# Patient Record
Sex: MALE | Race: WHITE | Employment: OTHER | URBAN - METROPOLITAN AREA
[De-identification: names, ages, dates, MRNs, and addresses within clinical notes are randomized per-mention and may not be internally consistent; named-entity substitution may affect disease eponyms.]

---

## 2017-08-10 ENCOUNTER — OFFICE VISIT (OUTPATIENT)
Dept: INTERNAL MEDICINE CLINIC | Age: 67
End: 2017-08-10

## 2017-08-10 VITALS
OXYGEN SATURATION: 97 % | BODY MASS INDEX: 28.05 KG/M2 | TEMPERATURE: 97.8 F | SYSTOLIC BLOOD PRESSURE: 130 MMHG | WEIGHT: 225.6 LBS | DIASTOLIC BLOOD PRESSURE: 72 MMHG | HEIGHT: 75 IN | RESPIRATION RATE: 12 BRPM | HEART RATE: 66 BPM

## 2017-08-10 DIAGNOSIS — Z87.891 HX OF SMOKING: ICD-10-CM

## 2017-08-10 DIAGNOSIS — E78.00 PURE HYPERCHOLESTEROLEMIA: ICD-10-CM

## 2017-08-10 DIAGNOSIS — E11.9 TYPE 2 DIABETES MELLITUS WITHOUT COMPLICATION, WITHOUT LONG-TERM CURRENT USE OF INSULIN (HCC): Primary | ICD-10-CM

## 2017-08-10 DIAGNOSIS — I10 ESSENTIAL HYPERTENSION: ICD-10-CM

## 2017-08-10 DIAGNOSIS — Z13.6 SCREENING FOR AAA (ABDOMINAL AORTIC ANEURYSM): ICD-10-CM

## 2017-08-10 DIAGNOSIS — Z12.5 PROSTATE CANCER SCREENING: ICD-10-CM

## 2017-08-10 PROCEDURE — 99214 OFFICE O/P EST MOD 30 MIN: CPT | Performed by: INTERNAL MEDICINE

## 2017-08-10 PROCEDURE — G8427 DOCREV CUR MEDS BY ELIG CLIN: HCPCS | Performed by: INTERNAL MEDICINE

## 2017-08-10 PROCEDURE — 3046F HEMOGLOBIN A1C LEVEL >9.0%: CPT | Performed by: INTERNAL MEDICINE

## 2017-08-10 PROCEDURE — 3017F COLORECTAL CA SCREEN DOC REV: CPT | Performed by: INTERNAL MEDICINE

## 2017-08-10 PROCEDURE — G8598 ASA/ANTIPLAT THER USED: HCPCS | Performed by: INTERNAL MEDICINE

## 2017-08-10 PROCEDURE — 1123F ACP DISCUSS/DSCN MKR DOCD: CPT | Performed by: INTERNAL MEDICINE

## 2017-08-10 PROCEDURE — 1036F TOBACCO NON-USER: CPT | Performed by: INTERNAL MEDICINE

## 2017-08-10 PROCEDURE — G8419 CALC BMI OUT NRM PARAM NOF/U: HCPCS | Performed by: INTERNAL MEDICINE

## 2017-08-10 PROCEDURE — 4040F PNEUMOC VAC/ADMIN/RCVD: CPT | Performed by: INTERNAL MEDICINE

## 2017-08-10 RX ORDER — HYDROCHLOROTHIAZIDE 12.5 MG/1
12.5 CAPSULE, GELATIN COATED ORAL EVERY MORNING
Qty: 90 CAPSULE | Refills: 1 | Status: SHIPPED | OUTPATIENT
Start: 2017-08-10 | End: 2018-02-06 | Stop reason: SDUPTHER

## 2017-08-10 RX ORDER — PIOGLITAZONEHYDROCHLORIDE 30 MG/1
30 TABLET ORAL DAILY
Qty: 90 TABLET | Refills: 1 | Status: SHIPPED | OUTPATIENT
Start: 2017-08-10 | End: 2018-02-12 | Stop reason: SDUPTHER

## 2017-08-10 RX ORDER — LANCETS
1 EACH MISCELLANEOUS DAILY
Qty: 300 EACH | Refills: 0 | Status: SHIPPED | OUTPATIENT
Start: 2017-08-10 | End: 2019-07-08 | Stop reason: SDUPTHER

## 2017-08-10 RX ORDER — ROSUVASTATIN CALCIUM 40 MG/1
40 TABLET, COATED ORAL DAILY
Qty: 90 TABLET | Refills: 1 | Status: SHIPPED | OUTPATIENT
Start: 2017-08-10 | End: 2018-02-12 | Stop reason: SDUPTHER

## 2017-08-10 RX ORDER — CLOPIDOGREL BISULFATE 75 MG/1
TABLET ORAL
Qty: 90 TABLET | Refills: 1 | Status: SHIPPED | OUTPATIENT
Start: 2017-08-10 | End: 2018-02-12 | Stop reason: SDUPTHER

## 2017-08-10 RX ORDER — AMLODIPINE BESYLATE AND BENAZEPRIL HYDROCHLORIDE 10; 20 MG/1; MG/1
1 CAPSULE ORAL DAILY
Qty: 90 CAPSULE | Refills: 1 | Status: SHIPPED | OUTPATIENT
Start: 2017-08-10 | End: 2018-02-12 | Stop reason: SDUPTHER

## 2017-08-10 RX ORDER — TAMSULOSIN HYDROCHLORIDE 0.4 MG/1
0.4 CAPSULE ORAL DAILY
Qty: 90 CAPSULE | Refills: 1 | Status: SHIPPED | OUTPATIENT
Start: 2017-08-10 | End: 2018-02-06 | Stop reason: SDUPTHER

## 2017-08-10 RX ORDER — EZETIMIBE 10 MG/1
10 TABLET ORAL DAILY
Qty: 90 TABLET | Refills: 1 | Status: SHIPPED | OUTPATIENT
Start: 2017-08-10 | End: 2018-02-12 | Stop reason: SDUPTHER

## 2017-08-10 RX ORDER — GLIMEPIRIDE 4 MG/1
TABLET ORAL
Qty: 135 TABLET | Refills: 1 | Status: SHIPPED | OUTPATIENT
Start: 2017-08-10 | End: 2018-02-06 | Stop reason: SDUPTHER

## 2017-08-10 ASSESSMENT — ENCOUNTER SYMPTOMS
SHORTNESS OF BREATH: 0
ABDOMINAL PAIN: 0
DIARRHEA: 0
COUGH: 0
CONSTIPATION: 0

## 2017-08-19 LAB
ALBUMIN SERPL-MCNC: 4.3 G/DL (ref 3.5–5.7)
ALP BLD-CCNC: 51 U/L (ref 36–125)
ALT SERPL-CCNC: 16 U/L (ref 7–52)
ANION GAP SERPL CALCULATED.3IONS-SCNC: 8 MMOL/L (ref 3–16)
AST SERPL-CCNC: 15 U/L (ref 13–39)
BILIRUB SERPL-MCNC: 0.5 MG/DL (ref 0–1.5)
BUN BLDV-MCNC: 15 MG/DL (ref 7–25)
CALCIUM SERPL-MCNC: 9.7 MG/DL (ref 8.6–10.3)
CHLORIDE BLD-SCNC: 104 MMOL/L (ref 98–110)
CHOLESTEROL, TOTAL: 150 MG/DL (ref 0–200)
CO2: 26 MMOL/L (ref 21–33)
CREAT SERPL-MCNC: 0.79 MG/DL (ref 0.6–1.3)
CREATININE URINE: 117.3 MG/DL
GFR, ESTIMATED: >90 SEE NOTE.
GFR, ESTIMATED: >90 SEE NOTE.
GLUCOSE BLD-MCNC: 159 MG/DL (ref 70–100)
HBA1C MFR BLD: 8 % (ref 4.8–6.4)
HDLC SERPL-MCNC: 41 MG/DL (ref 60–92)
LDL CHOLESTEROL CALCULATED: 84 MG/DL
MICROALBUMIN UR-MCNC: 16.1 MG/L (ref 0–17)
MICROALBUMIN/CREAT UR-RTO: 13.7 MG/G (ref 0–30)
OSMOLALITY CALCULATION: 290 MOSM/KG (ref 278–305)
POTASSIUM SERPL-SCNC: 4.1 MMOL/L (ref 3.5–5.3)
PROSTATE SPECIFIC ANTIGEN: 0.44 NG/ML (ref 0–4)
SODIUM BLD-SCNC: 138 MMOL/L (ref 133–146)
TOTAL CK: 64 U/L (ref 30–223)
TOTAL PROTEIN: 6.7 G/DL (ref 6.4–8.9)
TRIGL SERPL-MCNC: 123 MG/DL (ref 10–149)

## 2018-02-07 RX ORDER — TAMSULOSIN HYDROCHLORIDE 0.4 MG/1
CAPSULE ORAL
Qty: 90 CAPSULE | Refills: 0 | Status: SHIPPED | OUTPATIENT
Start: 2018-02-07 | End: 2018-05-13 | Stop reason: SDUPTHER

## 2018-02-07 RX ORDER — GLIMEPIRIDE 4 MG/1
TABLET ORAL
Qty: 135 TABLET | Refills: 0 | Status: SHIPPED | OUTPATIENT
Start: 2018-02-07 | End: 2019-01-23 | Stop reason: SDUPTHER

## 2018-02-07 RX ORDER — HYDROCHLOROTHIAZIDE 12.5 MG/1
12.5 CAPSULE, GELATIN COATED ORAL EVERY MORNING
Qty: 90 CAPSULE | Refills: 0 | Status: SHIPPED | OUTPATIENT
Start: 2018-02-07 | End: 2019-01-23 | Stop reason: SDUPTHER

## 2018-02-12 ENCOUNTER — OFFICE VISIT (OUTPATIENT)
Dept: INTERNAL MEDICINE CLINIC | Age: 68
End: 2018-02-12

## 2018-02-12 VITALS
DIASTOLIC BLOOD PRESSURE: 64 MMHG | HEART RATE: 57 BPM | OXYGEN SATURATION: 98 % | HEIGHT: 75 IN | WEIGHT: 233 LBS | BODY MASS INDEX: 28.97 KG/M2 | SYSTOLIC BLOOD PRESSURE: 120 MMHG

## 2018-02-12 DIAGNOSIS — E11.9 TYPE 2 DIABETES MELLITUS WITHOUT COMPLICATION, WITHOUT LONG-TERM CURRENT USE OF INSULIN (HCC): Primary | ICD-10-CM

## 2018-02-12 DIAGNOSIS — E78.00 PURE HYPERCHOLESTEROLEMIA: ICD-10-CM

## 2018-02-12 DIAGNOSIS — Z87.891 HX OF SMOKING: ICD-10-CM

## 2018-02-12 DIAGNOSIS — Z23 FLU VACCINE NEED: ICD-10-CM

## 2018-02-12 DIAGNOSIS — I10 ESSENTIAL HYPERTENSION: ICD-10-CM

## 2018-02-12 DIAGNOSIS — Z13.6 SCREENING FOR AAA (ABDOMINAL AORTIC ANEURYSM): ICD-10-CM

## 2018-02-12 PROCEDURE — 99214 OFFICE O/P EST MOD 30 MIN: CPT | Performed by: INTERNAL MEDICINE

## 2018-02-12 PROCEDURE — G8428 CUR MEDS NOT DOCUMENT: HCPCS | Performed by: INTERNAL MEDICINE

## 2018-02-12 PROCEDURE — G8598 ASA/ANTIPLAT THER USED: HCPCS | Performed by: INTERNAL MEDICINE

## 2018-02-12 PROCEDURE — 1123F ACP DISCUSS/DSCN MKR DOCD: CPT | Performed by: INTERNAL MEDICINE

## 2018-02-12 PROCEDURE — G8419 CALC BMI OUT NRM PARAM NOF/U: HCPCS | Performed by: INTERNAL MEDICINE

## 2018-02-12 PROCEDURE — 4040F PNEUMOC VAC/ADMIN/RCVD: CPT | Performed by: INTERNAL MEDICINE

## 2018-02-12 PROCEDURE — 3046F HEMOGLOBIN A1C LEVEL >9.0%: CPT | Performed by: INTERNAL MEDICINE

## 2018-02-12 PROCEDURE — 90662 IIV NO PRSV INCREASED AG IM: CPT | Performed by: INTERNAL MEDICINE

## 2018-02-12 PROCEDURE — G0008 ADMIN INFLUENZA VIRUS VAC: HCPCS | Performed by: INTERNAL MEDICINE

## 2018-02-12 PROCEDURE — 1036F TOBACCO NON-USER: CPT | Performed by: INTERNAL MEDICINE

## 2018-02-12 PROCEDURE — 3017F COLORECTAL CA SCREEN DOC REV: CPT | Performed by: INTERNAL MEDICINE

## 2018-02-12 PROCEDURE — G8484 FLU IMMUNIZE NO ADMIN: HCPCS | Performed by: INTERNAL MEDICINE

## 2018-02-12 RX ORDER — AMLODIPINE BESYLATE AND BENAZEPRIL HYDROCHLORIDE 10; 20 MG/1; MG/1
1 CAPSULE ORAL DAILY
Qty: 90 CAPSULE | Refills: 1 | Status: SHIPPED | OUTPATIENT
Start: 2018-02-12 | End: 2018-08-07 | Stop reason: SDUPTHER

## 2018-02-12 RX ORDER — EZETIMIBE 10 MG/1
10 TABLET ORAL DAILY
Qty: 90 TABLET | Refills: 1 | Status: SHIPPED | OUTPATIENT
Start: 2018-02-12 | End: 2018-08-07 | Stop reason: SDUPTHER

## 2018-02-12 RX ORDER — CLOPIDOGREL BISULFATE 75 MG/1
TABLET ORAL
Qty: 90 TABLET | Refills: 1 | Status: SHIPPED | OUTPATIENT
Start: 2018-02-12 | End: 2018-08-07 | Stop reason: SDUPTHER

## 2018-02-12 RX ORDER — ROSUVASTATIN CALCIUM 40 MG/1
40 TABLET, COATED ORAL DAILY
Qty: 90 TABLET | Refills: 1 | Status: SHIPPED | OUTPATIENT
Start: 2018-02-12 | End: 2018-08-18 | Stop reason: SDUPTHER

## 2018-02-12 RX ORDER — PIOGLITAZONEHYDROCHLORIDE 30 MG/1
30 TABLET ORAL DAILY
Qty: 90 TABLET | Refills: 1 | Status: SHIPPED | OUTPATIENT
Start: 2018-02-12 | End: 2018-08-07 | Stop reason: SDUPTHER

## 2018-02-12 ASSESSMENT — ENCOUNTER SYMPTOMS
COUGH: 0
SHORTNESS OF BREATH: 0
CONSTIPATION: 0
ABDOMINAL PAIN: 0
WHEEZING: 0
DIARRHEA: 0

## 2018-02-12 ASSESSMENT — PATIENT HEALTH QUESTIONNAIRE - PHQ9
2. FEELING DOWN, DEPRESSED OR HOPELESS: 0
SUM OF ALL RESPONSES TO PHQ9 QUESTIONS 1 & 2: 0
1. LITTLE INTEREST OR PLEASURE IN DOING THINGS: 0
SUM OF ALL RESPONSES TO PHQ QUESTIONS 1-9: 0

## 2018-02-12 NOTE — PROGRESS NOTES
Subjective:      Patient ID: Izaiah Castro is a 79 y.o. male. HPI     Chief Complaint   Patient presents with    Diabetes    Hypertension    Hyperlipidemia      Not fasting right now    Checks glucose only in am , [address] he recalls  Didn't bring records/meter    Weight is up some   Discussed    No chest pain  No edema  No headache    Not smoking    No stomach trouble recently  Had some diarrhea at times  Not gotten the AAA    Review of Systems   Constitutional: Negative for chills, fever and unexpected weight change. Respiratory: Negative for cough, shortness of breath and wheezing. Cardiovascular: Negative for chest pain, palpitations and leg swelling. Gastrointestinal: Negative for abdominal pain, constipation and diarrhea. Genitourinary: Negative for difficulty urinating, frequency and hematuria. Musculoskeletal: Positive for arthralgias. Negative for myalgias. Neurological: Negative for weakness, numbness and headaches. Hematological: Negative for adenopathy. Does not bruise/bleed easily. Objective:   Physical Exam   Constitutional: He is oriented to person, place, and time. He appears well-developed and well-nourished. Eyes: Pupils are equal, round, and reactive to light. No scleral icterus. Neck: Normal range of motion. No JVD present. Cardiovascular: Normal rate, regular rhythm, normal heart sounds and intact distal pulses. Pulmonary/Chest: Effort normal and breath sounds normal.   Abdominal: Soft. Bowel sounds are normal.   Musculoskeletal: He exhibits no edema. Neurological: He is alert and oriented to person, place, and time. Psychiatric: He has a normal mood and affect. His behavior is normal. Judgment and thought content normal.       Assessment:        ICD-10-CM ICD-9-CM    1. Type 2 diabetes mellitus without complication, without long-term current use of insulin ([facility]) E11.9 250.00 Hemoglobin A1C      Comprehensive Metabolic Panel   2.  Flu vaccine need Z23

## 2018-02-13 ENCOUNTER — TELEPHONE (OUTPATIENT)
Dept: INTERNAL MEDICINE CLINIC | Age: 68
End: 2018-02-13

## 2018-02-14 ENCOUNTER — TELEPHONE (OUTPATIENT)
Dept: INTERNAL MEDICINE CLINIC | Age: 68
End: 2018-02-14

## 2018-02-20 ENCOUNTER — TELEPHONE (OUTPATIENT)
Dept: INTERNAL MEDICINE CLINIC | Age: 68
End: 2018-02-20

## 2018-03-03 LAB
ALBUMIN SERPL-MCNC: 4.5 G/DL (ref 3.5–5.7)
ALP BLD-CCNC: 55 U/L (ref 36–125)
ALT SERPL-CCNC: 21 U/L (ref 7–52)
ANION GAP SERPL CALCULATED.3IONS-SCNC: 10 MMOL/L (ref 3–16)
AST SERPL-CCNC: 15 U/L (ref 13–39)
BILIRUB SERPL-MCNC: 0.6 MG/DL (ref 0–1.5)
BUN BLDV-MCNC: 18 MG/DL (ref 7–25)
CALCIUM SERPL-MCNC: 9.9 MG/DL (ref 8.6–10.3)
CHLORIDE BLD-SCNC: 102 MMOL/L (ref 98–110)
CHOLESTEROL, TOTAL: 136 MG/DL (ref 0–200)
CO2: 29 MMOL/L (ref 21–33)
CREAT SERPL-MCNC: 0.88 MG/DL (ref 0.6–1.3)
GFR, ESTIMATED: 88 SEE NOTE.
GFR, ESTIMATED: >90 SEE NOTE.
GLUCOSE BLD-MCNC: 177 MG/DL (ref 70–100)
HBA1C MFR BLD: 9.6 % (ref 4.8–6.4)
HDLC SERPL-MCNC: 38 MG/DL (ref 60–92)
LDL CHOLESTEROL CALCULATED: 69 MG/DL
OSMOLALITY CALCULATION: 298 MOSM/KG (ref 278–305)
POTASSIUM SERPL-SCNC: 4.3 MMOL/L (ref 3.5–5.3)
SODIUM BLD-SCNC: 141 MMOL/L (ref 133–146)
TOTAL PROTEIN: 7 G/DL (ref 6.4–8.9)
TRIGL SERPL-MCNC: 145 MG/DL (ref 10–149)

## 2018-05-14 ENCOUNTER — TELEPHONE (OUTPATIENT)
Dept: INTERNAL MEDICINE CLINIC | Age: 68
End: 2018-05-14

## 2018-05-14 RX ORDER — TAMSULOSIN HYDROCHLORIDE 0.4 MG/1
CAPSULE ORAL
Qty: 90 CAPSULE | Refills: 0 | Status: SHIPPED | OUTPATIENT
Start: 2018-05-14 | End: 2018-08-08 | Stop reason: SDUPTHER

## 2018-08-08 RX ORDER — TAMSULOSIN HYDROCHLORIDE 0.4 MG/1
CAPSULE ORAL
Qty: 90 CAPSULE | Refills: 0 | Status: SHIPPED | OUTPATIENT
Start: 2018-08-08 | End: 2018-11-04 | Stop reason: SDUPTHER

## 2018-08-20 RX ORDER — ROSUVASTATIN CALCIUM 40 MG/1
40 TABLET, COATED ORAL DAILY
Qty: 90 TABLET | Refills: 0 | Status: SHIPPED | OUTPATIENT
Start: 2018-08-20 | End: 2019-01-23 | Stop reason: SDUPTHER

## 2018-08-20 NOTE — TELEPHONE ENCOUNTER
Medication:   Requested Prescriptions     Pending Prescriptions Disp Refills    rosuvastatin (CRESTOR) 40 MG tablet [Pharmacy Med Name: ROSUVASTATIN 40MG TABLETS] 90 tablet 0     Sig: TAKE 1 TABLET BY MOUTH DAILY       Last Filled:      Patient Phone Number: [phone] (home) [phone] (work)    Last appt: Visit date not found 02-  Next appt: Visit date not found    Last Lipid:   Lab Results   Component Value Date    CHOL 136 03/03/2018    TRIG 145 03/03/2018    HDL 38 03/03/2018    HDL 38 05/22/2012    [address] 69 03/03/2018       Last OARRS: No flowsheet data found.     Preferred Pharmacy:   [facility] [address], [address] [place] [address] [phone] - [address] [address]  Phone: [phone] Fax: [phone]

## 2018-09-17 ENCOUNTER — OFFICE VISIT (OUTPATIENT)
Dept: INTERNAL MEDICINE CLINIC | Age: 68
End: 2018-09-17

## 2018-09-17 VITALS
BODY MASS INDEX: 27.85 KG/M2 | WEIGHT: 224 LBS | TEMPERATURE: 97.1 F | HEART RATE: 65 BPM | DIASTOLIC BLOOD PRESSURE: 78 MMHG | OXYGEN SATURATION: 98 % | HEIGHT: 75 IN | SYSTOLIC BLOOD PRESSURE: 134 MMHG

## 2018-09-17 DIAGNOSIS — R26.89 BALANCE PROBLEM: ICD-10-CM

## 2018-09-17 DIAGNOSIS — E11.9 TYPE 2 DIABETES MELLITUS WITHOUT COMPLICATION, WITHOUT LONG-TERM CURRENT USE OF INSULIN (HCC): Primary | ICD-10-CM

## 2018-09-17 DIAGNOSIS — E11.9 TYPE 2 DIABETES MELLITUS WITHOUT COMPLICATION, WITHOUT LONG-TERM CURRENT USE OF INSULIN (HCC): ICD-10-CM

## 2018-09-17 DIAGNOSIS — I10 ESSENTIAL HYPERTENSION: ICD-10-CM

## 2018-09-17 DIAGNOSIS — R29.898 LEFT LEG WEAKNESS: ICD-10-CM

## 2018-09-17 DIAGNOSIS — E78.00 PURE HYPERCHOLESTEROLEMIA: ICD-10-CM

## 2018-09-17 LAB
A/G RATIO: 1.7 (ref 1.1–2.2)
ALBUMIN SERPL-MCNC: 4.7 G/DL (ref 3.4–5)
ALP BLD-CCNC: 73 U/L (ref 40–129)
ALT SERPL-CCNC: 20 U/L (ref 10–40)
ANION GAP SERPL CALCULATED.3IONS-SCNC: 14 MMOL/L (ref 3–16)
AST SERPL-CCNC: 13 U/L (ref 15–37)
BILIRUB SERPL-MCNC: 0.4 MG/DL (ref 0–1)
BUN BLDV-MCNC: 16 MG/DL (ref 7–20)
CALCIUM SERPL-MCNC: 10 MG/DL (ref 8.3–10.6)
CHLORIDE BLD-SCNC: 97 MMOL/L (ref 99–110)
CHOLESTEROL, TOTAL: 282 MG/DL (ref 0–199)
CO2: 27 MMOL/L (ref 21–32)
CREAT SERPL-MCNC: 0.8 MG/DL (ref 0.8–1.3)
CREATININE URINE: 114.4 MG/DL (ref 39–259)
GFR AFRICAN AMERICAN: >60
GFR NON-AFRICAN AMERICAN: >60
GLOBULIN: 2.8 G/DL
GLUCOSE BLD-MCNC: 196 MG/DL (ref 70–99)
HDLC SERPL-MCNC: 42 MG/DL (ref 40–60)
LDL CHOLESTEROL CALCULATED: 198 MG/DL
MICROALBUMIN UR-MCNC: <1.2 MG/DL
MICROALBUMIN/CREAT UR-RTO: NORMAL MG/G (ref 0–30)
POTASSIUM SERPL-SCNC: 4.2 MMOL/L (ref 3.5–5.1)
SODIUM BLD-SCNC: 138 MMOL/L (ref 136–145)
TOTAL PROTEIN: 7.5 G/DL (ref 6.4–8.2)
TRIGL SERPL-MCNC: 210 MG/DL (ref 0–150)
VLDLC SERPL CALC-MCNC: 42 MG/DL

## 2018-09-17 PROCEDURE — 99214 OFFICE O/P EST MOD 30 MIN: CPT | Performed by: INTERNAL MEDICINE

## 2018-09-17 PROCEDURE — 1036F TOBACCO NON-USER: CPT | Performed by: INTERNAL MEDICINE

## 2018-09-17 PROCEDURE — G8419 CALC BMI OUT NRM PARAM NOF/U: HCPCS | Performed by: INTERNAL MEDICINE

## 2018-09-17 PROCEDURE — G8598 ASA/ANTIPLAT THER USED: HCPCS | Performed by: INTERNAL MEDICINE

## 2018-09-17 PROCEDURE — 3017F COLORECTAL CA SCREEN DOC REV: CPT | Performed by: INTERNAL MEDICINE

## 2018-09-17 PROCEDURE — G8427 DOCREV CUR MEDS BY ELIG CLIN: HCPCS | Performed by: INTERNAL MEDICINE

## 2018-09-17 PROCEDURE — 3046F HEMOGLOBIN A1C LEVEL >9.0%: CPT | Performed by: INTERNAL MEDICINE

## 2018-09-17 PROCEDURE — 2022F DILAT RTA XM EVC RTNOPTHY: CPT | Performed by: INTERNAL MEDICINE

## 2018-09-17 PROCEDURE — 1101F PT FALLS ASSESS-DOCD LE1/YR: CPT | Performed by: INTERNAL MEDICINE

## 2018-09-17 PROCEDURE — 4040F PNEUMOC VAC/ADMIN/RCVD: CPT | Performed by: INTERNAL MEDICINE

## 2018-09-17 PROCEDURE — 1123F ACP DISCUSS/DSCN MKR DOCD: CPT | Performed by: INTERNAL MEDICINE

## 2018-09-17 ASSESSMENT — ENCOUNTER SYMPTOMS
WHEEZING: 0
CONSTIPATION: 0
DIARRHEA: 0
ABDOMINAL PAIN: 0
COUGH: 0
SHORTNESS OF BREATH: 0
BACK PAIN: 1

## 2018-09-17 NOTE — PROGRESS NOTES
2018     Ambar Cam (:  1950) is a 76 y.o. male, here for evaluation of the following medical concerns:    Chief Complaint   Patient presents with    Other     unsteady on feet for 1 week, can hear heartbeat in left ear    Diabetes    Hyperlipidemia    Hypertension          HPI    Not fasting   Didn't bring in glucose meter or records    One week left leg feels heavy, walking \"like I am half drunk\"  Hard to even lift foot upward while sitting in chair left leg, has to concentrate, easy with right  This is new  Off balance for a couple weeks  No trouble with speech    No chest pains  No edema  No trouble with arms or face  No numbness    No ear pain  Hears pulsing in ears        Review of Systems   Constitutional: Negative for appetite change, chills and fever. Respiratory: Negative for cough, shortness of breath and wheezing. Cardiovascular: Negative for chest pain, palpitations and leg swelling. Gastrointestinal: Negative for abdominal pain, constipation and diarrhea. Genitourinary: Negative for difficulty urinating (good on the flomax) and hematuria. Musculoskeletal: Positive for back pain (dull , left side of back some). Negative for arthralgias. Skin: Negative for rash and wound. Neurological: Positive for weakness and light-headedness (occ). Negative for dizziness, syncope, speech difficulty, numbness and headaches. See hpi   Hematological: Negative for adenopathy. Does not bruise/bleed easily. Prior to Visit Medications    Medication Sig Taking?  Authorizing Provider   rosuvastatin (CRESTOR) 40 MG tablet TAKE 1 TABLET BY MOUTH DAILY Yes Timothy Wolff MD   amLODIPine-benazepril (LOTREL) 10-20 MG per capsule TAKE 1 CAPSULE BY MOUTH DAILY Yes Timothy Wolff MD   pioglitazone (ACTOS) 30 MG tablet TAKE 1 TABLET BY MOUTH DAILY Yes Timothy Wolff MD   clopidogrel (PLAVIX) 75 MG tablet TAKE 1 TABLET BY MOUTH EVERY DAY Yes Timothy Wolff MD   ezetimibe (ZETIA) 10 MG tablet TAKE 1 TABLET BY MOUTH DAILY Yes Angeles Cherry MD   tamsulosin (FLOMAX) 0.4 MG capsule TAKE 1 CAPSULE BY MOUTH EVERY DAY Yes Angeles Cherry MD   dapagliflozin (FARXIGA) 10 MG tablet Take 1 tablet by mouth every morning Yes Angeles Cherry MD   metFORMIN (GLUCOPHAGE) 1000 MG tablet TAKE 1 TABLET BY MOUTH TWICE DAILY WITH MEALS Yes Angeles Cherry MD   hydrochlorothiazide (MICROZIDE) 12.5 MG capsule TAKE 1 CAPSULE BY MOUTH EVERY MORNING Yes Angeles Cherry MD   glimepiride (AMARYL) 4 MG tablet TAKE 1 TABLET BY MOUTH EVERY MORNING WITH BREAKFAST AND 1/2 TABLET BY MOUTH WITH DINNER DAILY Yes Angeles Cherry MD   glucose blood VI test strips (ONE TOUCH ULTRA TEST) strip Apply 1 each topically daily Test Daily for Diabetes  DX E11.9 Yes Angeles Cherry MD   ONE TOUCH ULTRASOFT LANCETS MISC 1 each by In Vitro route daily Test Daily for Diabetes E11.9 Yes Angeles Cherry MD   Blood Glucose Monitoring Suppl MOISE Dx E11.9 Check daily and prn One Touch meter Yes Angeles Cherry MD   aspirin 81 MG EC tablet Take 81 mg by mouth daily. Yes Historical Provider, MD   clotrimazole-betamethasone (LOTRISONE) cream Apply topically 2 times daily as needed. Angeles Cherry MD   EPINEPHrine [facility]) 0.3 MG/0.3ML MOISE injection Inject 0.3 mLs into the skin once as needed for 1 dose. Angeles Cherry MD   Multiple Vitamins-Minerals (CENTRUM SILVER PO) Take  by mouth daily.   Historical Provider, MD        Social History   Substance Use Topics    Smoking status: Former Smoker     Packs/day: 1.00     Years: 48.00     Types: Cigarettes     Start date: 9/17/1959     Quit date: 11/28/2007    Smokeless tobacco: Never Used    Alcohol use Yes        Vitals:    09/17/18 1528   BP: 120/70   Site: Right Upper Arm   Position: Sitting   Cuff Size: Medium Adult   Pulse: 65   Temp: 97.1 °F (36.2 °C)   TempSrc: Oral   SpO2: 98%   Weight: 224 lb (101.6 kg)   Height: 6' 3\" (1.905 m)     Estimated body mass index is 28 kg/m² as calculated from the following:    Height as of this encounter: 6' 3\" (1.905 m). Weight as of this encounter: 224 lb (101.6 kg). Physical Exam   Constitutional: He is oriented to person, place, and time. He appears well-developed and well-nourished. HENT:   Right Ear: External ear normal.   Left Ear: External ear normal.   Eyes: Pupils are equal, round, and reactive to light. Conjunctivae and EOM are normal. No scleral icterus. Cardiovascular: Normal rate, regular rhythm, normal heart sounds and intact distal pulses. Pulmonary/Chest: Effort normal and breath sounds normal.   Abdominal: Soft. Bowel sounds are normal.   Musculoskeletal: He exhibits no edema. Feet -- no sores seen  Normal pulses  Filament test normal bilaterally     Neurological: He is alert and oriented to person, place, and time. He has normal reflexes. He displays normal reflexes. No cranial nerve deficit. He exhibits normal muscle tone. Coordination normal.   dtrs are ok  babinskis are downgoing bilaterally  rhomberg almost fall to left  Cranial nerves 2-12 intact  Motor 4+/5 left quad, 5/5 right   Ant tib 5/5 bilaterally  Tip toe for 30 seconds with hand on table for balance   Skin: Skin is warm and dry. Psychiatric: He has a normal mood and affect. His behavior is normal. Judgment and thought content normal.       ASSESSMENT/PLAN:  1. Balance problem  And left leg weakness - new  Discussed  Need mri with and without to rule out cva or cerebellum lesion  If negative, emg back    2. Type 2 diabetes mellitus without complication, without long-term current use of insulin ([address]  Check lab    3. Pure hypercholesterolemia  Check lab    4. Essential hypertension  Fair control  Need blood work before can do mri head      No Follow-up on file. An electronic signature was used to authenticate this note.     --Cristina Hernandez MD on 9/17/2018 at 4:04 PM

## 2018-09-18 ENCOUNTER — HOSPITAL ENCOUNTER (OUTPATIENT)
Dept: MRI IMAGING | Age: 68
Discharge: HOME | End: 2018-09-18
Attending: INTERNAL MEDICINE | Admitting: INTERNAL MEDICINE

## 2018-09-18 DIAGNOSIS — I10 ESSENTIAL HYPERTENSION: ICD-10-CM

## 2018-09-18 DIAGNOSIS — R29.898 LEFT LEG WEAKNESS: ICD-10-CM

## 2018-09-18 DIAGNOSIS — I63.9 ISCHEMIC STROKE OF FRONTAL LOBE (HCC): Primary | ICD-10-CM

## 2018-09-18 DIAGNOSIS — R26.89 BALANCE PROBLEM: ICD-10-CM

## 2018-09-18 DIAGNOSIS — E78.00 PURE HYPERCHOLESTEROLEMIA: ICD-10-CM

## 2018-09-18 DIAGNOSIS — E11.9 TYPE 2 DIABETES MELLITUS WITHOUT COMPLICATIONS (HCC): ICD-10-CM

## 2018-09-18 DIAGNOSIS — E11.9 TYPE 2 DIABETES MELLITUS WITHOUT COMPLICATION, WITHOUT LONG-TERM CURRENT USE OF INSULIN (HCC): ICD-10-CM

## 2018-09-18 DIAGNOSIS — I65.21 RIGHT INTERNAL CAROTID OCCLUSION: ICD-10-CM

## 2018-09-18 DIAGNOSIS — I25.10 ATHEROSCLEROSIS OF NATIVE CORONARY ARTERY OF NATIVE HEART WITHOUT ANGINA PECTORIS: ICD-10-CM

## 2018-09-18 LAB
ESTIMATED AVERAGE GLUCOSE: 243.2 MG/DL
HBA1C MFR BLD: 10.1 %

## 2018-09-18 RX ORDER — SODIUM CHLORIDE 0.9 % (FLUSH) 0.9 %
10 SYRINGE (ML) INJECTION ONCE
Status: DISCONTINUED | OUTPATIENT
Start: 2018-09-18 | End: 2018-09-19 | Stop reason: HOSPADM

## 2018-09-21 ENCOUNTER — TELEPHONE (OUTPATIENT)
Dept: INTERNAL MEDICINE CLINIC | Age: 68
End: 2018-09-21

## 2018-09-21 NOTE — TELEPHONE ENCOUNTER
PT called states MRI done on Tuesday showed some mini strokes , Pt wanting to call and see if he can get ECHO and DUP done sooner Pt given number [facility]

## 2018-10-02 ENCOUNTER — HOSPITAL ENCOUNTER (OUTPATIENT)
Dept: NON INVASIVE DIAGNOSTICS | Age: 68
Discharge: HOME | End: 2018-10-02
Payer: MEDICARE

## 2018-10-02 DIAGNOSIS — I10 ESSENTIAL HYPERTENSION: ICD-10-CM

## 2018-10-02 DIAGNOSIS — I25.10 ATHEROSCLEROSIS OF NATIVE CORONARY ARTERY OF NATIVE HEART WITHOUT ANGINA PECTORIS: ICD-10-CM

## 2018-10-02 DIAGNOSIS — I63.9 ISCHEMIC STROKE OF FRONTAL LOBE (HCC): ICD-10-CM

## 2018-10-02 LAB
LV EF: 58 %
LVEF MODALITY: NORMAL

## 2018-10-02 PROCEDURE — 93306 TTE W/DOPPLER COMPLETE: CPT

## 2018-10-03 ENCOUNTER — TELEPHONE (OUTPATIENT)
Dept: INTERNAL MEDICINE CLINIC | Age: 68
End: 2018-10-03

## 2018-10-09 ENCOUNTER — OFFICE VISIT (OUTPATIENT)
Dept: INTERNAL MEDICINE CLINIC | Age: 68
End: 2018-10-09
Payer: MEDICARE

## 2018-10-09 VITALS
BODY MASS INDEX: 27.95 KG/M2 | RESPIRATION RATE: 16 BRPM | TEMPERATURE: 98.6 F | HEART RATE: 58 BPM | OXYGEN SATURATION: 95 % | WEIGHT: 224.8 LBS | DIASTOLIC BLOOD PRESSURE: 74 MMHG | HEIGHT: 75 IN | SYSTOLIC BLOOD PRESSURE: 136 MMHG

## 2018-10-09 DIAGNOSIS — I77.9 RIGHT-SIDED CAROTID ARTERY DISEASE, UNSPECIFIED TYPE (HCC): Primary | ICD-10-CM

## 2018-10-09 DIAGNOSIS — E11.9 TYPE 2 DIABETES MELLITUS WITHOUT COMPLICATION, WITHOUT LONG-TERM CURRENT USE OF INSULIN (HCC): ICD-10-CM

## 2018-10-09 PROCEDURE — G8598 ASA/ANTIPLAT THER USED: HCPCS | Performed by: INTERNAL MEDICINE

## 2018-10-09 PROCEDURE — 3017F COLORECTAL CA SCREEN DOC REV: CPT | Performed by: INTERNAL MEDICINE

## 2018-10-09 PROCEDURE — 1036F TOBACCO NON-USER: CPT | Performed by: INTERNAL MEDICINE

## 2018-10-09 PROCEDURE — 2022F DILAT RTA XM EVC RTNOPTHY: CPT | Performed by: INTERNAL MEDICINE

## 2018-10-09 PROCEDURE — G8484 FLU IMMUNIZE NO ADMIN: HCPCS | Performed by: INTERNAL MEDICINE

## 2018-10-09 PROCEDURE — 1123F ACP DISCUSS/DSCN MKR DOCD: CPT | Performed by: INTERNAL MEDICINE

## 2018-10-09 PROCEDURE — G8419 CALC BMI OUT NRM PARAM NOF/U: HCPCS | Performed by: INTERNAL MEDICINE

## 2018-10-09 PROCEDURE — 3046F HEMOGLOBIN A1C LEVEL >9.0%: CPT | Performed by: INTERNAL MEDICINE

## 2018-10-09 PROCEDURE — 4040F PNEUMOC VAC/ADMIN/RCVD: CPT | Performed by: INTERNAL MEDICINE

## 2018-10-09 PROCEDURE — G8427 DOCREV CUR MEDS BY ELIG CLIN: HCPCS | Performed by: INTERNAL MEDICINE

## 2018-10-09 PROCEDURE — 99214 OFFICE O/P EST MOD 30 MIN: CPT | Performed by: INTERNAL MEDICINE

## 2018-10-09 PROCEDURE — 1101F PT FALLS ASSESS-DOCD LE1/YR: CPT | Performed by: INTERNAL MEDICINE

## 2018-10-09 ASSESSMENT — ENCOUNTER SYMPTOMS
ABDOMINAL PAIN: 0
CONSTIPATION: 0
SHORTNESS OF BREATH: 0
COUGH: 0
DIARRHEA: 0

## 2018-10-09 NOTE — PROGRESS NOTES
Deepak Alfaro MD   glimepiride (AMARYL) 4 MG tablet TAKE 1 TABLET BY MOUTH EVERY MORNING WITH BREAKFAST AND 1/2 TABLET BY MOUTH WITH DINNER DAILY Yes Deepak Alfaro MD   glucose blood VI test strips (ONE TOUCH ULTRA TEST) strip Apply 1 each topically daily Test Daily for Diabetes  DX E11.9 Yes Deepak Alfaro MD   ONE TOUCH ULTRASOFT LANCETS MISC 1 each by In Vitro route daily Test Daily for Diabetes E11.9 Yes Deepak Alfaro MD   Blood Glucose Monitoring Suppl MOISE Dx E11.9 Check daily and prn One Touch meter Yes Deepak Alfaro MD   clotrimazole-betamethasone (LOTRISONE) cream Apply topically 2 times daily as needed. Deepak Alfaro MD   EPINEPHrine [facility]) 0.3 MG/0.3ML MOISE injection Inject 0.3 mLs into the skin once as needed for 1 dose. Deepak Alfaro MD   aspirin 81 MG EC tablet Take 81 mg by mouth daily. Historical Provider, MD   Multiple Vitamins-Minerals (CENTRUM SILVER PO) Take  by mouth daily. Historical Provider, MD        Social History   Substance Use Topics    Smoking status: Former Smoker     Packs/day: 1.00     Years: 48.00     Types: Cigarettes     Start date: 9/17/1959     Quit date: 11/28/2007    Smokeless tobacco: Never Used    Alcohol use Yes        Vitals:    10/09/18 1629   BP: 136/74   Site: Left Upper Arm   Position: Sitting   Cuff Size: Large Adult   Pulse: 58   Resp: 16   Temp: 98.6 °F (37 °C)   TempSrc: Oral   SpO2: 95%   Weight: 224 lb 12.8 oz (102 kg)   Height: 6' 3\" (1.905 m)     Estimated body mass index is 28.1 kg/m² as calculated from the following:    Height as of this encounter: 6' 3\" (1.905 m). Weight as of this encounter: 224 lb 12.8 oz (102 kg). Physical Exam   Constitutional: He is oriented to person, place, and time. He appears well-developed and well-nourished. HENT:   Right Ear: External ear normal.   Left Ear: External ear normal.   Eyes: Pupils are equal, round, and reactive to light. No scleral icterus. Neck: Normal range of motion. No JVD present. Cardiovascular: Normal rate, regular rhythm, normal heart sounds and intact distal pulses. Left carotid bruit, soft   Pulmonary/Chest: Effort normal and breath sounds normal.   Abdominal: Soft. Bowel sounds are normal.   Musculoskeletal: He exhibits no edema. Neurological: He is alert and oriented to person, place, and time. He has normal reflexes. Minor hand tremor  Normal facies and motion   Psychiatric: He has a normal mood and affect. His behavior is normal. Judgment and thought content normal.       ASSESSMENT/PLAN:  1. Right-sided carotid artery disease, unspecified type [facility])  Discussed at length  Refer Dr Elza Mclaughlin and his group  Xiao Mancia emphasized  - Ambulatory Referral To Cardiothoracic Surgery    2. Type 2 diabetes mellitus without complication, without long-term current use of insulin ([facility]  Correcting how he takes med and diet  Offered referral dietician again  Patient thinking      No Follow-up on file. An electronic signature was used to authenticate this note.     --Willy Benitez MD on 10/9/2018 at 5:21 PM

## 2018-10-09 NOTE — PATIENT INSTRUCTIONS
The goal is to start the day with a fasting glucose between 80 and 130 and to have a glucose at least under 180 two hours after supper, ideally under 140. If your glucoses run higher than this frequently, please let me know. Don't wait until your next appointment. I recommend getting a 7 day am and pm pill organizer and setting this up once a week.

## 2018-11-05 RX ORDER — CLOPIDOGREL BISULFATE 75 MG/1
TABLET ORAL
Qty: 90 TABLET | Refills: 0 | Status: SHIPPED | OUTPATIENT
Start: 2018-11-05 | End: 2019-01-23 | Stop reason: SDUPTHER

## 2018-11-05 RX ORDER — TAMSULOSIN HYDROCHLORIDE 0.4 MG/1
CAPSULE ORAL
Qty: 90 CAPSULE | Refills: 0 | Status: SHIPPED | OUTPATIENT
Start: 2018-11-05 | End: 2019-01-23 | Stop reason: SDUPTHER

## 2018-11-05 RX ORDER — AMLODIPINE BESYLATE AND BENAZEPRIL HYDROCHLORIDE 10; 20 MG/1; MG/1
1 CAPSULE ORAL DAILY
Qty: 90 CAPSULE | Refills: 0 | Status: SHIPPED | OUTPATIENT
Start: 2018-11-05 | End: 2019-01-23 | Stop reason: SDUPTHER

## 2018-11-05 RX ORDER — PIOGLITAZONEHYDROCHLORIDE 30 MG/1
30 TABLET ORAL DAILY
Qty: 90 TABLET | Refills: 0 | Status: SHIPPED | OUTPATIENT
Start: 2018-11-05 | End: 2019-01-23 | Stop reason: SDUPTHER

## 2018-11-05 RX ORDER — EZETIMIBE 10 MG/1
10 TABLET ORAL DAILY
Qty: 90 TABLET | Refills: 0 | Status: SHIPPED | OUTPATIENT
Start: 2018-11-05 | End: 2019-01-23 | Stop reason: SDUPTHER

## 2018-11-07 ENCOUNTER — TELEPHONE (OUTPATIENT)
Dept: VASCULAR SURGERY | Age: 68
End: 2018-11-07

## 2018-12-06 ENCOUNTER — OFFICE VISIT (OUTPATIENT)
Dept: VASCULAR SURGERY | Age: 68
End: 2018-12-06
Payer: MEDICARE

## 2018-12-06 VITALS
HEIGHT: 75 IN | BODY MASS INDEX: 27.6 KG/M2 | SYSTOLIC BLOOD PRESSURE: 124 MMHG | DIASTOLIC BLOOD PRESSURE: 70 MMHG | WEIGHT: 222 LBS

## 2018-12-06 DIAGNOSIS — I65.21 STENOSIS OF RIGHT CAROTID ARTERY: Primary | ICD-10-CM

## 2018-12-06 PROCEDURE — G8598 ASA/ANTIPLAT THER USED: HCPCS | Performed by: SURGERY

## 2018-12-06 PROCEDURE — 99205 OFFICE O/P NEW HI 60 MIN: CPT | Performed by: SURGERY

## 2018-12-06 PROCEDURE — G8484 FLU IMMUNIZE NO ADMIN: HCPCS | Performed by: SURGERY

## 2018-12-06 PROCEDURE — 1036F TOBACCO NON-USER: CPT | Performed by: SURGERY

## 2018-12-06 PROCEDURE — 4040F PNEUMOC VAC/ADMIN/RCVD: CPT | Performed by: SURGERY

## 2018-12-06 PROCEDURE — 1123F ACP DISCUSS/DSCN MKR DOCD: CPT | Performed by: SURGERY

## 2018-12-06 PROCEDURE — G8427 DOCREV CUR MEDS BY ELIG CLIN: HCPCS | Performed by: SURGERY

## 2018-12-06 PROCEDURE — G8419 CALC BMI OUT NRM PARAM NOF/U: HCPCS | Performed by: SURGERY

## 2018-12-06 PROCEDURE — 3017F COLORECTAL CA SCREEN DOC REV: CPT | Performed by: SURGERY

## 2018-12-06 PROCEDURE — 1101F PT FALLS ASSESS-DOCD LE1/YR: CPT | Performed by: SURGERY

## 2018-12-06 ASSESSMENT — ENCOUNTER SYMPTOMS: SHORTNESS OF BREATH: 1

## 2018-12-06 NOTE — PROGRESS NOTES
noted in the   [address] is indicative of a distal obstruction.    - The left internal carotid artery appears to have a <50% diameter reducing    stenosis based on velocity criteria.        Recommendations        -Recommend follow up study in 6 months. Consider carotid CTA if further    imaging is warranted.        Signature        ------------------------------------------------------------------    Electronically signed by Cindy Street MD, [address] (Interpreting    physician) on 10/02/2018 at 07:09 PM    ------------------------------------------------------------------       Blood Pressure:Right arm 112/ mmHg. Left arm 118/ mmHg. Patient Status:Routine. [address] [place] [address] - Vascular Lab. Technical Quality:Adequate visualization.       Plaque     - A plaque was found in the Right ICA.       Irregular. The plaque characteristics are: heterogeneous texture. There is evidence of calcified plaque.         - A plaque was found in the Right CCA.       DIffuse smooth <50%. The plaque characteristics are: heterogeneous texture.         - A plaque was found in the Left ICA.       Irregular. The plaque characteristics are: heterogeneous texture.       Velocities are measured in cm/s ; Diameters are measured in mm       Carotid Right Measurements   +---------------+----+----+-----+----+   ! Location       !PSV ! EDV ! Angle! RI  !   +---------------+----+----+-----+----+   ! Prox CCA       !94. 4!0   !60   !1   !   +---------------+----+----+-----+----+   ! Mid CCA        !42. 7!0   !60   !1   !   +---------------+----+----+-----+----+   ! Dist CCA       !9.59!    !60   !    !   +---------------+----+----+-----+----+   ! Prox ICA       !86. 2!11. 5! 60   !0.87!   +---------------+----+----+-----+----+   ! Mid ICA        !25. 2!10. 1! 60   !0.6 ! +---------------+----+----+-----+----+   ! Dist ICA       !27. 2!10. 8! 60   !0.6 ! +---------------+----+----+-----+----+   ! Prox ECA       !64. 1!    !60   !    ! +---------------+----+----+-----+----+   ! Vertebral      !56. 8!    !60   !    !   +---------------+----+----+-----+----+   ! Prox Subclavian! 58.5!    !0    !    !   +---------------+----+----+-----+----+         - There is antegrade vertebral flow noted on the right side.         - Additional Measurements:ICAPSV/CCAPSV 2.02.       Carotid Left Measurements   +---------------+----+----+-----+----+   ! Location       !PSV ! EDV ! Angle! RI  !   +---------------+----+----+-----+----+   ! Prox CCA       !108 !16. 2! 60   !0.85!   +---------------+----+----+-----+----+   ! Mid CCA       [id number]. 9!18  !60   !0.8 ! +---------------+----+----+-----+----+   ! Dist CCA       !96. 9!25. 5! 60   !0.74!   +---------------+----+----+-----+----+   ! Prox ICA       !84. 5!12. 5! 60   !0.66!   +---------------+----+----+-----+----+   ! Mid ICA        !71. 3!20. 6! 60   !0.71! +---------------+----+----+-----+----+   ! Dist ICA       !74. 3!57. 5! 60   !0.62!   +---------------+----+----+-----+----+   ! Prox ECA       !87. 8!    !60   !    !   +---------------+----+----+-----+----+   ! Vertebral     [id number]. 6!    !60   !    !   +---------------+----+----+-----+----+   ! Prox Subclavian! 221 !    !60   !    !   +---------------+----+----+-----+----+         - There is antegrade vertebral flow noted on the left side.         - Additional Measurements:ICAPSV/CCAPSV 0.92. ICAEDV/CCAEDV 1.76.         Assessment:      S/P TIA/small CVA  HELEN stenosis of unclear severity due to severe calcification and shadowing - abnormal flow patterns seen on CDS in CCA and on MRI may be due to unseen critical ICA stenosis. Plan:      Continue maximal medical therapy - ASA + statin. Will obtain CTA neck to better delineate birfurcation disease and possibility of siphon stenosis as it may affect treatment. Will calll with results and plan.

## 2018-12-07 DIAGNOSIS — Z01.812 PRE-PROCEDURE LAB EXAM: Primary | ICD-10-CM

## 2018-12-14 ENCOUNTER — HOSPITAL ENCOUNTER (OUTPATIENT)
Dept: CT IMAGING | Age: 68
Discharge: HOME | End: 2018-12-14
Payer: MEDICARE

## 2018-12-14 DIAGNOSIS — I65.21 STENOSIS OF RIGHT CAROTID ARTERY: ICD-10-CM

## 2018-12-14 DIAGNOSIS — Z01.812 PRE-PROCEDURE LAB EXAM: ICD-10-CM

## 2018-12-14 LAB
ANION GAP SERPL CALCULATED.3IONS-SCNC: 10 MMOL/L (ref 3–16)
BUN BLDV-MCNC: 17 MG/DL (ref 7–20)
CALCIUM SERPL-MCNC: 10.2 MG/DL (ref 8.3–10.6)
CHLORIDE BLD-SCNC: 98 MMOL/L (ref 99–110)
CO2: 29 MMOL/L (ref 21–32)
CREAT SERPL-MCNC: 0.9 MG/DL (ref 0.8–1.3)
GFR AFRICAN AMERICAN: >60
GFR NON-AFRICAN AMERICAN: >60
GLUCOSE BLD-MCNC: 177 MG/DL (ref 70–99)
POTASSIUM SERPL-SCNC: 4.3 MMOL/L (ref 3.5–5.1)
SODIUM BLD-SCNC: 137 MMOL/L (ref 136–145)

## 2018-12-14 PROCEDURE — 70498 CT ANGIOGRAPHY NECK: CPT

## 2018-12-14 PROCEDURE — 36415 COLL VENOUS BLD VENIPUNCTURE: CPT

## 2018-12-14 PROCEDURE — 6360000004 HC RX CONTRAST MEDICATION: Performed by: SURGERY

## 2018-12-14 PROCEDURE — 80048 BASIC METABOLIC PNL TOTAL CA: CPT

## 2018-12-14 RX ADMIN — IOPAMIDOL 100 ML: 755 INJECTION, SOLUTION INTRAVENOUS at 16:34

## 2018-12-20 ENCOUNTER — SURG/PROC ORDERS (OUTPATIENT)
Dept: VASCULAR SURGERY | Age: 68
End: 2018-12-20

## 2018-12-20 ENCOUNTER — OFFICE VISIT (OUTPATIENT)
Dept: VASCULAR SURGERY | Age: 68
End: 2018-12-20
Payer: MEDICARE

## 2018-12-20 VITALS
SYSTOLIC BLOOD PRESSURE: 128 MMHG | WEIGHT: 221 LBS | BODY MASS INDEX: 27.48 KG/M2 | DIASTOLIC BLOOD PRESSURE: 68 MMHG | HEIGHT: 75 IN

## 2018-12-20 DIAGNOSIS — Z01.818 PRE-OP TESTING: Primary | ICD-10-CM

## 2018-12-20 DIAGNOSIS — I65.23 BILATERAL CAROTID ARTERY STENOSIS: ICD-10-CM

## 2018-12-20 DIAGNOSIS — I65.21 SYMPTOMATIC STENOSIS OF RIGHT CAROTID ARTERY WITHOUT INFARCTION: Primary | ICD-10-CM

## 2018-12-20 PROCEDURE — 99213 OFFICE O/P EST LOW 20 MIN: CPT | Performed by: SURGERY

## 2018-12-20 PROCEDURE — G8419 CALC BMI OUT NRM PARAM NOF/U: HCPCS | Performed by: SURGERY

## 2018-12-20 PROCEDURE — G8598 ASA/ANTIPLAT THER USED: HCPCS | Performed by: SURGERY

## 2018-12-20 PROCEDURE — 3017F COLORECTAL CA SCREEN DOC REV: CPT | Performed by: SURGERY

## 2018-12-20 PROCEDURE — G8427 DOCREV CUR MEDS BY ELIG CLIN: HCPCS | Performed by: SURGERY

## 2018-12-20 PROCEDURE — G8484 FLU IMMUNIZE NO ADMIN: HCPCS | Performed by: SURGERY

## 2018-12-20 PROCEDURE — 1101F PT FALLS ASSESS-DOCD LE1/YR: CPT | Performed by: SURGERY

## 2018-12-20 PROCEDURE — 4040F PNEUMOC VAC/ADMIN/RCVD: CPT | Performed by: SURGERY

## 2018-12-20 PROCEDURE — 1036F TOBACCO NON-USER: CPT | Performed by: SURGERY

## 2018-12-20 PROCEDURE — 1123F ACP DISCUSS/DSCN MKR DOCD: CPT | Performed by: SURGERY

## 2018-12-20 RX ORDER — SODIUM CHLORIDE 0.9 % (FLUSH) 0.9 %
10 SYRINGE (ML) INJECTION EVERY 12 HOURS SCHEDULED
Status: CANCELLED | OUTPATIENT
Start: 2018-12-20

## 2018-12-20 RX ORDER — SODIUM CHLORIDE 0.9 % (FLUSH) 0.9 %
10 SYRINGE (ML) INJECTION PRN
Status: CANCELLED | OUTPATIENT
Start: 2018-12-20

## 2018-12-20 NOTE — PROGRESS NOTES
Subjective:      Patient ID: Jeanne Deleon is a 76 y.o. male. HPI Referral from Willy Benitez MD for evaluation of HELEN stenosis found during wourkup for transient L leg clumsiness in September of this year. Symptoms lasted a week and have completely resolved and not recurred. No amaurosis or CVA. MRI at the time suggested N subacute frontal infarcts.     Past Medical History:   Diagnosis Date    CAD (coronary artery disease)     Chronic back pain     lumbar disk disease    Colorectal polyps     Erectile dysfunction     Hyperlipidemia     Hypertension     Hypertrophy of prostate without urinary obstruction and other lower urinary tract symptoms (LUTS)     Retrograde ejaculation     Sciatica     Type II or unspecified type diabetes mellitus without mention of complication, not stated as uncontrolled      Allergies   Allergen Reactions    Other      Bee stings       Current Outpatient Prescriptions   Medication Sig Dispense Refill    metFORMIN (GLUCOPHAGE) 1000 MG tablet TAKE 1 TABLET BY MOUTH TWICE DAILY WITH MEALS 180 tablet 0    amLODIPine-benazepril (LOTREL) 10-20 MG per capsule TAKE 1 CAPSULE BY MOUTH DAILY 90 capsule 0    clopidogrel (PLAVIX) 75 MG tablet TAKE 1 TABLET BY MOUTH EVERY DAY 90 tablet 0    pioglitazone (ACTOS) 30 MG tablet TAKE 1 TABLET BY MOUTH DAILY 90 tablet 0    tamsulosin (FLOMAX) 0.4 MG capsule TAKE 1 CAPSULE BY MOUTH EVERY DAY 90 capsule 0    ezetimibe (ZETIA) 10 MG tablet TAKE 1 TABLET BY MOUTH DAILY 90 tablet 0    rosuvastatin (CRESTOR) 40 MG tablet TAKE 1 TABLET BY MOUTH DAILY 90 tablet 0    dapagliflozin (FARXIGA) 10 MG tablet Take 1 tablet by mouth every morning 90 tablet 1    hydrochlorothiazide (MICROZIDE) 12.5 MG capsule TAKE 1 CAPSULE BY MOUTH EVERY MORNING 90 capsule 0    glimepiride (AMARYL) 4 MG tablet TAKE 1 TABLET BY MOUTH EVERY MORNING WITH BREAKFAST AND 1/2 TABLET BY MOUTH WITH DINNER DAILY 135 tablet 0    glucose blood VI test strips (ONE TOUCH artery origin. 5. Centrilobular and paraseptal emphysema. 6. Air-fluid level in the right maxillary sinus suspicious for acute   sinusitis. Assessment:     Symptomatic severe HELEN stenosis - critical affecting outflow and inflow      Plan:      Continue maximal medical therapy - ASA + statin. Recommended R CEA for stroke prevention in this case. All benefits and risk reduction explained including risk of CVA without surgery (up to 40% at 2 years) and with surgery (1-2%). Risk of CN injury also outlined. Pt and wife understand and wish to proceed in near future. [facility].

## 2018-12-26 ENCOUNTER — HOSPITAL ENCOUNTER (OUTPATIENT)
Dept: GENERAL RADIOLOGY | Age: 68
Discharge: HOME | End: 2018-12-26
Payer: MEDICARE

## 2018-12-26 ENCOUNTER — HOSPITAL ENCOUNTER (OUTPATIENT)
Dept: PREADMISSION TESTING | Age: 68
Discharge: HOME | End: 2018-12-30
Payer: MEDICARE

## 2018-12-26 ENCOUNTER — ANESTHESIA EVENT (OUTPATIENT)
Dept: OPERATING ROOM | Age: 68
DRG: 039 | End: 2018-12-26
Payer: MEDICARE

## 2018-12-26 VITALS — WEIGHT: 225 LBS | HEIGHT: 75 IN | BODY MASS INDEX: 27.98 KG/M2

## 2018-12-26 DIAGNOSIS — Z01.811 PRE-OP CHEST EXAM: ICD-10-CM

## 2018-12-26 LAB
ABO/RH: NORMAL
ANION GAP SERPL CALCULATED.3IONS-SCNC: 9 MMOL/L (ref 3–16)
ANTIBODY SCREEN: NORMAL
APTT: 34.8 SEC (ref 26–36)
BILIRUBIN URINE: NEGATIVE
BLOOD, URINE: NEGATIVE
BUN BLDV-MCNC: 16 MG/DL (ref 7–20)
CALCIUM SERPL-MCNC: 10.2 MG/DL (ref 8.3–10.6)
CHLORIDE BLD-SCNC: 101 MMOL/L (ref 99–110)
CLARITY: CLEAR
CO2: 29 MMOL/L (ref 21–32)
COLOR: YELLOW
CREAT SERPL-MCNC: 0.8 MG/DL (ref 0.8–1.3)
GFR AFRICAN AMERICAN: >60
GFR NON-AFRICAN AMERICAN: >60
GLUCOSE BLD-MCNC: 182 MG/DL (ref 70–99)
GLUCOSE URINE: >=1000 MG/DL
HCT VFR BLD CALC: 49.9 % (ref 40.5–52.5)
HEMOGLOBIN: 16.6 G/DL (ref 13.5–17.5)
INR BLD: 0.96 (ref 0.86–1.14)
KETONES, URINE: NEGATIVE MG/DL
LEUKOCYTE ESTERASE, URINE: NEGATIVE
MCH RBC QN AUTO: 30.6 PG (ref 26–34)
MCHC RBC AUTO-ENTMCNC: 33.3 G/DL (ref 31–36)
MCV RBC AUTO: 92 FL (ref 80–100)
MICROSCOPIC EXAMINATION: ABNORMAL
NITRITE, URINE: NEGATIVE
PDW BLD-RTO: 14.1 % (ref 12.4–15.4)
PH UA: 5.5
PLATELET # BLD: 190 K/UL (ref 135–450)
PMV BLD AUTO: 7.5 FL (ref 5–10.5)
POTASSIUM SERPL-SCNC: 4.5 MMOL/L (ref 3.5–5.1)
PROTEIN UA: NEGATIVE MG/DL
PROTHROMBIN TIME: 11 SEC (ref 9.8–13)
RBC # BLD: 5.42 M/UL (ref 4.2–5.9)
SODIUM BLD-SCNC: 139 MMOL/L (ref 136–145)
SPECIFIC GRAVITY UA: 1.03
URINE TYPE: ABNORMAL
UROBILINOGEN, URINE: 0.2 E.U./DL
WBC # BLD: 6.6 K/UL (ref 4–11)

## 2018-12-26 PROCEDURE — 93005 ELECTROCARDIOGRAM TRACING: CPT

## 2018-12-26 PROCEDURE — 81003 URINALYSIS AUTO W/O SCOPE: CPT

## 2018-12-26 PROCEDURE — 86900 BLOOD TYPING SEROLOGIC ABO: CPT

## 2018-12-26 PROCEDURE — 85027 COMPLETE CBC AUTOMATED: CPT

## 2018-12-26 PROCEDURE — 85610 PROTHROMBIN TIME: CPT

## 2018-12-26 PROCEDURE — 71046 X-RAY EXAM CHEST 2 VIEWS: CPT

## 2018-12-26 PROCEDURE — 80048 BASIC METABOLIC PNL TOTAL CA: CPT

## 2018-12-26 PROCEDURE — 86850 RBC ANTIBODY SCREEN: CPT

## 2018-12-26 PROCEDURE — 85730 THROMBOPLASTIN TIME PARTIAL: CPT

## 2018-12-26 PROCEDURE — 86901 BLOOD TYPING SEROLOGIC RH(D): CPT

## 2018-12-26 NOTE — ANESTHESIA PRE PROCEDURE
(EPIPEN) 0.3 MG/0.3ML MOISE injection Inject 0.3 mLs into the skin once as needed for 1 dose. 4/29/11   Yenifer Palomo MD   aspirin 81 MG EC tablet Take 81 mg by mouth daily. Historical Provider, MD   Multiple Vitamins-Minerals (CENTRUM SILVER PO) Take  by mouth daily. 11/28/10   Historical Provider, MD       Current medications:    Current Outpatient Prescriptions   Medication Sig Dispense Refill    metFORMIN (GLUCOPHAGE) 1000 MG tablet TAKE 1 TABLET BY MOUTH TWICE DAILY WITH MEALS 180 tablet 0    amLODIPine-benazepril (LOTREL) 10-20 MG per capsule TAKE 1 CAPSULE BY MOUTH DAILY 90 capsule 0    clopidogrel (PLAVIX) 75 MG tablet TAKE 1 TABLET BY MOUTH EVERY DAY 90 tablet 0    pioglitazone (ACTOS) 30 MG tablet TAKE 1 TABLET BY MOUTH DAILY 90 tablet 0    tamsulosin (FLOMAX) 0.4 MG capsule TAKE 1 CAPSULE BY MOUTH EVERY DAY 90 capsule 0    ezetimibe (ZETIA) 10 MG tablet TAKE 1 TABLET BY MOUTH DAILY 90 tablet 0    rosuvastatin (CRESTOR) 40 MG tablet TAKE 1 TABLET BY MOUTH DAILY 90 tablet 0    dapagliflozin (FARXIGA) 10 MG tablet Take 1 tablet by mouth every morning 90 tablet 1    hydrochlorothiazide (MICROZIDE) 12.5 MG capsule TAKE 1 CAPSULE BY MOUTH EVERY MORNING 90 capsule 0    glimepiride (AMARYL) 4 MG tablet TAKE 1 TABLET BY MOUTH EVERY MORNING WITH BREAKFAST AND 1/2 TABLET BY MOUTH WITH DINNER DAILY 135 tablet 0    glucose blood VI test strips (ONE TOUCH ULTRA TEST) strip Apply 1 each topically daily Test Daily for Diabetes  DX E11.9 100 each 3    ONE TOUCH ULTRASOFT LANCETS MISC 1 each by In Vitro route daily Test Daily for Diabetes E11.9 300 each 0    Blood Glucose Monitoring Suppl MOISE Dx E11.9 Check daily and prn One Touch meter 1 Device 0    EPINEPHrine (EPIPEN) 0.3 MG/0.3ML MOISE injection Inject 0.3 mLs into the skin once as needed for 1 dose. 1 Device 1    aspirin 81 MG EC tablet Take 81 mg by mouth daily.  Multiple Vitamins-Minerals (CENTRUM SILVER PO) Take  by mouth daily.        No current facility-administered medications for this encounter. Allergies:     Allergies   Allergen Reactions    Other      Bee stings         Problem List:    Patient Active Problem List   Diagnosis Code    Type 2 diabetes mellitus without complication, without long-term current use of insulin ([facility]) E11.9    Pure hypercholesterolemia E78.00    Essential hypertension I10    Coronary atherosclerosis of native coronary artery I25.10    Ear fullness H93.8X9    Paronychia [id number]    Kidney stone N20.0    Fungal dermatitis B36.9    Neck pain on left side M54.2    Hx of smoking Z87.891    Colon polyps K63.5    Left hip pain M25.552    Ischemic stroke of frontal lobe ([facility]) I63.9    Right-sided carotid artery disease ([facility]) I77.9       Past Medical History:        Diagnosis Date    CAD (coronary artery disease)     Chronic back pain     lumbar disk disease    Colorectal polyps     Erectile dysfunction     Hyperlipidemia     Hypertension     Hypertrophy of prostate without urinary obstruction and other lower urinary tract symptoms (LUTS)     Retrograde ejaculation     Sciatica     Type II or unspecified type diabetes mellitus without mention of complication, not stated as uncontrolled        Past Surgical History:        Procedure Laterality Date    CORONARY ANGIOPLASTY      x2    CYST REMOVAL      from back and finger x2    TONSILLECTOMY AND ADENOIDECTOMY         Social History:    Social History   Substance Use Topics    Smoking status: Former Smoker     Packs/day: 1.00     Years: 48.00     Types: Cigarettes     Start date: 9/17/1959     Quit date: 11/28/2007    Smokeless tobacco: Never Used    Alcohol use Yes                                Counseling given: Not Answered      Vital Signs (Current):   Vitals:    12/26/18 1253   Weight: 225 lb (102.1 kg)   Height: 6' 3\" (1.905 m)                                              BP Readings from Last 3 Encounters:   12/20/18 128/68   12/06/18

## 2018-12-27 ENCOUNTER — OFFICE VISIT (OUTPATIENT)
Dept: CARDIOLOGY CLINIC | Age: 68
End: 2018-12-27
Payer: MEDICARE

## 2018-12-27 VITALS
HEIGHT: 75 IN | SYSTOLIC BLOOD PRESSURE: 130 MMHG | BODY MASS INDEX: 26.98 KG/M2 | WEIGHT: 217 LBS | HEART RATE: 60 BPM | DIASTOLIC BLOOD PRESSURE: 62 MMHG | OXYGEN SATURATION: 96 %

## 2018-12-27 DIAGNOSIS — I10 ESSENTIAL HYPERTENSION: ICD-10-CM

## 2018-12-27 DIAGNOSIS — Z01.810 PREOP CARDIOVASCULAR EXAM: ICD-10-CM

## 2018-12-27 DIAGNOSIS — E78.00 PURE HYPERCHOLESTEROLEMIA: ICD-10-CM

## 2018-12-27 DIAGNOSIS — I25.10 ATHEROSCLEROSIS OF NATIVE CORONARY ARTERY OF NATIVE HEART WITHOUT ANGINA PECTORIS: Primary | ICD-10-CM

## 2018-12-27 LAB
EKG ATRIAL RATE: 74 BPM
EKG DIAGNOSIS: NORMAL
EKG P AXIS: 73 DEGREES
EKG P-R INTERVAL: 182 MS
EKG Q-T INTERVAL: 436 MS
EKG QRS DURATION: 154 MS
EKG QTC CALCULATION (BAZETT): 483 MS
EKG R AXIS: -40 DEGREES
EKG T AXIS: 47 DEGREES
EKG VENTRICULAR RATE: 74 BPM

## 2018-12-27 PROCEDURE — 4040F PNEUMOC VAC/ADMIN/RCVD: CPT | Performed by: INTERNAL MEDICINE

## 2018-12-27 PROCEDURE — G8598 ASA/ANTIPLAT THER USED: HCPCS | Performed by: INTERNAL MEDICINE

## 2018-12-27 PROCEDURE — 1123F ACP DISCUSS/DSCN MKR DOCD: CPT | Performed by: INTERNAL MEDICINE

## 2018-12-27 PROCEDURE — 1101F PT FALLS ASSESS-DOCD LE1/YR: CPT | Performed by: INTERNAL MEDICINE

## 2018-12-27 PROCEDURE — G8484 FLU IMMUNIZE NO ADMIN: HCPCS | Performed by: INTERNAL MEDICINE

## 2018-12-27 PROCEDURE — 3017F COLORECTAL CA SCREEN DOC REV: CPT | Performed by: INTERNAL MEDICINE

## 2018-12-27 PROCEDURE — G8419 CALC BMI OUT NRM PARAM NOF/U: HCPCS | Performed by: INTERNAL MEDICINE

## 2018-12-27 PROCEDURE — G8427 DOCREV CUR MEDS BY ELIG CLIN: HCPCS | Performed by: INTERNAL MEDICINE

## 2018-12-27 PROCEDURE — 99204 OFFICE O/P NEW MOD 45 MIN: CPT | Performed by: INTERNAL MEDICINE

## 2018-12-27 PROCEDURE — 1036F TOBACCO NON-USER: CPT | Performed by: INTERNAL MEDICINE

## 2018-12-27 NOTE — LETTER
[facility] CARDIOLOGY[address]  Dept: [phone]  Dept Fax: [phone]      2018    Patient:Hernandez Cam  : 1950  DOS: 2018    To Whom it May Concern:    Oren Morales has been evaluated for cardiac clearance. Oren Morales is considered at a mild to moderate cardiac risk for surgery due to known, stable CAD. There is no further cardiac testing that could be done to lower the risk. Please let my office know if you have any questions or concerns.           Shaun Fagan MD                           [facility] [place] and [place]

## 2018-12-31 ENCOUNTER — HOSPITAL ENCOUNTER (INPATIENT)
Age: 68
LOS: 1 days | Discharge: HOME | DRG: 039 | End: 2019-01-01
Attending: SURGERY | Admitting: SURGERY
Payer: MEDICARE

## 2018-12-31 ENCOUNTER — ANESTHESIA (OUTPATIENT)
Dept: OPERATING ROOM | Age: 68
DRG: 039 | End: 2018-12-31
Payer: MEDICARE

## 2018-12-31 VITALS
RESPIRATION RATE: 2 BRPM | SYSTOLIC BLOOD PRESSURE: 127 MMHG | DIASTOLIC BLOOD PRESSURE: 59 MMHG | TEMPERATURE: 99 F | OXYGEN SATURATION: 97 %

## 2018-12-31 PROBLEM — I65.21 CAROTID STENOSIS, SYMPTOMATIC W/O INFARCT, RIGHT: Status: ACTIVE | Noted: 2018-12-31

## 2018-12-31 LAB
ABO/RH: NORMAL
ANION GAP SERPL CALCULATED.3IONS-SCNC: 11 MMOL/L (ref 3–16)
ANTIBODY SCREEN: NORMAL
BUN BLDV-MCNC: 16 MG/DL (ref 7–20)
CALCIUM SERPL-MCNC: 9.7 MG/DL (ref 8.3–10.6)
CHLORIDE BLD-SCNC: 106 MMOL/L (ref 99–110)
CO2: 24 MMOL/L (ref 21–32)
CREAT SERPL-MCNC: 0.7 MG/DL (ref 0.8–1.3)
GFR AFRICAN AMERICAN: >60
GFR NON-AFRICAN AMERICAN: >60
GLUCOSE BLD-MCNC: 163 MG/DL (ref 70–99)
GLUCOSE BLD-MCNC: 192 MG/DL (ref 70–99)
GLUCOSE BLD-MCNC: 210 MG/DL (ref 70–99)
GLUCOSE BLD-MCNC: 272 MG/DL (ref 70–99)
PERFORMED ON: ABNORMAL
POTASSIUM SERPL-SCNC: 5.1 MMOL/L (ref 3.5–5.1)
SODIUM BLD-SCNC: 141 MMOL/L (ref 136–145)

## 2018-12-31 PROCEDURE — 7100000001 HC PACU RECOVERY - ADDTL 15 MIN: Performed by: SURGERY

## 2018-12-31 PROCEDURE — 2580000003 HC RX 258: Performed by: SURGERY

## 2018-12-31 PROCEDURE — 80048 BASIC METABOLIC PNL TOTAL CA: CPT

## 2018-12-31 PROCEDURE — 86850 RBC ANTIBODY SCREEN: CPT

## 2018-12-31 PROCEDURE — 6360000002 HC RX W HCPCS: Performed by: NURSE ANESTHETIST, CERTIFIED REGISTERED

## 2018-12-31 PROCEDURE — 2100000000 HC CCU R&B

## 2018-12-31 PROCEDURE — 88304 TISSUE EXAM BY PATHOLOGIST: CPT

## 2018-12-31 PROCEDURE — 6360000002 HC RX W HCPCS: Performed by: SURGERY

## 2018-12-31 PROCEDURE — 3600000004 HC SURGERY LEVEL 4 BASE: Performed by: SURGERY

## 2018-12-31 PROCEDURE — 2709999900 HC NON-CHARGEABLE SUPPLY: Performed by: SURGERY

## 2018-12-31 PROCEDURE — 6370000000 HC RX 637 (ALT 250 FOR IP): Performed by: SURGERY

## 2018-12-31 PROCEDURE — 86900 BLOOD TYPING SEROLOGIC ABO: CPT

## 2018-12-31 PROCEDURE — 2500000003 HC RX 250 WO HCPCS: Performed by: NURSE ANESTHETIST, CERTIFIED REGISTERED

## 2018-12-31 PROCEDURE — 2580000003 HC RX 258: Performed by: NURSE ANESTHETIST, CERTIFIED REGISTERED

## 2018-12-31 PROCEDURE — 86901 BLOOD TYPING SEROLOGIC RH(D): CPT

## 2018-12-31 PROCEDURE — 3700000000 HC ANESTHESIA ATTENDED CARE: Performed by: SURGERY

## 2018-12-31 PROCEDURE — 35301 RECHANNELING OF ARTERY: CPT | Performed by: SURGERY

## 2018-12-31 PROCEDURE — 3600000014 HC SURGERY LEVEL 4 ADDTL 15MIN: Performed by: SURGERY

## 2018-12-31 PROCEDURE — C1768 GRAFT, VASCULAR: HCPCS | Performed by: SURGERY

## 2018-12-31 PROCEDURE — 6360000002 HC RX W HCPCS: Performed by: ANESTHESIOLOGY

## 2018-12-31 PROCEDURE — 3700000001 HC ADD 15 MINUTES (ANESTHESIA): Performed by: SURGERY

## 2018-12-31 PROCEDURE — 7100000000 HC PACU RECOVERY - FIRST 15 MIN: Performed by: SURGERY

## 2018-12-31 PROCEDURE — 6370000000 HC RX 637 (ALT 250 FOR IP)

## 2018-12-31 DEVICE — PATCH VASC W0.8XL8CM PERIPH BOV PERICARD N PVC N DEHP CRSS: Type: IMPLANTABLE DEVICE | Site: CAROTID | Status: FUNCTIONAL

## 2018-12-31 RX ORDER — EPHEDRINE SULFATE 50 MG/ML
INJECTION INTRAVENOUS PRN
Status: DISCONTINUED | OUTPATIENT
Start: 2018-12-31 | End: 2018-12-31 | Stop reason: SDUPTHER

## 2018-12-31 RX ORDER — NITROGLYCERIN 5 MG/ML
INJECTION, SOLUTION INTRAVENOUS PRN
Status: DISCONTINUED | OUTPATIENT
Start: 2018-12-31 | End: 2018-12-31 | Stop reason: SDUPTHER

## 2018-12-31 RX ORDER — SODIUM CHLORIDE 0.9 % (FLUSH) 0.9 %
10 SYRINGE (ML) INJECTION EVERY 12 HOURS SCHEDULED
Status: DISCONTINUED | OUTPATIENT
Start: 2018-12-31 | End: 2018-12-31 | Stop reason: HOSPADM

## 2018-12-31 RX ORDER — HEPARIN SODIUM 1000 [USP'U]/ML
INJECTION, SOLUTION INTRAVENOUS; SUBCUTANEOUS PRN
Status: DISCONTINUED | OUTPATIENT
Start: 2018-12-31 | End: 2018-12-31 | Stop reason: SDUPTHER

## 2018-12-31 RX ORDER — HYDRALAZINE HYDROCHLORIDE 20 MG/ML
5 INJECTION INTRAMUSCULAR; INTRAVENOUS EVERY 10 MIN PRN
Status: DISCONTINUED | OUTPATIENT
Start: 2018-12-31 | End: 2018-12-31 | Stop reason: HOSPADM

## 2018-12-31 RX ORDER — SODIUM CHLORIDE 9 MG/ML
INJECTION, SOLUTION INTRAVENOUS
Status: COMPLETED
Start: 2018-12-31 | End: 2018-12-31

## 2018-12-31 RX ORDER — OXYCODONE HYDROCHLORIDE AND ACETAMINOPHEN 5; 325 MG/1; MG/1
2 TABLET ORAL EVERY 4 HOURS PRN
Status: DISCONTINUED | OUTPATIENT
Start: 2018-12-31 | End: 2019-01-01 | Stop reason: HOSPADM

## 2018-12-31 RX ORDER — FENTANYL CITRATE 50 UG/ML
INJECTION, SOLUTION INTRAMUSCULAR; INTRAVENOUS PRN
Status: DISCONTINUED | OUTPATIENT
Start: 2018-12-31 | End: 2018-12-31 | Stop reason: SDUPTHER

## 2018-12-31 RX ORDER — SODIUM CHLORIDE 0.9 % (FLUSH) 0.9 %
10 SYRINGE (ML) INJECTION PRN
Status: DISCONTINUED | OUTPATIENT
Start: 2018-12-31 | End: 2018-12-31 | Stop reason: HOSPADM

## 2018-12-31 RX ORDER — OXYCODONE HYDROCHLORIDE AND ACETAMINOPHEN 5; 325 MG/1; MG/1
1 TABLET ORAL
Status: DISCONTINUED | OUTPATIENT
Start: 2018-12-31 | End: 2018-12-31 | Stop reason: HOSPADM

## 2018-12-31 RX ORDER — SODIUM CHLORIDE 9 MG/ML
INJECTION, SOLUTION INTRAVENOUS CONTINUOUS PRN
Status: DISCONTINUED | OUTPATIENT
Start: 2018-12-31 | End: 2018-12-31 | Stop reason: SDUPTHER

## 2018-12-31 RX ORDER — SODIUM CHLORIDE 9 MG/ML
INJECTION, SOLUTION INTRAVENOUS CONTINUOUS
Status: DISCONTINUED | OUTPATIENT
Start: 2018-12-31 | End: 2019-01-01 | Stop reason: HOSPADM

## 2018-12-31 RX ORDER — ROCURONIUM BROMIDE 10 MG/ML
INJECTION, SOLUTION INTRAVENOUS PRN
Status: DISCONTINUED | OUTPATIENT
Start: 2018-12-31 | End: 2018-12-31 | Stop reason: SDUPTHER

## 2018-12-31 RX ORDER — LIDOCAINE HYDROCHLORIDE 20 MG/ML
INJECTION, SOLUTION INFILTRATION; PERINEURAL PRN
Status: DISCONTINUED | OUTPATIENT
Start: 2018-12-31 | End: 2018-12-31 | Stop reason: SDUPTHER

## 2018-12-31 RX ORDER — LABETALOL HYDROCHLORIDE 5 MG/ML
5 INJECTION, SOLUTION INTRAVENOUS EVERY 10 MIN PRN
Status: DISCONTINUED | OUTPATIENT
Start: 2018-12-31 | End: 2018-12-31 | Stop reason: HOSPADM

## 2018-12-31 RX ORDER — TAMSULOSIN HYDROCHLORIDE 0.4 MG/1
0.4 CAPSULE ORAL NIGHTLY
Status: DISCONTINUED | OUTPATIENT
Start: 2018-12-31 | End: 2019-01-01 | Stop reason: HOSPADM

## 2018-12-31 RX ORDER — CEFAZOLIN SODIUM 2 G/100ML
2 INJECTION, SOLUTION INTRAVENOUS
Status: COMPLETED | OUTPATIENT
Start: 2018-12-31 | End: 2018-12-31

## 2018-12-31 RX ORDER — MORPHINE SULFATE 2 MG/ML
4 INJECTION, SOLUTION INTRAMUSCULAR; INTRAVENOUS
Status: DISCONTINUED | OUTPATIENT
Start: 2018-12-31 | End: 2019-01-01 | Stop reason: HOSPADM

## 2018-12-31 RX ORDER — ROSUVASTATIN CALCIUM 10 MG/1
40 TABLET, COATED ORAL NIGHTLY
Status: DISCONTINUED | OUTPATIENT
Start: 2018-12-31 | End: 2019-01-01 | Stop reason: HOSPADM

## 2018-12-31 RX ORDER — AMLODIPINE BESYLATE AND BENAZEPRIL HYDROCHLORIDE 10; 20 MG/1; MG/1
1 CAPSULE ORAL DAILY
Status: DISCONTINUED | OUTPATIENT
Start: 2018-12-31 | End: 2018-12-31

## 2018-12-31 RX ORDER — NITROGLYCERIN 20 MG/100ML
INJECTION INTRAVENOUS CONTINUOUS PRN
Status: DISCONTINUED | OUTPATIENT
Start: 2018-12-31 | End: 2018-12-31 | Stop reason: SDUPTHER

## 2018-12-31 RX ORDER — MEPERIDINE HYDROCHLORIDE 25 MG/ML
12.5 INJECTION INTRAMUSCULAR; INTRAVENOUS; SUBCUTANEOUS EVERY 5 MIN PRN
Status: DISCONTINUED | OUTPATIENT
Start: 2018-12-31 | End: 2018-12-31 | Stop reason: HOSPADM

## 2018-12-31 RX ORDER — HYDROMORPHONE HCL 110MG/55ML
0.5 PATIENT CONTROLLED ANALGESIA SYRINGE INTRAVENOUS EVERY 5 MIN PRN
Status: DISCONTINUED | OUTPATIENT
Start: 2018-12-31 | End: 2018-12-31 | Stop reason: HOSPADM

## 2018-12-31 RX ORDER — NICOTINE POLACRILEX 4 MG
15 LOZENGE BUCCAL PRN
Status: DISCONTINUED | OUTPATIENT
Start: 2018-12-31 | End: 2019-01-01 | Stop reason: HOSPADM

## 2018-12-31 RX ORDER — DEXTROSE MONOHYDRATE 50 MG/ML
100 INJECTION, SOLUTION INTRAVENOUS PRN
Status: DISCONTINUED | OUTPATIENT
Start: 2018-12-31 | End: 2019-01-01 | Stop reason: HOSPADM

## 2018-12-31 RX ORDER — DEXTROSE MONOHYDRATE 25 G/50ML
12.5 INJECTION, SOLUTION INTRAVENOUS PRN
Status: DISCONTINUED | OUTPATIENT
Start: 2018-12-31 | End: 2019-01-01 | Stop reason: HOSPADM

## 2018-12-31 RX ORDER — AMLODIPINE BESYLATE 5 MG/1
10 TABLET ORAL DAILY
Status: DISCONTINUED | OUTPATIENT
Start: 2018-12-31 | End: 2019-01-01 | Stop reason: HOSPADM

## 2018-12-31 RX ORDER — LIDOCAINE HYDROCHLORIDE 10 MG/ML
INJECTION, SOLUTION EPIDURAL; INFILTRATION; INTRACAUDAL; PERINEURAL
Status: DISCONTINUED
Start: 2018-12-31 | End: 2018-12-31

## 2018-12-31 RX ORDER — SUCCINYLCHOLINE CHLORIDE 20 MG/ML
INJECTION INTRAMUSCULAR; INTRAVENOUS PRN
Status: DISCONTINUED | OUTPATIENT
Start: 2018-12-31 | End: 2018-12-31 | Stop reason: SDUPTHER

## 2018-12-31 RX ORDER — DEXAMETHASONE SODIUM PHOSPHATE 4 MG/ML
INJECTION, SOLUTION INTRA-ARTICULAR; INTRALESIONAL; INTRAMUSCULAR; INTRAVENOUS; SOFT TISSUE PRN
Status: DISCONTINUED | OUTPATIENT
Start: 2018-12-31 | End: 2018-12-31 | Stop reason: SDUPTHER

## 2018-12-31 RX ORDER — SODIUM CHLORIDE 0.9 % (FLUSH) 0.9 %
10 SYRINGE (ML) INJECTION PRN
Status: DISCONTINUED | OUTPATIENT
Start: 2018-12-31 | End: 2019-01-01 | Stop reason: HOSPADM

## 2018-12-31 RX ORDER — ACETAMINOPHEN 325 MG/1
650 TABLET ORAL EVERY 4 HOURS PRN
Status: DISCONTINUED | OUTPATIENT
Start: 2018-12-31 | End: 2019-01-01 | Stop reason: HOSPADM

## 2018-12-31 RX ORDER — MORPHINE SULFATE 2 MG/ML
2 INJECTION, SOLUTION INTRAMUSCULAR; INTRAVENOUS
Status: DISCONTINUED | OUTPATIENT
Start: 2018-12-31 | End: 2019-01-01 | Stop reason: HOSPADM

## 2018-12-31 RX ORDER — GLIMEPIRIDE 2 MG/1
4 TABLET ORAL
Status: DISCONTINUED | OUTPATIENT
Start: 2019-01-01 | End: 2019-01-01 | Stop reason: HOSPADM

## 2018-12-31 RX ORDER — SODIUM CHLORIDE 0.9 % (FLUSH) 0.9 %
10 SYRINGE (ML) INJECTION EVERY 12 HOURS SCHEDULED
Status: DISCONTINUED | OUTPATIENT
Start: 2018-12-31 | End: 2019-01-01 | Stop reason: HOSPADM

## 2018-12-31 RX ORDER — ONDANSETRON 2 MG/ML
INJECTION INTRAMUSCULAR; INTRAVENOUS PRN
Status: DISCONTINUED | OUTPATIENT
Start: 2018-12-31 | End: 2018-12-31 | Stop reason: SDUPTHER

## 2018-12-31 RX ORDER — PROPOFOL 10 MG/ML
INJECTION, EMULSION INTRAVENOUS PRN
Status: DISCONTINUED | OUTPATIENT
Start: 2018-12-31 | End: 2018-12-31 | Stop reason: SDUPTHER

## 2018-12-31 RX ORDER — LISINOPRIL 20 MG/1
20 TABLET ORAL DAILY
Status: DISCONTINUED | OUTPATIENT
Start: 2018-12-31 | End: 2019-01-01 | Stop reason: HOSPADM

## 2018-12-31 RX ORDER — CEFAZOLIN SODIUM 2 G/100ML
2 INJECTION, SOLUTION INTRAVENOUS EVERY 8 HOURS
Status: COMPLETED | OUTPATIENT
Start: 2018-12-31 | End: 2019-01-01

## 2018-12-31 RX ORDER — OXYCODONE HYDROCHLORIDE AND ACETAMINOPHEN 5; 325 MG/1; MG/1
1 TABLET ORAL EVERY 4 HOURS PRN
Status: DISCONTINUED | OUTPATIENT
Start: 2018-12-31 | End: 2019-01-01 | Stop reason: HOSPADM

## 2018-12-31 RX ORDER — GLYCOPYRROLATE 0.2 MG/ML
INJECTION INTRAMUSCULAR; INTRAVENOUS PRN
Status: DISCONTINUED | OUTPATIENT
Start: 2018-12-31 | End: 2018-12-31 | Stop reason: SDUPTHER

## 2018-12-31 RX ORDER — PIOGLITAZONEHYDROCHLORIDE 15 MG/1
30 TABLET ORAL DAILY
Status: DISCONTINUED | OUTPATIENT
Start: 2018-12-31 | End: 2019-01-01 | Stop reason: HOSPADM

## 2018-12-31 RX ORDER — NEOSTIGMINE METHYLSULFATE 5 MG/5 ML
SYRINGE (ML) INTRAVENOUS PRN
Status: DISCONTINUED | OUTPATIENT
Start: 2018-12-31 | End: 2018-12-31 | Stop reason: SDUPTHER

## 2018-12-31 RX ORDER — HYDROCHLOROTHIAZIDE 25 MG/1
12.5 TABLET ORAL EVERY MORNING
Status: DISCONTINUED | OUTPATIENT
Start: 2019-01-01 | End: 2019-01-01 | Stop reason: HOSPADM

## 2018-12-31 RX ORDER — ONDANSETRON 2 MG/ML
4 INJECTION INTRAMUSCULAR; INTRAVENOUS
Status: DISCONTINUED | OUTPATIENT
Start: 2018-12-31 | End: 2018-12-31 | Stop reason: HOSPADM

## 2018-12-31 RX ORDER — ONDANSETRON 2 MG/ML
4 INJECTION INTRAMUSCULAR; INTRAVENOUS EVERY 6 HOURS PRN
Status: DISCONTINUED | OUTPATIENT
Start: 2018-12-31 | End: 2019-01-01 | Stop reason: HOSPADM

## 2018-12-31 RX ORDER — ASPIRIN 81 MG/1
81 TABLET ORAL DAILY
Status: DISCONTINUED | OUTPATIENT
Start: 2018-12-31 | End: 2019-01-01 | Stop reason: HOSPADM

## 2018-12-31 RX ADMIN — PHENYLEPHRINE HYDROCHLORIDE 20 MCG/MIN: 10 INJECTION, SOLUTION INTRAMUSCULAR; INTRAVENOUS; SUBCUTANEOUS at 14:05

## 2018-12-31 RX ADMIN — NITROGLYCERIN 10 MCG: 5 INJECTION, SOLUTION INTRAVENOUS at 16:26

## 2018-12-31 RX ADMIN — PROPOFOL 140 MG: 10 INJECTION, EMULSION INTRAVENOUS at 13:56

## 2018-12-31 RX ADMIN — EPHEDRINE SULFATE 10 MG: 50 INJECTION INTRAVENOUS at 14:31

## 2018-12-31 RX ADMIN — EPHEDRINE SULFATE 10 MG: 50 INJECTION INTRAVENOUS at 14:29

## 2018-12-31 RX ADMIN — NITROGLYCERIN 20 MCG: 5 INJECTION, SOLUTION INTRAVENOUS at 14:56

## 2018-12-31 RX ADMIN — GLYCOPYRROLATE 0.2 MG: 0.2 INJECTION, SOLUTION INTRAMUSCULAR; INTRAVENOUS at 14:33

## 2018-12-31 RX ADMIN — GLYCOPYRROLATE 0.2 MG: 0.2 INJECTION, SOLUTION INTRAMUSCULAR; INTRAVENOUS at 14:34

## 2018-12-31 RX ADMIN — GLYCOPYRROLATE 0.2 MG: 0.2 INJECTION, SOLUTION INTRAMUSCULAR; INTRAVENOUS at 14:31

## 2018-12-31 RX ADMIN — METFORMIN HYDROCHLORIDE 1000 MG: 500 TABLET ORAL at 19:26

## 2018-12-31 RX ADMIN — OXYCODONE HYDROCHLORIDE AND ACETAMINOPHEN 1 TABLET: 5; 325 TABLET ORAL at 19:26

## 2018-12-31 RX ADMIN — HYDROMORPHONE HYDROCHLORIDE 0.5 MG: 2 INJECTION INTRAMUSCULAR; INTRAVENOUS; SUBCUTANEOUS at 16:30

## 2018-12-31 RX ADMIN — GLYCOPYRROLATE 0.6 MG: 0.2 INJECTION, SOLUTION INTRAMUSCULAR; INTRAVENOUS at 15:58

## 2018-12-31 RX ADMIN — Medication 3 MG: at 15:58

## 2018-12-31 RX ADMIN — HEPARIN SODIUM 3000 UNITS: 1000 INJECTION INTRAVENOUS; SUBCUTANEOUS at 14:35

## 2018-12-31 RX ADMIN — ROCURONIUM BROMIDE 5 MG: 10 INJECTION, SOLUTION INTRAVENOUS at 13:56

## 2018-12-31 RX ADMIN — NITROGLYCERIN 20 MCG: 5 INJECTION, SOLUTION INTRAVENOUS at 14:50

## 2018-12-31 RX ADMIN — ASPIRIN 81 MG: 81 TABLET, COATED ORAL at 20:13

## 2018-12-31 RX ADMIN — SODIUM CHLORIDE: 9 INJECTION, SOLUTION INTRAVENOUS at 15:15

## 2018-12-31 RX ADMIN — NITROGLYCERIN 5 MCG/MIN: 20 INJECTION INTRAVENOUS at 14:49

## 2018-12-31 RX ADMIN — NITROGLYCERIN 20 MCG: 5 INJECTION, SOLUTION INTRAVENOUS at 14:49

## 2018-12-31 RX ADMIN — LIDOCAINE HYDROCHLORIDE 60 MG: 20 INJECTION, SOLUTION INFILTRATION; PERINEURAL at 13:56

## 2018-12-31 RX ADMIN — FENTANYL CITRATE 50 MCG: 50 INJECTION, SOLUTION INTRAMUSCULAR; INTRAVENOUS at 16:00

## 2018-12-31 RX ADMIN — TAMSULOSIN HYDROCHLORIDE 0.4 MG: 0.4 CAPSULE ORAL at 20:13

## 2018-12-31 RX ADMIN — ROSUVASTATIN CALCIUM 40 MG: 10 TABLET, FILM COATED ORAL at 20:13

## 2018-12-31 RX ADMIN — NITROGLYCERIN 50 MCG: 5 INJECTION, SOLUTION INTRAVENOUS at 14:38

## 2018-12-31 RX ADMIN — EPHEDRINE SULFATE 10 MG: 50 INJECTION INTRAVENOUS at 14:33

## 2018-12-31 RX ADMIN — ONDANSETRON 4 MG: 2 INJECTION INTRAMUSCULAR; INTRAVENOUS at 15:58

## 2018-12-31 RX ADMIN — SODIUM CHLORIDE: 9 INJECTION, SOLUTION INTRAVENOUS at 21:44

## 2018-12-31 RX ADMIN — FENTANYL CITRATE 50 MCG: 50 INJECTION, SOLUTION INTRAMUSCULAR; INTRAVENOUS at 13:56

## 2018-12-31 RX ADMIN — INSULIN LISPRO 2 UNITS: 100 INJECTION, SOLUTION INTRAVENOUS; SUBCUTANEOUS at 21:38

## 2018-12-31 RX ADMIN — SUCCINYLCHOLINE CHLORIDE 120 MG: 20 INJECTION, SOLUTION INTRAMUSCULAR; INTRAVENOUS at 13:56

## 2018-12-31 RX ADMIN — DEXAMETHASONE SODIUM PHOSPHATE 4 MG: 4 INJECTION, SOLUTION INTRAMUSCULAR; INTRAVENOUS at 14:13

## 2018-12-31 RX ADMIN — SUCCINYLCHOLINE CHLORIDE 45 MG: 20 INJECTION, SOLUTION INTRAMUSCULAR; INTRAVENOUS at 14:07

## 2018-12-31 RX ADMIN — ROCURONIUM BROMIDE 10 MG: 10 INJECTION, SOLUTION INTRAVENOUS at 15:06

## 2018-12-31 RX ADMIN — NITROGLYCERIN 50 MCG: 5 INJECTION, SOLUTION INTRAVENOUS at 14:35

## 2018-12-31 RX ADMIN — GLYCOPYRROLATE 0.2 MG: 0.2 INJECTION, SOLUTION INTRAMUSCULAR; INTRAVENOUS at 14:32

## 2018-12-31 RX ADMIN — SODIUM CHLORIDE: 9 INJECTION, SOLUTION INTRAVENOUS at 13:45

## 2018-12-31 RX ADMIN — CEFAZOLIN SODIUM 2 G: 2 INJECTION, SOLUTION INTRAVENOUS at 23:02

## 2018-12-31 RX ADMIN — CEFAZOLIN SODIUM 2 G: 2 INJECTION, SOLUTION INTRAVENOUS at 13:38

## 2018-12-31 ASSESSMENT — PULMONARY FUNCTION TESTS
PIF_VALUE: 15
PIF_VALUE: 16
PIF_VALUE: 17
PIF_VALUE: 16
PIF_VALUE: 17
PIF_VALUE: 17
PIF_VALUE: 16
PIF_VALUE: 17
PIF_VALUE: 2
PIF_VALUE: 15
PIF_VALUE: 17
PIF_VALUE: 17
PIF_VALUE: 16
PIF_VALUE: 17
PIF_VALUE: 16
PIF_VALUE: 17
PIF_VALUE: 16
PIF_VALUE: 17
PIF_VALUE: 1
PIF_VALUE: 16
PIF_VALUE: 17
PIF_VALUE: 16
PIF_VALUE: 16
PIF_VALUE: 14
PIF_VALUE: 16
PIF_VALUE: 17
PIF_VALUE: 15
PIF_VALUE: 16
PIF_VALUE: 16
PIF_VALUE: 17
PIF_VALUE: 2
PIF_VALUE: 17
PIF_VALUE: 17
PIF_VALUE: 16
PIF_VALUE: 17
PIF_VALUE: 12
PIF_VALUE: 17
PIF_VALUE: 15
PIF_VALUE: 17
PIF_VALUE: 16
PIF_VALUE: 17
PIF_VALUE: 15
PIF_VALUE: 16
PIF_VALUE: 16
PIF_VALUE: 20
PIF_VALUE: 16
PIF_VALUE: 17
PIF_VALUE: 16
PIF_VALUE: 1
PIF_VALUE: 16
PIF_VALUE: 16
PIF_VALUE: 17
PIF_VALUE: 16
PIF_VALUE: 16
PIF_VALUE: 8
PIF_VALUE: 16
PIF_VALUE: 1
PIF_VALUE: 16
PIF_VALUE: 17
PIF_VALUE: 16
PIF_VALUE: 14
PIF_VALUE: 16
PIF_VALUE: 14
PIF_VALUE: 2
PIF_VALUE: 16
PIF_VALUE: 15
PIF_VALUE: 1
PIF_VALUE: 16
PIF_VALUE: 16
PIF_VALUE: 17
PIF_VALUE: 16
PIF_VALUE: 1
PIF_VALUE: 14
PIF_VALUE: 15
PIF_VALUE: 17
PIF_VALUE: 15
PIF_VALUE: 16
PIF_VALUE: 11
PIF_VALUE: 1
PIF_VALUE: 16
PIF_VALUE: 17
PIF_VALUE: 16
PIF_VALUE: 17
PIF_VALUE: 16
PIF_VALUE: 16
PIF_VALUE: 17
PIF_VALUE: 16
PIF_VALUE: 15
PIF_VALUE: 13
PIF_VALUE: 17
PIF_VALUE: 14
PIF_VALUE: 17
PIF_VALUE: 16
PIF_VALUE: 2
PIF_VALUE: 16
PIF_VALUE: 6
PIF_VALUE: 16
PIF_VALUE: 17
PIF_VALUE: 17
PIF_VALUE: 2
PIF_VALUE: 16
PIF_VALUE: 14
PIF_VALUE: 16
PIF_VALUE: 1
PIF_VALUE: 17
PIF_VALUE: 16
PIF_VALUE: 15
PIF_VALUE: 16
PIF_VALUE: 16
PIF_VALUE: 14
PIF_VALUE: 14
PIF_VALUE: 16
PIF_VALUE: 2
PIF_VALUE: 17
PIF_VALUE: 17
PIF_VALUE: 16
PIF_VALUE: 17
PIF_VALUE: 14
PIF_VALUE: 16
PIF_VALUE: 16
PIF_VALUE: 17
PIF_VALUE: 1

## 2018-12-31 ASSESSMENT — PAIN - FUNCTIONAL ASSESSMENT: PAIN_FUNCTIONAL_ASSESSMENT: 0-10

## 2018-12-31 ASSESSMENT — PAIN SCALES - GENERAL
PAINLEVEL_OUTOF10: 0
PAINLEVEL_OUTOF10: 5
PAINLEVEL_OUTOF10: 4
PAINLEVEL_OUTOF10: 8
PAINLEVEL_OUTOF10: 2

## 2018-12-31 ASSESSMENT — PAIN DESCRIPTION - PAIN TYPE
TYPE: SURGICAL PAIN
TYPE: SURGICAL PAIN

## 2018-12-31 NOTE — ANESTHESIA PRE PROCEDURE
complication, not stated as uncontrolled        Past Surgical History:        Procedure Laterality Date    CORONARY ANGIOPLASTY      x2    CYST REMOVAL      from back and finger x2    TONSILLECTOMY AND ADENOIDECTOMY         Social History:    Social History   Substance Use Topics    Smoking status: Former Smoker     Packs/day: 1.00     Years: 48.00     Types: Cigarettes     Start date: 9/17/1959     Quit date: 11/28/2007    Smokeless tobacco: Never Used    Alcohol use Yes                                Counseling given: Not Answered      Vital Signs (Current): There were no vitals filed for this visit. BP Readings from Last 3 Encounters:   12/27/18 130/62   12/20/18 128/68   12/06/18 124/70       NPO Status:                                                                                 BMI:   Wt Readings from Last 3 Encounters:   12/26/18 225 lb (102.1 kg)   12/27/18 217 lb (98.4 kg)   12/20/18 221 lb (100.2 kg)     There is no height or weight on file to calculate BMI.    CBC:   Lab Results   Component Value Date    WBC 6.6 12/26/2018    RBC 5.42 12/26/2018    HGB 16.6 12/26/2018    HCT 49.9 12/26/2018    MCV 92.0 12/26/2018    RDW 14.1 12/26/2018     12/26/2018       CMP:   Lab Results   Component Value Date     12/26/2018    K 4.5 12/26/2018     12/26/2018    CO2 29 12/26/2018    BUN 16 12/26/2018    CREATININE 0.8 12/26/2018    GFRAA >60 12/26/2018    GFRAA >60 12/18/2012    AGRATIO 1.7 09/17/2018    LABGLOM >60 12/26/2018    LABGLOM 90 01/19/2015    GLUCOSE 182 12/26/2018    GLUCOSE 141 05/21/2011    PROT 7.5 09/17/2018    PROT 7.7 12/17/2012    CALCIUM 10.2 12/26/2018    BILITOT 0.4 09/17/2018    ALKPHOS 73 09/17/2018    AST 13 09/17/2018    ALT 20 09/17/2018       POC Tests: No results for input(s): POCGLU, POCNA, POCK, POCCL, POCBUN, POCHEMO, POCHCT in the last 72 hours.     Coags:   Lab Results   Component Value Date    PROTIME 11.0

## 2019-01-01 VITALS
DIASTOLIC BLOOD PRESSURE: 60 MMHG | WEIGHT: 216.49 LBS | HEART RATE: 68 BPM | TEMPERATURE: 98.2 F | OXYGEN SATURATION: 94 % | SYSTOLIC BLOOD PRESSURE: 123 MMHG | HEIGHT: 75 IN | BODY MASS INDEX: 26.92 KG/M2 | RESPIRATION RATE: 20 BRPM

## 2019-01-01 LAB
GLUCOSE BLD-MCNC: 191 MG/DL (ref 70–99)
GLUCOSE BLD-MCNC: 224 MG/DL (ref 70–99)
PERFORMED ON: ABNORMAL
PERFORMED ON: ABNORMAL

## 2019-01-01 PROCEDURE — 03UK0KZ SUPPLEMENT RIGHT INTERNAL CAROTID ARTERY WITH NONAUTOLOGOUS TISSUE SUBSTITUTE, OPEN APPROACH: ICD-10-PCS | Performed by: SURGERY

## 2019-01-01 PROCEDURE — 6370000000 HC RX 637 (ALT 250 FOR IP): Performed by: SURGERY

## 2019-01-01 PROCEDURE — 6360000002 HC RX W HCPCS: Performed by: SURGERY

## 2019-01-01 PROCEDURE — 99024 POSTOP FOLLOW-UP VISIT: CPT | Performed by: SURGERY

## 2019-01-01 PROCEDURE — 2580000003 HC RX 258: Performed by: SURGERY

## 2019-01-01 PROCEDURE — 03CK0ZZ EXTIRPATION OF MATTER FROM RIGHT INTERNAL CAROTID ARTERY, OPEN APPROACH: ICD-10-PCS | Performed by: SURGERY

## 2019-01-01 RX ORDER — ACETAMINOPHEN 80 MG
TABLET,CHEWABLE ORAL
Status: DISCONTINUED
Start: 2019-01-01 | End: 2019-01-01 | Stop reason: HOSPADM

## 2019-01-01 RX ADMIN — OXYCODONE HYDROCHLORIDE AND ACETAMINOPHEN 2 TABLET: 5; 325 TABLET ORAL at 04:12

## 2019-01-01 RX ADMIN — GLIMEPIRIDE 4 MG: 2 TABLET ORAL at 09:01

## 2019-01-01 RX ADMIN — AMLODIPINE BESYLATE 10 MG: 5 TABLET ORAL at 09:00

## 2019-01-01 RX ADMIN — PIOGLITAZONE 30 MG: 15 TABLET ORAL at 09:00

## 2019-01-01 RX ADMIN — METFORMIN HYDROCHLORIDE 1000 MG: 500 TABLET ORAL at 09:00

## 2019-01-01 RX ADMIN — LISINOPRIL 20 MG: 20 TABLET ORAL at 09:01

## 2019-01-01 RX ADMIN — HYDROCHLOROTHIAZIDE 12.5 MG: 25 TABLET ORAL at 09:00

## 2019-01-01 RX ADMIN — Medication 10 ML: at 09:01

## 2019-01-01 RX ADMIN — INSULIN LISPRO 2 UNITS: 100 INJECTION, SOLUTION INTRAVENOUS; SUBCUTANEOUS at 14:16

## 2019-01-01 RX ADMIN — CEFAZOLIN SODIUM 2 G: 2 INJECTION, SOLUTION INTRAVENOUS at 08:59

## 2019-01-01 ASSESSMENT — PAIN DESCRIPTION - LOCATION: LOCATION: INCISION

## 2019-01-01 ASSESSMENT — PAIN SCALES - GENERAL
PAINLEVEL_OUTOF10: 0
PAINLEVEL_OUTOF10: 7

## 2019-01-01 ASSESSMENT — PAIN DESCRIPTION - PAIN TYPE: TYPE: SURGICAL PAIN

## 2019-01-17 ENCOUNTER — OFFICE VISIT (OUTPATIENT)
Dept: VASCULAR SURGERY | Age: 69
End: 2019-01-17

## 2019-01-17 VITALS
BODY MASS INDEX: 27.48 KG/M2 | DIASTOLIC BLOOD PRESSURE: 58 MMHG | SYSTOLIC BLOOD PRESSURE: 130 MMHG | HEIGHT: 75 IN | WEIGHT: 221 LBS

## 2019-01-17 DIAGNOSIS — I65.21 SYMPTOMATIC STENOSIS OF RIGHT CAROTID ARTERY WITHOUT INFARCTION: Primary | ICD-10-CM

## 2019-01-17 PROCEDURE — 99024 POSTOP FOLLOW-UP VISIT: CPT | Performed by: SURGERY

## 2019-01-23 ENCOUNTER — OFFICE VISIT (OUTPATIENT)
Dept: INTERNAL MEDICINE CLINIC | Age: 69
End: 2019-01-23
Payer: MEDICARE

## 2019-01-23 VITALS
DIASTOLIC BLOOD PRESSURE: 62 MMHG | WEIGHT: 219.2 LBS | BODY MASS INDEX: 27.4 KG/M2 | RESPIRATION RATE: 16 BRPM | HEART RATE: 63 BPM | TEMPERATURE: 98.1 F | OXYGEN SATURATION: 97 % | SYSTOLIC BLOOD PRESSURE: 132 MMHG

## 2019-01-23 DIAGNOSIS — E78.00 PURE HYPERCHOLESTEROLEMIA: ICD-10-CM

## 2019-01-23 DIAGNOSIS — E11.9 TYPE 2 DIABETES MELLITUS WITHOUT COMPLICATION, WITHOUT LONG-TERM CURRENT USE OF INSULIN (HCC): ICD-10-CM

## 2019-01-23 DIAGNOSIS — Z12.5 PROSTATE CANCER SCREENING: ICD-10-CM

## 2019-01-23 DIAGNOSIS — I10 ESSENTIAL HYPERTENSION: ICD-10-CM

## 2019-01-23 DIAGNOSIS — I65.21 CAROTID STENOSIS, SYMPTOMATIC W/O INFARCT, RIGHT: Primary | ICD-10-CM

## 2019-01-23 PROCEDURE — 1111F DSCHRG MED/CURRENT MED MERGE: CPT | Performed by: INTERNAL MEDICINE

## 2019-01-23 PROCEDURE — 3017F COLORECTAL CA SCREEN DOC REV: CPT | Performed by: INTERNAL MEDICINE

## 2019-01-23 PROCEDURE — 3046F HEMOGLOBIN A1C LEVEL >9.0%: CPT | Performed by: INTERNAL MEDICINE

## 2019-01-23 PROCEDURE — G8484 FLU IMMUNIZE NO ADMIN: HCPCS | Performed by: INTERNAL MEDICINE

## 2019-01-23 PROCEDURE — G8598 ASA/ANTIPLAT THER USED: HCPCS | Performed by: INTERNAL MEDICINE

## 2019-01-23 PROCEDURE — 2022F DILAT RTA XM EVC RTNOPTHY: CPT | Performed by: INTERNAL MEDICINE

## 2019-01-23 PROCEDURE — 1123F ACP DISCUSS/DSCN MKR DOCD: CPT | Performed by: INTERNAL MEDICINE

## 2019-01-23 PROCEDURE — 4040F PNEUMOC VAC/ADMIN/RCVD: CPT | Performed by: INTERNAL MEDICINE

## 2019-01-23 PROCEDURE — G8419 CALC BMI OUT NRM PARAM NOF/U: HCPCS | Performed by: INTERNAL MEDICINE

## 2019-01-23 PROCEDURE — 1101F PT FALLS ASSESS-DOCD LE1/YR: CPT | Performed by: INTERNAL MEDICINE

## 2019-01-23 PROCEDURE — 99214 OFFICE O/P EST MOD 30 MIN: CPT | Performed by: INTERNAL MEDICINE

## 2019-01-23 PROCEDURE — 1036F TOBACCO NON-USER: CPT | Performed by: INTERNAL MEDICINE

## 2019-01-23 PROCEDURE — G8427 DOCREV CUR MEDS BY ELIG CLIN: HCPCS | Performed by: INTERNAL MEDICINE

## 2019-01-23 RX ORDER — HYDROCHLOROTHIAZIDE 12.5 MG/1
12.5 CAPSULE, GELATIN COATED ORAL EVERY MORNING
Qty: 90 CAPSULE | Refills: 1 | Status: SHIPPED | OUTPATIENT
Start: 2019-01-23 | End: 2019-10-24 | Stop reason: SDUPTHER

## 2019-01-23 RX ORDER — CLOPIDOGREL BISULFATE 75 MG/1
TABLET ORAL
Qty: 90 TABLET | Refills: 1 | Status: SHIPPED | OUTPATIENT
Start: 2019-01-23 | End: 2020-01-30

## 2019-01-23 RX ORDER — TAMSULOSIN HYDROCHLORIDE 0.4 MG/1
CAPSULE ORAL
Qty: 90 CAPSULE | Refills: 1 | Status: SHIPPED | OUTPATIENT
Start: 2019-01-23 | End: 2019-08-20 | Stop reason: SDUPTHER

## 2019-01-23 RX ORDER — EZETIMIBE 10 MG/1
10 TABLET ORAL DAILY
Qty: 90 TABLET | Refills: 1 | Status: SHIPPED | OUTPATIENT
Start: 2019-01-23 | End: 2020-01-30 | Stop reason: SDUPTHER

## 2019-01-23 RX ORDER — AMLODIPINE BESYLATE AND BENAZEPRIL HYDROCHLORIDE 10; 20 MG/1; MG/1
1 CAPSULE ORAL DAILY
Qty: 90 CAPSULE | Refills: 0 | Status: SHIPPED | OUTPATIENT
Start: 2019-01-23 | End: 2019-10-24 | Stop reason: SDUPTHER

## 2019-01-23 RX ORDER — ROSUVASTATIN CALCIUM 40 MG/1
40 TABLET, COATED ORAL DAILY
Qty: 90 TABLET | Refills: 1 | Status: SHIPPED | OUTPATIENT
Start: 2019-01-23 | End: 2020-03-16 | Stop reason: SDUPTHER

## 2019-01-23 RX ORDER — PIOGLITAZONEHYDROCHLORIDE 30 MG/1
30 TABLET ORAL DAILY
Qty: 90 TABLET | Refills: 0 | Status: SHIPPED | OUTPATIENT
Start: 2019-01-23 | End: 2019-03-27

## 2019-01-23 RX ORDER — GLIMEPIRIDE 4 MG/1
TABLET ORAL
Qty: 135 TABLET | Refills: 1 | Status: SHIPPED | OUTPATIENT
Start: 2019-01-23 | End: 2019-03-27

## 2019-01-23 ASSESSMENT — ENCOUNTER SYMPTOMS
ROS SKIN COMMENTS: WOUND HEALING WELL
DIARRHEA: 0
COUGH: 0
CONSTIPATION: 0
SHORTNESS OF BREATH: 0
WHEEZING: 0
ABDOMINAL PAIN: 0

## 2019-01-25 DIAGNOSIS — E78.00 PURE HYPERCHOLESTEROLEMIA: ICD-10-CM

## 2019-01-25 DIAGNOSIS — Z12.5 PROSTATE CANCER SCREENING: ICD-10-CM

## 2019-01-25 DIAGNOSIS — E11.9 TYPE 2 DIABETES MELLITUS WITHOUT COMPLICATION, WITHOUT LONG-TERM CURRENT USE OF INSULIN (HCC): ICD-10-CM

## 2019-01-25 DIAGNOSIS — I10 ESSENTIAL HYPERTENSION: ICD-10-CM

## 2019-01-25 DIAGNOSIS — I65.21 CAROTID STENOSIS, SYMPTOMATIC W/O INFARCT, RIGHT: ICD-10-CM

## 2019-01-25 LAB
A/G RATIO: 1.8 (ref 1.1–2.2)
ALBUMIN SERPL-MCNC: 4.5 G/DL (ref 3.4–5)
ALP BLD-CCNC: 68 U/L (ref 40–129)
ALT SERPL-CCNC: 18 U/L (ref 10–40)
ANION GAP SERPL CALCULATED.3IONS-SCNC: 17 MMOL/L (ref 3–16)
AST SERPL-CCNC: 11 U/L (ref 15–37)
BILIRUB SERPL-MCNC: 0.3 MG/DL (ref 0–1)
BUN BLDV-MCNC: 14 MG/DL (ref 7–20)
CALCIUM SERPL-MCNC: 9.7 MG/DL (ref 8.3–10.6)
CHLORIDE BLD-SCNC: 95 MMOL/L (ref 99–110)
CHOLESTEROL, TOTAL: 136 MG/DL (ref 0–199)
CO2: 28 MMOL/L (ref 21–32)
CREAT SERPL-MCNC: 0.8 MG/DL (ref 0.8–1.3)
GFR AFRICAN AMERICAN: >60
GFR NON-AFRICAN AMERICAN: >60
GLOBULIN: 2.5 G/DL
GLUCOSE BLD-MCNC: 226 MG/DL (ref 70–99)
HDLC SERPL-MCNC: 45 MG/DL (ref 40–60)
LDL CHOLESTEROL CALCULATED: 62 MG/DL
POTASSIUM SERPL-SCNC: 4.6 MMOL/L (ref 3.5–5.1)
PROSTATE SPECIFIC ANTIGEN: 0.49 NG/ML (ref 0–4)
SODIUM BLD-SCNC: 140 MMOL/L (ref 136–145)
TOTAL PROTEIN: 7 G/DL (ref 6.4–8.2)
TRIGL SERPL-MCNC: 144 MG/DL (ref 0–150)
VLDLC SERPL CALC-MCNC: 29 MG/DL

## 2019-01-26 LAB
ESTIMATED AVERAGE GLUCOSE: 220.2 MG/DL
HBA1C MFR BLD: 9.3 %

## 2019-01-28 ENCOUNTER — TELEPHONE (OUTPATIENT)
Dept: INTERNAL MEDICINE CLINIC | Age: 69
End: 2019-01-28

## 2019-01-29 ENCOUNTER — TELEPHONE (OUTPATIENT)
Dept: INTERNAL MEDICINE CLINIC | Age: 69
End: 2019-01-29

## 2019-02-14 ENCOUNTER — CLINICAL DOCUMENTATION (OUTPATIENT)
Dept: INTERNAL MEDICINE CLINIC | Age: 69
End: 2019-02-14

## 2019-03-07 ENCOUNTER — OFFICE VISIT (OUTPATIENT)
Dept: VASCULAR SURGERY | Age: 69
End: 2019-03-07

## 2019-03-07 ENCOUNTER — HOSPITAL ENCOUNTER (OUTPATIENT)
Age: 69
Discharge: HOME | End: 2019-03-07
Payer: MEDICARE

## 2019-03-07 VITALS
WEIGHT: 225 LBS | DIASTOLIC BLOOD PRESSURE: 68 MMHG | SYSTOLIC BLOOD PRESSURE: 124 MMHG | BODY MASS INDEX: 27.98 KG/M2 | HEIGHT: 75 IN

## 2019-03-07 DIAGNOSIS — I65.21 SYMPTOMATIC STENOSIS OF RIGHT CAROTID ARTERY WITHOUT INFARCTION: Primary | ICD-10-CM

## 2019-03-07 LAB
CHOLESTEROL, TOTAL: 128 MG/DL (ref 0–199)
HDLC SERPL-MCNC: 43 MG/DL (ref 40–60)
LDL CHOLESTEROL CALCULATED: 63 MG/DL
TRIGL SERPL-MCNC: 109 MG/DL (ref 0–150)
VLDLC SERPL CALC-MCNC: 22 MG/DL

## 2019-03-07 PROCEDURE — 36415 COLL VENOUS BLD VENIPUNCTURE: CPT

## 2019-03-07 PROCEDURE — 99024 POSTOP FOLLOW-UP VISIT: CPT | Performed by: SURGERY

## 2019-03-07 PROCEDURE — 80061 LIPID PANEL: CPT

## 2019-03-12 ENCOUNTER — TELEPHONE (OUTPATIENT)
Dept: CARDIOLOGY CLINIC | Age: 69
End: 2019-03-12

## 2019-03-14 ENCOUNTER — OFFICE VISIT (OUTPATIENT)
Dept: CARDIOLOGY CLINIC | Age: 69
End: 2019-03-14
Payer: MEDICARE

## 2019-03-14 VITALS
HEART RATE: 81 BPM | HEIGHT: 75 IN | WEIGHT: 220 LBS | DIASTOLIC BLOOD PRESSURE: 64 MMHG | BODY MASS INDEX: 27.35 KG/M2 | SYSTOLIC BLOOD PRESSURE: 128 MMHG

## 2019-03-14 DIAGNOSIS — E78.00 PURE HYPERCHOLESTEROLEMIA: Primary | ICD-10-CM

## 2019-03-14 DIAGNOSIS — I25.10 ATHEROSCLEROSIS OF NATIVE CORONARY ARTERY OF NATIVE HEART WITHOUT ANGINA PECTORIS: ICD-10-CM

## 2019-03-14 DIAGNOSIS — I10 ESSENTIAL HYPERTENSION: ICD-10-CM

## 2019-03-14 PROBLEM — I77.9 RIGHT-SIDED CAROTID ARTERY DISEASE (HCC): Status: RESOLVED | Noted: 2018-10-09 | Resolved: 2019-03-14

## 2019-03-14 PROCEDURE — 1036F TOBACCO NON-USER: CPT | Performed by: INTERNAL MEDICINE

## 2019-03-14 PROCEDURE — 4040F PNEUMOC VAC/ADMIN/RCVD: CPT | Performed by: INTERNAL MEDICINE

## 2019-03-14 PROCEDURE — G8598 ASA/ANTIPLAT THER USED: HCPCS | Performed by: INTERNAL MEDICINE

## 2019-03-14 PROCEDURE — 99213 OFFICE O/P EST LOW 20 MIN: CPT | Performed by: INTERNAL MEDICINE

## 2019-03-14 PROCEDURE — 1101F PT FALLS ASSESS-DOCD LE1/YR: CPT | Performed by: INTERNAL MEDICINE

## 2019-03-14 PROCEDURE — 1123F ACP DISCUSS/DSCN MKR DOCD: CPT | Performed by: INTERNAL MEDICINE

## 2019-03-14 PROCEDURE — G8419 CALC BMI OUT NRM PARAM NOF/U: HCPCS | Performed by: INTERNAL MEDICINE

## 2019-03-14 PROCEDURE — G8427 DOCREV CUR MEDS BY ELIG CLIN: HCPCS | Performed by: INTERNAL MEDICINE

## 2019-03-14 PROCEDURE — G8484 FLU IMMUNIZE NO ADMIN: HCPCS | Performed by: INTERNAL MEDICINE

## 2019-03-14 PROCEDURE — 3017F COLORECTAL CA SCREEN DOC REV: CPT | Performed by: INTERNAL MEDICINE

## 2019-03-27 ENCOUNTER — OFFICE VISIT (OUTPATIENT)
Dept: ENDOCRINOLOGY | Age: 69
End: 2019-03-27
Payer: MEDICARE

## 2019-03-27 VITALS
HEART RATE: 80 BPM | DIASTOLIC BLOOD PRESSURE: 58 MMHG | SYSTOLIC BLOOD PRESSURE: 120 MMHG | WEIGHT: 221 LBS | BODY MASS INDEX: 27.48 KG/M2 | OXYGEN SATURATION: 98 % | HEIGHT: 75 IN

## 2019-03-27 DIAGNOSIS — I10 ESSENTIAL HYPERTENSION: ICD-10-CM

## 2019-03-27 DIAGNOSIS — E78.2 MIXED HYPERLIPIDEMIA: ICD-10-CM

## 2019-03-27 DIAGNOSIS — I25.10 CORONARY ARTERY DISEASE INVOLVING NATIVE CORONARY ARTERY OF NATIVE HEART WITHOUT ANGINA PECTORIS: ICD-10-CM

## 2019-03-27 PROCEDURE — G8484 FLU IMMUNIZE NO ADMIN: HCPCS | Performed by: INTERNAL MEDICINE

## 2019-03-27 PROCEDURE — G8419 CALC BMI OUT NRM PARAM NOF/U: HCPCS | Performed by: INTERNAL MEDICINE

## 2019-03-27 PROCEDURE — G8427 DOCREV CUR MEDS BY ELIG CLIN: HCPCS | Performed by: INTERNAL MEDICINE

## 2019-03-27 PROCEDURE — 3046F HEMOGLOBIN A1C LEVEL >9.0%: CPT | Performed by: INTERNAL MEDICINE

## 2019-03-27 PROCEDURE — G8598 ASA/ANTIPLAT THER USED: HCPCS | Performed by: INTERNAL MEDICINE

## 2019-03-27 PROCEDURE — 1036F TOBACCO NON-USER: CPT | Performed by: INTERNAL MEDICINE

## 2019-03-27 PROCEDURE — 4040F PNEUMOC VAC/ADMIN/RCVD: CPT | Performed by: INTERNAL MEDICINE

## 2019-03-27 PROCEDURE — 99204 OFFICE O/P NEW MOD 45 MIN: CPT | Performed by: INTERNAL MEDICINE

## 2019-03-27 PROCEDURE — 3017F COLORECTAL CA SCREEN DOC REV: CPT | Performed by: INTERNAL MEDICINE

## 2019-03-27 PROCEDURE — 2022F DILAT RTA XM EVC RTNOPTHY: CPT | Performed by: INTERNAL MEDICINE

## 2019-03-27 PROCEDURE — 1123F ACP DISCUSS/DSCN MKR DOCD: CPT | Performed by: INTERNAL MEDICINE

## 2019-03-27 RX ORDER — BLOOD-GLUCOSE METER
EACH MISCELLANEOUS
Qty: 1 KIT | Refills: 0 | COMMUNITY
Start: 2019-03-27

## 2019-03-27 RX ORDER — GLIPIZIDE 10 MG/1
10 TABLET, FILM COATED, EXTENDED RELEASE ORAL DAILY
Qty: 30 TABLET | Refills: 3 | Status: SHIPPED | OUTPATIENT
Start: 2019-03-27 | End: 2019-07-17 | Stop reason: SDUPTHER

## 2019-03-28 RX ORDER — BLOOD SUGAR DIAGNOSTIC
STRIP MISCELLANEOUS
Qty: 100 EACH | Refills: 5 | COMMUNITY
Start: 2019-03-28 | End: 2021-01-25 | Stop reason: SDUPTHER

## 2019-03-29 ENCOUNTER — TELEPHONE (OUTPATIENT)
Dept: ENDOCRINOLOGY | Age: 69
End: 2019-03-29

## 2019-04-04 NOTE — TELEPHONE ENCOUNTER
Fax from [employer]. They cannot perform this PA request because the requested product is a plan exclusion of the member, so there is no coverage criteria to review and apply.  Re: Gadiel Mantilla Inj

## 2019-04-08 ENCOUNTER — OFFICE VISIT (OUTPATIENT)
Dept: INTERNAL MEDICINE CLINIC | Age: 69
End: 2019-04-08
Payer: MEDICARE

## 2019-04-08 VITALS
HEART RATE: 74 BPM | SYSTOLIC BLOOD PRESSURE: 134 MMHG | OXYGEN SATURATION: 98 % | TEMPERATURE: 97.6 F | WEIGHT: 216 LBS | BODY MASS INDEX: 26.86 KG/M2 | DIASTOLIC BLOOD PRESSURE: 72 MMHG | HEIGHT: 75 IN

## 2019-04-08 DIAGNOSIS — I25.10 ATHEROSCLEROSIS OF NATIVE CORONARY ARTERY OF NATIVE HEART WITHOUT ANGINA PECTORIS: ICD-10-CM

## 2019-04-08 DIAGNOSIS — I10 ESSENTIAL HYPERTENSION: ICD-10-CM

## 2019-04-08 DIAGNOSIS — I65.21 CAROTID STENOSIS, SYMPTOMATIC W/O INFARCT, RIGHT: ICD-10-CM

## 2019-04-08 DIAGNOSIS — E11.9 TYPE 2 DIABETES MELLITUS WITHOUT COMPLICATION, WITHOUT LONG-TERM CURRENT USE OF INSULIN (HCC): ICD-10-CM

## 2019-04-08 DIAGNOSIS — E78.00 PURE HYPERCHOLESTEROLEMIA: ICD-10-CM

## 2019-04-08 PROCEDURE — 99214 OFFICE O/P EST MOD 30 MIN: CPT | Performed by: INTERNAL MEDICINE

## 2019-04-08 PROCEDURE — 1036F TOBACCO NON-USER: CPT | Performed by: INTERNAL MEDICINE

## 2019-04-08 PROCEDURE — 3046F HEMOGLOBIN A1C LEVEL >9.0%: CPT | Performed by: INTERNAL MEDICINE

## 2019-04-08 PROCEDURE — G8598 ASA/ANTIPLAT THER USED: HCPCS | Performed by: INTERNAL MEDICINE

## 2019-04-08 PROCEDURE — G8419 CALC BMI OUT NRM PARAM NOF/U: HCPCS | Performed by: INTERNAL MEDICINE

## 2019-04-08 PROCEDURE — 1123F ACP DISCUSS/DSCN MKR DOCD: CPT | Performed by: INTERNAL MEDICINE

## 2019-04-08 PROCEDURE — 3017F COLORECTAL CA SCREEN DOC REV: CPT | Performed by: INTERNAL MEDICINE

## 2019-04-08 PROCEDURE — G8427 DOCREV CUR MEDS BY ELIG CLIN: HCPCS | Performed by: INTERNAL MEDICINE

## 2019-04-08 PROCEDURE — 4040F PNEUMOC VAC/ADMIN/RCVD: CPT | Performed by: INTERNAL MEDICINE

## 2019-04-08 PROCEDURE — 2022F DILAT RTA XM EVC RTNOPTHY: CPT | Performed by: INTERNAL MEDICINE

## 2019-04-08 RX ORDER — AMOXICILLIN 500 MG/1
500 CAPSULE ORAL 3 TIMES DAILY
Qty: 30 CAPSULE | Refills: 0 | Status: SHIPPED | OUTPATIENT
Start: 2019-04-08 | End: 2019-04-18

## 2019-04-08 ASSESSMENT — PATIENT HEALTH QUESTIONNAIRE - PHQ9
SUM OF ALL RESPONSES TO PHQ QUESTIONS 1-9: 0
SUM OF ALL RESPONSES TO PHQ9 QUESTIONS 1 & 2: 0
2. FEELING DOWN, DEPRESSED OR HOPELESS: 0
1. LITTLE INTEREST OR PLEASURE IN DOING THINGS: 0
SUM OF ALL RESPONSES TO PHQ QUESTIONS 1-9: 0

## 2019-04-08 NOTE — PROGRESS NOTES
Subjective:      Patient ID: Job Vicente is a 71 y.o. male. HPI  Established patient here for a visit to manage chronic medical conditions as detailed below. Type 2 diabetes mellitus without complication, without long-term current use of insulin (HCC)  Diabetes Mellitus Type II:  Home blood sugar records reviewed: fasting range: 120-150. No significant episodes of hypoglycemia. Compliant with medications. No polyuria, polydipsia, visual changes, foot problems, GI upset. Diabetic diet compliance:  compliant most of the time. Current exercise: none. Will check labs, and refill medications as appropriate. Hypertension:  Denies CP, SOB, visual changes, dizziness, palpitations or HA. He is adherent to a low sodium diet. Blood pressure typically runs ok outside of the office. Continue current medications. Hyperlipidemia:  No new myalgias or GI upset on current medications. Lab Results   Component Value Date    LABA1C 9.3 01/25/2019    LABA1C 10.1 09/17/2018    LABA1C 9.6 (H) 03/03/2018     Lab Results   Component Value Date    LABMICR Not Indicated 12/26/2018    CREATININE 0.8 01/25/2019     Lab Results   Component Value Date    ALT 18 01/25/2019    AST 11 (L) 01/25/2019     No components found for: CHLPL  Lab Results   Component Value Date    TRIG 109 03/07/2019     Lab Results   Component Value Date    HDL 43 03/07/2019     Lab Results   Component Value Date    LDLCALC 63 03/07/2019         BP Readings from Last 2 Encounters:   04/08/19 134/72   03/27/19 (!) 120/58     Hemoglobin A1C (%)   Date Value   01/25/2019 9.3     Microscopic Examination (no units)   Date Value   12/26/2018 Not Indicated     LDL Calculated (mg/dL)   Date Value   03/07/2019 63              Tobacco use:  Patient  reports that he quit smoking about 11 years ago. His smoking use included cigarettes. He started smoking about 59 years ago. He has a 48.00 pack-year smoking history.  He has never used smokeless is normal without focal findings. Cranial nerves are intact, reflexes are symmetrical and muscle strength eaqual. Skin is normal without suspicious lesions noted. Assessment:      Type 2 diabetes mellitus without complication, without long-term current use of insulin ([facility])  Diabetes Mellitus Type II:  Home blood sugar records reviewed: fasting range: 120-150. No significant episodes of hypoglycemia. Compliant with medications. No polyuria, polydipsia, visual changes, foot problems, GI upset. Diabetic diet compliance:  compliant most of the time. Current exercise: none. Will check labs, and refill medications as appropriate. Hypertension:  Denies CP, SOB, visual changes, dizziness, palpitations or HA. He is adherent to a low sodium diet. Blood pressure typically runs ok outside of the office. Continue current medications. Hyperlipidemia:  No new myalgias or GI upset on current medications. Lab Results   Component Value Date    LABA1C 9.3 01/25/2019    LABA1C 10.1 09/17/2018    LABA1C 9.6 (H) 03/03/2018     Lab Results   Component Value Date    LABMICR Not Indicated 12/26/2018    CREATININE 0.8 01/25/2019     Lab Results   Component Value Date    ALT 18 01/25/2019    AST 11 (L) 01/25/2019     No components found for: CHLPL  Lab Results   Component Value Date    TRIG 109 03/07/2019     Lab Results   Component Value Date    HDL 43 03/07/2019     Lab Results   Component Value Date    LDLCALC 63 03/07/2019         BP Readings from Last 2 Encounters:   04/08/19 134/72   03/27/19 (!) 120/58     Hemoglobin A1C (%)   Date Value   01/25/2019 9.3     Microscopic Examination (no units)   Date Value   12/26/2018 Not Indicated     LDL Calculated (mg/dL)   Date Value   03/07/2019 63              Tobacco use:  Patient  reports that he quit smoking about 11 years ago. His smoking use included cigarettes. He started smoking about 59 years ago. He has a 48.00 pack-year smoking history.  He has never used smokeless

## 2019-04-08 NOTE — ASSESSMENT & PLAN NOTE
Has been stable,  No cp or sob or dizziness,  Patient is compliant w medications, no side effects, effective, provides adequate symptom relief. No new symptoms or problems as noted by patient. The problem is stable, no changes noted by patient. Will consider monitoring labs and refill medications as appropriate. Patient counseled and will continue current plan.

## 2019-04-08 NOTE — ASSESSMENT & PLAN NOTE
Following Dr Cr Massey. Patient is compliant w medications, no side effects, effective, provides adequate symptom relief. No new symptoms or problems as noted by patient. The problem is stable, no changes noted by patient. Will consider monitoring labs and refill medications as appropriate. Patient counseled and will continue current plan.

## 2019-04-08 NOTE — TELEPHONE ENCOUNTER
Fax from [facility] for [facility] Flextouch Pen.  Or change medication to MedWhat, Levemir, Levemir FlexTouch

## 2019-04-08 NOTE — ASSESSMENT & PLAN NOTE
Diabetes Mellitus Type II:  Home blood sugar records reviewed: fasting range: 120-150. No significant episodes of hypoglycemia. Compliant with medications. No polyuria, polydipsia, visual changes, foot problems, GI upset. Diabetic diet compliance:  compliant most of the time. Current exercise: none. Will check labs, and refill medications as appropriate. Hypertension:  Denies CP, SOB, visual changes, dizziness, palpitations or HA. He is adherent to a low sodium diet. Blood pressure typically runs ok outside of the office. Continue current medications. Hyperlipidemia:  No new myalgias or GI upset on current medications. Lab Results   Component Value Date    LABA1C 9.3 01/25/2019    LABA1C 10.1 09/17/2018    LABA1C 9.6 (H) 03/03/2018     Lab Results   Component Value Date    LABMICR Not Indicated 12/26/2018    CREATININE 0.8 01/25/2019     Lab Results   Component Value Date    ALT 18 01/25/2019    AST 11 (L) 01/25/2019     No components found for: CHLPL  Lab Results   Component Value Date    TRIG 109 03/07/2019     Lab Results   Component Value Date    HDL 43 03/07/2019     Lab Results   Component Value Date    LDLCALC 63 03/07/2019         BP Readings from Last 2 Encounters:   04/08/19 134/72   03/27/19 (!) 120/58     Hemoglobin A1C (%)   Date Value   01/25/2019 9.3     Microscopic Examination (no units)   Date Value   12/26/2018 Not Indicated     LDL Calculated (mg/dL)   Date Value   03/07/2019 63              Tobacco use:  Patient  reports that he quit smoking about 11 years ago. His smoking use included cigarettes. He started smoking about 59 years ago. He has a 48.00 pack-year smoking history. He has never used smokeless tobacco.    If Smoker - Cessation materials given? NA    Is Daily aspirin on medication list?:  Yes    Diabetic retinal exam done? Yes   If yes, document in Health Maintenance. Monofilament placed on counter? Yes    Shoes and socks removed?  Yes    BP taken with correct size cuff? Yes   Repeated if > 130/80 Yes     Microalbumin performed if applicable?   Yes

## 2019-04-08 NOTE — PROGRESS NOTES
PHQ Scores 4/8/2019 2/12/2018 7/11/2016 12/15/2014 12/15/2014 6/25/2014 4/17/2014   PHQ2 Score 0 0 0 0 0 0 0   PHQ9 Score 0 0 0 0 0 0 0     Interpretation of Total Score Depression Severity: 1-4 = Minimal depression, 5-9 = Mild depression, 10-14 = Moderate depression, 15-19 = Moderately severe depression, 20-27 = Severe depression

## 2019-04-08 NOTE — ASSESSMENT & PLAN NOTE
On zetia and crestor,  This has been a chronic problem, takes meds regularly. Monitors diet and tries to follow a low fat diet. Not been very compliant w exercise. Lipids have been stable, The problem is controlled. Recent lipid tests were reviewed and are normal. Pertinent negatives include no chest pain, focal sensory loss, focal weakness, leg pain, myalgias or shortness of breath. Advised patient to continue the current instructions or medications.

## 2019-04-24 ENCOUNTER — TELEPHONE (OUTPATIENT)
Dept: ENDOCRINOLOGY | Age: 69
End: 2019-04-24

## 2019-05-20 ENCOUNTER — OFFICE VISIT (OUTPATIENT)
Dept: INTERNAL MEDICINE CLINIC | Age: 69
End: 2019-05-20
Payer: MEDICARE

## 2019-05-20 VITALS
HEIGHT: 75 IN | DIASTOLIC BLOOD PRESSURE: 76 MMHG | WEIGHT: 214 LBS | BODY MASS INDEX: 26.61 KG/M2 | OXYGEN SATURATION: 98 % | HEART RATE: 64 BPM | SYSTOLIC BLOOD PRESSURE: 132 MMHG

## 2019-05-20 DIAGNOSIS — I10 ESSENTIAL HYPERTENSION: ICD-10-CM

## 2019-05-20 DIAGNOSIS — E11.9 TYPE 2 DIABETES MELLITUS WITHOUT COMPLICATION, WITHOUT LONG-TERM CURRENT USE OF INSULIN (HCC): ICD-10-CM

## 2019-05-20 DIAGNOSIS — L97.521 DIABETIC ULCER OF TOE OF LEFT FOOT ASSOCIATED WITH TYPE 2 DIABETES MELLITUS, LIMITED TO BREAKDOWN OF SKIN (HCC): Chronic | ICD-10-CM

## 2019-05-20 DIAGNOSIS — E11.621 DIABETIC ULCER OF TOE OF LEFT FOOT ASSOCIATED WITH TYPE 2 DIABETES MELLITUS, LIMITED TO BREAKDOWN OF SKIN (HCC): Chronic | ICD-10-CM

## 2019-05-20 PROCEDURE — 1036F TOBACCO NON-USER: CPT | Performed by: INTERNAL MEDICINE

## 2019-05-20 PROCEDURE — 3046F HEMOGLOBIN A1C LEVEL >9.0%: CPT | Performed by: INTERNAL MEDICINE

## 2019-05-20 PROCEDURE — 1123F ACP DISCUSS/DSCN MKR DOCD: CPT | Performed by: INTERNAL MEDICINE

## 2019-05-20 PROCEDURE — G8598 ASA/ANTIPLAT THER USED: HCPCS | Performed by: INTERNAL MEDICINE

## 2019-05-20 PROCEDURE — 3017F COLORECTAL CA SCREEN DOC REV: CPT | Performed by: INTERNAL MEDICINE

## 2019-05-20 PROCEDURE — G8427 DOCREV CUR MEDS BY ELIG CLIN: HCPCS | Performed by: INTERNAL MEDICINE

## 2019-05-20 PROCEDURE — 4040F PNEUMOC VAC/ADMIN/RCVD: CPT | Performed by: INTERNAL MEDICINE

## 2019-05-20 PROCEDURE — 2022F DILAT RTA XM EVC RTNOPTHY: CPT | Performed by: INTERNAL MEDICINE

## 2019-05-20 PROCEDURE — 99214 OFFICE O/P EST MOD 30 MIN: CPT | Performed by: INTERNAL MEDICINE

## 2019-05-20 PROCEDURE — G8419 CALC BMI OUT NRM PARAM NOF/U: HCPCS | Performed by: INTERNAL MEDICINE

## 2019-05-20 RX ORDER — AMOXICILLIN AND CLAVULANATE POTASSIUM 875; 125 MG/1; MG/1
1 TABLET, FILM COATED ORAL 2 TIMES DAILY
Qty: 20 TABLET | Refills: 0 | Status: SHIPPED | OUTPATIENT
Start: 2019-05-20 | End: 2019-05-30 | Stop reason: ALTCHOICE

## 2019-05-20 NOTE — ASSESSMENT & PLAN NOTE
Diabetes Mellitus Type II:  Home blood sugar records reviewed: fasting range: 200-220. No significant episodes of hypoglycemia. Compliant with medications. No polyuria, polydipsia, visual changes, foot problems, GI upset. Diabetic diet compliance:  compliant most of the time. Current exercise: none. Will check labs, and refill medications as appropriate. Hypertension:  Denies CP, SOB, visual changes, dizziness, palpitations or HA. He is adherent to a low sodium diet. Blood pressure typically runs ok outside of the office. Continue current medications. Hyperlipidemia:  No new myalgias or GI upset on current medications. Lab Results   Component Value Date    LABA1C 9.3 01/25/2019    LABA1C 10.1 09/17/2018    LABA1C 9.6 (H) 03/03/2018     Lab Results   Component Value Date    LABMICR Not Indicated 12/26/2018    CREATININE 0.8 01/25/2019     Lab Results   Component Value Date    ALT 18 01/25/2019    AST 11 (L) 01/25/2019     No components found for: CHLPL  Lab Results   Component Value Date    TRIG 109 03/07/2019     Lab Results   Component Value Date    HDL 43 03/07/2019     Lab Results   Component Value Date    LDLCALC 63 03/07/2019        BP Readings from Last 2 Encounters:   05/20/19 132/76   04/08/19 134/72     Hemoglobin A1C (%)   Date Value   01/25/2019 9.3     Microscopic Examination (no units)   Date Value   12/26/2018 Not Indicated     LDL Calculated (mg/dL)   Date Value   03/07/2019 63              Tobacco use:  Patient  reports that he quit smoking about 11 years ago. His smoking use included cigarettes. He started smoking about 59 years ago. He has a 48.00 pack-year smoking history. He has never used smokeless tobacco.    If Smoker - Cessation materials given? NA    Is Daily aspirin on medication list?:  Yes    Diabetic retinal exam done? Yes   If yes, document in Health Maintenance. Monofilament placed on counter? Yes    Shoes and socks removed? Yes    BP taken with correct size cuff?

## 2019-05-20 NOTE — PROGRESS NOTES
Subjective:      Patient ID: Makenna Freeman is a 71 y.o. male. HPI  Established patient here for a visit to manage chronic medical conditions as detailed below. Diabetic ulcer of toe of left foot associated with type 2 diabetes mellitus, limited to breakdown of skin ([address]  This started as a blister 1 week ago, which broke and now has been very sore and has had discharge,   Has some pus,  No fever or chills,   Has some numbness, and tingling in the feet,   Will start antibiotics and also refer to foot doctor. Essential hypertension  This is a chronic problem. The problem is well controlled. Patient monitors readings regularly. Pertinent negatives include no chest pain, focal sensory loss, focal weakness, leg pain, myalgias or shortness of breath. No headaches or chest pain. Takes medications regularly. Blood pressure has been stable, blood work was reviewed, and advised patient to continue the current instructions or medications. Type 2 diabetes mellitus without complication, without long-term current use of insulin (HCC)  Diabetes Mellitus Type II:  Home blood sugar records reviewed: fasting range: 200-220. No significant episodes of hypoglycemia. Compliant with medications. No polyuria, polydipsia, visual changes, foot problems, GI upset. Diabetic diet compliance:  compliant most of the time. Current exercise: none. Will check labs, and refill medications as appropriate. Hypertension:  Denies CP, SOB, visual changes, dizziness, palpitations or HA. He is adherent to a low sodium diet. Blood pressure typically runs ok outside of the office. Continue current medications. Hyperlipidemia:  No new myalgias or GI upset on current medications.        Lab Results   Component Value Date    LABA1C 9.3 01/25/2019    LABA1C 10.1 09/17/2018    LABA1C 9.6 (H) 03/03/2018     Lab Results   Component Value Date    LABMICR Not Indicated 12/26/2018    CREATININE 0.8 01/25/2019     Lab Results was negative. Objective:   Physical Exam  /76 (Site: Right Upper Arm, Position: Sitting, Cuff Size: Medium Adult)   Pulse 64   Ht 6' 3\" (1.905 m)   Wt 214 lb (97.1 kg)   SpO2 98%   BMI 26.75 kg/m²    The physical exam reveals a patient who appears well, alert and oriented x 3, pleasant, cooperative. Vitals are as noted. Head is atraumatic and normocephalic. Eyes reveal normal conjunctiva, cornea normal, pupils are equal and rective to light. Nasal mucosa is normal. Throat is normal without exudates. Ears reveal normal tympanic membranes. Neck is supple and free of adenopathy, or masses. No thyromegaly. No jugular venous distension. Lungs are clear to auscultation, no rales or rhonchi noted. Heart sounds are regular , no murmurs, clicks, gallops or rubs. Abdomen is soft, no tenderness, masses or organomegaly. Bowel sounds are normally heard. Pelvis: normal. Extremities are normal. Peripheral pulses are normal. Screening neurological exam is normal without focal findings. Cranial nerves are intact, reflexes are symmetrical and muscle strength eaqual. Skin is normal without suspicious lesions noted. Assessment:      Diabetic ulcer of toe of left foot associated with type 2 diabetes mellitus, limited to breakdown of skin ([address]  This started as a blister 1 week ago, which broke and now has been very sore and has had discharge,   Has some pus,  No fever or chills,   Has some numbness, and tingling in the feet,   Will start antibiotics and also refer to foot doctor. Essential hypertension  This is a chronic problem. The problem is well controlled. Patient monitors readings regularly. Pertinent negatives include no chest pain, focal sensory loss, focal weakness, leg pain, myalgias or shortness of breath. No headaches or chest pain. Takes medications regularly. Blood pressure has been stable, blood work was reviewed, and advised patient to continue the current instructions or medications.       Type 2 diabetes mellitus without complication, without long-term current use of insulin ([facility]  Diabetes Mellitus Type II:  Home blood sugar records reviewed: fasting range: 200-220. No significant episodes of hypoglycemia. Compliant with medications. No polyuria, polydipsia, visual changes, foot problems, GI upset. Diabetic diet compliance:  compliant most of the time. Current exercise: none. Will check labs, and refill medications as appropriate. Hypertension:  Denies CP, SOB, visual changes, dizziness, palpitations or HA. He is adherent to a low sodium diet. Blood pressure typically runs ok outside of the office. Continue current medications. Hyperlipidemia:  No new myalgias or GI upset on current medications. Lab Results   Component Value Date    LABA1C 9.3 01/25/2019    LABA1C 10.1 09/17/2018    LABA1C 9.6 (H) 03/03/2018     Lab Results   Component Value Date    LABMICR Not Indicated 12/26/2018    CREATININE 0.8 01/25/2019     Lab Results   Component Value Date    ALT 18 01/25/2019    AST 11 (L) 01/25/2019     No components found for: CHLPL  Lab Results   Component Value Date    TRIG 109 03/07/2019     Lab Results   Component Value Date    HDL 43 03/07/2019     Lab Results   Component Value Date    LDLCALC 63 03/07/2019        BP Readings from Last 2 Encounters:   05/20/19 132/76   04/08/19 134/72     Hemoglobin A1C (%)   Date Value   01/25/2019 9.3     Microscopic Examination (no units)   Date Value   12/26/2018 Not Indicated     LDL Calculated (mg/dL)   Date Value   03/07/2019 63              Tobacco use:  Patient  reports that he quit smoking about 11 years ago. His smoking use included cigarettes. He started smoking about 59 years ago. He has a 48.00 pack-year smoking history. He has never used smokeless tobacco.    If Smoker - Cessation materials given? NA    Is Daily aspirin on medication list?:  Yes    Diabetic retinal exam done? Yes   If yes, document in Health Maintenance.      Monofilament placed on counter? Yes    Shoes and socks removed? Yes    BP taken with correct size cuff? Yes   Repeated if > 130/80 Yes     Microalbumin performed if applicable? Yes            Plan: Will start antibiotics,  Advised patient to increase basaglar to 20 units qd,  Will refer to podiatry.         Jonathon Miller MD

## 2019-05-20 NOTE — ASSESSMENT & PLAN NOTE
This started as a blister 1 week ago, which broke and now has been very sore and has had discharge,   Has some pus,  No fever or chills,   Has some numbness, and tingling in the feet,   Will start antibiotics and also refer to foot doctor.

## 2019-05-23 ENCOUNTER — OFFICE VISIT (OUTPATIENT)
Dept: ORTHOPEDIC SURGERY | Age: 69
End: 2019-05-23
Payer: MEDICARE

## 2019-05-23 VITALS
HEIGHT: 75 IN | WEIGHT: 214.07 LBS | SYSTOLIC BLOOD PRESSURE: 130 MMHG | HEART RATE: 68 BPM | DIASTOLIC BLOOD PRESSURE: 74 MMHG | BODY MASS INDEX: 26.62 KG/M2

## 2019-05-23 DIAGNOSIS — E11.42 DM TYPE 2 WITH DIABETIC PERIPHERAL NEUROPATHY (HCC): ICD-10-CM

## 2019-05-23 DIAGNOSIS — L97.521 FOOT ULCER, LEFT, LIMITED TO BREAKDOWN OF SKIN (HCC): Primary | ICD-10-CM

## 2019-05-23 PROCEDURE — 11042 DBRDMT SUBQ TIS 1ST 20SQCM/<: CPT | Performed by: PODIATRIST

## 2019-05-23 PROCEDURE — G8419 CALC BMI OUT NRM PARAM NOF/U: HCPCS | Performed by: PODIATRIST

## 2019-05-23 PROCEDURE — 99213 OFFICE O/P EST LOW 20 MIN: CPT | Performed by: PODIATRIST

## 2019-05-23 PROCEDURE — 1036F TOBACCO NON-USER: CPT | Performed by: PODIATRIST

## 2019-05-23 PROCEDURE — G8427 DOCREV CUR MEDS BY ELIG CLIN: HCPCS | Performed by: PODIATRIST

## 2019-05-23 PROCEDURE — 1123F ACP DISCUSS/DSCN MKR DOCD: CPT | Performed by: PODIATRIST

## 2019-05-23 PROCEDURE — 2022F DILAT RTA XM EVC RTNOPTHY: CPT | Performed by: PODIATRIST

## 2019-05-23 PROCEDURE — G8598 ASA/ANTIPLAT THER USED: HCPCS | Performed by: PODIATRIST

## 2019-05-23 PROCEDURE — L3260 AMBULATORY SURGICAL BOOT EAC: HCPCS | Performed by: PODIATRIST

## 2019-05-23 PROCEDURE — 3046F HEMOGLOBIN A1C LEVEL >9.0%: CPT | Performed by: PODIATRIST

## 2019-05-23 PROCEDURE — 4040F PNEUMOC VAC/ADMIN/RCVD: CPT | Performed by: PODIATRIST

## 2019-05-23 PROCEDURE — 3017F COLORECTAL CA SCREEN DOC REV: CPT | Performed by: PODIATRIST

## 2019-05-23 NOTE — PROGRESS NOTES
HISTORY OF PRESENT ILLNESS:  This is an initial visit for a 40-year-old non-insulin-dependent male diabetic patient with a chief complaint of an open sore on the bottom of the left foot. He states that he noticed a blister develop last week. He was placed on Augmentin. There is no recent history of fever, chills, or pain to the foot. FAMILY HISTORY:  Documented in chart. SOCIAL HISTORY:  Documented in chart. REVIEW OF SYSTEMS: NIDDM otherwise The patient denies any fever, chills, or night sweats. The patient also denies developing any type of rash. The patient denies any problems with cardiovascular, pulmonary, gastrointestinal, neurologic, urologic, genitourinary, psychiatric, dermatologic, and HEENT systems. Family History, Social History, and Review of Systems were reviewed from patient history form dated on 5/23/2019 and available in the patient's chart under the MEDIA tab. PHYSICAL EXAM:  An open lesion is present at the plantar aspect of the left hallux IPJ. This is superficial and has a pink granular base. This measures 1.0 cm in diameter. The lesion does not probe to bone and there is no deep sinus tract noted. Drainage is minimal and is mostly serosanguineous in nature. No surrounding signs of infection are noted. No ascending cellulitis is noted. The remainder of the skin is intact, bilateral.    The patient has palpable pedal pulses, bilateral.  There is mild pitting edema of the foot and ankle, bilateral.    Sensation is absent to a 5.07 g monofilament and multiple plantar sides of both feet, bilateral.      X-RAYS: None taken      ASSESSMENT:  NIDDM with peripheral neuropathy, diabetic toe ulcer - left foot      PLAN:  I educated the patient on the pathology and its treatment options. A diabetic foot exam was performed. This exam revealed loss of protective sensation to both feet.   This finding coupled with the presence of foot ulceration classifies the patient

## 2019-05-30 ENCOUNTER — OFFICE VISIT (OUTPATIENT)
Dept: ORTHOPEDIC SURGERY | Age: 69
End: 2019-05-30
Payer: MEDICARE

## 2019-05-30 VITALS
SYSTOLIC BLOOD PRESSURE: 131 MMHG | HEIGHT: 75 IN | BODY MASS INDEX: 26.62 KG/M2 | WEIGHT: 214.07 LBS | DIASTOLIC BLOOD PRESSURE: 74 MMHG | HEART RATE: 68 BPM

## 2019-05-30 DIAGNOSIS — L97.521 FOOT ULCER, LEFT, LIMITED TO BREAKDOWN OF SKIN (HCC): Primary | ICD-10-CM

## 2019-05-30 DIAGNOSIS — E11.42 DM TYPE 2 WITH DIABETIC PERIPHERAL NEUROPATHY (HCC): ICD-10-CM

## 2019-05-30 PROCEDURE — 3017F COLORECTAL CA SCREEN DOC REV: CPT | Performed by: PODIATRIST

## 2019-05-30 PROCEDURE — 11042 DBRDMT SUBQ TIS 1ST 20SQCM/<: CPT | Performed by: PODIATRIST

## 2019-05-30 PROCEDURE — 1036F TOBACCO NON-USER: CPT | Performed by: PODIATRIST

## 2019-05-30 PROCEDURE — 99213 OFFICE O/P EST LOW 20 MIN: CPT | Performed by: PODIATRIST

## 2019-05-30 PROCEDURE — 2022F DILAT RTA XM EVC RTNOPTHY: CPT | Performed by: PODIATRIST

## 2019-05-30 PROCEDURE — 1123F ACP DISCUSS/DSCN MKR DOCD: CPT | Performed by: PODIATRIST

## 2019-05-30 PROCEDURE — G8427 DOCREV CUR MEDS BY ELIG CLIN: HCPCS | Performed by: PODIATRIST

## 2019-05-30 PROCEDURE — G8598 ASA/ANTIPLAT THER USED: HCPCS | Performed by: PODIATRIST

## 2019-05-30 PROCEDURE — 4040F PNEUMOC VAC/ADMIN/RCVD: CPT | Performed by: PODIATRIST

## 2019-05-30 PROCEDURE — 3046F HEMOGLOBIN A1C LEVEL >9.0%: CPT | Performed by: PODIATRIST

## 2019-05-30 PROCEDURE — G8419 CALC BMI OUT NRM PARAM NOF/U: HCPCS | Performed by: PODIATRIST

## 2019-05-30 NOTE — PROGRESS NOTES
HISTORY OF PRESENT ILLNESS: This is a followup for diabetic toe ulcer on the left foot. The patient denies any fever, chills, or pain. PHYSICAL EXAM:  An open lesion is present at the plantar medial aspect of the left hallux IPJ. This measures 0.5 cm in diameter. This has a pink granular base. The lesion does not probe to bone and there is no deep sinus tract noted. Drainage is minimal and is mostly serosanguineous in nature. No surrounding signs of infection are noted. No ascending cellulitis is noted. The remainder of the skin is intact, bilateral.    The patient has palpable pedal pulses, bilateral.  There is mild pitting edema of the foot and ankle, bilateral.    Sensation is absent to a 5.07 g monofilament and multiple plantar sides of both feet, bilateral.      ASSESSMENT:  NIDDM with peripheral neuropathy, diabetic toe ulcer of the left hallux. PLAN:  The ulcer was debrided using a #15 blade down to good bleeding tissue. A mildly compressive dressing was applied with Neosporin ointment. The same local wound care will be continued. I discussed the recalcitrant nature of diabetic foot ulcers to treatment and the oftentimes extended nature of treatment. We discussed several different treatment options. He will continue the postop shoe full-time. Diabetic foot education was discussed. Overall activity will have to be decreased. The patient will follow up in one.

## 2019-06-06 ENCOUNTER — OFFICE VISIT (OUTPATIENT)
Dept: ORTHOPEDIC SURGERY | Age: 69
End: 2019-06-06
Payer: MEDICARE

## 2019-06-06 VITALS
SYSTOLIC BLOOD PRESSURE: 124 MMHG | BODY MASS INDEX: 26.62 KG/M2 | WEIGHT: 214.07 LBS | HEIGHT: 75 IN | DIASTOLIC BLOOD PRESSURE: 77 MMHG | HEART RATE: 86 BPM

## 2019-06-06 DIAGNOSIS — L97.521 FOOT ULCER, LEFT, LIMITED TO BREAKDOWN OF SKIN (HCC): Primary | ICD-10-CM

## 2019-06-06 DIAGNOSIS — E11.42 DM TYPE 2 WITH DIABETIC PERIPHERAL NEUROPATHY (HCC): ICD-10-CM

## 2019-06-06 PROCEDURE — 1123F ACP DISCUSS/DSCN MKR DOCD: CPT | Performed by: PODIATRIST

## 2019-06-06 PROCEDURE — G8598 ASA/ANTIPLAT THER USED: HCPCS | Performed by: PODIATRIST

## 2019-06-06 PROCEDURE — 1036F TOBACCO NON-USER: CPT | Performed by: PODIATRIST

## 2019-06-06 PROCEDURE — 3017F COLORECTAL CA SCREEN DOC REV: CPT | Performed by: PODIATRIST

## 2019-06-06 PROCEDURE — 4040F PNEUMOC VAC/ADMIN/RCVD: CPT | Performed by: PODIATRIST

## 2019-06-06 PROCEDURE — G8419 CALC BMI OUT NRM PARAM NOF/U: HCPCS | Performed by: PODIATRIST

## 2019-06-06 PROCEDURE — G8427 DOCREV CUR MEDS BY ELIG CLIN: HCPCS | Performed by: PODIATRIST

## 2019-06-06 PROCEDURE — 11042 DBRDMT SUBQ TIS 1ST 20SQCM/<: CPT | Performed by: PODIATRIST

## 2019-06-06 PROCEDURE — 99213 OFFICE O/P EST LOW 20 MIN: CPT | Performed by: PODIATRIST

## 2019-06-06 PROCEDURE — 3046F HEMOGLOBIN A1C LEVEL >9.0%: CPT | Performed by: PODIATRIST

## 2019-06-06 PROCEDURE — 2022F DILAT RTA XM EVC RTNOPTHY: CPT | Performed by: PODIATRIST

## 2019-06-06 NOTE — PROGRESS NOTES
HISTORY OF PRESENT ILLNESS: This is a followup for diabetic foot ulcer on the left great toe. The patient denies any fever, chills, or pain. He states that he has not noticed any drainage. PHYSICAL EXAM:  An open lesion is present at the plantar medial aspect of the hallux IPJ of the left foot. This measures 0.4 cm x 0.3 cm. The lesion does not probe to bone and there is no deep sinus tract noted. Drainage is minimal and is mostly serosanguineous in nature. No surrounding signs of infection are noted. No ascending cellulitis is noted. The remainder of the skin is intact, bilateral.    The patient has palpable pedal pulses, bilateral.  There is minimal pitting edema of the foot and ankle, bilateral.    Sensation is absent to a 5.07 g monofilament and multiple plantar sides of both feet, bilateral.    3 weightbearing x-ray views of his left foot were taken. The proximal phalangeal head at the medial aspect appears to be thickened but this is not an acute periosteal reaction or fracture. There is no intra-articular sesamoid at the IPJ. No obvious signs of osteomyelitis are noted. ASSESSMENT:  NIDDM with peripheral neuropathy, diabetic toe ulcer of the left foot. PLAN:  The ulcer was debrided using a #15 blade down to good bleeding tissue. A mildly compressive dressing was applied with Neosporin ointment. The same local wound care will be continued. I discussed the recalcitrant nature of diabetic foot ulcers to treatment and the oftentimes extended nature of treatment. We discussed several different treatment options. We briefly discussed the possible need for surgical treatment to alleviate pressure. Diabetic foot education was discussed. Overall activity will have to be decreased. The patient will follow up in one week.

## 2019-06-12 ENCOUNTER — TELEPHONE (OUTPATIENT)
Dept: ENDOCRINOLOGY | Age: 69
End: 2019-06-12

## 2019-06-12 NOTE — TELEPHONE ENCOUNTER
Dr. Bhakti Zuniga reviewed diabetes log for 3/27 to 6/10. Patient aware to make the following changes increases bedtime long acting insulin to 12 units daily. Send logs in 2-3 wks.

## 2019-06-13 ENCOUNTER — OFFICE VISIT (OUTPATIENT)
Dept: ORTHOPEDIC SURGERY | Age: 69
End: 2019-06-13
Payer: MEDICARE

## 2019-06-13 VITALS
HEART RATE: 62 BPM | DIASTOLIC BLOOD PRESSURE: 60 MMHG | BODY MASS INDEX: 26.62 KG/M2 | SYSTOLIC BLOOD PRESSURE: 107 MMHG | HEIGHT: 75 IN | WEIGHT: 214.07 LBS

## 2019-06-13 DIAGNOSIS — E11.42 DM TYPE 2 WITH DIABETIC PERIPHERAL NEUROPATHY (HCC): ICD-10-CM

## 2019-06-13 DIAGNOSIS — L97.521 FOOT ULCER, LEFT, LIMITED TO BREAKDOWN OF SKIN (HCC): Primary | ICD-10-CM

## 2019-06-13 PROCEDURE — 4040F PNEUMOC VAC/ADMIN/RCVD: CPT | Performed by: PODIATRIST

## 2019-06-13 PROCEDURE — 1036F TOBACCO NON-USER: CPT | Performed by: PODIATRIST

## 2019-06-13 PROCEDURE — G8427 DOCREV CUR MEDS BY ELIG CLIN: HCPCS | Performed by: PODIATRIST

## 2019-06-13 PROCEDURE — 3046F HEMOGLOBIN A1C LEVEL >9.0%: CPT | Performed by: PODIATRIST

## 2019-06-13 PROCEDURE — 11042 DBRDMT SUBQ TIS 1ST 20SQCM/<: CPT | Performed by: PODIATRIST

## 2019-06-13 PROCEDURE — 1123F ACP DISCUSS/DSCN MKR DOCD: CPT | Performed by: PODIATRIST

## 2019-06-13 PROCEDURE — G8419 CALC BMI OUT NRM PARAM NOF/U: HCPCS | Performed by: PODIATRIST

## 2019-06-13 PROCEDURE — 3017F COLORECTAL CA SCREEN DOC REV: CPT | Performed by: PODIATRIST

## 2019-06-13 PROCEDURE — G8598 ASA/ANTIPLAT THER USED: HCPCS | Performed by: PODIATRIST

## 2019-06-13 PROCEDURE — 99213 OFFICE O/P EST LOW 20 MIN: CPT | Performed by: PODIATRIST

## 2019-06-13 PROCEDURE — 2022F DILAT RTA XM EVC RTNOPTHY: CPT | Performed by: PODIATRIST

## 2019-06-21 DIAGNOSIS — I25.10 CORONARY ARTERY DISEASE INVOLVING NATIVE CORONARY ARTERY OF NATIVE HEART WITHOUT ANGINA PECTORIS: ICD-10-CM

## 2019-06-21 LAB
A/G RATIO: 1.7 (ref 1.1–2.2)
ALBUMIN SERPL-MCNC: 4.7 G/DL (ref 3.4–5)
ALP BLD-CCNC: 66 U/L (ref 40–129)
ALT SERPL-CCNC: 22 U/L (ref 10–40)
ANION GAP SERPL CALCULATED.3IONS-SCNC: 15 MMOL/L (ref 3–16)
AST SERPL-CCNC: 14 U/L (ref 15–37)
BILIRUB SERPL-MCNC: 0.4 MG/DL (ref 0–1)
BUN BLDV-MCNC: 15 MG/DL (ref 7–20)
CALCIUM SERPL-MCNC: 9.6 MG/DL (ref 8.3–10.6)
CHLORIDE BLD-SCNC: 105 MMOL/L (ref 99–110)
CO2: 24 MMOL/L (ref 21–32)
CREAT SERPL-MCNC: 0.7 MG/DL (ref 0.8–1.3)
CREATININE URINE: 59.1 MG/DL (ref 39–259)
GFR AFRICAN AMERICAN: >60
GFR NON-AFRICAN AMERICAN: >60
GLOBULIN: 2.7 G/DL
GLUCOSE BLD-MCNC: 214 MG/DL (ref 70–99)
MICROALBUMIN UR-MCNC: 2.8 MG/DL
MICROALBUMIN/CREAT UR-RTO: 47.4 MG/G (ref 0–30)
POTASSIUM SERPL-SCNC: 4.3 MMOL/L (ref 3.5–5.1)
SODIUM BLD-SCNC: 144 MMOL/L (ref 136–145)
TOTAL PROTEIN: 7.4 G/DL (ref 6.4–8.2)
TSH SERPL DL<=0.05 MIU/L-ACNC: 3.58 UIU/ML (ref 0.27–4.2)

## 2019-06-22 LAB
ESTIMATED AVERAGE GLUCOSE: 237.4 MG/DL
HBA1C MFR BLD: 9.9 %

## 2019-06-26 ENCOUNTER — OFFICE VISIT (OUTPATIENT)
Dept: ENDOCRINOLOGY | Age: 69
End: 2019-06-26
Payer: MEDICARE

## 2019-06-26 VITALS
OXYGEN SATURATION: 100 % | HEART RATE: 75 BPM | HEIGHT: 75 IN | SYSTOLIC BLOOD PRESSURE: 138 MMHG | BODY MASS INDEX: 26.86 KG/M2 | DIASTOLIC BLOOD PRESSURE: 70 MMHG | WEIGHT: 216 LBS

## 2019-06-26 DIAGNOSIS — I10 ESSENTIAL HYPERTENSION: ICD-10-CM

## 2019-06-26 DIAGNOSIS — E78.00 PURE HYPERCHOLESTEROLEMIA: ICD-10-CM

## 2019-06-26 DIAGNOSIS — K63.5 POLYP OF ASCENDING COLON, UNSPECIFIED TYPE: ICD-10-CM

## 2019-06-26 DIAGNOSIS — E78.2 MIXED HYPERLIPIDEMIA: ICD-10-CM

## 2019-06-26 PROCEDURE — 3017F COLORECTAL CA SCREEN DOC REV: CPT | Performed by: INTERNAL MEDICINE

## 2019-06-26 PROCEDURE — G8427 DOCREV CUR MEDS BY ELIG CLIN: HCPCS | Performed by: INTERNAL MEDICINE

## 2019-06-26 PROCEDURE — 3046F HEMOGLOBIN A1C LEVEL >9.0%: CPT | Performed by: INTERNAL MEDICINE

## 2019-06-26 PROCEDURE — 2022F DILAT RTA XM EVC RTNOPTHY: CPT | Performed by: INTERNAL MEDICINE

## 2019-06-26 PROCEDURE — G8419 CALC BMI OUT NRM PARAM NOF/U: HCPCS | Performed by: INTERNAL MEDICINE

## 2019-06-26 PROCEDURE — 4040F PNEUMOC VAC/ADMIN/RCVD: CPT | Performed by: INTERNAL MEDICINE

## 2019-06-26 PROCEDURE — 1036F TOBACCO NON-USER: CPT | Performed by: INTERNAL MEDICINE

## 2019-06-26 PROCEDURE — G8598 ASA/ANTIPLAT THER USED: HCPCS | Performed by: INTERNAL MEDICINE

## 2019-06-26 PROCEDURE — 1123F ACP DISCUSS/DSCN MKR DOCD: CPT | Performed by: INTERNAL MEDICINE

## 2019-06-26 PROCEDURE — 99214 OFFICE O/P EST MOD 30 MIN: CPT | Performed by: INTERNAL MEDICINE

## 2019-06-26 NOTE — PROGRESS NOTES
Sarah Lora is a 71 y.o. male seen management of Type 2 diabetes. Sarah Lora was diagnosed with Diabetes mellitus at age 52 . Diabetes was diagnosed at routine screening . At the time of diagnosis patient had polyuria polydipsia . Sarah Lora got diabetic education in the past when he was first diagnosed with diabetes. Comorbid conditions: Neuropathy and Coronary Artery Disease    Current diabetic medications include: Actos 30 milligrams, metformin thousand twice a day, Amaryl 4 milligrams daily    INTERIM:    Diabetes   He presents for his initial diabetic visit. He has type 2 diabetes mellitus. No MedicAlert identification noted. The initial diagnosis of diabetes was made 20 years ago. His disease course has been worsening. There are no hypoglycemic associated symptoms. Associated symptoms include polyphagia and polyuria. Pertinent negatives for diabetes include no weight loss. There are no hypoglycemic complications. Pertinent negatives for hypoglycemia complications include no required glucagon injection. Symptoms are worsening. Diabetic complications include heart disease and peripheral neuropathy. Risk factors for coronary artery disease include diabetes mellitus, dyslipidemia, family history, male sex, obesity, hypertension and tobacco exposure. Current diabetic treatment includes oral agent (triple therapy). He is compliant with treatment most of the time. His weight is stable. He is following a generally unhealthy diet. Meal planning includes avoidance of concentrated sweets. He has had a previous visit with a dietitian. He rarely participates in exercise. There is no change in his home blood glucose trend. His breakfast blood glucose is taken between 7-8 am. His breakfast blood glucose range is generally >200 mg/dl. An ACE inhibitor/angiotensin II receptor blocker is being taken. He sees a podiatrist.Eye exam is current.     since last foot noticed a foot wound and is now Social History     Socioeconomic History    Marital status:      Spouse name: Not on file    Number of children: Not on file    Years of education: Not on file    Highest education level: Not on file   Occupational History    Not on file   Social Needs    Financial resource strain: Not on file    Food insecurity:     Worry: Not on file     Inability: Not on file    Transportation needs:     Medical: Not on file     Non-medical: Not on file   Tobacco Use    Smoking status: Former Smoker     Packs/day: 1.00     Years: 48.00     Pack years: 48.00     Types: Cigarettes     Start date: 1959     Last attempt to quit: 2007     Years since quittin.5    Smokeless tobacco: Never Used   Substance and Sexual Activity    Alcohol use:  Yes    Drug use: No    Sexual activity: Yes     Partners: Female   Lifestyle    Physical activity:     Days per week: Not on file     Minutes per session: Not on file    Stress: Not on file   Relationships    Social connections:     Talks on phone: Not on file     Gets together: Not on file     Attends Orthodox service: Not on file     Active member of club or organization: Not on file     Attends meetings of clubs or organizations: Not on file     Relationship status: Not on file    Intimate partner violence:     Fear of current or ex partner: Not on file     Emotionally abused: Not on file     Physically abused: Not on file     Forced sexual activity: Not on file   Other Topics Concern    Not on file   Social History Narrative    Not on file     Family History   Problem Relation Age of Onset    Coronary Art Dis Mother         CABG   [facility] Diabetes Mother     High Blood Pressure Father     Stroke Father     Hypertension Brother     Diabetes Brother     Coronary Art Dis Brother     Cancer Brother         prostate    Other Brother         PVD    Diabetes Maternal Grandmother     Cancer Maternal Grandmother         skin    Diabetes Brother     Hypertension Brother     Hypertension Brother         throat polyp, guillain barre     Current Outpatient Medications   Medication Sig Dispense Refill    insulin glargine (BASAGLAR KWIKPEN) 100 UNIT/ML injection pen Inject 30 Units into the skin nightly Indications: Inject 12 Units into the skin nightly 5 pen 5    ONETOUCH VERIO strip TEST FINGERSTICK GLUCOSE THREE TIMES DAILY 100 each 5    Empagliflozin-metFORMIN HCl ER 12.5-1000 MG TB24 1 tab BID 60 tablet 5    glipiZIDE (GLUCOTROL XL) 10 MG extended release tablet Take 1 tablet by mouth daily 30 tablet 3    Insulin Pen Needle (B-D UF III MINI PEN NEEDLES) 31G X 5 MM MISC 1 each by Does not apply route daily 100 each 6    Blood Glucose Monitoring Suppl (ONETOUCH VERIO FLEX SYSTEM) w/Device KIT Check blood sugar 1 kit 0    blood glucose test strips (ONE TOUCH ULTRA TEST) strip TEST FINGERSTICK GLUCOSE THREE TIMES DAILY 200 strip 3    amLODIPine-benazepril (LOTREL) 10-20 MG per capsule Take 1 capsule by mouth daily 90 capsule 0    clopidogrel (PLAVIX) 75 MG tablet TAKE 1 TABLET BY MOUTH EVERY DAY 90 tablet 1    tamsulosin (FLOMAX) 0.4 MG capsule TAKE 1 CAPSULE BY MOUTH EVERY DAY 90 capsule 1    ezetimibe (ZETIA) 10 MG tablet Take 1 tablet by mouth daily 90 tablet 1    rosuvastatin (CRESTOR) 40 MG tablet Take 1 tablet by mouth daily 90 tablet 1    hydrochlorothiazide (MICROZIDE) 12.5 MG capsule Take 1 capsule by mouth every morning 90 capsule 1    ONE TOUCH ULTRASOFT LANCETS MISC 1 each by In Vitro route daily Test Daily for Diabetes E11.9 300 each 0    Blood Glucose Monitoring Suppl MOISE Dx E11.9 Check daily and prn One Touch meter 1 Device 0    aspirin 81 MG EC tablet Take 81 mg by mouth daily. No current facility-administered medications for this visit.       Allergies   Allergen Reactions    Other      Bee stings       Family Status   Relation Name Status    Mother      Father      Brother   at age 76   [facility] (Not Specified)    Brother   at age 76    Brother  Alive       Review of Systems  I have reviewed the review of system questionnaire filled by the patient . Patient was advised to contact PCP for non endocrine signs and symptoms     OBJECTIVE:  /70   Pulse 75   Ht 6' 3\" (1.905 m)   Wt 216 lb (98 kg)   SpO2 100%   BMI 27.00 kg/m²    Wt Readings from Last 3 Encounters:   19 216 lb 0.8 oz (98 kg)   19 216 lb (98 kg)   19 214 lb 1.1 oz (97.1 kg)       Constitutional: no acute distress, well appearing and well nourished  Psychiatric: oriented to person, place and time, judgement and insight and normal, recent and remote memory and intact and mood and affect are normal  Skin: skin and subcutaneous tissue is normal without mass, normal turgor  Head and Face: examination of head and face revealed no abnormalities  Eyes: no lid or conjunctival swelling, erythema or discharge, pupils are normal,   Ears/Nose: external inspection of ears and nose revealed no abnormalities, hearing is grossly normal  Oropharynx/Mouth/Face: lips, tongue and gums are normal with no lesions, the voice quality was normal  Neck: neck is supple and symmetric, with midline trachea and no masses, thyroid is normal  Pulmonary: no increased work of breathing or signs of respiratory distress, lungs are clear to auscultation  Cardiovascular: normal heart rate and rhythm, normal S1 and S2, no murmurs and pedal pulses and 2+ bilaterally, No edema  Musculoskeletal: normal gait and station and exam of the digits and nails are normal  Neurological: normal coordination and normal general cortical function      Lab Results   Component Value Date    LABA1C 9.9 2019    LABA1C 9.3 2019    LABA1C 10.1 2018         ASSESSMENT/PLAN:  1.  Uncontrolled type 2 diabetes mellitus with complication, with long-term current use of insulin 9.3>>9.9    He has not modified his diet and control has worsened   ? comliance with insulin   Increase lantus   Advised to continue titrating his lantus to bring fasting between 90---120   On synjardy   Glucotrol XL 10 mg daily   I discussed in detail with patient side effects associated with SGLT 2 Inhibitors including   Increased incidence of foot amputations   and Ramesh's gangrene which can present with   Genital tenderness  Redness or swelling of the genitalia  Fever above 100.4 F  The FDA has issued a warning so far , after discussing with patient he decided to stay on the current regimen. Patient was advised that sending of his fingerstick blood glucose logs is crucial in management of his  diabetes. I will adjust the  doses of diabetic medications  according to sent data. Health Maintenance       Last Eye Exam: advised to have annual dilated eye exam. his last eye exam was within a year   Last Podiatry Exam:  Saw podiatry may 2019 Dr Claire Gilliland left foot I boot last foot June 2019    Lipids: Last LDL level was 63 in March of 2019   Microalbumin/Creatinine Ratio:pt has microalbuminuria   . Education: Reviewed ABCs of diabetes management (respective goals in parentheses): A1C (<7), blood pressure (<130/80), and cholesterol (LDL <100). Additional Education: referred to Diabetes Educator but according to patient and his wife had gone through diabetic education in the past and is very well worst with carbohydrate counting he will rely on her to help him. 2. Coronary artery disease involving native coronary artery of native heart without angina pectoris  Patient had coronary stents put in 20 years ago. He had carotid endarterectomy done in January 2019    3. Essential hypertension  Well controlled on current regimen    4.  Mixed hyperlipidemia  Patient is on statins --Crestor 40 milligrams and ezetimibe      Reviewed and/or ordered clinical lab results yes   Reviewed and/or ordered radiology tests Yes  Reviewed and/or ordered other diagnostic tests yes   Made a decision to obtain old

## 2019-06-27 ENCOUNTER — OFFICE VISIT (OUTPATIENT)
Dept: ORTHOPEDIC SURGERY | Age: 69
End: 2019-06-27
Payer: MEDICARE

## 2019-06-27 VITALS
HEIGHT: 75 IN | HEART RATE: 60 BPM | SYSTOLIC BLOOD PRESSURE: 110 MMHG | WEIGHT: 216.05 LBS | DIASTOLIC BLOOD PRESSURE: 74 MMHG | BODY MASS INDEX: 26.86 KG/M2

## 2019-06-27 DIAGNOSIS — L97.521 FOOT ULCER, LEFT, LIMITED TO BREAKDOWN OF SKIN (HCC): Primary | ICD-10-CM

## 2019-06-27 DIAGNOSIS — E11.42 DM TYPE 2 WITH DIABETIC PERIPHERAL NEUROPATHY (HCC): ICD-10-CM

## 2019-06-27 PROCEDURE — 11042 DBRDMT SUBQ TIS 1ST 20SQCM/<: CPT | Performed by: PODIATRIST

## 2019-06-27 NOTE — PROGRESS NOTES
HISTORY OF PRESENT ILLNESS: This is a followup for diabetic foot ulcer on the left great toe. The patient denies any fever, chills, or pain. PHYSICAL EXAM:  An open lesion is present at the plantar medial aspect of left hallux IPJ. This now measures 0.2 cm in diameter and is superficial.      This is a pink granular base. The lesion does not probe to bone and there is no deep sinus tract noted. Drainage is minimal and is mostly serosanguineous in nature. No surrounding signs of infection are noted. No ascending cellulitis is noted. The remainder of the skin is intact, bilateral.    The patient has palpable pedal pulses, bilateral.  There is mild pitting edema of the foot and ankle, bilateral.    Sensation is absent to a 5.07 g monofilament and multiple plantar sides of both feet, bilateral.      ASSESSMENT:  NIDDM with peripheral neuropathy, diabetic foot ulcer of the left foot. PLAN:  The ulcer was debrided using a #15 blade down to good bleeding tissue. A mildly compressive dressing was applied with Neosporin ointment. The same local wound care will be continued. I discussed the recalcitrant nature of diabetic foot ulcers to treatment and the oftentimes extended nature of treatment. We discussed several different treatment options. Diabetic foot education was discussed. Overall activity will have to be decreased. The patient will follow up in 2 weeks.

## 2019-07-08 ENCOUNTER — TELEPHONE (OUTPATIENT)
Dept: ENDOCRINOLOGY | Age: 69
End: 2019-07-08

## 2019-07-09 RX ORDER — LANCETS 33 GAUGE
EACH MISCELLANEOUS
Qty: 200 EACH | Refills: 3 | Status: SHIPPED | OUTPATIENT
Start: 2019-07-09 | End: 2020-08-24

## 2019-07-11 ENCOUNTER — OFFICE VISIT (OUTPATIENT)
Dept: ORTHOPEDIC SURGERY | Age: 69
End: 2019-07-11
Payer: MEDICARE

## 2019-07-11 VITALS
DIASTOLIC BLOOD PRESSURE: 74 MMHG | BODY MASS INDEX: 26.86 KG/M2 | HEART RATE: 87 BPM | WEIGHT: 216.05 LBS | SYSTOLIC BLOOD PRESSURE: 132 MMHG | HEIGHT: 75 IN

## 2019-07-11 DIAGNOSIS — E11.42 DM TYPE 2 WITH DIABETIC PERIPHERAL NEUROPATHY (HCC): ICD-10-CM

## 2019-07-11 DIAGNOSIS — L97.521 FOOT ULCER, LEFT, LIMITED TO BREAKDOWN OF SKIN (HCC): ICD-10-CM

## 2019-07-11 DIAGNOSIS — M79.672 FOOT PAIN, LEFT: Primary | ICD-10-CM

## 2019-07-11 PROCEDURE — G8427 DOCREV CUR MEDS BY ELIG CLIN: HCPCS | Performed by: PODIATRIST

## 2019-07-11 PROCEDURE — 99213 OFFICE O/P EST LOW 20 MIN: CPT | Performed by: PODIATRIST

## 2019-07-11 PROCEDURE — 4040F PNEUMOC VAC/ADMIN/RCVD: CPT | Performed by: PODIATRIST

## 2019-07-11 PROCEDURE — G8598 ASA/ANTIPLAT THER USED: HCPCS | Performed by: PODIATRIST

## 2019-07-11 PROCEDURE — 2022F DILAT RTA XM EVC RTNOPTHY: CPT | Performed by: PODIATRIST

## 2019-07-11 PROCEDURE — 1123F ACP DISCUSS/DSCN MKR DOCD: CPT | Performed by: PODIATRIST

## 2019-07-11 PROCEDURE — 1036F TOBACCO NON-USER: CPT | Performed by: PODIATRIST

## 2019-07-11 PROCEDURE — 3046F HEMOGLOBIN A1C LEVEL >9.0%: CPT | Performed by: PODIATRIST

## 2019-07-11 PROCEDURE — 3017F COLORECTAL CA SCREEN DOC REV: CPT | Performed by: PODIATRIST

## 2019-07-11 PROCEDURE — G8419 CALC BMI OUT NRM PARAM NOF/U: HCPCS | Performed by: PODIATRIST

## 2019-07-17 RX ORDER — GLIPIZIDE 10 MG/1
10 TABLET, FILM COATED, EXTENDED RELEASE ORAL DAILY
Qty: 90 TABLET | Refills: 1 | Status: SHIPPED | OUTPATIENT
Start: 2019-07-17 | End: 2019-10-02 | Stop reason: SDUPTHER

## 2019-07-18 ENCOUNTER — OFFICE VISIT (OUTPATIENT)
Dept: ORTHOPEDIC SURGERY | Age: 69
End: 2019-07-18
Payer: MEDICARE

## 2019-07-18 VITALS
HEART RATE: 82 BPM | DIASTOLIC BLOOD PRESSURE: 78 MMHG | SYSTOLIC BLOOD PRESSURE: 137 MMHG | WEIGHT: 216.05 LBS | BODY MASS INDEX: 26.86 KG/M2 | HEIGHT: 75 IN

## 2019-07-18 DIAGNOSIS — L97.521 FOOT ULCER, LEFT, LIMITED TO BREAKDOWN OF SKIN (HCC): ICD-10-CM

## 2019-07-18 DIAGNOSIS — E11.42 DM TYPE 2 WITH DIABETIC PERIPHERAL NEUROPATHY (HCC): Primary | ICD-10-CM

## 2019-07-18 PROCEDURE — 3017F COLORECTAL CA SCREEN DOC REV: CPT | Performed by: PODIATRIST

## 2019-07-18 PROCEDURE — 4040F PNEUMOC VAC/ADMIN/RCVD: CPT | Performed by: PODIATRIST

## 2019-07-18 PROCEDURE — 1123F ACP DISCUSS/DSCN MKR DOCD: CPT | Performed by: PODIATRIST

## 2019-07-18 PROCEDURE — G8419 CALC BMI OUT NRM PARAM NOF/U: HCPCS | Performed by: PODIATRIST

## 2019-07-18 PROCEDURE — G8598 ASA/ANTIPLAT THER USED: HCPCS | Performed by: PODIATRIST

## 2019-07-18 PROCEDURE — 99212 OFFICE O/P EST SF 10 MIN: CPT | Performed by: PODIATRIST

## 2019-07-18 PROCEDURE — 1036F TOBACCO NON-USER: CPT | Performed by: PODIATRIST

## 2019-07-18 PROCEDURE — 2022F DILAT RTA XM EVC RTNOPTHY: CPT | Performed by: PODIATRIST

## 2019-07-18 PROCEDURE — 3046F HEMOGLOBIN A1C LEVEL >9.0%: CPT | Performed by: PODIATRIST

## 2019-07-18 PROCEDURE — G8427 DOCREV CUR MEDS BY ELIG CLIN: HCPCS | Performed by: PODIATRIST

## 2019-07-18 NOTE — PROGRESS NOTES
This is a follow-up for the diabetic toe ulcer on his left great toe. The ulcer appears to be closed. There is a light hyperkeratotic cover but no open lesion. No signs of acute bacterial infection are present. He has no palpable tenderness. He has palpable pedal pulses bilateral.  His sensation remains absent bilateral.    Diabetic toe ulcer, left. NIDDM with peripheral neuropathy. I prescribed diabetic foot orthoses and diabetic shoes. He will see Antoine Urrutia for that. He can stop with all local wound care but he will continue with the postop shoe until he gets the diabetic shoes. I will see him back in 6 weeks.

## 2019-07-24 ENCOUNTER — TELEPHONE (OUTPATIENT)
Dept: ENDOCRINOLOGY | Age: 69
End: 2019-07-24

## 2019-07-24 ENCOUNTER — ORTHOTIC/BRACE ENCOUNTER (OUTPATIENT)
Dept: ORTHOPEDIC SURGERY | Age: 69
End: 2019-07-24

## 2019-08-20 RX ORDER — TAMSULOSIN HYDROCHLORIDE 0.4 MG/1
CAPSULE ORAL
Qty: 90 CAPSULE | Refills: 1 | Status: SHIPPED | OUTPATIENT
Start: 2019-08-20 | End: 2020-02-10

## 2019-09-05 ENCOUNTER — OFFICE VISIT (OUTPATIENT)
Dept: ORTHOPEDIC SURGERY | Age: 69
End: 2019-09-05
Payer: MEDICARE

## 2019-09-05 VITALS
DIASTOLIC BLOOD PRESSURE: 67 MMHG | HEART RATE: 72 BPM | BODY MASS INDEX: 26.86 KG/M2 | HEIGHT: 75 IN | SYSTOLIC BLOOD PRESSURE: 129 MMHG | WEIGHT: 216.05 LBS

## 2019-09-05 DIAGNOSIS — E11.42 DM TYPE 2 WITH DIABETIC PERIPHERAL NEUROPATHY (HCC): Primary | ICD-10-CM

## 2019-09-05 DIAGNOSIS — L97.521 FOOT ULCER, LEFT, LIMITED TO BREAKDOWN OF SKIN (HCC): ICD-10-CM

## 2019-09-05 PROCEDURE — 4040F PNEUMOC VAC/ADMIN/RCVD: CPT | Performed by: PODIATRIST

## 2019-09-05 PROCEDURE — 99212 OFFICE O/P EST SF 10 MIN: CPT | Performed by: PODIATRIST

## 2019-09-05 PROCEDURE — 1036F TOBACCO NON-USER: CPT | Performed by: PODIATRIST

## 2019-09-05 PROCEDURE — G8419 CALC BMI OUT NRM PARAM NOF/U: HCPCS | Performed by: PODIATRIST

## 2019-09-05 PROCEDURE — 2022F DILAT RTA XM EVC RTNOPTHY: CPT | Performed by: PODIATRIST

## 2019-09-05 PROCEDURE — 3017F COLORECTAL CA SCREEN DOC REV: CPT | Performed by: PODIATRIST

## 2019-09-05 PROCEDURE — G8598 ASA/ANTIPLAT THER USED: HCPCS | Performed by: PODIATRIST

## 2019-09-05 PROCEDURE — 1123F ACP DISCUSS/DSCN MKR DOCD: CPT | Performed by: PODIATRIST

## 2019-09-05 PROCEDURE — G8427 DOCREV CUR MEDS BY ELIG CLIN: HCPCS | Performed by: PODIATRIST

## 2019-09-05 PROCEDURE — 3046F HEMOGLOBIN A1C LEVEL >9.0%: CPT | Performed by: PODIATRIST

## 2019-09-05 NOTE — PROGRESS NOTES
This is a follow-up for a left great toe diabetic ulcer. He states that he has not noticed any drainage or bleeding from the toe nor has he had pain since I last saw him. He did receive his new diabetic foot orthoses and shoes. The left hallux demonstrates mild hyperkeratosis over the plantar medial aspect of the IPJ. No open lesion or pre-ulcerative lesion is noted. He has palpable pedal pulses bilateral.  His sensation remains diminished bilateral.    NIDDM with peripheral neuropathy, left hallux ulcer    We discussed appropriate use of the diabetic foot orthoses and shoes. He will continue to monitor the toe for any new lesion. We will see him back as needed.

## 2019-09-24 DIAGNOSIS — E78.2 MIXED HYPERLIPIDEMIA: ICD-10-CM

## 2019-09-24 LAB
A/G RATIO: 2 (ref 1.1–2.2)
ALBUMIN SERPL-MCNC: 4.6 G/DL (ref 3.4–5)
ALP BLD-CCNC: 72 U/L (ref 40–129)
ALT SERPL-CCNC: 31 U/L (ref 10–40)
ANION GAP SERPL CALCULATED.3IONS-SCNC: 16 MMOL/L (ref 3–16)
AST SERPL-CCNC: 20 U/L (ref 15–37)
BILIRUB SERPL-MCNC: 0.4 MG/DL (ref 0–1)
BUN BLDV-MCNC: 15 MG/DL (ref 7–20)
CALCIUM SERPL-MCNC: 9.5 MG/DL (ref 8.3–10.6)
CHLORIDE BLD-SCNC: 102 MMOL/L (ref 99–110)
CO2: 22 MMOL/L (ref 21–32)
CREAT SERPL-MCNC: 0.7 MG/DL (ref 0.8–1.3)
CREATININE URINE: 75.4 MG/DL (ref 39–259)
GFR AFRICAN AMERICAN: >60
GFR NON-AFRICAN AMERICAN: >60
GLOBULIN: 2.3 G/DL
GLUCOSE BLD-MCNC: 172 MG/DL (ref 70–99)
MICROALBUMIN UR-MCNC: 2.6 MG/DL
MICROALBUMIN/CREAT UR-RTO: 34.5 MG/G (ref 0–30)
POTASSIUM SERPL-SCNC: 4.4 MMOL/L (ref 3.5–5.1)
SODIUM BLD-SCNC: 140 MMOL/L (ref 136–145)
TOTAL PROTEIN: 6.9 G/DL (ref 6.4–8.2)
TSH SERPL DL<=0.05 MIU/L-ACNC: 3.74 UIU/ML (ref 0.27–4.2)

## 2019-09-25 LAB
ESTIMATED AVERAGE GLUCOSE: 203 MG/DL
HBA1C MFR BLD: 8.7 %

## 2019-10-02 ENCOUNTER — OFFICE VISIT (OUTPATIENT)
Dept: ENDOCRINOLOGY | Age: 69
End: 2019-10-02
Payer: MEDICARE

## 2019-10-02 VITALS
SYSTOLIC BLOOD PRESSURE: 144 MMHG | HEART RATE: 79 BPM | BODY MASS INDEX: 26.49 KG/M2 | OXYGEN SATURATION: 100 % | HEIGHT: 75 IN | DIASTOLIC BLOOD PRESSURE: 78 MMHG | WEIGHT: 213 LBS

## 2019-10-02 DIAGNOSIS — I10 ESSENTIAL HYPERTENSION: ICD-10-CM

## 2019-10-02 DIAGNOSIS — E78.2 MIXED HYPERLIPIDEMIA: ICD-10-CM

## 2019-10-02 DIAGNOSIS — I25.10 CORONARY ARTERY DISEASE INVOLVING NATIVE CORONARY ARTERY OF NATIVE HEART WITHOUT ANGINA PECTORIS: ICD-10-CM

## 2019-10-02 DIAGNOSIS — E11.9 TYPE 2 DIABETES MELLITUS WITHOUT COMPLICATION, WITHOUT LONG-TERM CURRENT USE OF INSULIN (HCC): Primary | ICD-10-CM

## 2019-10-02 DIAGNOSIS — I65.21 CAROTID STENOSIS, SYMPTOMATIC W/O INFARCT, RIGHT: ICD-10-CM

## 2019-10-02 PROCEDURE — 2022F DILAT RTA XM EVC RTNOPTHY: CPT | Performed by: INTERNAL MEDICINE

## 2019-10-02 PROCEDURE — 99214 OFFICE O/P EST MOD 30 MIN: CPT | Performed by: INTERNAL MEDICINE

## 2019-10-02 PROCEDURE — 3017F COLORECTAL CA SCREEN DOC REV: CPT | Performed by: INTERNAL MEDICINE

## 2019-10-02 PROCEDURE — 4040F PNEUMOC VAC/ADMIN/RCVD: CPT | Performed by: INTERNAL MEDICINE

## 2019-10-02 PROCEDURE — 3045F PR MOST RECENT HEMOGLOBIN A1C LEVEL 7.0-9.0%: CPT | Performed by: INTERNAL MEDICINE

## 2019-10-02 PROCEDURE — G8419 CALC BMI OUT NRM PARAM NOF/U: HCPCS | Performed by: INTERNAL MEDICINE

## 2019-10-02 PROCEDURE — 1036F TOBACCO NON-USER: CPT | Performed by: INTERNAL MEDICINE

## 2019-10-02 PROCEDURE — G8427 DOCREV CUR MEDS BY ELIG CLIN: HCPCS | Performed by: INTERNAL MEDICINE

## 2019-10-02 PROCEDURE — G8484 FLU IMMUNIZE NO ADMIN: HCPCS | Performed by: INTERNAL MEDICINE

## 2019-10-02 PROCEDURE — 1123F ACP DISCUSS/DSCN MKR DOCD: CPT | Performed by: INTERNAL MEDICINE

## 2019-10-02 PROCEDURE — G8598 ASA/ANTIPLAT THER USED: HCPCS | Performed by: INTERNAL MEDICINE

## 2019-10-02 RX ORDER — GLIPIZIDE 10 MG/1
10 TABLET, FILM COATED, EXTENDED RELEASE ORAL DAILY
Qty: 90 TABLET | Refills: 1 | Status: SHIPPED | OUTPATIENT
Start: 2019-10-02 | End: 2019-10-24 | Stop reason: ALTCHOICE

## 2019-10-10 ENCOUNTER — OFFICE VISIT (OUTPATIENT)
Dept: VASCULAR SURGERY | Age: 69
End: 2019-10-10
Payer: MEDICARE

## 2019-10-10 ENCOUNTER — PROCEDURE VISIT (OUTPATIENT)
Dept: VASCULAR SURGERY | Age: 69
End: 2019-10-10
Payer: MEDICARE

## 2019-10-10 VITALS
SYSTOLIC BLOOD PRESSURE: 139 MMHG | BODY MASS INDEX: 26.73 KG/M2 | WEIGHT: 215 LBS | DIASTOLIC BLOOD PRESSURE: 60 MMHG | HEIGHT: 75 IN

## 2019-10-10 DIAGNOSIS — I65.21 RECURRENT CAROTID STENOSIS, RIGHT: ICD-10-CM

## 2019-10-10 DIAGNOSIS — Z48.812 POSTOPERATIVE CAROTID ENDARTERECTOMY SURVEILLANCE, ENCOUNTER FOR: Primary | ICD-10-CM

## 2019-10-10 DIAGNOSIS — Z98.890 S/P CAROTID ENDARTERECTOMY: Primary | ICD-10-CM

## 2019-10-10 PROCEDURE — G8419 CALC BMI OUT NRM PARAM NOF/U: HCPCS | Performed by: SURGERY

## 2019-10-10 PROCEDURE — G8484 FLU IMMUNIZE NO ADMIN: HCPCS | Performed by: SURGERY

## 2019-10-10 PROCEDURE — 1123F ACP DISCUSS/DSCN MKR DOCD: CPT | Performed by: SURGERY

## 2019-10-10 PROCEDURE — 99212 OFFICE O/P EST SF 10 MIN: CPT | Performed by: SURGERY

## 2019-10-10 PROCEDURE — 93880 EXTRACRANIAL BILAT STUDY: CPT | Performed by: SURGERY

## 2019-10-10 PROCEDURE — 3017F COLORECTAL CA SCREEN DOC REV: CPT | Performed by: SURGERY

## 2019-10-10 PROCEDURE — 1036F TOBACCO NON-USER: CPT | Performed by: SURGERY

## 2019-10-10 PROCEDURE — G8598 ASA/ANTIPLAT THER USED: HCPCS | Performed by: SURGERY

## 2019-10-10 PROCEDURE — 4040F PNEUMOC VAC/ADMIN/RCVD: CPT | Performed by: SURGERY

## 2019-10-10 PROCEDURE — G8427 DOCREV CUR MEDS BY ELIG CLIN: HCPCS | Performed by: SURGERY

## 2019-10-24 ENCOUNTER — OFFICE VISIT (OUTPATIENT)
Dept: PRIMARY CARE CLINIC | Age: 69
End: 2019-10-24
Payer: MEDICARE

## 2019-10-24 VITALS
SYSTOLIC BLOOD PRESSURE: 134 MMHG | HEIGHT: 75 IN | HEART RATE: 60 BPM | OXYGEN SATURATION: 98 % | DIASTOLIC BLOOD PRESSURE: 76 MMHG | WEIGHT: 219 LBS | BODY MASS INDEX: 27.23 KG/M2

## 2019-10-24 DIAGNOSIS — E11.9 TYPE 2 DIABETES MELLITUS WITHOUT COMPLICATION, WITHOUT LONG-TERM CURRENT USE OF INSULIN (HCC): ICD-10-CM

## 2019-10-24 DIAGNOSIS — I10 ESSENTIAL HYPERTENSION: ICD-10-CM

## 2019-10-24 DIAGNOSIS — E78.00 PURE HYPERCHOLESTEROLEMIA: ICD-10-CM

## 2019-10-24 DIAGNOSIS — E11.621 DIABETIC ULCER OF TOE OF LEFT FOOT ASSOCIATED WITH TYPE 2 DIABETES MELLITUS, LIMITED TO BREAKDOWN OF SKIN (HCC): Chronic | ICD-10-CM

## 2019-10-24 DIAGNOSIS — L97.521 DIABETIC ULCER OF TOE OF LEFT FOOT ASSOCIATED WITH TYPE 2 DIABETES MELLITUS, LIMITED TO BREAKDOWN OF SKIN (HCC): Chronic | ICD-10-CM

## 2019-10-24 PROCEDURE — 99214 OFFICE O/P EST MOD 30 MIN: CPT | Performed by: INTERNAL MEDICINE

## 2019-10-24 PROCEDURE — 90653 IIV ADJUVANT VACCINE IM: CPT | Performed by: INTERNAL MEDICINE

## 2019-10-24 PROCEDURE — 1123F ACP DISCUSS/DSCN MKR DOCD: CPT | Performed by: INTERNAL MEDICINE

## 2019-10-24 PROCEDURE — G8482 FLU IMMUNIZE ORDER/ADMIN: HCPCS | Performed by: INTERNAL MEDICINE

## 2019-10-24 PROCEDURE — 4040F PNEUMOC VAC/ADMIN/RCVD: CPT | Performed by: INTERNAL MEDICINE

## 2019-10-24 PROCEDURE — 1036F TOBACCO NON-USER: CPT | Performed by: INTERNAL MEDICINE

## 2019-10-24 PROCEDURE — 2022F DILAT RTA XM EVC RTNOPTHY: CPT | Performed by: INTERNAL MEDICINE

## 2019-10-24 PROCEDURE — G8427 DOCREV CUR MEDS BY ELIG CLIN: HCPCS | Performed by: INTERNAL MEDICINE

## 2019-10-24 PROCEDURE — G0008 ADMIN INFLUENZA VIRUS VAC: HCPCS | Performed by: INTERNAL MEDICINE

## 2019-10-24 PROCEDURE — 3017F COLORECTAL CA SCREEN DOC REV: CPT | Performed by: INTERNAL MEDICINE

## 2019-10-24 PROCEDURE — G8598 ASA/ANTIPLAT THER USED: HCPCS | Performed by: INTERNAL MEDICINE

## 2019-10-24 PROCEDURE — G8417 CALC BMI ABV UP PARAM F/U: HCPCS | Performed by: INTERNAL MEDICINE

## 2019-10-24 RX ORDER — AMLODIPINE BESYLATE AND BENAZEPRIL HYDROCHLORIDE 10; 20 MG/1; MG/1
1 CAPSULE ORAL DAILY
Qty: 90 CAPSULE | Refills: 3 | Status: SHIPPED | OUTPATIENT
Start: 2019-10-24 | End: 2020-09-29 | Stop reason: SDUPTHER

## 2019-10-24 RX ORDER — HYDROCHLOROTHIAZIDE 12.5 MG/1
12.5 CAPSULE, GELATIN COATED ORAL EVERY MORNING
Qty: 90 CAPSULE | Refills: 3 | Status: SHIPPED | OUTPATIENT
Start: 2019-10-24 | End: 2020-09-29 | Stop reason: SDUPTHER

## 2019-11-12 ENCOUNTER — TELEPHONE (OUTPATIENT)
Dept: ENDOCRINOLOGY | Age: 69
End: 2019-11-12

## 2019-12-31 DIAGNOSIS — I25.10 CORONARY ARTERY DISEASE INVOLVING NATIVE CORONARY ARTERY OF NATIVE HEART WITHOUT ANGINA PECTORIS: ICD-10-CM

## 2019-12-31 DIAGNOSIS — E11.9 TYPE 2 DIABETES MELLITUS WITHOUT COMPLICATION, WITHOUT LONG-TERM CURRENT USE OF INSULIN (HCC): ICD-10-CM

## 2019-12-31 LAB
A/G RATIO: 1.5 (ref 1.1–2.2)
ALBUMIN SERPL-MCNC: 4.5 G/DL (ref 3.4–5)
ALP BLD-CCNC: 65 U/L (ref 40–129)
ALT SERPL-CCNC: 20 U/L (ref 10–40)
ANION GAP SERPL CALCULATED.3IONS-SCNC: 17 MMOL/L (ref 3–16)
AST SERPL-CCNC: 15 U/L (ref 15–37)
BILIRUB SERPL-MCNC: 0.4 MG/DL (ref 0–1)
BUN BLDV-MCNC: 15 MG/DL (ref 7–20)
CALCIUM SERPL-MCNC: 10.1 MG/DL (ref 8.3–10.6)
CHLORIDE BLD-SCNC: 100 MMOL/L (ref 99–110)
CHOLESTEROL, TOTAL: 137 MG/DL (ref 0–199)
CO2: 24 MMOL/L (ref 21–32)
CREAT SERPL-MCNC: 0.8 MG/DL (ref 0.8–1.3)
CREATININE URINE: 100.2 MG/DL (ref 39–259)
GFR AFRICAN AMERICAN: >60
GFR NON-AFRICAN AMERICAN: >60
GLOBULIN: 3.1 G/DL
GLUCOSE BLD-MCNC: 131 MG/DL (ref 70–99)
HDLC SERPL-MCNC: 47 MG/DL (ref 40–60)
LDL CHOLESTEROL CALCULATED: 70 MG/DL
MICROALBUMIN UR-MCNC: 1.7 MG/DL
MICROALBUMIN/CREAT UR-RTO: 17 MG/G (ref 0–30)
POTASSIUM SERPL-SCNC: 4 MMOL/L (ref 3.5–5.1)
SODIUM BLD-SCNC: 141 MMOL/L (ref 136–145)
TOTAL CK: 78 U/L (ref 39–308)
TOTAL PROTEIN: 7.6 G/DL (ref 6.4–8.2)
TRIGL SERPL-MCNC: 99 MG/DL (ref 0–150)
TSH SERPL DL<=0.05 MIU/L-ACNC: 4.36 UIU/ML (ref 0.27–4.2)
VLDLC SERPL CALC-MCNC: 20 MG/DL

## 2020-01-01 LAB
ESTIMATED AVERAGE GLUCOSE: 177.2 MG/DL
HBA1C MFR BLD: 7.8 %

## 2020-01-13 ENCOUNTER — OFFICE VISIT (OUTPATIENT)
Dept: ENDOCRINOLOGY | Age: 70
End: 2020-01-13
Payer: MEDICARE

## 2020-01-13 VITALS
WEIGHT: 221 LBS | OXYGEN SATURATION: 100 % | DIASTOLIC BLOOD PRESSURE: 62 MMHG | BODY MASS INDEX: 27.48 KG/M2 | HEART RATE: 64 BPM | HEIGHT: 75 IN | SYSTOLIC BLOOD PRESSURE: 128 MMHG

## 2020-01-13 PROCEDURE — 3017F COLORECTAL CA SCREEN DOC REV: CPT | Performed by: INTERNAL MEDICINE

## 2020-01-13 PROCEDURE — 1036F TOBACCO NON-USER: CPT | Performed by: INTERNAL MEDICINE

## 2020-01-13 PROCEDURE — 4040F PNEUMOC VAC/ADMIN/RCVD: CPT | Performed by: INTERNAL MEDICINE

## 2020-01-13 PROCEDURE — 3046F HEMOGLOBIN A1C LEVEL >9.0%: CPT | Performed by: INTERNAL MEDICINE

## 2020-01-13 PROCEDURE — 99214 OFFICE O/P EST MOD 30 MIN: CPT | Performed by: INTERNAL MEDICINE

## 2020-01-13 PROCEDURE — G8417 CALC BMI ABV UP PARAM F/U: HCPCS | Performed by: INTERNAL MEDICINE

## 2020-01-13 PROCEDURE — G8482 FLU IMMUNIZE ORDER/ADMIN: HCPCS | Performed by: INTERNAL MEDICINE

## 2020-01-13 PROCEDURE — 2022F DILAT RTA XM EVC RTNOPTHY: CPT | Performed by: INTERNAL MEDICINE

## 2020-01-13 PROCEDURE — 1123F ACP DISCUSS/DSCN MKR DOCD: CPT | Performed by: INTERNAL MEDICINE

## 2020-01-13 PROCEDURE — G8427 DOCREV CUR MEDS BY ELIG CLIN: HCPCS | Performed by: INTERNAL MEDICINE

## 2020-01-13 NOTE — PROGRESS NOTES
Bettina Mcclure is a 71 y.o. male seen management of Type 2 diabetes. Bettina Mcclure was diagnosed with Diabetes mellitus at age 52 . Diabetes was diagnosed at routine screening . At the time of diagnosis patient had polyuria polydipsia . Bettina Mcclure got diabetic education in the past when he was first diagnosed with diabetes. Comorbid conditions: Neuropathy and Coronary Artery Disease    Current diabetic medications include: Actos 30 milligrams, metformin thousand twice a day, Amaryl 4 milligrams daily    INTERIM:    Diabetes   He presents for his follow-up diabetic visit. He has type 2 diabetes mellitus. No MedicAlert identification noted. The initial diagnosis of diabetes was made 20 years ago. His disease course has been worsening. There are no hypoglycemic associated symptoms. Associated symptoms include polyphagia and polyuria. Pertinent negatives for diabetes include no weight loss. There are no hypoglycemic complications. Pertinent negatives for hypoglycemia complications include no required glucagon injection. Symptoms are worsening. Diabetic complications include heart disease and peripheral neuropathy. Risk factors for coronary artery disease include diabetes mellitus, dyslipidemia, family history, male sex, obesity, hypertension and tobacco exposure. Current diabetic treatment includes oral agent (triple therapy). He is compliant with treatment most of the time. His weight is stable. He is following a generally unhealthy diet. Meal planning includes avoidance of concentrated sweets. He has had a previous visit with a dietitian. He rarely participates in exercise. There is no change in his home blood glucose trend. His breakfast blood glucose is taken between 7-8 am. His breakfast blood glucose range is generally >200 mg/dl. An ACE inhibitor/angiotensin II receptor blocker is being taken. He sees a podiatrist.Eye exam is current.    ---reviewed fingerstick blood glucose log with him in detail   --- He has been taking higher doses of insulin and his fingerstick glucose have improved  He denies any hypoglycemia still continues to eat snacks at bedtime    Weight trend: stable  Prior visit with dietician: no  Current diet: on average, 3 meals per day  Current exercise: rare    He has CAD s/p stents age 48   Has hypertension and is on medication well controlled   He has hyperlipidemia and is on medication     Past Medical History:   Diagnosis Date    CAD (coronary artery disease)     Chronic back pain     lumbar disk disease    Colorectal polyps     Diabetic ulcer of toe of left foot associated with type 2 diabetes mellitus, limited to breakdown of skin ([address] 5/20/2019    Erectile dysfunction     Hyperlipidemia     Hypertension     Hypertrophy of prostate without urinary obstruction and other lower urinary tract symptoms (LUTS)     Penetrating foot wound 2019    L foot     Retrograde ejaculation     Sciatica     Type II or unspecified type diabetes mellitus without mention of complication, not stated as uncontrolled       Patient Active Problem List   Diagnosis    Type 2 diabetes mellitus without complication, without long-term current use of insulin ([address]    Pure hypercholesterolemia    Essential hypertension    Coronary atherosclerosis of native coronary artery    Ear fullness    Paronychia    Kidney stone    Fungal dermatitis    Neck pain on left side    Hx of smoking    Colon polyps    Left hip pain    Ischemic stroke of frontal lobe (HCC)    Carotid stenosis, symptomatic w/o infarct, right    Diabetic ulcer of toe of left foot associated with type 2 diabetes mellitus, limited to breakdown of skin ([address]     Past Surgical History:   Procedure Laterality Date    CAROTID ENDARTERECTOMY Right 12/31/2018    RIGHT CAROTID ENDARTERECTOMY WITH INTRA OP DUPLEX SCAN performed by Cory Huang MD at [address]      x2    CYST REMOVAL      from back and finger x2    TONSILLECTOMY AND ADENOIDECTOMY       Social History     Socioeconomic History    Marital status:      Spouse name: Not on file    Number of children: Not on file    Years of education: Not on file    Highest education level: Not on file   Occupational History    Not on file   Social Needs    Financial resource strain: Not on file    Food insecurity:     Worry: Not on file     Inability: Not on file    Transportation needs:     Medical: Not on file     Non-medical: Not on file   Tobacco Use    Smoking status: Former Smoker     Packs/day: 1.00     Years: 48.00     Pack years: 48.00     Types: Cigarettes     Start date: 1959     Last attempt to quit: 2007     Years since quittin.1    Smokeless tobacco: Never Used   Substance and Sexual Activity    Alcohol use:  Yes    Drug use: No    Sexual activity: Yes     Partners: Female   Lifestyle    Physical activity:     Days per week: Not on file     Minutes per session: Not on file    Stress: Not on file   Relationships    Social connections:     Talks on phone: Not on file     Gets together: Not on file     Attends Buddhist service: Not on file     Active member of club or organization: Not on file     Attends meetings of clubs or organizations: Not on file     Relationship status: Not on file    Intimate partner violence:     Fear of current or ex partner: Not on file     Emotionally abused: Not on file     Physically abused: Not on file     Forced sexual activity: Not on file   Other Topics Concern    Not on file   Social History Narrative    Not on file     Family History   Problem Relation Age of Onset    Coronary Art Dis Mother         CABG   [place] Diabetes Mother     High Blood Pressure Father     Stroke Father     Hypertension Brother     Diabetes Brother     Coronary Art Dis Brother     Cancer Brother         prostate    Other Brother         PVD    Diabetes Maternal Grandmother     Cancer Maternal Status    Mother     [facility] Father     [facility] Brother   at age 76    MGM  (Not Specified)   [facility] Brother   at age 76    Brother  Alive       Review of Systems  I have reviewed the review of system questionnaire filled by the patient .   Patient was advised to contact PCP for non endocrine signs and symptoms     OBJECTIVE:  /62   Pulse 64   Ht 6' 3\" (1.905 m)   Wt 221 lb (100.2 kg)   SpO2 100%   BMI 27.62 kg/m²    Wt Readings from Last 3 Encounters:   20 221 lb (100.2 kg)   10/24/19 219 lb (99.3 kg)   10/10/19 215 lb (97.5 kg)       Constitutional: no acute distress, well appearing and well nourished  Psychiatric: oriented to person, place and time, judgement and insight and normal, recent and remote memory and intact and mood and affect are normal  Skin: skin and subcutaneous tissue is normal without mass, normal turgor  Head and Face: examination of head and face revealed no abnormalities  Eyes: no lid or conjunctival swelling, erythema or discharge, pupils are normal,   Ears/Nose: external inspection of ears and nose revealed no abnormalities, hearing is grossly normal  Oropharynx/Mouth/Face: lips, tongue and gums are normal with no lesions, the voice quality was normal  Neck: neck is supple and symmetric, with midline trachea and no masses, thyroid is normal  Pulmonary: no increased work of breathing or signs of respiratory distress, lungs are clear to auscultation  Cardiovascular: normal heart rate and rhythm, normal S1 and S2, no murmurs and pedal pulses and 2+ bilaterally, No edema  Musculoskeletal: normal gait and station and exam of the digits and nails are normal  Neurological: normal coordination and normal general cortical function      Lab Results   Component Value Date    LABA1C 7.8 2019    LABA1C 8.7 2019    LABA1C 9.9 2019         ASSESSMENT/PLAN:    --- Uncontrolled type 2 diabetes mellitus with complication, with long-term current use of complications of disease if inadequately treated, side effects of medications, diagnosis, treatment options, and prognosis, risks, benefits, complications, and alternatives of treatment, labs, imaging and other studies and treatment targets and goals. He understands instructions and counseling        These diagnosis were discussed and reviewed with the patient including the advantages of drug therapy. He was counseled at this visit on the following: diabetes complication prevention and foot care. Return in about 3 months (around 4/13/2020). Please note that some or all of this report was generated using voice recognition software. Please notify me in case of any questions about the content of this document, as some errors in transcription may have occurred .

## 2020-01-29 NOTE — TELEPHONE ENCOUNTER
Medication:   Requested Prescriptions     Pending Prescriptions Disp Refills    clopidogrel (PLAVIX) 75 MG tablet [Pharmacy Med Name: CLOPIDOGREL 75MG TABLETS] 90 tablet 1     Sig: TAKE 1 TABLET BY MOUTH EVERY DAY        Last Filled:      Patient Phone Number: [phone] (home)     Last appt: 10/24/2019   Next appt: Visit date not found    Last OARRS: No flowsheet data found.     Preferred Pharmacy:   [facility] [address] [facility], [address]  [address] [facility],[address] [address]  Phone: [phone] Fax: [phone]

## 2020-01-30 RX ORDER — CLOPIDOGREL BISULFATE 75 MG/1
TABLET ORAL
Qty: 90 TABLET | Refills: 1 | Status: SHIPPED | OUTPATIENT
Start: 2020-01-30 | End: 2020-07-29 | Stop reason: SDUPTHER

## 2020-01-30 RX ORDER — EZETIMIBE 10 MG/1
10 TABLET ORAL DAILY
Qty: 90 TABLET | Refills: 1 | Status: SHIPPED | OUTPATIENT
Start: 2020-01-30 | End: 2020-07-29 | Stop reason: SDUPTHER

## 2020-01-30 NOTE — TELEPHONE ENCOUNTER
Medication:   Requested Prescriptions     Pending Prescriptions Disp Refills    ezetimibe (ZETIA) 10 MG tablet 90 tablet 1     Sig: Take 1 tablet by mouth daily     Last Filled:  01/23/2019    Last appt: 10/24/2019   Next appt: Visit date not found    Last Lipid:   Lab Results   Component Value Date    CHOL 137 12/31/2019    TRIG 99 12/31/2019    HDL 47 12/31/2019    HDL 38 05/22/2012    LDLCALC 70 12/31/2019

## 2020-02-10 RX ORDER — TAMSULOSIN HYDROCHLORIDE 0.4 MG/1
CAPSULE ORAL
Qty: 90 CAPSULE | Refills: 1 | Status: SHIPPED | OUTPATIENT
Start: 2020-02-10 | End: 2020-08-12 | Stop reason: SDUPTHER

## 2020-02-28 ENCOUNTER — OFFICE VISIT (OUTPATIENT)
Dept: PRIMARY CARE CLINIC | Age: 70
End: 2020-02-28
Payer: MEDICARE

## 2020-02-28 VITALS
SYSTOLIC BLOOD PRESSURE: 134 MMHG | WEIGHT: 219 LBS | HEART RATE: 72 BPM | TEMPERATURE: 98.3 F | DIASTOLIC BLOOD PRESSURE: 78 MMHG | BODY MASS INDEX: 27.37 KG/M2 | OXYGEN SATURATION: 98 %

## 2020-02-28 PROCEDURE — 4040F PNEUMOC VAC/ADMIN/RCVD: CPT | Performed by: INTERNAL MEDICINE

## 2020-02-28 PROCEDURE — G8427 DOCREV CUR MEDS BY ELIG CLIN: HCPCS | Performed by: INTERNAL MEDICINE

## 2020-02-28 PROCEDURE — 3017F COLORECTAL CA SCREEN DOC REV: CPT | Performed by: INTERNAL MEDICINE

## 2020-02-28 PROCEDURE — 1123F ACP DISCUSS/DSCN MKR DOCD: CPT | Performed by: INTERNAL MEDICINE

## 2020-02-28 PROCEDURE — 99213 OFFICE O/P EST LOW 20 MIN: CPT | Performed by: INTERNAL MEDICINE

## 2020-02-28 PROCEDURE — G8482 FLU IMMUNIZE ORDER/ADMIN: HCPCS | Performed by: INTERNAL MEDICINE

## 2020-02-28 PROCEDURE — G8417 CALC BMI ABV UP PARAM F/U: HCPCS | Performed by: INTERNAL MEDICINE

## 2020-02-28 PROCEDURE — 1036F TOBACCO NON-USER: CPT | Performed by: INTERNAL MEDICINE

## 2020-02-28 RX ORDER — GUAIFENESIN AND DEXTROMETHORPHAN HYDROBROMIDE 1200; 60 MG/1; MG/1
TABLET, EXTENDED RELEASE ORAL
Qty: 20 TABLET | Refills: 0 | Status: SHIPPED | OUTPATIENT
Start: 2020-02-28 | End: 2020-09-30 | Stop reason: ALTCHOICE

## 2020-02-28 RX ORDER — AZITHROMYCIN 250 MG/1
250 TABLET, FILM COATED ORAL SEE ADMIN INSTRUCTIONS
Qty: 6 TABLET | Refills: 0 | Status: SHIPPED | OUTPATIENT
Start: 2020-02-28 | End: 2020-03-04

## 2020-02-28 ASSESSMENT — PATIENT HEALTH QUESTIONNAIRE - PHQ9
SUM OF ALL RESPONSES TO PHQ QUESTIONS 1-9: 0
SUM OF ALL RESPONSES TO PHQ9 QUESTIONS 1 & 2: 0
2. FEELING DOWN, DEPRESSED OR HOPELESS: 0
SUM OF ALL RESPONSES TO PHQ QUESTIONS 1-9: 0
1. LITTLE INTEREST OR PLEASURE IN DOING THINGS: 0

## 2020-03-16 RX ORDER — ROSUVASTATIN CALCIUM 40 MG/1
40 TABLET, COATED ORAL DAILY
Qty: 90 TABLET | Refills: 1 | Status: SHIPPED | OUTPATIENT
Start: 2020-03-16 | End: 2020-09-28 | Stop reason: SDUPTHER

## 2020-03-23 ENCOUNTER — OFFICE VISIT (OUTPATIENT)
Dept: CARDIOLOGY CLINIC | Age: 70
End: 2020-03-23
Payer: MEDICARE

## 2020-03-23 VITALS
HEIGHT: 75 IN | DIASTOLIC BLOOD PRESSURE: 72 MMHG | RESPIRATION RATE: 18 BRPM | BODY MASS INDEX: 27.68 KG/M2 | HEART RATE: 81 BPM | OXYGEN SATURATION: 95 % | SYSTOLIC BLOOD PRESSURE: 150 MMHG | WEIGHT: 222.6 LBS

## 2020-03-23 PROCEDURE — 3017F COLORECTAL CA SCREEN DOC REV: CPT | Performed by: INTERNAL MEDICINE

## 2020-03-23 PROCEDURE — G8417 CALC BMI ABV UP PARAM F/U: HCPCS | Performed by: INTERNAL MEDICINE

## 2020-03-23 PROCEDURE — 4040F PNEUMOC VAC/ADMIN/RCVD: CPT | Performed by: INTERNAL MEDICINE

## 2020-03-23 PROCEDURE — G8482 FLU IMMUNIZE ORDER/ADMIN: HCPCS | Performed by: INTERNAL MEDICINE

## 2020-03-23 PROCEDURE — 1123F ACP DISCUSS/DSCN MKR DOCD: CPT | Performed by: INTERNAL MEDICINE

## 2020-03-23 PROCEDURE — 1036F TOBACCO NON-USER: CPT | Performed by: INTERNAL MEDICINE

## 2020-03-23 PROCEDURE — 99214 OFFICE O/P EST MOD 30 MIN: CPT | Performed by: INTERNAL MEDICINE

## 2020-03-23 PROCEDURE — G8427 DOCREV CUR MEDS BY ELIG CLIN: HCPCS | Performed by: INTERNAL MEDICINE

## 2020-03-23 NOTE — PROGRESS NOTES
to 10/40    Plan:  Refill meds  Follow BP at home  F/u 6 months with stress ECHO    Thank you for allowing me to participate in the care of this individual.      Romelia Cole M.D., [facility]

## 2020-04-13 RX ORDER — BLOOD SUGAR DIAGNOSTIC
STRIP MISCELLANEOUS
Qty: 200 STRIP | Refills: 3 | Status: SHIPPED | OUTPATIENT
Start: 2020-04-13 | End: 2020-04-27 | Stop reason: SDUPTHER

## 2020-04-13 RX ORDER — PEN NEEDLE, DIABETIC 31 GX5/16"
NEEDLE, DISPOSABLE MISCELLANEOUS
Qty: 100 EACH | Refills: 6 | Status: SHIPPED | OUTPATIENT
Start: 2020-04-13 | End: 2020-11-06

## 2020-04-20 ENCOUNTER — HOSPITAL ENCOUNTER (OUTPATIENT)
Age: 70
Discharge: HOME | End: 2020-04-20
Payer: MEDICARE

## 2020-04-20 LAB
A/G RATIO: 1.4 (ref 1.1–2.2)
ALBUMIN SERPL-MCNC: 4.6 G/DL (ref 3.4–5)
ALP BLD-CCNC: 69 U/L (ref 40–129)
ALT SERPL-CCNC: 25 U/L (ref 10–40)
ANION GAP SERPL CALCULATED.3IONS-SCNC: 14 MMOL/L (ref 3–16)
AST SERPL-CCNC: 19 U/L (ref 15–37)
BILIRUB SERPL-MCNC: 0.6 MG/DL (ref 0–1)
BUN BLDV-MCNC: 19 MG/DL (ref 7–20)
CALCIUM SERPL-MCNC: 9.9 MG/DL (ref 8.3–10.6)
CHLORIDE BLD-SCNC: 99 MMOL/L (ref 99–110)
CO2: 27 MMOL/L (ref 21–32)
CREAT SERPL-MCNC: 0.9 MG/DL (ref 0.8–1.3)
ESTIMATED AVERAGE GLUCOSE: 177.2 MG/DL
GFR AFRICAN AMERICAN: >60
GFR NON-AFRICAN AMERICAN: >60
GLOBULIN: 3.2 G/DL
GLUCOSE BLD-MCNC: 165 MG/DL (ref 70–99)
HBA1C MFR BLD: 7.8 %
POTASSIUM SERPL-SCNC: 4 MMOL/L (ref 3.5–5.1)
SODIUM BLD-SCNC: 140 MMOL/L (ref 136–145)
TOTAL PROTEIN: 7.8 G/DL (ref 6.4–8.2)
TSH SERPL DL<=0.05 MIU/L-ACNC: 3.75 UIU/ML (ref 0.27–4.2)

## 2020-04-20 PROCEDURE — 83036 HEMOGLOBIN GLYCOSYLATED A1C: CPT

## 2020-04-20 PROCEDURE — 80053 COMPREHEN METABOLIC PANEL: CPT

## 2020-04-20 PROCEDURE — 36415 COLL VENOUS BLD VENIPUNCTURE: CPT

## 2020-04-20 PROCEDURE — 84443 ASSAY THYROID STIM HORMONE: CPT

## 2020-04-27 ENCOUNTER — VIRTUAL VISIT (OUTPATIENT)
Dept: ENDOCRINOLOGY | Age: 70
End: 2020-04-27
Payer: MEDICARE

## 2020-04-27 PROCEDURE — 3017F COLORECTAL CA SCREEN DOC REV: CPT | Performed by: INTERNAL MEDICINE

## 2020-04-27 PROCEDURE — 2022F DILAT RTA XM EVC RTNOPTHY: CPT | Performed by: INTERNAL MEDICINE

## 2020-04-27 PROCEDURE — G8427 DOCREV CUR MEDS BY ELIG CLIN: HCPCS | Performed by: INTERNAL MEDICINE

## 2020-04-27 PROCEDURE — 4040F PNEUMOC VAC/ADMIN/RCVD: CPT | Performed by: INTERNAL MEDICINE

## 2020-04-27 PROCEDURE — 1123F ACP DISCUSS/DSCN MKR DOCD: CPT | Performed by: INTERNAL MEDICINE

## 2020-04-27 PROCEDURE — 3051F HG A1C>EQUAL 7.0%<8.0%: CPT | Performed by: INTERNAL MEDICINE

## 2020-04-27 PROCEDURE — 99214 OFFICE O/P EST MOD 30 MIN: CPT | Performed by: INTERNAL MEDICINE

## 2020-04-27 RX ORDER — BLOOD SUGAR DIAGNOSTIC
STRIP MISCELLANEOUS
Qty: 200 STRIP | Refills: 3 | Status: SHIPPED | OUTPATIENT
Start: 2020-04-27 | End: 2020-11-12

## 2020-04-27 NOTE — PROGRESS NOTES
KWIKPEN) 100 UNIT/ML pen 15 units with each meal (Patient taking differently: 15 units with each meal (using sliding scale)) 5 pen 3    ONETOUCH DELICA LANCETS 75I MISC Test fingerstick glucose 4-6 times daily 200 each 3    ONETOUCH VERIO strip TEST FINGERSTICK GLUCOSE THREE TIMES DAILY 100 each 5    Blood Glucose Monitoring Suppl (ONETOUCH VERIO FLEX SYSTEM) w/Device KIT Check blood sugar 1 kit 0    aspirin 81 MG EC tablet Take 81 mg by mouth daily.  Dextromethorphan-guaiFENesin (MUCINEX DM MAXIMUM STRENGTH)  MG TB12 Take in tab bid with a lot of fluids. (Patient not taking: Reported on 2020) 20 tablet 0    Blood Glucose Monitoring Suppl MOISE Dx E11.9 Check daily and prn One Touch meter (Patient not taking: Reported on 2020) 1 Device 0     No current facility-administered medications for this visit.       Allergies   Allergen Reactions    Other      Bee stings       Family Status   Relation Name Status    Mother     [facility] Father     [facility] Brother   at age 76    MGM  (Not Specified)   [facility] Brother   at age 76   [facility] Brother  Alive       Review of Systems  Constitutional  Patient denies any weight loss denies any weight gain,  Eyes   Pt denies any double vision blurred vision or decreased peripheral vision  Head ear nose throat  Denies any trouble swallowing denies any hoarseness  Denies any shortness of breath denies  Cardiovascular  Denies any chest pain denies any palpitations currently  Gastrointestinal  Denies any nausea ,vomiting ,constipation or diarrhea  Hematological/lymphatic  Denies any blood clot denies or any painful lymph nodes  Genitourinary  Denies any frequent urination denies any nighttime urination  Skin  Denies any acne denies any changes in facial body hair denies any non healing wounds Musculoskeletal   denies any joint or bone pain ,denies any muscle weakness ,denies any new fracture  Endocrine  Denies any excessive thirst denies any excessive heat intolerance or cold intolerance , denies any nipple discharge ,denies any increase in shoe size . OBJECTIVE:  There were no vitals taken for this visit. Wt Readings from Last 3 Encounters:   03/23/20 222 lb 9.6 oz (101 kg)   02/28/20 219 lb (99.3 kg)   01/13/20 221 lb (100.2 kg)         Constitutional: no acute distress, well appearing and well nourished  Psychiatric: oriented to person, place and time, judgement and insight and normal, recent and remote memory intact and mood and affect are normal  Skin: skin and subcutaneous tissue is normal without visible mass,   Head and Face: visual inspection  of head and face revealed no abnormalities  Eyes: visual inspection showed no lid or conjunctival swelling, erythema or discharge, pupils are normal, equal, round  Ears/Nose: external inspection of ears and nose revealed no abnormalities, hearing is grossly normal  Oropharynx/Mouth/Face: lips, tongue and gums appear  normal with no lesions, the voice quality was normal  Neck: neck appears symmetric, with no visible masses,   Pulmonary: no increased work of breathing or signs of respiratory distress,  Musculoskeletal: normal on inspection    Neurological: normal coordination and normal general cortical function      Lab Results   Component Value Date    LABA1C 7.8 04/20/2020    LABA1C 7.8 12/31/2019    LABA1C 8.7 09/24/2019         ASSESSMENT/PLAN:    --- Uncontrolled type 2 diabetes mellitus with complication, with long-term current use of insulin 9.3>>9.9>>8.7>>7.8>>7.8    His Aic has improved he has been taking Basalar 50  units at bedtime he was advised to increase his insulin based on his fasting glucose to bring the fasting glucose below 120  ---avoid snacks at bedtime   On synjardy with no SE   Patient is using Humalog  CIR  10:1 patient is familiar with carbohydrate counting. Along with SSI   Patient was advised that sending of his fingerstick blood glucose logs is crucial in management of his  diabetes.

## 2020-07-01 ENCOUNTER — TELEPHONE (OUTPATIENT)
Dept: ENDOCRINOLOGY | Age: 70
End: 2020-07-01

## 2020-07-01 RX ORDER — INSULIN GLARGINE 300 U/ML
INJECTION, SOLUTION SUBCUTANEOUS
Qty: 5 PEN | Refills: 3 | Status: SHIPPED | OUTPATIENT
Start: 2020-07-01 | End: 2020-08-20

## 2020-07-01 NOTE — TELEPHONE ENCOUNTER
Please advise patient his fingerstick glucose seems to have improved he should continue with the current regimen and continue watching his diet

## 2020-07-01 NOTE — TELEPHONE ENCOUNTER
Pt came in office to drop off letter & sugar logs. Letter is from [employer] stating they are no longer going to cover [employer] and the alternative drugs covered are either Lantus or Toujeo.  He states he take Basaglar and needs it switched

## 2020-07-14 ENCOUNTER — TELEPHONE (OUTPATIENT)
Dept: VASCULAR SURGERY | Age: 70
End: 2020-07-14

## 2020-07-14 ENCOUNTER — PROCEDURE VISIT (OUTPATIENT)
Dept: VASCULAR SURGERY | Age: 70
End: 2020-07-14
Payer: MEDICARE

## 2020-07-14 DIAGNOSIS — I65.21 CAROTID STENOSIS, SYMPTOMATIC W/O INFARCT, RIGHT: ICD-10-CM

## 2020-07-14 DIAGNOSIS — Z48.812 POSTOPERATIVE CAROTID ENDARTERECTOMY SURVEILLANCE, ENCOUNTER FOR: Primary | ICD-10-CM

## 2020-07-14 PROCEDURE — 93880 EXTRACRANIAL BILAT STUDY: CPT | Performed by: SURGERY

## 2020-07-14 NOTE — TELEPHONE ENCOUNTER
I spoke to patient and told him that per Dr. Shruthi Yi there are no changes on carotid scan RTO 1 year with scan.

## 2020-07-17 ENCOUNTER — TELEPHONE (OUTPATIENT)
Dept: VASCULAR SURGERY | Age: 70
End: 2020-07-17

## 2020-07-29 RX ORDER — EZETIMIBE 10 MG/1
10 TABLET ORAL DAILY
Qty: 90 TABLET | Refills: 0 | Status: SHIPPED | OUTPATIENT
Start: 2020-07-29 | End: 2020-09-29 | Stop reason: SDUPTHER

## 2020-07-29 RX ORDER — CLOPIDOGREL BISULFATE 75 MG/1
TABLET ORAL
Qty: 90 TABLET | Refills: 0 | Status: SHIPPED | OUTPATIENT
Start: 2020-07-29 | End: 2020-09-29 | Stop reason: SDUPTHER

## 2020-07-29 NOTE — TELEPHONE ENCOUNTER
Medication:   Requested Prescriptions      No prescriptions requested or ordered in this encounter     Last Filled:  01/30/20    Last appt: 2/28/2020   Next appt: Visit date not found    Last OARRS: No flowsheet data found.

## 2020-08-12 RX ORDER — TAMSULOSIN HYDROCHLORIDE 0.4 MG/1
CAPSULE ORAL
Qty: 90 CAPSULE | Refills: 1 | Status: SHIPPED | OUTPATIENT
Start: 2020-08-12 | End: 2021-02-02 | Stop reason: SDUPTHER

## 2020-08-12 NOTE — TELEPHONE ENCOUNTER
Pt called asking for this refill     tamsulosin (FLOMAX) 0.4 MG capsule [[id number]    [facility] [address] [place] [address] [phone] - F [phone]

## 2020-08-12 NOTE — TELEPHONE ENCOUNTER
Medication:   Requested Prescriptions      No prescriptions requested or ordered in this encounter     Last Filled:  02/10/20    Last appt: 2/28/2020   Next appt: Visit date not found    Last OARRS: No flowsheet data found.

## 2020-08-20 RX ORDER — INSULIN GLARGINE 100 [IU]/ML
INJECTION, SOLUTION SUBCUTANEOUS
Qty: 15 ML | OUTPATIENT
Start: 2020-08-20

## 2020-08-20 RX ORDER — INSULIN GLARGINE 100 [IU]/ML
INJECTION, SOLUTION SUBCUTANEOUS
Qty: 6 PEN | Refills: 3 | Status: SHIPPED | OUTPATIENT
Start: 2020-08-20 | End: 2020-12-29 | Stop reason: SDUPTHER

## 2020-08-20 NOTE — TELEPHONE ENCOUNTER
Pt called and states he ran out of his insulin yesterday. He needs Lantus sent in to [facility] on [address] Pt states his insurance no longer covers [address]  Pt states he uses 56 units and might need more pens

## 2020-09-25 RX ORDER — ROSUVASTATIN CALCIUM 40 MG/1
40 TABLET, COATED ORAL DAILY
Qty: 90 TABLET | Refills: 0 | Status: CANCELLED | OUTPATIENT
Start: 2020-09-25

## 2020-09-26 RX ORDER — ROSUVASTATIN CALCIUM 40 MG/1
40 TABLET, COATED ORAL DAILY
Qty: 90 TABLET | Refills: 1 | Status: CANCELLED | OUTPATIENT
Start: 2020-09-26

## 2020-09-26 NOTE — TELEPHONE ENCOUNTER
Medication:   Requested Prescriptions     Pending Prescriptions Disp Refills    rosuvastatin (CRESTOR) 40 MG tablet 90 tablet 1     Sig: Take 1 tablet by mouth daily       Last Filled:      Patient Phone Number: [phone] (home)     Last appt: 2/28/2020   Next appt: Visit date not found    Last Lipid:   Lab Results   Component Value Date    CHOL 137 12/31/2019    TRIG 99 12/31/2019    HDL 47 12/31/2019    HDL 38 05/22/2012    [address] 70 12/31/2019       Last OARRS: No flowsheet data found.     Preferred Pharmacy:   [address]  [id number] [place] [address],[address] [id number]  Phone: [phone] Fax: [phone]

## 2020-09-28 ENCOUNTER — TELEPHONE (OUTPATIENT)
Dept: PRIMARY CARE CLINIC | Age: 70
End: 2020-09-28

## 2020-09-28 RX ORDER — ROSUVASTATIN CALCIUM 40 MG/1
40 TABLET, COATED ORAL DAILY
Qty: 90 TABLET | Refills: 1 | Status: SHIPPED | OUTPATIENT
Start: 2020-09-28 | End: 2021-03-24

## 2020-09-28 NOTE — TELEPHONE ENCOUNTER
Patient called and would like to speak to the dr. Domingo Leigh his refills, he has been out for 12 days, pharmacy has told him that they have faxed for the refills over 4 times. He is addimit that he wants to speak to the doctor.       Thank you

## 2020-09-29 ENCOUNTER — OFFICE VISIT (OUTPATIENT)
Dept: PRIMARY CARE CLINIC | Age: 70
End: 2020-09-29
Payer: MEDICARE

## 2020-09-29 VITALS
OXYGEN SATURATION: 98 % | BODY MASS INDEX: 29.39 KG/M2 | HEIGHT: 75 IN | WEIGHT: 236.4 LBS | TEMPERATURE: 96.4 F | DIASTOLIC BLOOD PRESSURE: 68 MMHG | SYSTOLIC BLOOD PRESSURE: 160 MMHG | HEART RATE: 64 BPM

## 2020-09-29 PROCEDURE — 99214 OFFICE O/P EST MOD 30 MIN: CPT | Performed by: INTERNAL MEDICINE

## 2020-09-29 PROCEDURE — G8427 DOCREV CUR MEDS BY ELIG CLIN: HCPCS | Performed by: INTERNAL MEDICINE

## 2020-09-29 PROCEDURE — G8417 CALC BMI ABV UP PARAM F/U: HCPCS | Performed by: INTERNAL MEDICINE

## 2020-09-29 PROCEDURE — G0438 PPPS, INITIAL VISIT: HCPCS | Performed by: INTERNAL MEDICINE

## 2020-09-29 PROCEDURE — 1123F ACP DISCUSS/DSCN MKR DOCD: CPT | Performed by: INTERNAL MEDICINE

## 2020-09-29 PROCEDURE — 3017F COLORECTAL CA SCREEN DOC REV: CPT | Performed by: INTERNAL MEDICINE

## 2020-09-29 PROCEDURE — 3051F HG A1C>EQUAL 7.0%<8.0%: CPT | Performed by: INTERNAL MEDICINE

## 2020-09-29 PROCEDURE — G0008 ADMIN INFLUENZA VIRUS VAC: HCPCS | Performed by: INTERNAL MEDICINE

## 2020-09-29 PROCEDURE — 2022F DILAT RTA XM EVC RTNOPTHY: CPT | Performed by: INTERNAL MEDICINE

## 2020-09-29 PROCEDURE — 1036F TOBACCO NON-USER: CPT | Performed by: INTERNAL MEDICINE

## 2020-09-29 PROCEDURE — 4040F PNEUMOC VAC/ADMIN/RCVD: CPT | Performed by: INTERNAL MEDICINE

## 2020-09-29 PROCEDURE — 90694 VACC AIIV4 NO PRSRV 0.5ML IM: CPT | Performed by: INTERNAL MEDICINE

## 2020-09-29 RX ORDER — EZETIMIBE 10 MG/1
10 TABLET ORAL DAILY
Qty: 90 TABLET | Refills: 3 | Status: SHIPPED | OUTPATIENT
Start: 2020-09-29 | End: 2022-01-03 | Stop reason: SDUPTHER

## 2020-09-29 RX ORDER — HYDROCHLOROTHIAZIDE 12.5 MG/1
12.5 CAPSULE, GELATIN COATED ORAL EVERY MORNING
Qty: 90 CAPSULE | Refills: 3 | Status: SHIPPED | OUTPATIENT
Start: 2020-09-29 | End: 2022-01-03 | Stop reason: SDUPTHER

## 2020-09-29 RX ORDER — CLOPIDOGREL BISULFATE 75 MG/1
TABLET ORAL
Qty: 90 TABLET | Refills: 3 | Status: SHIPPED | OUTPATIENT
Start: 2020-09-29 | End: 2020-09-30 | Stop reason: ALTCHOICE

## 2020-09-29 RX ORDER — AMLODIPINE BESYLATE AND BENAZEPRIL HYDROCHLORIDE 10; 20 MG/1; MG/1
1 CAPSULE ORAL DAILY
Qty: 90 CAPSULE | Refills: 3 | Status: SHIPPED | OUTPATIENT
Start: 2020-09-29 | End: 2022-01-03 | Stop reason: SDUPTHER

## 2020-09-29 ASSESSMENT — LIFESTYLE VARIABLES
HOW OFTEN DO YOU HAVE SIX OR MORE DRINKS ON ONE OCCASION: 0
AUDIT-C TOTAL SCORE: 3
HOW MANY STANDARD DRINKS CONTAINING ALCOHOL DO YOU HAVE ON A TYPICAL DAY: 0
HOW OFTEN DO YOU HAVE A DRINK CONTAINING ALCOHOL: 3

## 2020-09-29 ASSESSMENT — PATIENT HEALTH QUESTIONNAIRE - PHQ9
SUM OF ALL RESPONSES TO PHQ9 QUESTIONS 1 & 2: 0
SUM OF ALL RESPONSES TO PHQ QUESTIONS 1-9: 0
2. FEELING DOWN, DEPRESSED OR HOPELESS: 0
SUM OF ALL RESPONSES TO PHQ QUESTIONS 1-9: 0
1. LITTLE INTEREST OR PLEASURE IN DOING THINGS: 0

## 2020-09-29 NOTE — ASSESSMENT & PLAN NOTE
This has been a long standing problem, takes crestor and zetia      Monitors diet and tries to follow a low fat diet. Has  been reasonably  compliant w exercise. Lipids have been stable, The problem is controlled. Recent lipid tests were reviewed and are normal. Pertinent negatives include no chest pain, focal sensory loss, focal weakness, leg pain, myalgias or shortness of breath. Advised patient to continue the current instructions or medications.

## 2020-09-29 NOTE — PROGRESS NOTES
Medicare Annual Wellness Visit  Name: Kandy Calvin Date: 2020   MRN: [id number] Sex: Male   Age: 79 y.o. Ethnicity: Non-/Non    : 1950 Race: Tia Joseph is here for Medicare AWV    Screenings for behavioral, psychosocial and functional/safety risks, and cognitive dysfunction are all negative except as indicated below. These results, as well as other patient data from the [address] form, are documented in Flowsheets linked to this Encounter. Allergies   Allergen Reactions    Other      Bee stings         Prior to Visit Medications    Medication Sig Taking? Authorizing Provider   rosuvastatin (CRESTOR) 40 MG tablet Take 1 tablet by mouth daily Yes Genie Wright MD   Lancets ([address]) [address] [facility] USE TO TEST 4 TO 6 TIMES DAILY Yes Jackie Wu MD   insulin glargine (LANTUS SOLOSTAR) 100 UNIT/ML injection pen Inject 56 units into the skin nightly.  Yes Jackie Wu MD   tamsulosin (FLOMAX) 0.4 MG capsule TAKE 1 CAPSULE BY MOUTH EVERY DAY Yes Genie Wright MD   clopidogrel (PLAVIX) 75 MG tablet TAKE 1 TABLET BY MOUTH EVERY DAY Yes Genie Wright MD   ezetimibe (ZETIA) 10 MG tablet Take 1 tablet by mouth daily Yes Genie Wright MD   insulin lispro, 1 Unit Dial, (HUMALOG KWIKPEN) 100 UNIT/ML SOPN INJECT 15 UNITS UNDER THE SKIN WITH EACH MEAL Yes Jackie Wu MD   blood glucose test strips (ONETOUCH VERIO) strip TEST THREE TIMES DAILY Yes Jackie Wu MD   B-D UF III MINI PEN NEEDLES 31G X 5 MM MISC USE AS DIRECTED Yes Jackie Wu MD   hydrochlorothiazide (MICROZIDE) 12.5 MG capsule Take 1 capsule by mouth every morning Yes Genie Wright MD   amLODIPine-benazepril (LOTREL) 10-20 MG per capsule Take 1 capsule by mouth daily Yes Genie Wright MD   ONETOUCH VERIO strip TEST FINGERSTICK GLUCOSE THREE TIMES DAILY Yes Jackie Wu MD   Blood Glucose Monitoring Suppl ( S ) w/Device KIT Check blood sugar Yes Shirlene Stephens MD   Blood Glucose Monitoring Suppl MOISE Dx E11.9 Check daily and prn One Touch meter Yes Jose Ingram MD   aspirin 81 MG EC tablet Take 81 mg by mouth daily. Yes Historical Provider, MD   Empagliflozin-metFORMIN HCl ER 12.5-1000 MG TB24 1 tab BID  Patient not taking: Reported on 9/29/2020  Shirlene Stephens MD   Dextromethorphan-guaiFENesin [facility] DM MAXIMUM STRENGTH)  MG TB12 Take in tab bid with a lot of fluids.   Patient not taking: Reported on 9/29/2020  Shubham Ng MD       Past Medical History:   Diagnosis Date    CAD (coronary artery disease)     Chronic back pain     lumbar disk disease    Colorectal polyps     Diabetic ulcer of toe of left foot associated with type 2 diabetes mellitus, limited to breakdown of skin ([address] 5/20/2019    Erectile dysfunction     Hyperlipidemia     Hypertension     Hypertrophy of prostate without urinary obstruction and other lower urinary tract symptoms (LUTS)     Penetrating foot wound 2019    L foot     Retrograde ejaculation     Sciatica     Type II or unspecified type diabetes mellitus without mention of complication, not stated as uncontrolled        Past Surgical History:   Procedure Laterality Date    CAROTID ENDARTERECTOMY Right 12/31/2018    RIGHT CAROTID ENDARTERECTOMY WITH INTRA OP DUPLEX SCAN performed by Marti Rincon MD at [address]      x2    CYST REMOVAL      from back and finger x2    TONSILLECTOMY AND ADENOIDECTOMY         Family History   Problem Relation Age of Onset    Coronary Art Dis Mother         CABG    Diabetes Mother     High Blood Pressure Father     Stroke Father     Hypertension Brother     Diabetes Brother     Coronary Art Dis Brother     Cancer Brother         prostate    Other Brother         PVD    Diabetes Maternal Grandmother     Cancer Maternal Grandmother         skin    Diabetes Brother     Hypertension Brother     Hypertension Brother throat polyp, guillain barre       CareTeam (Including outside providers/suppliers regularly involved in providing care):   Patient Care Team:  Shara Good MD as PCP - General (Internal Medicine)  Shara Good MD as PCP - [facility]    Wt Readings from Last 3 Encounters:   09/29/20 236 lb 6.4 oz (107.2 kg)   03/23/20 222 lb 9.6 oz (101 kg)   02/28/20 219 lb (99.3 kg)     Vitals:    09/29/20 1554 09/29/20 1603   BP: (!) 160/76 (!) 160/68   Site: Left Upper Arm Left Upper Arm   Position: Sitting Sitting   Cuff Size: Medium Adult Medium Adult   Pulse: 64    Temp: 96.4 °F (35.8 °C)    TempSrc: Infrared    SpO2: 98%    Weight: 236 lb 6.4 oz (107.2 kg)    Height: 6' 3\" (1.905 m)      Body mass index is 29.55 kg/m². Based upon direct observation of the patient, evaluation of cognition reveals recent and remote memory intact.     General Appearance: alert and oriented to person, place and time, well developed and well- nourished, in no acute distress  Skin: warm and dry, no rash or erythema  Head: normocephalic and atraumatic  Eyes: pupils equal, round, and reactive to light, extraocular eye movements intact, conjunctivae normal  ENT: tympanic membrane, external ear and ear canal normal bilaterally, nose without deformity, nasal mucosa and turbinates normal without polyps  Neck: supple and non-tender without mass, no thyromegaly or thyroid nodules, no cervical lymphadenopathy  Pulmonary/Chest: clear to auscultation bilaterally- no wheezes, rales or rhonchi, normal air movement, no respiratory distress  Cardiovascular: normal rate, regular rhythm, normal S1 and S2, no murmurs, rubs, clicks, or gallops, distal pulses intact, no carotid bruits  Abdomen: soft, non-tender, non-distended, normal bowel sounds, no masses or organomegaly  Extremities: no cyanosis, clubbing or edema  Musculoskeletal: normal range of motion, no joint swelling, deformity or tenderness  Neurologic: reflexes normal and symmetric, no cranial nerve deficit, gait, coordination and speech normal    Patient's complete Health Risk Assessment and screening values have been reviewed and are found in Flowsheets. The following problems were reviewed today and where indicated follow up appointments were made and/or referrals ordered. Positive Risk Factor Screenings with Interventions:     Health Habits/Nutrition:  Health Habits/Nutrition  Do you exercise for at least 20 minutes 2-3 times per week?: (!) No  Have you lost any weight without trying in the past 3 months?: No  Do you eat fewer than 2 meals per day?: No  Have you seen a dentist within the past year?: Yes  Body mass index is 29.55 kg/m².   Health Habits/Nutrition Interventions:  · no issues    Safety:  Safety  Do you have working smoke detectors?: Yes  Have all throw rugs been removed or fastened?: (!) No  Do you have non-slip mats or surfaces in all bathtubs/showers?: (!) No  Do all of your stairways have a railing or banister?: Yes  Are your doorways, halls and stairs free of clutter?: Yes  Do you always fasten your seatbelt when you are in a car?: Yes  Safety Interventions:  · Home safety tips provided    Personalized Preventive Plan   Current Health Maintenance Status  Immunization History   Administered Date(s) Administered    Influenza 12/05/2011    Influenza, High Dose (Fluzone 65 yrs and older) 11/29/2010, 12/10/2013, 12/15/2014, 12/01/2016, 02/12/2018    Influenza, Triv, inactivated, subunit, adjuvanted, IM (Fluad 65 yrs and older) 10/24/2019    Pneumococcal Conjugate 13-valent ([id number]) 07/01/2015    Pneumococcal Polysaccharide ([id number]) 01/13/2006, 12/01/2016    Td, unspecified formulation 05/17/2004    Tdap (Boostrix, Adacel) 06/25/2014        Health Maintenance   Topic Date Due    Shingles Vaccine (1 of 2) 02/18/2000    Low dose CT lung screening  02/18/2005    Annual Wellness Visit (AWV)  05/29/2019    Diabetic foot exam  09/17/2019    Flu vaccine (1) 09/01/2020    Hepatitis C screen  01/01/2050 (Originally 1950)    Diabetic microalbuminuria test  12/31/2020    Lipid screen  12/31/2020    A1C test (Diabetic or Prediabetic)  04/20/2021    Potassium monitoring  04/20/2021    Creatinine monitoring  04/20/2021    Statin Therapy  09/28/2021    Diabetic retinal exam  07/11/2022    DTaP/Tdap/Td vaccine (2 - Td) 06/25/2024    Colon cancer screen colonoscopy  03/21/2027    Pneumococcal 65+ years Vaccine  Completed    AAA screen  Completed    Hepatitis A vaccine  Aged Out    Hib vaccine  Aged Out    Meningococcal (ACWY) vaccine  Aged Out     Recommendations for [place] [employer] Due: see orders and patient instructions/AVS.  . Recommended screening schedule for the next 5-10 years is provided to the patient in written form: see Patient Instructions/AVS.    Established patient here for a visit to manage acute and chronic medical conditions as detailed below. Type 2 diabetes mellitus without complication, without long-term current use of insulin (HCC)  Diabetes Mellitus Type II:  Home blood sugar records reviewed: fasting range: 120-130. No significant episodes of hypoglycemia. Compliant with medications. No polyuria, polydipsia, visual changes, foot problems, GI upset. Diabetic diet compliance:  compliant most of the time. Current exercise: none. Will check labs, and refill medications as appropriate. Hypertension:  Denies CP, SOB, visual changes, dizziness, palpitations or HA. He is adherent to a low sodium diet. Blood pressure typically runs ok  outside of the office. Continue current medications. Hyperlipidemia:  No new myalgias or GI upset on current medications.        Lab Results   Component Value Date    LABA1C 7.8 04/20/2020    LABA1C 7.8 12/31/2019    LABA1C 8.7 09/24/2019     Lab Results   Component Value Date    LABMICR 1.70 12/31/2019    CREATININE 0.9 04/20/2020     Lab Results   Component Value Date    ALT 25 04/20/2020 AST 19 04/20/2020     No components found for: CHLPL  Lab Results   Component Value Date    TRIG 99 12/31/2019     Lab Results   Component Value Date    HDL 47 12/31/2019     Lab Results   Component Value Date    LDLCALC 70 12/31/2019        BP Readings from Last 2 Encounters:   09/29/20 (!) 160/68   03/23/20 (!) 150/72     Hemoglobin A1C (%)   Date Value   04/20/2020 7.8     Microscopic Examination (no units)   Date Value   12/26/2018 Not Indicated     Microalbumin, Random Urine (mg/dL)   Date Value   12/31/2019 1.70     LDL Calculated (mg/dL)   Date Value   12/31/2019 70              Tobacco use:  Patient  reports that he quit smoking about 12 years ago. His smoking use included cigarettes. He started smoking about 61 years ago. He has a 48.00 pack-year smoking history. He has never used smokeless tobacco.    If Smoker - Cessation materials given? NA    Is Daily aspirin on medication list?:  Yes    Diabetic retinal exam done? Yes   If yes, document in Health Maintenance. Monofilament placed on counter? Yes    Shoes and socks removed? Yes    BP taken with correct size cuff? Yes   Repeated if > 130/80 Yes     Microalbumin performed if applicable? Yes       Essential hypertension  This is a chronic problem. The problem is well controlled. Patient monitors readings regularly. Pertinent negatives include no chest pain, focal sensory loss, focal weakness, leg pain, myalgias or shortness of breath. No headaches or chest pain. Takes medications regularly. Blood pressure has been stable, blood work was reviewed, and advised patient to continue the current instructions or medications. Pure hypercholesterolemia  This has been a long standing problem, takes crestor and zetia      Monitors diet and tries to follow a low fat diet. Has  been reasonably  compliant w exercise. Lipids have been stable, The problem is controlled.  Recent lipid tests were reviewed and are normal. Pertinent negatives include no chest

## 2020-09-29 NOTE — ASSESSMENT & PLAN NOTE
Diabetes Mellitus Type II:  Home blood sugar records reviewed: fasting range: 120-130. No significant episodes of hypoglycemia. Compliant with medications. No polyuria, polydipsia, visual changes, foot problems, GI upset. Diabetic diet compliance:  compliant most of the time. Current exercise: none. Will check labs, and refill medications as appropriate. Hypertension:  Denies CP, SOB, visual changes, dizziness, palpitations or HA. He is adherent to a low sodium diet. Blood pressure typically runs ok  outside of the office. Continue current medications. Hyperlipidemia:  No new myalgias or GI upset on current medications. Lab Results   Component Value Date    LABA1C 7.8 04/20/2020    LABA1C 7.8 12/31/2019    LABA1C 8.7 09/24/2019     Lab Results   Component Value Date    LABMICR 1.70 12/31/2019    CREATININE 0.9 04/20/2020     Lab Results   Component Value Date    ALT 25 04/20/2020    AST 19 04/20/2020     No components found for: CHLPL  Lab Results   Component Value Date    TRIG 99 12/31/2019     Lab Results   Component Value Date    HDL 47 12/31/2019     Lab Results   Component Value Date    LDLCALC 70 12/31/2019        BP Readings from Last 2 Encounters:   09/29/20 (!) 160/68   03/23/20 (!) 150/72     Hemoglobin A1C (%)   Date Value   04/20/2020 7.8     Microscopic Examination (no units)   Date Value   12/26/2018 Not Indicated     Microalbumin, Random Urine (mg/dL)   Date Value   12/31/2019 1.70     LDL Calculated (mg/dL)   Date Value   12/31/2019 70              Tobacco use:  Patient  reports that he quit smoking about 12 years ago. His smoking use included cigarettes. He started smoking about 61 years ago. He has a 48.00 pack-year smoking history. He has never used smokeless tobacco.    If Smoker - Cessation materials given? NA    Is Daily aspirin on medication list?:  Yes    Diabetic retinal exam done? Yes   If yes, document in Health Maintenance. Monofilament placed on counter? Yes    Shoes and socks removed? Yes    BP taken with correct size cuff? Yes   Repeated if > 130/80 Yes     Microalbumin performed if applicable?   Yes

## 2020-09-30 ENCOUNTER — PROCEDURE VISIT (OUTPATIENT)
Dept: CARDIOLOGY CLINIC | Age: 70
End: 2020-09-30
Payer: MEDICARE

## 2020-09-30 ENCOUNTER — OFFICE VISIT (OUTPATIENT)
Dept: CARDIOLOGY CLINIC | Age: 70
End: 2020-09-30
Payer: MEDICARE

## 2020-09-30 VITALS
OXYGEN SATURATION: 95 % | DIASTOLIC BLOOD PRESSURE: 62 MMHG | HEIGHT: 75 IN | WEIGHT: 236 LBS | HEART RATE: 77 BPM | SYSTOLIC BLOOD PRESSURE: 115 MMHG | BODY MASS INDEX: 29.34 KG/M2

## 2020-09-30 LAB
LV EF: 50 %
LVEF MODALITY: NORMAL

## 2020-09-30 PROCEDURE — 1036F TOBACCO NON-USER: CPT | Performed by: INTERNAL MEDICINE

## 2020-09-30 PROCEDURE — 93320 DOPPLER ECHO COMPLETE: CPT | Performed by: INTERNAL MEDICINE

## 2020-09-30 PROCEDURE — 4040F PNEUMOC VAC/ADMIN/RCVD: CPT | Performed by: INTERNAL MEDICINE

## 2020-09-30 PROCEDURE — 93351 STRESS TTE COMPLETE: CPT | Performed by: INTERNAL MEDICINE

## 2020-09-30 PROCEDURE — 93325 DOPPLER ECHO COLOR FLOW MAPG: CPT | Performed by: INTERNAL MEDICINE

## 2020-09-30 PROCEDURE — 3017F COLORECTAL CA SCREEN DOC REV: CPT | Performed by: INTERNAL MEDICINE

## 2020-09-30 PROCEDURE — 1123F ACP DISCUSS/DSCN MKR DOCD: CPT | Performed by: INTERNAL MEDICINE

## 2020-09-30 PROCEDURE — G8417 CALC BMI ABV UP PARAM F/U: HCPCS | Performed by: INTERNAL MEDICINE

## 2020-09-30 PROCEDURE — 99214 OFFICE O/P EST MOD 30 MIN: CPT | Performed by: INTERNAL MEDICINE

## 2020-09-30 PROCEDURE — G8427 DOCREV CUR MEDS BY ELIG CLIN: HCPCS | Performed by: INTERNAL MEDICINE

## 2020-09-30 NOTE — PROGRESS NOTES
[facility]  Cardiac Consult     Referring Provider:  Aracelis Fitzgerald MD     Chief Complaint   Patient presents with    Follow-up        History of Present Illness:  75 y/o male seen in f/u for CAD. He retired from [employer] .  started back doing contract work for [employer]. He does have CAD. Prior RCA stenting. Followed by Dr. Alli Florence in the past. Last cath . Occluded stent in [facility]. Last stress  normal.    He is doing well. Working 40 hrs per week. Not exercising. He has no cardiac symptoms with daily activity. Specifically, no chest pain, tightness or pressure. No dyspnea with exertion. Tolerating statin without side effects. Tolerating statin without side effects. Some mild bruising on arms. Past Medical History:   has a past medical history of CAD (coronary artery disease), Chronic back pain, Colorectal polyps, Diabetic ulcer of toe of left foot associated with type 2 diabetes mellitus, limited to breakdown of skin ([address] Erectile dysfunction, Hyperlipidemia, Hypertension, Hypertrophy of prostate without urinary obstruction and other lower urinary tract symptoms (LUTS), Penetrating foot wound, Retrograde ejaculation, Sciatica, and Type II or unspecified type diabetes mellitus without mention of complication, not stated as uncontrolled. Surgical History:   has a past surgical history that includes Coronary angioplasty; Tonsillectomy and adenoidectomy; cyst removal; and Carotid endarterectomy (Right, 2018).      Social History:  Social History     Tobacco Use    Smoking status: Former Smoker     Packs/day: 1.00     Years: 48.00     Pack years: 48.00     Types: Cigarettes     Start date: 1959     Last attempt to quit: 2007     Years since quittin.8    Smokeless tobacco: Never Used   Substance Use Topics    Alcohol use: Yes     Comment: social        Family History:  family history includes Cancer in his brother and maternal grandmother; Coronary Art Dis in his brother and mother; Diabetes in his brother, brother, maternal grandmother, and mother; High Blood Pressure in his father; Hypertension in his brother, brother, and brother; Other in his brother; Stroke in his father. Allergies: Other     Home Medications:  Prior to Visit Medications    Medication Sig Taking? Authorizing Provider   clopidogrel (PLAVIX) 75 MG tablet TAKE 1 TABLET BY MOUTH EVERY DAY Yes Shara Good MD   ezetimibe (ZETIA) 10 MG tablet Take 1 tablet by mouth daily Yes Shara Good MD   hydroCHLOROthiazide (MICROZIDE) 12.5 MG capsule Take 1 capsule by mouth every morning Yes Shara Good MD   amLODIPine-benazepril (LOTREL) 10-20 MG per capsule Take 1 capsule by mouth daily Yes Shara Good MD   rosuvastatin (CRESTOR) 40 MG tablet Take 1 tablet by mouth daily Yes Shara Good MD   Lancets (Jaimie Marleny) MISC USE TO TEST 4 TO 6 TIMES DAILY Yes Aleyda Vuong MD   insulin glargine (LANTUS SOLOSTAR) 100 UNIT/ML injection pen Inject 56 units into the skin nightly.  Yes Aleyda Vuong MD   tamsulosin (FLOMAX) 0.4 MG capsule TAKE 1 CAPSULE BY MOUTH EVERY DAY Yes Shara Good MD   insulin lispro, 1 Unit Dial, (HUMALOG KWIKPEN) 100 UNIT/ML SOPN INJECT 15 UNITS UNDER THE SKIN WITH EACH MEAL  Patient taking differently: INJECT 15 UNITS UNDER THE SKIN WITH EACH MEAL sliding scale Yes Aleyda Vuong MD   Empagliflozin-metFORMIN HCl ER 12.5-1000 MG TB24 1 tab BID Yes Aleyda Vuong MD   blood glucose test strips (ONETOUCH VERIO) strip TEST THREE TIMES DAILY Yes Aleyda Vuong MD   B-D UF III MINI PEN NEEDLES 31G X 5 MM MISC USE AS DIRECTED Yes Aleyda Vuong MD   ONETOUCH VERIO strip TEST FINGERSTICK GLUCOSE THREE TIMES DAILY Yes Aleyda Vuong MD   Blood Glucose Monitoring Suppl (Visible Measures SYSTEM) w/Device KIT Check blood sugar Yes Aleyda Vuong MD   Blood Glucose Monitoring Suppl MOISE Dx E11.9 Check daily and prn One Touch meter Yes Smiley Fair MD aspirin 81 MG EC tablet Take 81 mg by mouth daily. Yes Historical Provider, MD       [x] Medications and dosages reviewed. ROS:  [x]Full ROS obtained and negative except as mentioned in HPI      Physical Examination:    Vitals:    09/30/20 1153   BP: 115/62   Site: Left Upper Arm   Position: Sitting   Cuff Size: Large Adult   Pulse: 77   SpO2: 95%   Weight: 236 lb (107 kg)   Height: 6' 3\" (1.905 m)        · GENERAL: Well developed, well nourished, No acute distress  · NEUROLOGICAL: Alert and oriented  · PSYCH: Calm affect  · SKIN: Warm and dry, No visible rash, Some mild ecchymosis on arms   · EYES: Pupils equal and round, Sclera non-icteric,   · HENT:  External ears and nose unremarkable, mucus membranes moist  · MUSCULOSKELETAL: Normal cephalic, neck supple  · CAROTID: Normal upstroke, no bruits  · CARDIAC: JVP normal, Normal PMI, regular rate and rhythm, normal S1S2, no murmur, rub, or gallop  · RESPIRATORY: Normal respiratory effort, clear to auscultation bilaterally  · EXTREMITIES: No LE edema  · GASTROINTESTINAL: normal bowel sounds, soft, non-tender, No hepatomegaly         Assessment:     CAD:  Stable. Stress ECHO today (9/30/2020) normal.  Continue medical therapy. Prior RCA stenting. Followed by Dr. Yovani Love in the past. Last cath 2003. Occluded stent in [facility]. Hyperlipidemia:  LDL=70. Needs repeat lipids  Now taking crestor 40 and zetia 10 on regular basis  Well controlled. Follow    HTN:  /62 (Site: Left Upper Arm, Position: Sitting, Cuff Size: Large Adult)   Pulse 77   Ht 6' 3\" (1.905 m)   Wt 236 lb (107 kg)   SpO2 95%   BMI 29.50 kg/m²   Well controlled.   Continue medical therapy    Plan:  Stable cardiac status  Needs labs  F/u 1 year    Thank you for allowing me to participate in the care of this individual.      Paxton Bailey M.D., [facility] [place]

## 2020-09-30 NOTE — PATIENT INSTRUCTIONS
1. Stop Plaix. Continue baby aspirin every day. 2. Fasting labs soon. Please send copy of results to Dr. Christ Cranker.  3. Recommend making roscoe't with Dr. Robert Nguyen after labs done to go over results.

## 2020-09-30 NOTE — LETTER
 Smoking status: Former Smoker     Packs/day: 1.00     Years: 48.00     Pack years: 48.00     Types: Cigarettes     Start date: 1959     Last attempt to quit: 2007     Years since quittin.8    Smokeless tobacco: Never Used   Substance Use Topics    Alcohol use: Yes     Comment: social        Family History:  family history includes Cancer in his brother and maternal grandmother; Coronary Art Dis in his brother and mother; Diabetes in his brother, brother, maternal grandmother, and mother; High Blood Pressure in his father; Hypertension in his brother, brother, and brother; Other in his brother; Stroke in his father. Allergies: Other     Home Medications:  Prior to Visit Medications    Medication Sig Taking? Authorizing Provider   clopidogrel (PLAVIX) 75 MG tablet TAKE 1 TABLET BY MOUTH EVERY DAY Yes Josiane Yang MD   ezetimibe (ZETIA) 10 MG tablet Take 1 tablet by mouth daily Yes Josiane Yang MD   hydroCHLOROthiazide (MICROZIDE) 12.5 MG capsule Take 1 capsule by mouth every morning Yes Josiane Yang MD   amLODIPine-benazepril (LOTREL) 10-20 MG per capsule Take 1 capsule by mouth daily Yes Josiane Yang MD   rosuvastatin (CRESTOR) 40 MG tablet Take 1 tablet by mouth daily Yes Josiane Yang MD   Lancets ([address]) [facility] USE TO TEST 4 TO 6 TIMES DAILY Yes Debbi Austin MD   insulin glargine (LANTUS SOLOSTAR) 100 UNIT/ML injection pen Inject 56 units into the skin nightly.  Yes Debbi Austin MD   tamsulosin (FLOMAX) 0.4 MG capsule TAKE 1 CAPSULE BY MOUTH EVERY DAY Yes Josiane Yang MD   insulin lispro, 1 Unit Dial, (HUMALOG KWIKPEN) 100 UNIT/ML SOPN INJECT 15 UNITS UNDER THE SKIN WITH EACH MEAL  Patient taking differently: INJECT 15 UNITS UNDER THE SKIN WITH EACH MEAL sliding scale Yes Debbi Austin MD   Empagliflozin-metFORMIN HCl ER 12.5-1000 MG TB24 1 tab BID Yes Debbi Austin MD blood glucose test strips (ONETOUCH VERIO) strip TEST THREE TIMES DAILY Yes Baldomero Edmondson MD   B-D UF III MINI PEN NEEDLES 31G X 5 MM MISC USE AS DIRECTED Yes Baldomero Edmondson MD   ONETOUCH VERIO strip TEST FINGERSTICK GLUCOSE THREE TIMES DAILY Yes Baldomero Edmondson MD   Blood Glucose Monitoring Suppl (Ivonne Pacer FLEX SYSTEM) w/Device KIT Check blood sugar Yes Baldomero Edmondson MD   Blood Glucose Monitoring Suppl MOISE Dx E11.9 Check daily and prn One Touch meter Yes Félix Lowe MD   aspirin 81 MG EC tablet Take 81 mg by mouth daily. Yes Historical Provider, MD       [x] Medications and dosages reviewed. ROS:  [x]Full ROS obtained and negative except as mentioned in HPI      Physical Examination:    Vitals:    09/30/20 1153   BP: 115/62   Site: Left Upper Arm   Position: Sitting   Cuff Size: Large Adult   Pulse: 77   SpO2: 95%   Weight: 236 lb (107 kg)   Height: 6' 3\" (1.905 m)        · GENERAL: Well developed, well nourished, No acute distress  · NEUROLOGICAL: Alert and oriented  · PSYCH: Calm affect  · SKIN: Warm and dry, No visible rash, Some mild ecchymosis on arms   · EYES: Pupils equal and round, Sclera non-icteric,   · HENT:  External ears and nose unremarkable, mucus membranes moist  · MUSCULOSKELETAL: Normal cephalic, neck supple  · CAROTID: Normal upstroke, no bruits  · CARDIAC: JVP normal, Normal PMI, regular rate and rhythm, normal S1S2, no murmur, rub, or gallop  · RESPIRATORY: Normal respiratory effort, clear to auscultation bilaterally  · EXTREMITIES: No LE edema  · GASTROINTESTINAL: normal bowel sounds, soft, non-tender, No hepatomegaly         Assessment:     CAD:  Stable. Stress ECHO today (9/30/2020) normal.  Continue medical therapy. Prior RCA stenting. Followed by Dr. Tirge Anthony in the past. Last cath 2003. Occluded stent in [facility]. Hyperlipidemia:  LDL=70. Needs repeat lipids  Now taking crestor 40 and zetia 10 on regular basis  Well controlled.  Follow    HTN: /62 (Site: Left Upper Arm, Position: Sitting, Cuff Size: Large Adult)   Pulse 77   Ht 6' 3\" (1.905 m)   Wt 236 lb (107 kg)   SpO2 95%   BMI 29.50 kg/m²   Well controlled.   Continue medical therapy    Plan:  Stable cardiac status  Needs labs  F/u 1 year    Thank you for allowing me to participate in the care of this individual.      Bakari Chowdary M.D., [facility] [place]      Sincerely,        Catrina Fuentes MD

## 2020-10-15 DIAGNOSIS — I10 ESSENTIAL HYPERTENSION: ICD-10-CM

## 2020-10-15 DIAGNOSIS — Z12.5 SCREENING FOR PROSTATE CANCER: ICD-10-CM

## 2020-10-15 DIAGNOSIS — I25.10 CORONARY ARTERY DISEASE INVOLVING NATIVE CORONARY ARTERY OF NATIVE HEART WITHOUT ANGINA PECTORIS: ICD-10-CM

## 2020-10-15 LAB
A/G RATIO: 1.9 (ref 1.1–2.2)
ALBUMIN SERPL-MCNC: 4.5 G/DL (ref 3.4–5)
ALP BLD-CCNC: 79 U/L (ref 40–129)
ALT SERPL-CCNC: 49 U/L (ref 10–40)
ANION GAP SERPL CALCULATED.3IONS-SCNC: 11 MMOL/L (ref 3–16)
AST SERPL-CCNC: 36 U/L (ref 15–37)
BILIRUB SERPL-MCNC: 0.4 MG/DL (ref 0–1)
BUN BLDV-MCNC: 15 MG/DL (ref 7–20)
CALCIUM SERPL-MCNC: 9.6 MG/DL (ref 8.3–10.6)
CHLORIDE BLD-SCNC: 102 MMOL/L (ref 99–110)
CHOLESTEROL, TOTAL: 110 MG/DL (ref 0–199)
CO2: 27 MMOL/L (ref 21–32)
CREAT SERPL-MCNC: 0.8 MG/DL (ref 0.8–1.3)
GFR AFRICAN AMERICAN: >60
GFR NON-AFRICAN AMERICAN: >60
GLOBULIN: 2.4 G/DL
GLUCOSE BLD-MCNC: 95 MG/DL (ref 70–99)
HDLC SERPL-MCNC: 36 MG/DL (ref 40–60)
LDL CHOLESTEROL CALCULATED: 54 MG/DL
POTASSIUM SERPL-SCNC: 4.3 MMOL/L (ref 3.5–5.1)
PROSTATE SPECIFIC ANTIGEN: 0.51 NG/ML (ref 0–4)
SODIUM BLD-SCNC: 140 MMOL/L (ref 136–145)
TOTAL PROTEIN: 6.9 G/DL (ref 6.4–8.2)
TRIGL SERPL-MCNC: 100 MG/DL (ref 0–150)
TSH SERPL DL<=0.05 MIU/L-ACNC: 3.42 UIU/ML (ref 0.27–4.2)
VLDLC SERPL CALC-MCNC: 20 MG/DL

## 2020-10-16 LAB
ESTIMATED AVERAGE GLUCOSE: 157.1 MG/DL
HBA1C MFR BLD: 7.1 %

## 2020-11-05 RX ORDER — CLOPIDOGREL BISULFATE 75 MG/1
TABLET ORAL
Qty: 90 TABLET | Refills: 3 | Status: SHIPPED | OUTPATIENT
Start: 2020-11-05 | End: 2020-11-12

## 2020-11-12 ENCOUNTER — VIRTUAL VISIT (OUTPATIENT)
Dept: ENDOCRINOLOGY | Age: 70
End: 2020-11-12
Payer: MEDICARE

## 2020-11-12 PROCEDURE — 3051F HG A1C>EQUAL 7.0%<8.0%: CPT | Performed by: INTERNAL MEDICINE

## 2020-11-12 PROCEDURE — G8427 DOCREV CUR MEDS BY ELIG CLIN: HCPCS | Performed by: INTERNAL MEDICINE

## 2020-11-12 PROCEDURE — 4040F PNEUMOC VAC/ADMIN/RCVD: CPT | Performed by: INTERNAL MEDICINE

## 2020-11-12 PROCEDURE — 2022F DILAT RTA XM EVC RTNOPTHY: CPT | Performed by: INTERNAL MEDICINE

## 2020-11-12 PROCEDURE — 99214 OFFICE O/P EST MOD 30 MIN: CPT | Performed by: INTERNAL MEDICINE

## 2020-11-12 PROCEDURE — 1123F ACP DISCUSS/DSCN MKR DOCD: CPT | Performed by: INTERNAL MEDICINE

## 2020-11-12 PROCEDURE — 3017F COLORECTAL CA SCREEN DOC REV: CPT | Performed by: INTERNAL MEDICINE

## 2020-11-12 RX ORDER — EMPAGLIFLOZIN, METFORMIN HYDROCHLORIDE 12.5; 1 MG/1; MG/1
TABLET, EXTENDED RELEASE ORAL
Qty: 60 TABLET | Refills: 5 | Status: SHIPPED | OUTPATIENT
Start: 2020-11-12 | End: 2021-05-21

## 2020-11-12 NOTE — PROGRESS NOTES
TELEHEALTH EVALUATION -- Audio/Visual (During LPBTM-60 public health emergency)  Pursuant to the emergency declaration under the [address] [place] [address] authority and the [employer] Act, this Virtual  Visit was conducted, with patient's consent, to reduce the patient's risk of exposure to COVID-19 and provide care for  patient. Services were provided through a video synchronous discussion virtually to substitute for in-person clinic visit. Total time spent :25+       Pt is a 79 y.o. male seen management of Type 2 diabetes. Inocente Garcia was diagnosed with Diabetes mellitus at age 52 . Diabetes was diagnosed at routine screening . At the time of diagnosis patient had polyuria polydipsia . Inocente Garcia got diabetic education in the past when he was first diagnosed with diabetes. Comorbid conditions: Neuropathy and Coronary Artery Disease    Current diabetic medications include: Actos 30 milligrams, metformin thousand twice a day, Amaryl 4 milligrams daily    INTERIM:    Diabetes   He presents for his follow-up diabetic visit. He has type 2 diabetes mellitus. No MedicAlert identification noted. The initial diagnosis of diabetes was made 20 years ago. His disease course has been worsening. There are no hypoglycemic associated symptoms. Associated symptoms include polyphagia and polyuria. Pertinent negatives for diabetes include no weight loss. There are no hypoglycemic complications. Pertinent negatives for hypoglycemia complications include no required glucagon injection. Symptoms are worsening. Diabetic complications include heart disease and peripheral neuropathy. Risk factors for coronary artery disease include diabetes mellitus, dyslipidemia, family history, male sex, obesity, hypertension and tobacco exposure. Current diabetic treatment includes oral agent (triple therapy).  He is compliant with treatment most of the time. His weight is stable. He is following a generally unhealthy diet. Meal planning includes avoidance of concentrated sweets. He has had a previous visit with a dietitian. He rarely participates in exercise. There is no change in his home blood glucose trend. His breakfast blood glucose is taken between 7-8 am. His breakfast blood glucose range is generally >200 mg/dl. An ACE inhibitor/angiotensin II receptor blocker is being taken. He sees a podiatrist.Eye exam is current.       he has been checking his fingerstick blood glucose and Average fastings 129---220  His Average glucose 140   No hypoglycemia   --- He has been taking higher doses of insulin and his fingerstick glucose have improved  He denies any hypoglycemia still continues to eat snacks at bedtime which can be cake and icecream     Weight trend: stable  Prior visit with dietician: no  Current diet: on average, 3 meals per day  Current exercise: rare    He has CAD s/p stents age 48   Has hypertension and is on medication well controlled   He has hyperlipidemia and is on medication     Past Medical History:   Diagnosis Date    CAD (coronary artery disease)     Chronic back pain     lumbar disk disease    Colorectal polyps     Diabetic ulcer of toe of left foot associated with type 2 diabetes mellitus, limited to breakdown of skin ([address] 5/20/2019    Erectile dysfunction     Hyperlipidemia     Hypertension     Hypertrophy of prostate without urinary obstruction and other lower urinary tract symptoms (LUTS)     Penetrating foot wound 2019    L foot     Retrograde ejaculation     Sciatica     Type II or unspecified type diabetes mellitus without mention of complication, not stated as uncontrolled       Patient Active Problem List   Diagnosis    Type 2 diabetes mellitus without complication, without long-term current use of insulin ([address]    Pure hypercholesterolemia    Essential hypertension    Coronary atherosclerosis of native coronary artery    Ear fullness    Paronychia    Kidney stone    Fungal dermatitis    Neck pain on left side    Hx of smoking    Colon polyps    Left hip pain    Ischemic stroke of frontal lobe (HCC)    Carotid stenosis, symptomatic w/o infarct, right    Diabetic ulcer of toe of left foot associated with type 2 diabetes mellitus, limited to breakdown of skin [facility])     Past Surgical History:   Procedure Laterality Date    CAROTID ENDARTERECTOMY Right 2018    RIGHT CAROTID ENDARTERECTOMY WITH INTRA OP DUPLEX SCAN performed by Rita Mcclendon MD at [address]      x2    CYST REMOVAL      from back and finger x2    TONSILLECTOMY AND ADENOIDECTOMY       Social History     Socioeconomic History    Marital status:      Spouse name: Not on file    Number of children: Not on file    Years of education: Not on file    Highest education level: Not on file   Occupational History    Not on file   Social Needs    Financial resource strain: Not on file    Food insecurity     Worry: Not on file     Inability: Not on file    Transportation needs     Medical: Not on file     Non-medical: Not on file   Tobacco Use    Smoking status: Former Smoker     Packs/day: 1.00     Years: 48.00     Pack years: 48.00     Types: Cigarettes     Start date: 1959     Last attempt to quit: 2007     Years since quittin.9    Smokeless tobacco: Never Used   Substance and Sexual Activity    Alcohol use: Yes     Comment: social    Drug use: No    Sexual activity: Yes     Partners: Female   Lifestyle    Physical activity     Days per week: Not on file     Minutes per session: Not on file    Stress: Not on file   Relationships    Social connections     Talks on phone: Not on file     Gets together: Not on file     Attends Hoahaoism service: Not on file     Active member of club or organization: Not on file     Attends meetings of clubs or organizations: Not on file Relationship status: Not on file    Intimate partner violence     Fear of current or ex partner: Not on file     Emotionally abused: Not on file     Physically abused: Not on file     Forced sexual activity: Not on file   Other Topics Concern    Not on file   Social History Narrative    Not on file     Family History   Problem Relation Age of Onset    Coronary Art Dis Mother         CABG   [facility] Diabetes Mother     High Blood Pressure Father     Stroke Father     Hypertension Brother     Diabetes Brother     Coronary Art Dis Brother     Cancer Brother         prostate    Other Brother         PVD    Diabetes Maternal Grandmother     Cancer Maternal Grandmother         skin    Diabetes Brother     Hypertension Brother     Hypertension Brother         throat polyp, guillain barre     Current Outpatient Medications   Medication Sig Dispense Refill    Empagliflozin-metFORMIN HCl ER (SYNJARDY XR) 12.5-1000 MG TB24 TAKE 1 TABLET BY MOUTH TWICE DAILY 60 tablet 5    B-D UF III MINI PEN NEEDLES 31G X 5 MM MISC USE AS DIRECTED 100 each 6    ezetimibe (ZETIA) 10 MG tablet Take 1 tablet by mouth daily 90 tablet 3    hydroCHLOROthiazide (MICROZIDE) 12.5 MG capsule Take 1 capsule by mouth every morning 90 capsule 3    amLODIPine-benazepril (LOTREL) 10-20 MG per capsule Take 1 capsule by mouth daily 90 capsule 3    rosuvastatin (CRESTOR) 40 MG tablet Take 1 tablet by mouth daily 90 tablet 1    Lancets (ONETOUCH DELICA PLUS [id number]) MISC USE TO TEST 4 TO 6 TIMES DAILY 200 each 3    insulin glargine (LANTUS SOLOSTAR) 100 UNIT/ML injection pen Inject 56 units into the skin nightly.  6 pen 3    tamsulosin (FLOMAX) 0.4 MG capsule TAKE 1 CAPSULE BY MOUTH EVERY DAY 90 capsule 1    insulin lispro, 1 Unit Dial, (HUMALOG KWIKPEN) 100 UNIT/ML SOPN INJECT 15 UNITS UNDER THE SKIN WITH EACH MEAL (Patient taking differently: INJECT 15 UNITS UNDER THE SKIN WITH EACH MEAL sliding scale) 15 mL 1    ONETOUCH VERIO strip TEST FINGERSTICK GLUCOSE THREE TIMES DAILY 100 each 5    Blood Glucose Monitoring Suppl (ONETOUCH VERIO FLEX SYSTEM) w/Device KIT Check blood sugar 1 kit 0    Blood Glucose Monitoring Suppl MOISE Dx E11.9 Check daily and prn One Touch meter 1 Device 0    aspirin 81 MG EC tablet Take 81 mg by mouth daily. No current facility-administered medications for this visit. Allergies   Allergen Reactions    Other      Bee stings       Family Status   Relation Name Status    Mother     [facility] Father     [facility] Brother   at age 76    MGM  (Not Specified)   [facility] Brother   at age 76   [facility] Brother  Alive       Review of Systems  Constitutional  Patient denies any weight loss denies any weight gain,  Eyes   Pt denies any double vision blurred vision or decreased peripheral vision  Head ear nose throat  Denies any trouble swallowing denies any hoarseness  Denies any shortness of breath denies  Cardiovascular  Denies any chest pain denies any palpitations currently  Gastrointestinal  Denies any nausea ,vomiting ,constipation or diarrhea  Hematological/lymphatic  Denies any blood clot denies or any painful lymph nodes  Genitourinary  Denies any frequent urination denies any nighttime urination  Skin  Denies any acne denies any changes in facial body hair denies any non healing wounds Musculoskeletal   denies any joint or bone pain ,denies any muscle weakness ,denies any new fracture  Endocrine  Denies any excessive thirst denies any excessive heat intolerance or cold intolerance , denies any nipple discharge ,denies any increase in shoe size . OBJECTIVE:  There were no vitals taken for this visit.    Wt Readings from Last 3 Encounters:   20 236 lb (107 kg)   20 236 lb 6.4 oz (107.2 kg)   20 222 lb 9.6 oz (101 kg)         Constitutional: no acute distress, well appearing and well nourished  Psychiatric: oriented to person, place and time, judgement and insight and normal, recent and eye exam. his last eye exam was within a year sept 2020   Last Podiatry Exam:  Saw podiatry may 2019 Dr Slater Kehr and again  June 2019    Lipids: Last LDL level was 63 in March of 2019   Microalbumin/Creatinine Ratio:pt has microalbuminuria elevated in sept 2019 , normal in dec 2019   . Education: Reviewed ABCs of diabetes management (respective goals in parentheses): A1C (<7), blood pressure (<130/80), and cholesterol (LDL <100). Additional Education: referred to Diabetes Educator but according to patient and his wife had gone through diabetic education in the past and is very well versed  with carbohydrate counting he will rely on her to help him. -- Coronary artery disease involving native coronary artery of native heart without angina pectoris  Patient had coronary stents put in 20 years ago. He had carotid endarterectomy done in January 2019    --Essential hypertension  Well controlled on current regimen denies any headaches denies any blurred vision     --- Mixed hyperlipidemia  Patient is on statins --Crestor 40 milligrams and ezetimibe  Advised to work on diet       Reviewed and/or ordered clinical lab results yes   Reviewed and/or ordered radiology tests Yes  Reviewed and/or ordered other diagnostic tests yes   Made a decision to obtain old records yes   Reviewed and summarized old records yes     Chas Zuñiga was counseled regarding symptoms of current diagnosis, course and complications of disease if inadequately treated, side effects of medications, diagnosis, treatment options, and prognosis, risks, benefits, complications, and alternatives of treatment, labs, imaging and other studies and treatment targets and goals. He understands instructions and counseling      Return in about 3 months (around 2/12/2021). Please note that some or all of this report was generated using voice recognition software.  Please notify me in case of any questions about the content of this document, as some

## 2020-12-01 ENCOUNTER — TELEPHONE (OUTPATIENT)
Dept: ENDOCRINOLOGY | Age: 70
End: 2020-12-01

## 2020-12-01 NOTE — TELEPHONE ENCOUNTER
PT is scheduled for an appt on 3/1/21 and he needs to be rescheduled for a appt before 2/12/21 for his insurance to cover the visit.    #[phone]

## 2020-12-29 RX ORDER — INSULIN GLARGINE 100 [IU]/ML
INJECTION, SOLUTION SUBCUTANEOUS
Qty: 6 PEN | Refills: 3 | Status: SHIPPED | OUTPATIENT
Start: 2020-12-29 | End: 2021-06-08

## 2021-01-25 ENCOUNTER — TELEPHONE (OUTPATIENT)
Dept: ENDOCRINOLOGY | Age: 71
End: 2021-01-25

## 2021-01-25 RX ORDER — BLOOD SUGAR DIAGNOSTIC
STRIP MISCELLANEOUS
Qty: 100 EACH | Refills: 5 | Status: SHIPPED | OUTPATIENT
Start: 2021-01-25 | End: 2021-08-05

## 2021-01-25 NOTE — TELEPHONE ENCOUNTER
Fax from [employer] regarding a second request for a refill of One Touch verio test st (new) 100's, patient test 3 times a day, qty 200.     LOV:  11/12/20    FOV:  2/1/21

## 2021-01-29 DIAGNOSIS — I10 ESSENTIAL HYPERTENSION: ICD-10-CM

## 2021-01-29 DIAGNOSIS — I25.10 CORONARY ARTERY DISEASE INVOLVING NATIVE CORONARY ARTERY OF NATIVE HEART WITHOUT ANGINA PECTORIS: ICD-10-CM

## 2021-01-29 LAB
A/G RATIO: 1.7 (ref 1.1–2.2)
ALBUMIN SERPL-MCNC: 4.6 G/DL (ref 3.4–5)
ALP BLD-CCNC: 77 U/L (ref 40–129)
ALT SERPL-CCNC: 15 U/L (ref 10–40)
ANION GAP SERPL CALCULATED.3IONS-SCNC: 13 MMOL/L (ref 3–16)
AST SERPL-CCNC: 14 U/L (ref 15–37)
BILIRUB SERPL-MCNC: 0.4 MG/DL (ref 0–1)
BUN BLDV-MCNC: 13 MG/DL (ref 7–20)
CALCIUM SERPL-MCNC: 10.1 MG/DL (ref 8.3–10.6)
CHLORIDE BLD-SCNC: 100 MMOL/L (ref 99–110)
CHOLESTEROL, TOTAL: 135 MG/DL (ref 0–199)
CO2: 28 MMOL/L (ref 21–32)
CREAT SERPL-MCNC: 0.8 MG/DL (ref 0.8–1.3)
CREATININE URINE: 138.2 MG/DL (ref 39–259)
GFR AFRICAN AMERICAN: >60
GFR NON-AFRICAN AMERICAN: >60
GLOBULIN: 2.7 G/DL
GLUCOSE BLD-MCNC: 116 MG/DL (ref 70–99)
HDLC SERPL-MCNC: 45 MG/DL (ref 40–60)
LDL CHOLESTEROL CALCULATED: 68 MG/DL
MICROALBUMIN UR-MCNC: 2.3 MG/DL
MICROALBUMIN/CREAT UR-RTO: 16.6 MG/G (ref 0–30)
POTASSIUM SERPL-SCNC: 4.7 MMOL/L (ref 3.5–5.1)
SODIUM BLD-SCNC: 141 MMOL/L (ref 136–145)
TOTAL PROTEIN: 7.3 G/DL (ref 6.4–8.2)
TRIGL SERPL-MCNC: 108 MG/DL (ref 0–150)
TSH SERPL DL<=0.05 MIU/L-ACNC: 4.17 UIU/ML (ref 0.27–4.2)
VLDLC SERPL CALC-MCNC: 22 MG/DL

## 2021-01-30 LAB
ESTIMATED AVERAGE GLUCOSE: 151.3 MG/DL
HBA1C MFR BLD: 6.9 %

## 2021-02-01 ENCOUNTER — VIRTUAL VISIT (OUTPATIENT)
Dept: ENDOCRINOLOGY | Age: 71
End: 2021-02-01
Payer: MEDICARE

## 2021-02-01 DIAGNOSIS — E78.2 MIXED HYPERLIPIDEMIA: ICD-10-CM

## 2021-02-01 DIAGNOSIS — I10 ESSENTIAL HYPERTENSION: ICD-10-CM

## 2021-02-01 PROCEDURE — 1123F ACP DISCUSS/DSCN MKR DOCD: CPT | Performed by: INTERNAL MEDICINE

## 2021-02-01 PROCEDURE — 4040F PNEUMOC VAC/ADMIN/RCVD: CPT | Performed by: INTERNAL MEDICINE

## 2021-02-01 PROCEDURE — G8427 DOCREV CUR MEDS BY ELIG CLIN: HCPCS | Performed by: INTERNAL MEDICINE

## 2021-02-01 PROCEDURE — 99214 OFFICE O/P EST MOD 30 MIN: CPT | Performed by: INTERNAL MEDICINE

## 2021-02-01 PROCEDURE — 3044F HG A1C LEVEL LT 7.0%: CPT | Performed by: INTERNAL MEDICINE

## 2021-02-01 PROCEDURE — 2022F DILAT RTA XM EVC RTNOPTHY: CPT | Performed by: INTERNAL MEDICINE

## 2021-02-01 PROCEDURE — 3017F COLORECTAL CA SCREEN DOC REV: CPT | Performed by: INTERNAL MEDICINE

## 2021-02-01 NOTE — PROGRESS NOTES
TELEHEALTH EVALUATION -- Audio/Visual (During NAEDA-94 public health emergency)  Pursuant to the emergency declaration under the [facility] [address] authority and the [employer] and [employer] Act, this Virtual  Visit was conducted, with patient's consent, to reduce the patient's risk of exposure to COVID-19 and provide care for  patient. Services were provided through a video synchronous discussion virtually to substitute for in-person clinic visit. Pt is a 79 y.o. male seen management of uncontrolled type 2 diabetes and abnormal thyroid function test.    Diabetes was diagnosed at routine screening . At the time of diagnosis patient had polyuria polydipsia . Reshma Matthews got diabetic education in the past when he was first diagnosed with diabetes. Comorbid conditions: Neuropathy and Coronary Artery Disease     previous  diabetic medications include: Actos 30 milligrams, metformin thousand twice a day, Amaryl 4 milligrams daily    He has CAD s/p stents age 48, follows with cardiology  Has hypertension and is on medication well controlled   He has hyperlipidemia and is on medication     INTERIM:    Diabetes  He presents for his follow-up diabetic visit. He has type 2 diabetes mellitus. No MedicAlert identification noted. The initial diagnosis of diabetes was made 20 years ago. His disease course has been worsening. There are no hypoglycemic associated symptoms. Associated symptoms include polyphagia and polyuria. Pertinent negatives for diabetes include no weight loss. There are no hypoglycemic complications. Pertinent negatives for hypoglycemia complications include no required glucagon injection. Symptoms are worsening. Diabetic complications include heart disease and peripheral neuropathy.  Risk factors for coronary artery disease include diabetes mellitus, dyslipidemia, family history, male sex, obesity, hypertension and tobacco exposure. Current diabetic treatment includes oral agent (triple therapy). He is compliant with treatment most of the time. His weight is stable. He is following a generally unhealthy diet. Meal planning includes avoidance of concentrated sweets. He has had a previous visit with a dietitian. He rarely participates in exercise. There is no change in his home blood glucose trend. His breakfast blood glucose is taken between 7-8 am. His breakfast blood glucose range is generally >200 mg/dl. An ACE inhibitor/angiotensin II receptor blocker is being taken. He sees a podiatrist.Eye exam is current.       he has been checking his fingerstick blood glucose and Average fastings 129---220  His Average glucose 140   No hypoglycemia   --- He has been taking higher doses of insulin and his fingerstick glucose have improved  He denies any hypoglycemia still continues to eat snacks at bedtime which can be cake and icecream     Weight trend: stable  Prior visit with dietician: no  Current diet: on average, 3 meals per day  Current exercise: rare        Past Medical History:   Diagnosis Date    CAD (coronary artery disease)     Chronic back pain     lumbar disk disease    Colorectal polyps     Diabetic ulcer of toe of left foot associated with type 2 diabetes mellitus, limited to breakdown of skin ([address] 5/20/2019    Erectile dysfunction     Hyperlipidemia     Hypertension     Hypertrophy of prostate without urinary obstruction and other lower urinary tract symptoms (LUTS)     Penetrating foot wound 2019    L foot     Retrograde ejaculation     Sciatica     Type II or unspecified type diabetes mellitus without mention of complication, not stated as uncontrolled       Patient Active Problem List   Diagnosis    Type 2 diabetes mellitus without complication, without long-term current use of insulin ([address]    Pure hypercholesterolemia    Essential hypertension    Coronary atherosclerosis of native Not on file    Intimate partner violence     Fear of current or ex partner: Not on file     Emotionally abused: Not on file     Physically abused: Not on file     Forced sexual activity: Not on file   Other Topics Concern    Not on file   Social History Narrative    Not on file     Family History   Problem Relation Age of Onset    Coronary Art Dis Mother         CABG   [facility] Diabetes Mother     High Blood Pressure Father     Stroke Father     Hypertension Brother     Diabetes Brother     Coronary Art Dis Brother     Cancer Brother         prostate    Other Brother         PVD    Diabetes Maternal Grandmother     Cancer Maternal Grandmother         skin    Diabetes Brother     Hypertension Brother     Hypertension Brother         throat polyp, guillain barre     Current Outpatient Medications   Medication Sig Dispense Refill    ONETOUCH VERIO strip TEST FINGERSTICK GLUCOSE THREE TIMES DAILY 100 each 5    insulin glargine (LANTUS SOLOSTAR) 100 UNIT/ML injection pen Inject 56 units into the skin nightly.  6 pen 3    Empagliflozin-metFORMIN HCl ER (SYNJARDY XR) 12.5-1000 MG TB24 TAKE 1 TABLET BY MOUTH TWICE DAILY 60 tablet 5    B-D UF III MINI PEN NEEDLES 31G X 5 MM MISC USE AS DIRECTED 100 each 6    ezetimibe (ZETIA) 10 MG tablet Take 1 tablet by mouth daily 90 tablet 3    hydroCHLOROthiazide (MICROZIDE) 12.5 MG capsule Take 1 capsule by mouth every morning 90 capsule 3    amLODIPine-benazepril (LOTREL) 10-20 MG per capsule Take 1 capsule by mouth daily 90 capsule 3    rosuvastatin (CRESTOR) 40 MG tablet Take 1 tablet by mouth daily 90 tablet 1    Lancets (ONETOUCH DELICA PLUS [id number]) MISC USE TO TEST 4 TO 6 TIMES DAILY 200 each 3    tamsulosin (FLOMAX) 0.4 MG capsule TAKE 1 CAPSULE BY MOUTH EVERY DAY 90 capsule 1    insulin lispro, 1 Unit Dial, (HUMALOG KWIKPEN) 100 UNIT/ML SOPN INJECT 15 UNITS UNDER THE SKIN WITH EACH MEAL (Patient taking differently: INJECT 15 UNITS UNDER THE SKIN WITH EACH MEAL sliding scale) 15 mL 1    Blood Glucose Monitoring Suppl (ONETOUCH VERIO FLEX SYSTEM) w/Device KIT Check blood sugar 1 kit 0    aspirin 81 MG EC tablet Take 81 mg by mouth daily. No current facility-administered medications for this visit. Allergies   Allergen Reactions    Other      Bee stings       Family Status   Relation Name Status    Mother     Maria De Jesus Bravo Father     Maria De Jesus Bravo Brother   at age 76    MGM  (Not Specified)   Maria De Jesus Bravo Brother   at age 76    Brother  Alive         OBJECTIVE:  There were no vitals taken for this visit.    Wt Readings from Last 3 Encounters:   20 236 lb (107 kg)   20 236 lb 6.4 oz (107.2 kg)   20 222 lb 9.6 oz (101 kg)         Constitutional: no acute distress, well appearing and well nourished  Psychiatric: oriented to person, place and time, judgement and insight and normal, recent and remote memory intact and mood and affect are normal  Skin: skin and subcutaneous tissue is normal without visible mass,   Head and Face: visual inspection  of head and face revealed no abnormalities  Eyes: visual inspection showed no lid or conjunctival swelling, erythema or discharge, pupils are normal, equal, round  Ears/Nose: external inspection of ears and nose revealed no abnormalities, hearing is grossly normal  Oropharynx/Mouth/Face: lips, tongue and gums appear  normal with no lesions, the voice quality was normal  Neck: neck appears symmetric, with no visible masses,   Pulmonary: no increased work of breathing or signs of respiratory distress,  Musculoskeletal: normal on inspection    Neurological: normal coordination and normal general cortical function      Lab Results   Component Value Date    LABA1C 6.9 2021    LABA1C 7.1 10/15/2020    LABA1C 7.8 2020         ASSESSMENT/PLAN:    --- Uncontrolled type 2 diabetes mellitus with complication, with long-term current use of insulin 9.3>>9.9>>8.7>>7.8>>7.8>>7.1>>6.9  Stable control

## 2021-02-02 RX ORDER — TAMSULOSIN HYDROCHLORIDE 0.4 MG/1
CAPSULE ORAL
Qty: 90 CAPSULE | Refills: 1 | Status: SHIPPED | OUTPATIENT
Start: 2021-02-02 | End: 2021-08-02

## 2021-02-02 NOTE — TELEPHONE ENCOUNTER
Medication:   Requested Prescriptions      No prescriptions requested or ordered in this encounter     Last Filled:  08/12/20    Last appt: 9/29/2020   Next appt: Visit date not found    Last OARRS: No flowsheet data found.

## 2021-03-03 ENCOUNTER — IMMUNIZATION (OUTPATIENT)
Dept: PRIMARY CARE CLINIC | Age: 71
End: 2021-03-03
Payer: MEDICARE

## 2021-03-03 PROCEDURE — 91301 COVID-19, MODERNA VACCINE 100MCG/0.5ML DOSE: CPT | Performed by: FAMILY MEDICINE

## 2021-03-03 PROCEDURE — 0011A COVID-19, MODERNA VACCINE 100MCG/0.5ML DOSE: CPT | Performed by: FAMILY MEDICINE

## 2021-03-24 RX ORDER — ROSUVASTATIN CALCIUM 40 MG/1
40 TABLET, COATED ORAL DAILY
Qty: 90 TABLET | Refills: 1 | Status: SHIPPED | OUTPATIENT
Start: 2021-03-24 | End: 2021-09-17

## 2021-03-24 NOTE — TELEPHONE ENCOUNTER
Medication:   Requested Prescriptions     Pending Prescriptions Disp Refills    rosuvastatin (CRESTOR) 40 MG tablet [Pharmacy Med Name: ROSUVASTATIN 40MG TABLETS] 90 tablet 1     Sig: TAKE 1 TABLET BY MOUTH DAILY       Last Filled:      Patient Phone Number: [phone] (home)     Last appt: 9/29/2020   Next appt: Visit date not found    Last Lipid:   Lab Results   Component Value Date    CHOL 135 01/29/2021    TRIG 108 01/29/2021    HDL 45 01/29/2021    HDL 38 05/22/2012    [address] 68 01/29/2021       Last OARRS: No flowsheet data found.     Preferred Pharmacy:   [facility], [address] [place] [address],[address] [id number]  Phone: [phone] Fax: [phone]

## 2021-03-29 ENCOUNTER — TELEPHONE (OUTPATIENT)
Dept: ENDOCRINOLOGY | Age: 71
End: 2021-03-29

## 2021-03-29 RX ORDER — INSULIN LISPRO 100 [IU]/ML
INJECTION, SOLUTION INTRAVENOUS; SUBCUTANEOUS
Qty: 15 ML | Refills: 3 | Status: SHIPPED | OUTPATIENT
Start: 2021-03-29 | End: 2021-09-13

## 2021-03-31 ENCOUNTER — IMMUNIZATION (OUTPATIENT)
Dept: PRIMARY CARE CLINIC | Age: 71
End: 2021-03-31
Payer: MEDICARE

## 2021-03-31 PROCEDURE — 0012A COVID-19, MODERNA VACCINE 100MCG/0.5ML DOSE: CPT | Performed by: FAMILY MEDICINE

## 2021-03-31 PROCEDURE — 91301 COVID-19, MODERNA VACCINE 100MCG/0.5ML DOSE: CPT | Performed by: FAMILY MEDICINE

## 2021-05-03 DIAGNOSIS — E78.2 MIXED HYPERLIPIDEMIA: ICD-10-CM

## 2021-05-03 DIAGNOSIS — I10 ESSENTIAL HYPERTENSION: ICD-10-CM

## 2021-05-03 LAB
A/G RATIO: 1.8 (ref 1.1–2.2)
ALBUMIN SERPL-MCNC: 4.6 G/DL (ref 3.4–5)
ALP BLD-CCNC: 76 U/L (ref 40–129)
ALT SERPL-CCNC: 20 U/L (ref 10–40)
ANION GAP SERPL CALCULATED.3IONS-SCNC: 13 MMOL/L (ref 3–16)
ANTI-THYROGLOB ABS: 14 IU/ML
AST SERPL-CCNC: 16 U/L (ref 15–37)
BILIRUB SERPL-MCNC: 0.5 MG/DL (ref 0–1)
BUN BLDV-MCNC: 16 MG/DL (ref 7–20)
CALCIUM SERPL-MCNC: 9.5 MG/DL (ref 8.3–10.6)
CHLORIDE BLD-SCNC: 102 MMOL/L (ref 99–110)
CHOLESTEROL, TOTAL: 126 MG/DL (ref 0–199)
CO2: 27 MMOL/L (ref 21–32)
CREAT SERPL-MCNC: 0.8 MG/DL (ref 0.8–1.3)
CREATININE URINE: 166 MG/DL (ref 39–259)
GFR AFRICAN AMERICAN: >60
GFR NON-AFRICAN AMERICAN: >60
GLOBULIN: 2.5 G/DL
GLUCOSE BLD-MCNC: 99 MG/DL (ref 70–99)
HDLC SERPL-MCNC: 43 MG/DL (ref 40–60)
LDL CHOLESTEROL CALCULATED: 65 MG/DL
MICROALBUMIN UR-MCNC: 1.9 MG/DL
MICROALBUMIN/CREAT UR-RTO: 11.4 MG/G (ref 0–30)
POTASSIUM SERPL-SCNC: 4.6 MMOL/L (ref 3.5–5.1)
SODIUM BLD-SCNC: 142 MMOL/L (ref 136–145)
T4 FREE: 1 NG/DL (ref 0.9–1.8)
THYROID PEROXIDASE (TPO) ABS: 10 IU/ML
TOTAL PROTEIN: 7.1 G/DL (ref 6.4–8.2)
TRIGL SERPL-MCNC: 91 MG/DL (ref 0–150)
TSH SERPL DL<=0.05 MIU/L-ACNC: 4.11 UIU/ML (ref 0.27–4.2)
VLDLC SERPL CALC-MCNC: 18 MG/DL

## 2021-05-04 LAB
ESTIMATED AVERAGE GLUCOSE: 157.1 MG/DL
HBA1C MFR BLD: 7.1 %

## 2021-05-06 ENCOUNTER — VIRTUAL VISIT (OUTPATIENT)
Dept: ENDOCRINOLOGY | Age: 71
End: 2021-05-06
Payer: MEDICARE

## 2021-05-06 DIAGNOSIS — E78.2 MIXED HYPERLIPIDEMIA: ICD-10-CM

## 2021-05-06 DIAGNOSIS — I10 ESSENTIAL HYPERTENSION: ICD-10-CM

## 2021-05-06 DIAGNOSIS — I25.10 ATHEROSCLEROSIS OF NATIVE CORONARY ARTERY OF NATIVE HEART WITHOUT ANGINA PECTORIS: ICD-10-CM

## 2021-05-06 PROCEDURE — 99213 OFFICE O/P EST LOW 20 MIN: CPT | Performed by: INTERNAL MEDICINE

## 2021-05-06 PROCEDURE — 2022F DILAT RTA XM EVC RTNOPTHY: CPT | Performed by: INTERNAL MEDICINE

## 2021-05-06 PROCEDURE — 1123F ACP DISCUSS/DSCN MKR DOCD: CPT | Performed by: INTERNAL MEDICINE

## 2021-05-06 PROCEDURE — 3017F COLORECTAL CA SCREEN DOC REV: CPT | Performed by: INTERNAL MEDICINE

## 2021-05-06 PROCEDURE — 4040F PNEUMOC VAC/ADMIN/RCVD: CPT | Performed by: INTERNAL MEDICINE

## 2021-05-06 PROCEDURE — 3051F HG A1C>EQUAL 7.0%<8.0%: CPT | Performed by: INTERNAL MEDICINE

## 2021-05-06 PROCEDURE — G8427 DOCREV CUR MEDS BY ELIG CLIN: HCPCS | Performed by: INTERNAL MEDICINE

## 2021-05-06 NOTE — PROGRESS NOTES
Pt is a 70 y.o. male seen management of uncontrolled type 2 diabetes and abnormal thyroid function test.    Diabetes was diagnosed at routine screening . At the time of diagnosis patient had polyuria polydipsia . Ajit Purvis got diabetic education in the past when he was first diagnosed with diabetes. Comorbid conditions: Neuropathy and Coronary Artery Disease     previous  diabetic medications include: Actos 30 milligrams, metformin thousand twice a day, Amaryl 4 milligrams daily    He has CAD s/p stents age 48, follows with cardiology  Has hypertension and is on medication well controlled   He has hyperlipidemia and is on medication     INTERIM:    Diabetes  He presents for his follow-up diabetic visit. He has type 2 diabetes mellitus. No MedicAlert identification noted. The initial diagnosis of diabetes was made 20 years ago. His disease course has been worsening. There are no hypoglycemic associated symptoms. Associated symptoms include polyphagia and polyuria. Pertinent negatives for diabetes include no weight loss. There are no hypoglycemic complications. Pertinent negatives for hypoglycemia complications include no required glucagon injection. Symptoms are worsening. Diabetic complications include heart disease and peripheral neuropathy. Risk factors for coronary artery disease include diabetes mellitus, dyslipidemia, family history, male sex, obesity, hypertension and tobacco exposure. Current diabetic treatment includes oral agent (triple therapy). He is compliant with treatment most of the time. His weight is stable. He is following a generally unhealthy diet. Meal planning includes avoidance of concentrated sweets. He has had a previous visit with a dietitian. He rarely participates in exercise. There is no change in his home blood glucose trend. His breakfast blood glucose is taken between 7-8 am. His breakfast blood glucose range is generally >200 mg/dl.  An ACE inhibitor/angiotensin II receptor blocker is being taken. He sees a podiatrist.Eye exam is current.       he has been checking his fingerstick blood glucose and Average fastings 129---220  His Average glucose 140   No hypoglycemia   --- He has been taking higher doses of insulin and his fingerstick glucose have improved  He denies any hypoglycemia still continues to eat snacks at bedtime which can be cake and icecream     Weight trend: stable  Prior visit with dietician: no  Current diet: on average, 3 meals per day  Current exercise: rare        Past Medical History:   Diagnosis Date    CAD (coronary artery disease)     Chronic back pain     lumbar disk disease    Colorectal polyps     Diabetic ulcer of toe of left foot associated with type 2 diabetes mellitus, limited to breakdown of skin ([address] 5/20/2019    Erectile dysfunction     Hyperlipidemia     Hypertension     Hypertrophy of prostate without urinary obstruction and other lower urinary tract symptoms (LUTS)     Penetrating foot wound 2019    L foot     Retrograde ejaculation     Sciatica     Type II or unspecified type diabetes mellitus without mention of complication, not stated as uncontrolled       Patient Active Problem List   Diagnosis    Type 2 diabetes mellitus without complication, without long-term current use of insulin ([address]    Pure hypercholesterolemia    Essential hypertension    Coronary atherosclerosis of native coronary artery    Ear fullness    Paronychia    Kidney stone    Fungal dermatitis    Neck pain on left side    Hx of smoking    Colon polyps    Left hip pain    Ischemic stroke of frontal lobe (HCC)    Carotid stenosis, symptomatic w/o infarct, right    Diabetic ulcer of toe of left foot associated with type 2 diabetes mellitus, limited to breakdown of skin ([address]     Past Surgical History:   Procedure Laterality Date    CAROTID ENDARTERECTOMY Right 12/31/2018    RIGHT CAROTID ENDARTERECTOMY WITH INTRA OP DUPLEX SCAN performed by Alba Benitez MD at [address]      x2    CYST REMOVAL      from back and finger x2    TONSILLECTOMY AND ADENOIDECTOMY       Social History     Socioeconomic History    Marital status:      Spouse name: Not on file    Number of children: Not on file    Years of education: Not on file    Highest education level: Not on file   Occupational History    Not on file   Social Needs    Financial resource strain: Not on file    Food insecurity     Worry: Not on file     Inability: Not on file    Transportation needs     Medical: Not on file     Non-medical: Not on file   Tobacco Use    Smoking status: Former Smoker     Packs/day: 1.00     Years: 48.00     Pack years: 48.00     Types: Cigarettes     Start date: 1959     Quit date: 2007     Years since quittin.4    Smokeless tobacco: Never Used   Substance and Sexual Activity    Alcohol use: Yes     Comment: social    Drug use: No    Sexual activity: Yes     Partners: Female   Lifestyle    Physical activity     Days per week: Not on file     Minutes per session: Not on file    Stress: Not on file   Relationships    Social connections     Talks on phone: Not on file     Gets together: Not on file     Attends Gnosticist service: Not on file     Active member of club or organization: Not on file     Attends meetings of clubs or organizations: Not on file     Relationship status: Not on file    Intimate partner violence     Fear of current or ex partner: Not on file     Emotionally abused: Not on file     Physically abused: Not on file     Forced sexual activity: Not on file   Other Topics Concern    Not on file   Social History Narrative    Not on file     Family History   Problem Relation Age of Onset    Coronary Art Dis Mother         CABG    Diabetes Mother     High Blood Pressure Father     Stroke Father     Hypertension Brother     Diabetes Brother     Coronary Art Dis Brother     Cancer Brother prostate    Other Brother         PVD    Diabetes Maternal Grandmother     Cancer Maternal Grandmother         skin    Diabetes Brother     Hypertension Brother     Hypertension Brother         throat polyp, guillain barre     Current Outpatient Medications   Medication Sig Dispense Refill    insulin lispro, 1 Unit Dial, (HUMALOG KWIKPEN) 100 UNIT/ML SOPN INJECT 15 UNITS UNDER THE SKIN WITH EACH MEAL 15 mL 3    rosuvastatin (CRESTOR) 40 MG tablet TAKE 1 TABLET BY MOUTH DAILY 90 tablet 1    tamsulosin (FLOMAX) 0.4 MG capsule TAKE 1 CAPSULE BY MOUTH EVERY DAY 90 capsule 1    ONETOUCH VERIO strip TEST FINGERSTICK GLUCOSE THREE TIMES DAILY 100 each 5    insulin glargine (LANTUS SOLOSTAR) 100 UNIT/ML injection pen Inject 56 units into the skin nightly. 6 pen 3    Empagliflozin-metFORMIN HCl ER (SYNJARDY XR) 12.5-1000 MG TB24 TAKE 1 TABLET BY MOUTH TWICE DAILY 60 tablet 5    B-D UF III MINI PEN NEEDLES 31G X 5 MM MISC USE AS DIRECTED 100 each 6    ezetimibe (ZETIA) 10 MG tablet Take 1 tablet by mouth daily 90 tablet 3    hydroCHLOROthiazide (MICROZIDE) 12.5 MG capsule Take 1 capsule by mouth every morning 90 capsule 3    amLODIPine-benazepril (LOTREL) 10-20 MG per capsule Take 1 capsule by mouth daily 90 capsule 3    Lancets (ONETOUCH DELICA PLUS [id number]) MISC USE TO TEST 4 TO 6 TIMES DAILY 200 each 3    Blood Glucose Monitoring Suppl (ONETOUCH VERIO FLEX SYSTEM) w/Device KIT Check blood sugar 1 kit 0    aspirin 81 MG EC tablet Take 81 mg by mouth daily. No current facility-administered medications for this visit. Allergies   Allergen Reactions    Other      Bee stings       Family Status   Relation Name Status    Mother     Armida Brunner Father     Armida Brunner Brother   at age 76    MGM  (Not Specified)   Armida Brunner Brother   at age 76    Brother  Alive         OBJECTIVE:  There were no vitals taken for this visit.    Wt Readings from Last 3 Encounters:   20 236 lb (107 kg) 09/29/20 236 lb 6.4 oz (107.2 kg)   03/23/20 222 lb 9.6 oz (101 kg)         Constitutional: no acute distress, well appearing and well nourished  Psychiatric: oriented to person, place and time, judgement and insight and normal, recent and remote memory intact and mood and affect are normal  Skin: skin and subcutaneous tissue is normal without visible mass,   Head and Face: visual inspection  of head and face revealed no abnormalities  Eyes: visual inspection showed no lid or conjunctival swelling, erythema or discharge, pupils are normal, equal, round  Ears/Nose: external inspection of ears and nose revealed no abnormalities, hearing is grossly normal  Oropharynx/Mouth/Face: lips, tongue and gums appear  normal with no lesions, the voice quality was normal  Neck: neck appears symmetric, with no visible masses,   Pulmonary: no increased work of breathing or signs of respiratory distress,  Musculoskeletal: normal on inspection    Neurological: normal coordination and normal general cortical function      Lab Results   Component Value Date    LABA1C 7.1 05/03/2021    LABA1C 6.9 01/29/2021    LABA1C 7.1 10/15/2020         ASSESSMENT/PLAN:    --- Uncontrolled type 2 diabetes mellitus with complication, with long-term current use of insulin 9.3>>9.9>>8.7>>7.8>>7.8>>7.1>>6.9  Stable control  ---he has been taking lantus 56  units at bedtime   Fasting glucose 95--150   He takes humalog with meals  ---Advised to avoid snacks at bedtime   On synjardy with no SE   Patient is using Humalog  CIR  10:1 patient is familiar with carbohydrate counting. Uses SSI   I have advised him to change the sliding scale from starting at 120 to start at 130     Patient was advised that sending of his fingerstick blood glucose logs is crucial in management of his  diabetes. I will adjust the  doses of diabetic medications  according to sent data.      Health Maintenance       Last Eye Exam: advised to have annual dilated eye exam. his last eye exam was within a year sept 2020   Last Podiatry Exam:  Saw podiatry may 2019 Dr Ana Serna and again  June 2019    Lipids: Last LDL level was 65  in may 2021   Microalbumin/Creatinine Ratio:pt has microalbuminuria elevated in sept 2019 , normal in dec 2019   . Education: Reviewed ABCs of diabetes management (respective goals in parentheses): A1C (<7), blood pressure (<130/80), and cholesterol (LDL <100). Additional Education: referred to Diabetes Educator but according to patient and his wife had gone through diabetic education in the past and is very well versed  with carbohydrate counting he will rely on her to help him. Abnormal thyroid function test his TSH has fluctuated in the last 1 year I will check thyroid antibodies and repeat thyroid function test at follow-up  Repeat thyroid fx test were TSH 4.1   Thyroid AB negative   Not symptomatic       -- Coronary artery disease involving native coronary artery of native heart without angina pectoris  Patient had coronary stents put in 20 years ago.   He had carotid endarterectomy done in January 2019    --Essential hypertension  Well controlled on current regimen denies any headaches denies any blurred vision     --- Mixed hyperlipidemia  Patient is on statins --Crestor 40 milligrams and ezetimibe  Advised to work on diet   LDL is at target in May 2021 he will continue with the current regimen      Reviewed and/or ordered clinical lab results yes   Reviewed and/or ordered radiology tests Yes  Reviewed and/or ordered other diagnostic tests yes   Made a decision to obtain old records yes   Reviewed and summarized old records yes     Regine Rodríguez was counseled regarding symptoms of current diagnosis, course and complications of disease if inadequately treated, side effects of medications, diagnosis, treatment options, and prognosis, risks, benefits, complications, and alternatives of treatment, labs, imaging and other studies and treatment targets and goals. He understands instructions and counseling    TELEHEALTH EVALUATION -- Audio/Visual (During WCDSJ-40 public health emergency)  Pursuant to the emergency declaration under the [facility] [place] 2[facility] waiver authority and the [employer] Act, this Virtual  Visit was conducted, with patient's consent, to reduce the patient's risk of exposure to COVID-19 and provide care for  patient. Services were provided through a video synchronous discussion virtually to substitute for in-person clinic visit. Patient's location : home     Patient provided verbal consent to use the video visit. Total time spent : 20 minutes reviewing the chart, conducting an interview, performing a limited exam by video and educating the patient on my assessment plan. Return in about 5 months (around 10/6/2021). Please note that some or all of this report was generated using voice recognition software. Please notify me in case of any questions about the content of this document, as some errors in transcription may have occurred .

## 2021-06-09 RX ORDER — INSULIN GLARGINE 100 [IU]/ML
INJECTION, SOLUTION SUBCUTANEOUS
Qty: 18 ML | Refills: 2 | Status: SHIPPED | OUTPATIENT
Start: 2021-06-09 | End: 2021-12-13

## 2021-08-02 RX ORDER — TAMSULOSIN HYDROCHLORIDE 0.4 MG/1
CAPSULE ORAL
Qty: 90 CAPSULE | Refills: 1 | Status: SHIPPED | OUTPATIENT
Start: 2021-08-02 | End: 2022-01-31

## 2021-08-02 NOTE — TELEPHONE ENCOUNTER
Medication:   Requested Prescriptions     Pending Prescriptions Disp Refills    tamsulosin (FLOMAX) 0.4 MG capsule [Pharmacy Med Name: TAMSULOSIN 0.4MG CAPSULES] 90 capsule 1     Sig: TAKE 1 CAPSULE BY MOUTH EVERY DAY     Last Filled:  02/02/21    Last appt: 9/29/2020   Next appt: Visit date not found    Last OARRS: No flowsheet data found. Talked with a patient and he will call back to make an appt.

## 2021-09-17 RX ORDER — ROSUVASTATIN CALCIUM 40 MG/1
40 TABLET, COATED ORAL DAILY
Qty: 90 TABLET | Refills: 0 | Status: SHIPPED | OUTPATIENT
Start: 2021-09-17 | End: 2022-01-03 | Stop reason: SDUPTHER

## 2021-09-17 NOTE — TELEPHONE ENCOUNTER
Medication:   Requested Prescriptions     Pending Prescriptions Disp Refills    rosuvastatin (CRESTOR) 40 MG tablet [Pharmacy Med Name: ROSUVASTATIN 40MG TABLETS] 90 tablet 1     Sig: TAKE 1 TABLET BY MOUTH DAILY       Last appt: 9/29/2020   Next appt: Visit date not found    Last Lipid:   Lab Results   Component Value Date    CHOL 126 05/03/2021    TRIG 91 05/03/2021    HDL 43 05/03/2021    HDL 38 05/22/2012    LDLCALC 65 05/03/2021

## 2021-09-22 ENCOUNTER — OFFICE VISIT (OUTPATIENT)
Dept: CARDIOLOGY CLINIC | Age: 71
End: 2021-09-22
Payer: MEDICARE

## 2021-09-22 VITALS
BODY MASS INDEX: 28.35 KG/M2 | SYSTOLIC BLOOD PRESSURE: 142 MMHG | HEART RATE: 65 BPM | WEIGHT: 228 LBS | HEIGHT: 75 IN | DIASTOLIC BLOOD PRESSURE: 60 MMHG | OXYGEN SATURATION: 96 %

## 2021-09-22 DIAGNOSIS — E78.2 MIXED HYPERLIPIDEMIA: ICD-10-CM

## 2021-09-22 DIAGNOSIS — I10 ESSENTIAL HYPERTENSION: ICD-10-CM

## 2021-09-22 DIAGNOSIS — I25.10 ATHEROSCLEROSIS OF NATIVE CORONARY ARTERY OF NATIVE HEART WITHOUT ANGINA PECTORIS: Primary | ICD-10-CM

## 2021-09-22 PROCEDURE — 4040F PNEUMOC VAC/ADMIN/RCVD: CPT | Performed by: INTERNAL MEDICINE

## 2021-09-22 PROCEDURE — G8417 CALC BMI ABV UP PARAM F/U: HCPCS | Performed by: INTERNAL MEDICINE

## 2021-09-22 PROCEDURE — 3017F COLORECTAL CA SCREEN DOC REV: CPT | Performed by: INTERNAL MEDICINE

## 2021-09-22 PROCEDURE — G8427 DOCREV CUR MEDS BY ELIG CLIN: HCPCS | Performed by: INTERNAL MEDICINE

## 2021-09-22 PROCEDURE — 99214 OFFICE O/P EST MOD 30 MIN: CPT | Performed by: INTERNAL MEDICINE

## 2021-09-22 PROCEDURE — 1123F ACP DISCUSS/DSCN MKR DOCD: CPT | Performed by: INTERNAL MEDICINE

## 2021-09-22 PROCEDURE — 1036F TOBACCO NON-USER: CPT | Performed by: INTERNAL MEDICINE

## 2021-09-22 NOTE — PROGRESS NOTES
[facility], operated by [facility]  Cardiac Consult     Referring Provider:  Iron Bruce MD     Chief Complaint   Patient presents with    Coronary Artery Disease    Hypertension        History of Present Illness:  75 y/o male seen in f/u for CAD. He retired from [employer] .  started back doing contract work for [employer]. He does have CAD. Prior RCA stenting. Followed by Dr. Cari Castro in the past. Last cath . Occluded stent in PLB. Last stress  normal.    He is doing well. Remains active but doesn't exercise. No chest pain, tightness or pressure. Past Medical History:   has a past medical history of CAD (coronary artery disease), Chronic back pain, Colorectal polyps, Diabetic ulcer of toe of left foot associated with type 2 diabetes mellitus, limited to breakdown of skin ([address] Erectile dysfunction, Hyperlipidemia, Hypertension, Hypertrophy of prostate without urinary obstruction and other lower urinary tract symptoms (LUTS), Penetrating foot wound, Retrograde ejaculation, Sciatica, and Type II or unspecified type diabetes mellitus without mention of complication, not stated as uncontrolled. Surgical History:   has a past surgical history that includes Coronary angioplasty; Tonsillectomy and adenoidectomy; cyst removal; and Carotid endarterectomy (Right, 2018).      Social History:  Social History     Tobacco Use    Smoking status: Former Smoker     Packs/day: 1.00     Years: 48.00     Pack years: 48.00     Types: Cigarettes     Start date: 1959     Quit date: 2007     Years since quittin.8    Smokeless tobacco: Never Used   Substance Use Topics    Alcohol use: Yes     Comment: social        Family History:  family history includes Cancer in his brother and maternal grandmother; Coronary Art Dis in his brother and mother; Diabetes in his brother, brother, maternal grandmother, and mother; High Blood Pressure in his father; Hypertension in his brother, brother, and brother; Other in his brother; Stroke in his father. Allergies: Other     Home Medications:  Prior to Visit Medications    Medication Sig Taking? Authorizing Provider   rosuvastatin (CRESTOR) 40 MG tablet TAKE 1 TABLET BY MOUTH DAILY Yes Paolo Montalvo MD   insulin lispro, 1 Unit Dial, 100 UNIT/ML SOPN INJECT 15 UNITS UNDER THE SKIN WITH EACH MEAL Yes Danette Garza MD   tamsulosin (FLOMAX) 0.4 MG capsule TAKE 1 CAPSULE BY MOUTH EVERY DAY Yes Paolo Montalvo MD   insulin glargine (LANTUS SOLOSTAR) 100 UNIT/ML injection pen ADMINISTER 56 UNITS UNDER THE SKIN EVERY NIGHT Yes Danette Garza MD   SYNJARDY XR 12.5-1000 MG TB24 TAKE 1 TABLET BY MOUTH TWICE DAILY Yes Danette Garza MD   ezetimibe (ZETIA) 10 MG tablet Take 1 tablet by mouth daily Yes Paolo Montalvo MD   hydroCHLOROthiazide (MICROZIDE) 12.5 MG capsule Take 1 capsule by mouth every morning Yes Paolo Montalvo MD   amLODIPine-benazepril (LOTREL) 10-20 MG per capsule Take 1 capsule by mouth daily Yes Paolo Montalvo MD   aspirin 81 MG EC tablet Take 81 mg by mouth daily. Yes Historical Provider, MD Gabby Bradshaw strip TEST FINGERSTICK [address]  Danette Garza MD   B-D UF III MINI PEN NEEDLES 31G X 5 MM MISC USE AS DIRECTED  Danette Garza MD   Lancets ([address]) [address] [facility] USE TO TEST 4 TO 6 TIMES DAILY  Danette Garza MD   Blood Glucose Monitoring Suppl ([address]) w/Device KIT Check blood sugar  Danette Garza MD       [x] Medications and dosages reviewed.     ROS:  [x]Full ROS obtained and negative except as mentioned in HPI      Physical Examination:    Vitals:    09/22/21 1440 09/22/21 1447   BP: (!) 144/60 (!) 142/60   Site: Left Upper Arm Right Upper Arm   Position: Sitting Sitting   Cuff Size: Medium Adult    Pulse: 65    SpO2: 96%    Weight: 228 lb (103.4 kg)    Height: 6' 3\" (1.905 m)         · GENERAL: Well developed, well nourished, No acute distress  · NEUROLOGICAL: Alert and oriented  · PSYCH:

## 2021-10-05 ENCOUNTER — VIRTUAL VISIT (OUTPATIENT)
Dept: ENDOCRINOLOGY | Age: 71
End: 2021-10-05
Payer: MEDICARE

## 2021-10-05 DIAGNOSIS — I25.10 CORONARY ARTERY DISEASE INVOLVING NATIVE CORONARY ARTERY OF NATIVE HEART WITHOUT ANGINA PECTORIS: ICD-10-CM

## 2021-10-05 DIAGNOSIS — I10 ESSENTIAL HYPERTENSION: ICD-10-CM

## 2021-10-05 DIAGNOSIS — E78.2 MIXED HYPERLIPIDEMIA: ICD-10-CM

## 2021-10-05 PROCEDURE — 1123F ACP DISCUSS/DSCN MKR DOCD: CPT | Performed by: INTERNAL MEDICINE

## 2021-10-05 PROCEDURE — 3051F HG A1C>EQUAL 7.0%<8.0%: CPT | Performed by: INTERNAL MEDICINE

## 2021-10-05 PROCEDURE — 3017F COLORECTAL CA SCREEN DOC REV: CPT | Performed by: INTERNAL MEDICINE

## 2021-10-05 PROCEDURE — 99214 OFFICE O/P EST MOD 30 MIN: CPT | Performed by: INTERNAL MEDICINE

## 2021-10-05 PROCEDURE — 2022F DILAT RTA XM EVC RTNOPTHY: CPT | Performed by: INTERNAL MEDICINE

## 2021-10-05 PROCEDURE — G8427 DOCREV CUR MEDS BY ELIG CLIN: HCPCS | Performed by: INTERNAL MEDICINE

## 2021-10-05 PROCEDURE — 4040F PNEUMOC VAC/ADMIN/RCVD: CPT | Performed by: INTERNAL MEDICINE

## 2021-10-05 NOTE — PROGRESS NOTES
Pt is a 70 y.o. male seen management of uncontrolled type 2 diabetes and abnormal thyroid function test.    Diabetes was diagnosed at routine screening . At the time of diagnosis patient had polyuria polydipsia . Jemma Paz got diabetic education in the past when he was first diagnosed with diabetes. Comorbid conditions: Neuropathy and Coronary Artery Disease     previous  diabetic medications include: Actos 30 milligrams, metformin thousand twice a day, Amaryl 4 milligrams daily    He has CAD s/p stents age 48, follows with cardiology  Has hypertension and is on medication well controlled   He has hyperlipidemia and is on medication     INTERIM:    Diabetes  He presents for his follow-up diabetic visit. He has type 2 diabetes mellitus. No MedicAlert identification noted. The initial diagnosis of diabetes was made 20 years ago. His disease course has been worsening. There are no hypoglycemic associated symptoms. Associated symptoms include polyphagia and polyuria. Pertinent negatives for diabetes include no weight loss. There are no hypoglycemic complications. Pertinent negatives for hypoglycemia complications include no required glucagon injection. Symptoms are worsening. Diabetic complications include heart disease and peripheral neuropathy. Risk factors for coronary artery disease include diabetes mellitus, dyslipidemia, family history, male sex, obesity, hypertension and tobacco exposure. Current diabetic treatment includes oral agent (triple therapy). He is compliant with treatment most of the time. His weight is stable. He is following a generally unhealthy diet. Meal planning includes avoidance of concentrated sweets. He has had a previous visit with a dietitian. He rarely participates in exercise. There is no change in his home blood glucose trend. His breakfast blood glucose is taken between 7-8 am. His breakfast blood glucose range is generally >200 mg/dl.  An ACE inhibitor/angiotensin II receptor blocker is being taken. He sees a podiatrist.Eye exam is current.      Denies any unexplained hypoglycemia, he has gone back to work      Weight trend: stable  Prior visit with dietician: no  Current diet: on average, 3 meals per day  Current exercise: rare        Past Medical History:   Diagnosis Date    CAD (coronary artery disease)     Chronic back pain     lumbar disk disease    Colorectal polyps     Diabetic ulcer of toe of left foot associated with type 2 diabetes mellitus, limited to breakdown of skin ([address] 5/20/2019    Erectile dysfunction     Hyperlipidemia     Hypertension     Hypertrophy of prostate without urinary obstruction and other lower urinary tract symptoms (LUTS)     Penetrating foot wound 2019    L foot     Retrograde ejaculation     Sciatica     Type II or unspecified type diabetes mellitus without mention of complication, not stated as uncontrolled       Patient Active Problem List   Diagnosis    Type 2 diabetes mellitus without complication, without long-term current use of insulin ([address]    Pure hypercholesterolemia    Essential hypertension    Coronary atherosclerosis of native coronary artery    Ear fullness    Paronychia    Kidney stone    Fungal dermatitis    Neck pain on left side    Hx of smoking    Colon polyps    Left hip pain    Ischemic stroke of frontal lobe (HCC)    Carotid stenosis, symptomatic w/o infarct, right    Diabetic ulcer of toe of left foot associated with type 2 diabetes mellitus, limited to breakdown of skin ([address]     Past Surgical History:   Procedure Laterality Date    CAROTID ENDARTERECTOMY Right 12/31/2018    RIGHT CAROTID ENDARTERECTOMY WITH INTRA OP DUPLEX SCAN performed by Benji Traylor MD at [address]      x2    CYST REMOVAL      from back and finger x2    TONSILLECTOMY AND ADENOIDECTOMY       Social History     Socioeconomic History    Marital status:      Spouse name: Not on file    Number of children: Not on file    Years of education: Not on file    Highest education level: Not on file   Occupational History    Not on file   Tobacco Use    Smoking status: Former Smoker     Packs/day: 1.00     Years: 48.00     Pack years: 48.00     Types: Cigarettes     Start date: 1959     Quit date: 2007     Years since quittin.8    Smokeless tobacco: Never Used   Vaping Use    Vaping Use: Never used   Substance and Sexual Activity    Alcohol use: Yes     Comment: social    Drug use: No    Sexual activity: Yes     Partners: Female   Other Topics Concern    Not on file   Social History Narrative    Not on file     Social Determinants of Health     Financial Resource Strain:     Difficulty of Paying Living Expenses:    Food Insecurity:     Worried About Running Out of Food in the Last Year:     [address] in the Last Year:    Transportation Needs:     Lack of Transportation (Medical):      Lack of Transportation (Non-Medical):    Physical Activity:     Days of Exercise per Week:     Minutes of Exercise per Session:    Stress:     Feeling of Stress :    Social Connections:     Frequency of Communication with Friends and Family:     Frequency of Social Gatherings with Friends and Family:     Attends Islam Services:     Active Member of Clubs or Organizations:     Attends Club or Organization Meetings:     Marital Status:    Intimate Partner Violence:     Fear of Current or Ex-Partner:     Emotionally Abused:     Physically Abused:     Sexually Abused:      Family History   Problem Relation Age of Onset    Coronary Art Dis Mother         CABG    Diabetes Mother     High Blood Pressure Father     Stroke Father     Hypertension Brother     Diabetes Brother     Coronary Art Dis Brother     Cancer Brother         prostate    Other Brother         PVD    Diabetes Maternal Grandmother     Cancer Maternal Grandmother         skin    Diabetes Brother     Hypertension Brother     Hypertension Brother         throat polyp, guillain barre     Current Outpatient Medications   Medication Sig Dispense Refill    rosuvastatin (CRESTOR) 40 MG tablet TAKE 1 TABLET BY MOUTH DAILY 90 tablet 0    insulin lispro, 1 Unit Dial, 100 UNIT/ML SOPN INJECT 15 UNITS UNDER THE SKIN WITH EACH MEAL 15 mL 3    ONETOUCH VERIO strip TEST FINGERSTICK GLUCOSE THREE TIMES DAILY 100 strip 3    tamsulosin (FLOMAX) 0.4 MG capsule TAKE 1 CAPSULE BY MOUTH EVERY DAY 90 capsule 1    insulin glargine (LANTUS SOLOSTAR) 100 UNIT/ML injection pen ADMINISTER 56 UNITS UNDER THE SKIN EVERY NIGHT 18 mL 2    B-D UF III MINI PEN NEEDLES 31G X 5 MM MISC USE AS DIRECTED 100 each 6    SYNJARDY XR 12.5-1000 MG TB24 TAKE 1 TABLET BY MOUTH TWICE DAILY 60 tablet 3    ezetimibe (ZETIA) 10 MG tablet Take 1 tablet by mouth daily 90 tablet 3    hydroCHLOROthiazide (MICROZIDE) 12.5 MG capsule Take 1 capsule by mouth every morning 90 capsule 3    amLODIPine-benazepril (LOTREL) 10-20 MG per capsule Take 1 capsule by mouth daily 90 capsule 3    Lancets (ONETOUCH DELICA PLUS [id number]) MISC USE TO TEST 4 TO 6 TIMES DAILY 200 each 3    Blood Glucose Monitoring Suppl (ONETOUCH VERIO FLEX SYSTEM) w/Device KIT Check blood sugar 1 kit 0    aspirin 81 MG EC tablet Take 81 mg by mouth daily. No current facility-administered medications for this visit. Allergies   Allergen Reactions    Other      Bee stings       Family Status   Relation Name Status    Mother     [facility] Father     [facility] Brother   at age 76    MGM  (Not Specified)   [facility] Brother   at age 76    Brother  Alive         OBJECTIVE:  There were no vitals taken for this visit.    Wt Readings from Last 3 Encounters:   21 228 lb (103.4 kg)   20 236 lb (107 kg)   20 236 lb 6.4 oz (107.2 kg)         Constitutional: no acute distress, well appearing and well nourished  Psychiatric: oriented to person, place and time, judgement and insight and normal, recent and remote memory intact and mood and affect are normal  Skin: skin and subcutaneous tissue is normal without visible mass,   Head and Face: visual inspection  of head and face revealed no abnormalities  Eyes: visual inspection showed no lid or conjunctival swelling, erythema or discharge, pupils are normal, equal, round  Ears/Nose: external inspection of ears and nose revealed no abnormalities, hearing is grossly normal  Oropharynx/Mouth/Face: lips, tongue and gums appear  normal with no lesions, the voice quality was normal  Neck: neck appears symmetric, with no visible masses,   Pulmonary: no increased work of breathing or signs of respiratory distress,  Musculoskeletal: normal on inspection    Neurological: normal coordination and normal general cortical function      Lab Results   Component Value Date    LABA1C 7.3 10/04/2021    LABA1C 7.1 05/03/2021    LABA1C 6.9 01/29/2021         ASSESSMENT/PLAN:    --- Uncontrolled type 2 diabetes mellitus with complication, with long-term current use of insulin 9.3>>9.9>>8.7>>7.8>>7.8>>7.1>>6.9>>7.3  Control has worsened    reviewed all his labs results with him in detail  ---he has been taking lantus 56  units at bedtime   Fasting glucose are close to target  He takes humalog with meals, he is advised to increase his Humalog with the meals  ---Advised to avoid snacks at bedtime   On synjardy with no SE   Patient is using Humalog  CIR  10:1 patient is familiar with carbohydrate counting. Uses [facility]   Patient was advised that sending of his fingerstick blood glucose logs is crucial in management of his  diabetes. I will adjust the  doses of diabetic medications  according to sent data.      Health Maintenance       Last Eye Exam: advised to have annual dilated eye exam. his last eye exam was within a year sept 2020   Last Podiatry Exam:  Saw podiatry in the past advised to follow up   Lipids: Last LDL level was 69  In oct  2021 Microalbumin/Creatinine Ratio:pt has microalbuminuria elevated in sept 2019 , normal in dec 2019   . Education: Reviewed ABCs of diabetes management (respective goals in parentheses): A1C (<7), blood pressure (<130/80), and cholesterol (LDL <100). Additional Education: referred to Diabetes Educator but according to patient and his wife had gone through diabetic education in the past and is very well versed  with carbohydrate counting he will rely on her to help him. Abnormal thyroid function test his TSH has fluctuated in the last 1 year I will check thyroid antibodies and repeat thyroid function test at follow-up  Repeat thyroid fx test were TSH 4.1   Thyroid AB negative   Not symptomatic       -- Coronary artery disease involving native coronary artery of native heart without angina pectoris  Patient had coronary stents put in 20 years ago. He had carotid endarterectomy done in January 2019    --Essential hypertension  Well controlled on current regimen denies any headaches denies any blurred vision     --- Mixed hyperlipidemia  Patient is on statins --Crestor 40 milligrams and ezetimibe  Advised to work on diet   LDL is at target in May 2021 he will continue with the current regimen      Reviewed and/or ordered clinical lab results yes   Reviewed and/or ordered radiology tests Yes  Reviewed and/or ordered other diagnostic tests yes   Made a decision to obtain old records yes   Reviewed and summarized old records yes     Karolina Mansfield was counseled regarding symptoms of current diagnosis, course and complications of disease if inadequately treated, side effects of medications, diagnosis, treatment options, and prognosis, risks, benefits, complications, and alternatives of treatment, labs, imaging and other studies and treatment targets and goals.   He understands instructions and counseling    TELEHEALTH EVALUATION -- Audio/Visual (During TFMLX-34 public health emergency)  Pursuant to the emergency declaration under the [facility] waiver authority and the [employer] Act, this Virtual  Visit was conducted, with patient's consent, to reduce the patient's risk of exposure to COVID-19 and provide care for  patient. Services were provided through a video synchronous discussion virtually to substitute for in-person clinic visit. Patient's location : home     Patient provided verbal consent to use the video visit. Total time spent : 20 minutes reviewing the chart, conducting an interview, performing a limited exam by video and educating the patient on my assessment plan. Return in about 3 months (around 1/5/2022). Please note that some or all of this report was generated using voice recognition software. Please notify me in case of any questions about the content of this document, as some errors in transcription may have occurred .

## 2022-01-03 ENCOUNTER — HOSPITAL ENCOUNTER (OUTPATIENT)
Dept: GENERAL RADIOLOGY | Age: 72
Discharge: HOME | End: 2022-01-03
Payer: COMMERCIAL

## 2022-01-03 ENCOUNTER — OFFICE VISIT (OUTPATIENT)
Dept: PRIMARY CARE CLINIC | Age: 72
End: 2022-01-03
Payer: COMMERCIAL

## 2022-01-03 VITALS
TEMPERATURE: 97.2 F | DIASTOLIC BLOOD PRESSURE: 78 MMHG | OXYGEN SATURATION: 99 % | HEART RATE: 87 BPM | SYSTOLIC BLOOD PRESSURE: 128 MMHG | WEIGHT: 217 LBS | BODY MASS INDEX: 27.12 KG/M2 | RESPIRATION RATE: 14 BRPM

## 2022-01-03 DIAGNOSIS — M54.2 NECK PAIN: ICD-10-CM

## 2022-01-03 DIAGNOSIS — Z12.5 SCREENING FOR PROSTATE CANCER: ICD-10-CM

## 2022-01-03 DIAGNOSIS — Z23 NEEDS FLU SHOT: ICD-10-CM

## 2022-01-03 DIAGNOSIS — E11.9 TYPE 2 DIABETES MELLITUS WITHOUT COMPLICATION, WITHOUT LONG-TERM CURRENT USE OF INSULIN (HCC): ICD-10-CM

## 2022-01-03 DIAGNOSIS — I10 ESSENTIAL HYPERTENSION: ICD-10-CM

## 2022-01-03 DIAGNOSIS — E78.00 PURE HYPERCHOLESTEROLEMIA: Primary | ICD-10-CM

## 2022-01-03 DIAGNOSIS — I25.10 ATHEROSCLEROSIS OF NATIVE CORONARY ARTERY OF NATIVE HEART WITHOUT ANGINA PECTORIS: ICD-10-CM

## 2022-01-03 PROBLEM — L97.521 DIABETIC ULCER OF TOE OF LEFT FOOT ASSOCIATED WITH TYPE 2 DIABETES MELLITUS, LIMITED TO BREAKDOWN OF SKIN (HCC): Chronic | Status: RESOLVED | Noted: 2019-05-20 | Resolved: 2022-01-03

## 2022-01-03 PROBLEM — E11.621 DIABETIC ULCER OF TOE OF LEFT FOOT ASSOCIATED WITH TYPE 2 DIABETES MELLITUS, LIMITED TO BREAKDOWN OF SKIN (HCC): Chronic | Status: RESOLVED | Noted: 2019-05-20 | Resolved: 2022-01-03

## 2022-01-03 PROCEDURE — 90694 VACC AIIV4 NO PRSRV 0.5ML IM: CPT | Performed by: INTERNAL MEDICINE

## 2022-01-03 PROCEDURE — G8427 DOCREV CUR MEDS BY ELIG CLIN: HCPCS | Performed by: INTERNAL MEDICINE

## 2022-01-03 PROCEDURE — G8417 CALC BMI ABV UP PARAM F/U: HCPCS | Performed by: INTERNAL MEDICINE

## 2022-01-03 PROCEDURE — 72050 X-RAY EXAM NECK SPINE 4/5VWS: CPT

## 2022-01-03 PROCEDURE — G8484 FLU IMMUNIZE NO ADMIN: HCPCS | Performed by: INTERNAL MEDICINE

## 2022-01-03 PROCEDURE — 3017F COLORECTAL CA SCREEN DOC REV: CPT | Performed by: INTERNAL MEDICINE

## 2022-01-03 PROCEDURE — 1123F ACP DISCUSS/DSCN MKR DOCD: CPT | Performed by: INTERNAL MEDICINE

## 2022-01-03 PROCEDURE — 2022F DILAT RTA XM EVC RTNOPTHY: CPT | Performed by: INTERNAL MEDICINE

## 2022-01-03 PROCEDURE — 1036F TOBACCO NON-USER: CPT | Performed by: INTERNAL MEDICINE

## 2022-01-03 PROCEDURE — 4040F PNEUMOC VAC/ADMIN/RCVD: CPT | Performed by: INTERNAL MEDICINE

## 2022-01-03 PROCEDURE — 3046F HEMOGLOBIN A1C LEVEL >9.0%: CPT | Performed by: INTERNAL MEDICINE

## 2022-01-03 PROCEDURE — G0008 ADMIN INFLUENZA VIRUS VAC: HCPCS | Performed by: INTERNAL MEDICINE

## 2022-01-03 PROCEDURE — 99214 OFFICE O/P EST MOD 30 MIN: CPT | Performed by: INTERNAL MEDICINE

## 2022-01-03 RX ORDER — ROSUVASTATIN CALCIUM 40 MG/1
40 TABLET, COATED ORAL DAILY
Qty: 90 TABLET | Refills: 0 | Status: SHIPPED | OUTPATIENT
Start: 2022-01-03 | End: 2022-03-31

## 2022-01-03 RX ORDER — HYDROCHLOROTHIAZIDE 12.5 MG/1
12.5 CAPSULE, GELATIN COATED ORAL EVERY MORNING
Qty: 90 CAPSULE | Refills: 3 | Status: SHIPPED | OUTPATIENT
Start: 2022-01-03 | End: 2022-10-10

## 2022-01-03 RX ORDER — AMLODIPINE BESYLATE AND BENAZEPRIL HYDROCHLORIDE 10; 20 MG/1; MG/1
1 CAPSULE ORAL DAILY
Qty: 90 CAPSULE | Refills: 3 | Status: SHIPPED | OUTPATIENT
Start: 2022-01-03 | End: 2022-10-10

## 2022-01-03 RX ORDER — CYCLOBENZAPRINE HCL 10 MG
10 TABLET ORAL NIGHTLY PRN
Qty: 30 TABLET | Refills: 0 | Status: SHIPPED | OUTPATIENT
Start: 2022-01-03 | End: 2022-01-13

## 2022-01-03 RX ORDER — EZETIMIBE 10 MG/1
10 TABLET ORAL DAILY
Qty: 90 TABLET | Refills: 3 | Status: SHIPPED | OUTPATIENT
Start: 2022-01-03 | End: 2022-10-10

## 2022-01-03 SDOH — ECONOMIC STABILITY: HOUSING INSECURITY: IN THE LAST 12 MONTHS, HOW MANY PLACES HAVE YOU LIVED?: 1

## 2022-01-03 SDOH — HEALTH STABILITY: PHYSICAL HEALTH: ON AVERAGE, HOW MANY MINUTES DO YOU ENGAGE IN EXERCISE AT THIS LEVEL?: 40 MIN

## 2022-01-03 SDOH — ECONOMIC STABILITY: TRANSPORTATION INSECURITY
IN THE PAST 12 MONTHS, HAS THE LACK OF TRANSPORTATION KEPT YOU FROM MEDICAL APPOINTMENTS OR FROM GETTING MEDICATIONS?: NO

## 2022-01-03 SDOH — ECONOMIC STABILITY: FOOD INSECURITY: WITHIN THE PAST 12 MONTHS, THE FOOD YOU BOUGHT JUST DIDN'T LAST AND YOU DIDN'T HAVE MONEY TO GET MORE.: NEVER TRUE

## 2022-01-03 SDOH — ECONOMIC STABILITY: FOOD INSECURITY: WITHIN THE PAST 12 MONTHS, YOU WORRIED THAT YOUR FOOD WOULD RUN OUT BEFORE YOU GOT MONEY TO BUY MORE.: NEVER TRUE

## 2022-01-03 SDOH — ECONOMIC STABILITY: INCOME INSECURITY: IN THE LAST 12 MONTHS, WAS THERE A TIME WHEN YOU WERE NOT ABLE TO PAY THE MORTGAGE OR RENT ON TIME?: NO

## 2022-01-03 SDOH — HEALTH STABILITY: PHYSICAL HEALTH: ON AVERAGE, HOW MANY DAYS PER WEEK DO YOU ENGAGE IN MODERATE TO STRENUOUS EXERCISE (LIKE A BRISK WALK)?: 4 DAYS

## 2022-01-03 SDOH — ECONOMIC STABILITY: TRANSPORTATION INSECURITY
IN THE PAST 12 MONTHS, HAS LACK OF TRANSPORTATION KEPT YOU FROM MEETINGS, WORK, OR FROM GETTING THINGS NEEDED FOR DAILY LIVING?: NO

## 2022-01-03 SDOH — ECONOMIC STABILITY: HOUSING INSECURITY
IN THE LAST 12 MONTHS, WAS THERE A TIME WHEN YOU DID NOT HAVE A STEADY PLACE TO SLEEP OR SLEPT IN A SHELTER (INCLUDING NOW)?: NO

## 2022-01-03 ASSESSMENT — SOCIAL DETERMINANTS OF HEALTH (SDOH)
HOW OFTEN DO YOU ATTEND CHURCH OR RELIGIOUS SERVICES?: NEVER
HOW HARD IS IT FOR YOU TO PAY FOR THE VERY BASICS LIKE FOOD, HOUSING, MEDICAL CARE, AND HEATING?: NOT HARD AT ALL
HOW OFTEN DO YOU ATTENT MEETINGS OF THE CLUB OR ORGANIZATION YOU BELONG TO?: NEVER
IN A TYPICAL WEEK, HOW MANY TIMES DO YOU TALK ON THE PHONE WITH FAMILY, FRIENDS, OR NEIGHBORS?: THREE TIMES A WEEK
DO YOU BELONG TO ANY CLUBS OR ORGANIZATIONS SUCH AS CHURCH GROUPS UNIONS, FRATERNAL OR ATHLETIC GROUPS, OR SCHOOL GROUPS?: NO
HOW OFTEN DO YOU GET TOGETHER WITH FRIENDS OR RELATIVES?: THREE TIMES A WEEK

## 2022-01-03 ASSESSMENT — LIFESTYLE VARIABLES
HOW OFTEN DO YOU HAVE A DRINK CONTAINING ALCOHOL: MONTHLY OR LESS
HOW MANY STANDARD DRINKS CONTAINING ALCOHOL DO YOU HAVE ON A TYPICAL DAY: 1 OR 2

## 2022-01-03 ASSESSMENT — PATIENT HEALTH QUESTIONNAIRE - PHQ9
1. LITTLE INTEREST OR PLEASURE IN DOING THINGS: 0
SUM OF ALL RESPONSES TO PHQ QUESTIONS 1-9: 0
2. FEELING DOWN, DEPRESSED OR HOPELESS: 0
SUM OF ALL RESPONSES TO PHQ QUESTIONS 1-9: 0
SUM OF ALL RESPONSES TO PHQ9 QUESTIONS 1 & 2: 0

## 2022-01-03 NOTE — ASSESSMENT & PLAN NOTE
Diabetes Mellitus Type II:  Home blood sugar records reviewed: fasting range: 120-130. No significant episodes of hypoglycemia. Compliant with medications. No polyuria, polydipsia, visual changes, foot problems, GI upset. Diabetic diet compliance:  compliant most of the time. Current exercise: none. Will check labs, and refill medications as appropriate. Hypertension:  Denies CP, SOB, visual changes, dizziness, palpitations or HA. He is adherent to a low sodium diet. Blood pressure typically runs ok  outside of the office. Continue current medications. Hyperlipidemia:  No new myalgias or GI upset on current medications. Lab Results   Component Value Date    LABA1C 7.3 10/04/2021    LABA1C 7.1 05/03/2021    LABA1C 6.9 01/29/2021     Lab Results   Component Value Date    LABMICR 4.10 (H) 10/04/2021    CREATININE 0.8 10/04/2021     Lab Results   Component Value Date    ALT 23 10/04/2021    AST 20 10/04/2021     No components found for: CHLPL  Lab Results   Component Value Date    TRIG 119 10/04/2021     Lab Results   Component Value Date    HDL 43 10/04/2021     Lab Results   Component Value Date    LDLCALC 69 10/04/2021       This problem was reviewed in detail. It is under control and stable. Will check blood work.

## 2022-01-03 NOTE — PROGRESS NOTES
Subjective:      Patient ID: Darlyn Mcadams is a 70 y.o. male. HPI  Established patient here for a visit to manage acute and chronic medical conditions as detailed below. Type 2 diabetes mellitus without complication, without long-term current use of insulin (HCC)  Diabetes Mellitus Type II:  Home blood sugar records reviewed: fasting range: 120-130. No significant episodes of hypoglycemia. Compliant with medications. No polyuria, polydipsia, visual changes, foot problems, GI upset. Diabetic diet compliance:  compliant most of the time. Current exercise: none. Will check labs, and refill medications as appropriate. Hypertension:  Denies CP, SOB, visual changes, dizziness, palpitations or HA. He is adherent to a low sodium diet. Blood pressure typically runs ok  outside of the office. Continue current medications. Hyperlipidemia:  No new myalgias or GI upset on current medications. Lab Results   Component Value Date    LABA1C 7.3 10/04/2021    LABA1C 7.1 05/03/2021    LABA1C 6.9 01/29/2021     Lab Results   Component Value Date    LABMICR 4.10 (H) 10/04/2021    CREATININE 0.8 10/04/2021     Lab Results   Component Value Date    ALT 23 10/04/2021    AST 20 10/04/2021     No components found for: CHLPL  Lab Results   Component Value Date    TRIG 119 10/04/2021     Lab Results   Component Value Date    HDL 43 10/04/2021     Lab Results   Component Value Date    LDLCALC 69 10/04/2021       This problem was reviewed in detail. It is under control and stable. Will check blood work. Pure hypercholesterolemia  This has been a long standing problem, takes [employer] and tries to follow a low fat diet. Has  been reasonably  compliant w exercise. Lipids have been stable, The problem is controlled. Recent lipid tests were reviewed and are normal. Pertinent negatives include no chest pain, focal sensory loss, focal weakness, leg pain, myalgias or shortness of breath.   Advised patient to continue the current instructions or medications. Essential hypertension  This is a chronic problem. The problem is well controlled. Patient monitors readings regularly. Pertinent negatives include no chest pain, focal sensory loss, focal weakness, leg pain, myalgias or shortness of breath. No headaches or chest pain. Takes medications regularly. Blood pressure has been stable, blood work was reviewed, and advised patient to continue the current instructions or medications. Coronary atherosclerosis of native coronary artery  No cp or sob,  Patient is compliant w medications, no side effects, effective, provides adequate symptom relief. No new symptoms or problems as noted by patient. The problem is stable, no changes noted by patient. Will consider monitoring labs and refill medications as appropriate. Patient counseled and will continue current plan. Review of Systems  ROS: No unusual headaches or allergy symptoms or blurred vision. No prolonged cough. No flushing or facial pain or chest pain,dizziness, dyspnea, palpitations, or chest pain on exertion. No syncope. No nausea or vommitting or diarrhea. No jaundice or abdominal pain, change in bowel habits, black or bloody stools. No dysuria or hematuria or frequency of urination. No myalgias or muscle pain. No numbness, weakness, or tingling. No falls, or loss of consciousness. No weight loss or back pain. No falls. No paresthesias. No joint swelling or redness. No joint pain. No recent weight loss. No focal weakness or sensory deficits or paresthesias, No confusion or altered sensorium. No hematemesis. No hearing loss. No siezures. All other systems were reviewed, and review was negative.    Objective:   Physical Exam  /78 (Site: Left Upper Arm, Position: Sitting, Cuff Size: Medium Adult)   Pulse 87   Temp 97.2 °F (36.2 °C)   Resp 14   Wt 217 lb (98.4 kg)   SpO2 99%   BMI 27.12 kg/m²    The physical exam reveals a patient who appears well, alert and oriented x 3, pleasant, cooperative. Vitals are as noted. Head is atraumatic and normocephalic. Eyes reveal normal conjunctiva, cornea normal, pupils are equal and rective to light. Nasal mucosa is normal. Throat is normal without exudates. Ears reveal normal tympanic membranes. Neck is supple and free of adenopathy, or masses. No thyromegaly. No jugular venous distension. Lungs are clear to auscultation, no rales or rhonchi noted. Heart sounds are regular , no murmurs, clicks, gallops or rubs. Abdomen is soft, no tenderness, masses or organomegaly. Bowel sounds are normally heard. Pelvis: normal. Extremities are normal. Peripheral pulses are normal. Screening neurological exam is normal without focal findings. Cranial nerves are intact, reflexes are symmetrical and muscle strength eaqual. Skin is normal without suspicious lesions noted. Assessment:      Type 2 diabetes mellitus without complication, without long-term current use of insulin ([facility])  Diabetes Mellitus Type II:  Home blood sugar records reviewed: fasting range: 120-130. No significant episodes of hypoglycemia. Compliant with medications. No polyuria, polydipsia, visual changes, foot problems, GI upset. Diabetic diet compliance:  compliant most of the time. Current exercise: none. Will check labs, and refill medications as appropriate. Hypertension:  Denies CP, SOB, visual changes, dizziness, palpitations or HA. He is adherent to a low sodium diet. Blood pressure typically runs ok  outside of the office. Continue current medications. Hyperlipidemia:  No new myalgias or GI upset on current medications.        Lab Results   Component Value Date    LABA1C 7.3 10/04/2021    LABA1C 7.1 05/03/2021    LABA1C 6.9 01/29/2021     Lab Results   Component Value Date    LABMICR 4.10 (H) 10/04/2021    CREATININE 0.8 10/04/2021     Lab Results   Component Value Date    ALT 23 10/04/2021    AST 20 10/04/2021     No components found for: CHLPL  Lab Results   Component Value Date    TRIG 119 10/04/2021     Lab Results   Component Value Date    HDL 43 10/04/2021     Lab Results   Component Value Date    LDLCALC 69 10/04/2021       This problem was reviewed in detail. It is under control and stable. Will check blood work. Pure hypercholesterolemia  This has been a long standing problem, takes [employer] and tries to follow a low fat diet. Has  been reasonably  compliant w exercise. Lipids have been stable, The problem is controlled. Recent lipid tests were reviewed and are normal. Pertinent negatives include no chest pain, focal sensory loss, focal weakness, leg pain, myalgias or shortness of breath. Advised patient to continue the current instructions or medications. Essential hypertension  This is a chronic problem. The problem is well controlled. Patient monitors readings regularly. Pertinent negatives include no chest pain, focal sensory loss, focal weakness, leg pain, myalgias or shortness of breath. No headaches or chest pain. Takes medications regularly. Blood pressure has been stable, blood work was reviewed, and advised patient to continue the current instructions or medications. Coronary atherosclerosis of native coronary artery  No cp or sob,  Patient is compliant w medications, no side effects, effective, provides adequate symptom relief. No new symptoms or problems as noted by patient. The problem is stable, no changes noted by patient. Will consider monitoring labs and refill medications as appropriate. Patient counseled and will continue current plan. Plan:      Labs ordered, reviewed. Medications refilled. All Health maintenance needs reviewed and the needful ordered.            Lakshmi Best MD

## 2022-01-31 RX ORDER — TAMSULOSIN HYDROCHLORIDE 0.4 MG/1
CAPSULE ORAL
Qty: 90 CAPSULE | Refills: 1 | Status: SHIPPED | OUTPATIENT
Start: 2022-01-31 | End: 2022-03-28 | Stop reason: SDUPTHER

## 2022-01-31 NOTE — TELEPHONE ENCOUNTER
Medication:   Requested Prescriptions     Pending Prescriptions Disp Refills    tamsulosin (FLOMAX) 0.4 MG capsule [Pharmacy Med Name: TAMSULOSIN 0.4MG CAPSULES] 90 capsule 1     Sig: TAKE 1 CAPSULE BY MOUTH EVERY DAY     Last Filled:  08/02/2021    Last appt: 1/3/2022   Next appt: Visit date not found    Last OARRS: No flowsheet data found.

## 2022-03-21 ENCOUNTER — NURSE TRIAGE (OUTPATIENT)
Dept: OTHER | Facility: CLINIC | Age: 72
End: 2022-03-21

## 2022-03-21 NOTE — TELEPHONE ENCOUNTER
Limited triage due to adult not being present during call. Received call from Magali at [facility] with Red Flag Complaint. Subjective: Caller states \"Fall\"     Current Symptoms: Fell on 3/19. Feeling unbalance. Shaky and weak at times. Onset: 3 days ago; sudden    Associated Symptoms: NA    Pain Severity: 0/10; N/A; none    Temperature: denies fever    What has been tried: None    LMP: NA Pregnant: NA    Recommended disposition: See PCP within 3 Days    Care advice provided, patient verbalizes understanding; denies any other questions or concerns; instructed to call back for any new or worsening symptoms. Patient/Caller agrees with recommended disposition; writer provided warm transfer to Manuela See at [facility] for appointment scheduling     Attention Provider: Thank you for allowing me to participate in the care of your patient. The patient was connected to triage in response to information provided to the ECC/PSC. Please do not respond through this encounter as the response is not directed to a shared pool.       Reason for Disposition   Patient wants to be seen    Protocols used: FALLS AND FALLING-ADULT-[place]

## 2022-03-22 ENCOUNTER — OFFICE VISIT (OUTPATIENT)
Dept: PRIMARY CARE CLINIC | Age: 72
End: 2022-03-22
Payer: COMMERCIAL

## 2022-03-22 VITALS
BODY MASS INDEX: 28.17 KG/M2 | DIASTOLIC BLOOD PRESSURE: 62 MMHG | OXYGEN SATURATION: 98 % | WEIGHT: 226.6 LBS | TEMPERATURE: 98.1 F | HEART RATE: 55 BPM | SYSTOLIC BLOOD PRESSURE: 130 MMHG | HEIGHT: 75 IN
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
## 2022-03-22 DIAGNOSIS — E78.00 PURE HYPERCHOLESTEROLEMIA: ICD-10-CM

## 2022-03-22 DIAGNOSIS — E11.9 TYPE 2 DIABETES MELLITUS WITHOUT COMPLICATION, WITHOUT LONG-TERM CURRENT USE OF INSULIN (HCC): Primary | ICD-10-CM

## 2022-03-22 DIAGNOSIS — L97.528 DIABETIC ULCER OF TOE OF LEFT FOOT ASSOCIATED WITH TYPE 2 DIABETES MELLITUS, WITH OTHER ULCER SEVERITY (HCC): ICD-10-CM

## 2022-03-22 DIAGNOSIS — I25.10 ATHEROSCLEROSIS OF NATIVE CORONARY ARTERY OF NATIVE HEART WITHOUT ANGINA PECTORIS: ICD-10-CM

## 2022-03-22 DIAGNOSIS — E11.621 DIABETIC ULCER OF TOE OF LEFT FOOT ASSOCIATED WITH TYPE 2 DIABETES MELLITUS, WITH OTHER ULCER SEVERITY (HCC): ICD-10-CM

## 2022-03-22 DIAGNOSIS — I10 ESSENTIAL HYPERTENSION: ICD-10-CM

## 2022-03-22 DIAGNOSIS — R27.0 ATAXIA: ICD-10-CM

## 2022-03-22 PROBLEM — R29.6 FALLS: Chronic | Status: ACTIVE | Noted: 2022-03-22

## 2022-03-22 LAB — HBA1C MFR BLD: 8.2 %

## 2022-03-22 PROCEDURE — 1123F ACP DISCUSS/DSCN MKR DOCD: CPT | Performed by: INTERNAL MEDICINE

## 2022-03-22 PROCEDURE — 99214 OFFICE O/P EST MOD 30 MIN: CPT | Performed by: INTERNAL MEDICINE

## 2022-03-22 PROCEDURE — 3017F COLORECTAL CA SCREEN DOC REV: CPT | Performed by: INTERNAL MEDICINE

## 2022-03-22 PROCEDURE — 83036 HEMOGLOBIN GLYCOSYLATED A1C: CPT | Performed by: INTERNAL MEDICINE

## 2022-03-22 PROCEDURE — 3046F HEMOGLOBIN A1C LEVEL >9.0%: CPT | Performed by: INTERNAL MEDICINE

## 2022-03-22 PROCEDURE — G8427 DOCREV CUR MEDS BY ELIG CLIN: HCPCS | Performed by: INTERNAL MEDICINE

## 2022-03-22 PROCEDURE — 2022F DILAT RTA XM EVC RTNOPTHY: CPT | Performed by: INTERNAL MEDICINE

## 2022-03-22 PROCEDURE — 4040F PNEUMOC VAC/ADMIN/RCVD: CPT | Performed by: INTERNAL MEDICINE

## 2022-03-22 PROCEDURE — G8417 CALC BMI ABV UP PARAM F/U: HCPCS | Performed by: INTERNAL MEDICINE

## 2022-03-22 PROCEDURE — G8484 FLU IMMUNIZE NO ADMIN: HCPCS | Performed by: INTERNAL MEDICINE

## 2022-03-22 PROCEDURE — 1036F TOBACCO NON-USER: CPT | Performed by: INTERNAL MEDICINE

## 2022-03-22 NOTE — PROGRESS NOTES
Subjective:      Patient ID: Mona Justin is a 67 y.o. male. HPI  Established patient here for a visit to manage acute and chronic medical conditions as detailed below. Falls  Has been falling frequently,   Feels off balance   Has been happening in the last few months,  Hard to get up,  No dizziness,  No loss of consciousness   Will get CT head,     Type 2 diabetes mellitus without complication, without long-term current use of insulin (HCC)  Diabetes Mellitus Type II:  Home blood sugar records reviewed: fasting range: 120-130. No significant episodes of hypoglycemia. Compliant with medications. No polyuria, polydipsia, visual changes, foot problems, GI upset. Diabetic diet compliance:  compliant most of the time. Current exercise: none. Will check labs, and refill medications as appropriate. Hypertension:  Denies CP, SOB, visual changes, dizziness, palpitations or HA. He is adherent to a low sodium diet. Blood pressure typically runs ok  outside of the office. Continue current medications. Hyperlipidemia:  No new myalgias or GI upset on current medications. Lab Results   Component Value Date    LABA1C 7.3 10/04/2021    LABA1C 7.1 05/03/2021    LABA1C 6.9 01/29/2021     Lab Results   Component Value Date    LABMICR 4.10 (H) 10/04/2021    CREATININE 0.8 10/04/2021     Lab Results   Component Value Date    ALT 23 10/04/2021    AST 20 10/04/2021     No components found for: CHLPL  Lab Results   Component Value Date    TRIG 119 10/04/2021     Lab Results   Component Value Date    HDL 43 10/04/2021     Lab Results   Component Value Date    LDLCALC 69 10/04/2021      This problem is stable, reviewed in detail, and advised patient to continue the current instructions or medications. Will continue to closely monitor the situation. Pure hypercholesterolemia  This has been a long standing problem, takes statin      Monitors diet and tries to follow a low fat diet.  Has  been reasonably compliant w exercise. Lipids have been stable, The problem is controlled. Recent lipid tests were reviewed and are normal. Pertinent negatives include no chest pain, focal sensory loss, focal weakness, leg pain, myalgias or shortness of breath. Advised patient to continue the current instructions or medications. Essential hypertension  This is a chronic problem. The problem is well controlled. Patient monitors readings regularly. Pertinent negatives include no chest pain, focal sensory loss, focal weakness, leg pain, myalgias or shortness of breath. No headaches or chest pain. Takes medications regularly. Blood pressure has been stable, blood work was reviewed, and advised patient to continue the current instructions or medications. Coronary atherosclerosis of native coronary artery  On asa,   Patient is compliant w medications, no side effects, effective, provides adequate symptom relief. No new symptoms or problems as noted by patient. The problem is stable, no changes noted by patient. Will consider monitoring labs and refill medications as appropriate. Patient counseled and will continue current plan. Review of Systems  ROS: No unusual headaches or allergy symptoms or blurred vision. No prolonged cough. No flushing or facial pain or chest pain,dizziness, dyspnea, palpitations, or chest pain on exertion. No syncope. No nausea or vommitting or diarrhea. No jaundice or abdominal pain, change in bowel habits, black or bloody stools. No dysuria or hematuria or frequency of urination. No myalgias or muscle pain. No numbness, weakness, or tingling. No falls, or loss of consciousness. No weight loss or back pain. No falls. No paresthesias. No joint swelling or redness. No joint pain. No recent weight loss. No focal weakness or sensory deficits or paresthesias, No confusion or altered sensorium. No hematemesis. No hearing loss. No siezures.  All other systems were reviewed, and review was negative. Objective:   Physical Exam  /62 (Site: Right Upper Arm, Position: Sitting, Cuff Size: Large Adult)   Pulse 55   Temp 98.1 °F (36.7 °C) (Temporal)   Ht 6' 3\" (1.905 m)   Wt 226 lb 9.6 oz (102.8 kg)   SpO2 98%   BMI 28.32 kg/m²    The physical exam reveals a patient who appears well, alert and oriented x 3, pleasant, cooperative. Vitals are as noted. Head is atraumatic and normocephalic. Eyes reveal normal conjunctiva, cornea normal, pupils are equal and rective to light. Nasal mucosa is normal. Throat is normal without exudates. Ears reveal normal tympanic membranes. Neck is supple and free of adenopathy, or masses. No thyromegaly. No jugular venous distension. Lungs are clear to auscultation, no rales or rhonchi noted. Heart sounds are regular , no murmurs, clicks, gallops or rubs. Abdomen is soft, no tenderness, masses or organomegaly. Bowel sounds are normally heard. Pelvis: normal. Extremities are normal. Peripheral pulses are normal. Screening neurological exam is normal without focal findings. Cranial nerves are intact, reflexes are symmetrical and muscle strength eaqual. Skin is normal without suspicious lesions noted. Assessment:      Falls  Has been falling frequently,   Feels off balance   Has been happening in the last few months,  Hard to get up,  No dizziness,  No loss of consciousness   Will get CT head,     Type 2 diabetes mellitus without complication, without long-term current use of insulin (HCC)  Diabetes Mellitus Type II:  Home blood sugar records reviewed: fasting range: 120-130. No significant episodes of hypoglycemia. Compliant with medications. No polyuria, polydipsia, visual changes, foot problems, GI upset. Diabetic diet compliance:  compliant most of the time. Current exercise: none. Will check labs, and refill medications as appropriate. Hypertension:  Denies CP, SOB, visual changes, dizziness, palpitations or HA. He is adherent to a low sodium diet. Blood pressure typically runs ok  outside of the office. Continue current medications. Hyperlipidemia:  No new myalgias or GI upset on current medications. Lab Results   Component Value Date    LABA1C 7.3 10/04/2021    LABA1C 7.1 05/03/2021    LABA1C 6.9 01/29/2021     Lab Results   Component Value Date    LABMICR 4.10 (H) 10/04/2021    CREATININE 0.8 10/04/2021     Lab Results   Component Value Date    ALT 23 10/04/2021    AST 20 10/04/2021     No components found for: CHLPL  Lab Results   Component Value Date    TRIG 119 10/04/2021     Lab Results   Component Value Date    HDL 43 10/04/2021     Lab Results   Component Value Date    LDLCALC 69 10/04/2021      This problem is stable, reviewed in detail, and advised patient to continue the current instructions or medications. Will continue to closely monitor the situation. Pure hypercholesterolemia  This has been a long standing problem, takes statin      Monitors diet and tries to follow a low fat diet. Has  been reasonably  compliant w exercise. Lipids have been stable, The problem is controlled. Recent lipid tests were reviewed and are normal. Pertinent negatives include no chest pain, focal sensory loss, focal weakness, leg pain, myalgias or shortness of breath. Advised patient to continue the current instructions or medications. Essential hypertension  This is a chronic problem. The problem is well controlled. Patient monitors readings regularly. Pertinent negatives include no chest pain, focal sensory loss, focal weakness, leg pain, myalgias or shortness of breath. No headaches or chest pain. Takes medications regularly. Blood pressure has been stable, blood work was reviewed, and advised patient to continue the current instructions or medications. Coronary atherosclerosis of native coronary artery  On asa,   Patient is compliant w medications, no side effects, effective, provides adequate symptom relief.  No new symptoms or problems as noted by patient. The problem is stable, no changes noted by patient. Will consider monitoring labs and refill medications as appropriate. Patient counseled and will continue current plan. Plan: Will get MRI of the head,   Counseled.          Jose Manuel Perez MD

## 2022-03-22 NOTE — ASSESSMENT & PLAN NOTE
Diabetes Mellitus Type II:  Home blood sugar records reviewed: fasting range: 120-130. No significant episodes of hypoglycemia. Compliant with medications. No polyuria, polydipsia, visual changes, foot problems, GI upset. Diabetic diet compliance:  compliant most of the time. Current exercise: none. Will check labs, and refill medications as appropriate. Hypertension:  Denies CP, SOB, visual changes, dizziness, palpitations or HA. He is adherent to a low sodium diet. Blood pressure typically runs ok  outside of the office. Continue current medications. Hyperlipidemia:  No new myalgias or GI upset on current medications. Lab Results   Component Value Date    LABA1C 7.3 10/04/2021    LABA1C 7.1 05/03/2021    LABA1C 6.9 01/29/2021     Lab Results   Component Value Date    LABMICR 4.10 (H) 10/04/2021    CREATININE 0.8 10/04/2021     Lab Results   Component Value Date    ALT 23 10/04/2021    AST 20 10/04/2021     No components found for: CHLPL  Lab Results   Component Value Date    TRIG 119 10/04/2021     Lab Results   Component Value Date    HDL 43 10/04/2021     Lab Results   Component Value Date    LDLCALC 69 10/04/2021      This problem is stable, reviewed in detail, and advised patient to continue the current instructions or medications. Will continue to closely monitor the situation.

## 2022-03-22 NOTE — ASSESSMENT & PLAN NOTE
This has been a long standing problem, takes statin      Monitors diet and tries to follow a low fat diet. Has  been reasonably  compliant w exercise. Lipids have been stable, The problem is controlled. Recent lipid tests were reviewed and are normal. Pertinent negatives include no chest pain, focal sensory loss, focal weakness, leg pain, myalgias or shortness of breath. Advised patient to continue the current instructions or medications.

## 2022-03-22 NOTE — ASSESSMENT & PLAN NOTE
Has been falling frequently,   Feels off balance   Has been happening in the last few months,  Hard to get up,  No dizziness,  No loss of consciousness   Will get CT head,

## 2022-03-25 ENCOUNTER — HOSPITAL ENCOUNTER (OUTPATIENT)
Dept: MRI IMAGING | Age: 72
Discharge: HOME | End: 2022-03-25
Payer: MEDICARE
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
## 2022-03-25 DIAGNOSIS — R27.0 ATAXIA: ICD-10-CM

## 2022-03-25 PROCEDURE — 70551 MRI BRAIN STEM W/O DYE: CPT

## 2022-03-28 ENCOUNTER — OFFICE VISIT (OUTPATIENT)
Dept: PRIMARY CARE CLINIC | Age: 72
End: 2022-03-28
Payer: COMMERCIAL

## 2022-03-28 VITALS
TEMPERATURE: 96.8 F | BODY MASS INDEX: 28.12 KG/M2 | OXYGEN SATURATION: 99 % | RESPIRATION RATE: 14 BRPM | DIASTOLIC BLOOD PRESSURE: 70 MMHG | SYSTOLIC BLOOD PRESSURE: 138 MMHG | HEART RATE: 67 BPM | WEIGHT: 225 LBS

## 2022-03-28 DIAGNOSIS — I67.9 CEREBROVASCULAR DISEASE: Primary | Chronic | ICD-10-CM

## 2022-03-28 DIAGNOSIS — R73.9 HYPERGLYCEMIA: ICD-10-CM

## 2022-03-28 DIAGNOSIS — E11.9 TYPE 2 DIABETES MELLITUS WITHOUT COMPLICATION, WITHOUT LONG-TERM CURRENT USE OF INSULIN (HCC): ICD-10-CM

## 2022-03-28 DIAGNOSIS — I10 ESSENTIAL HYPERTENSION: ICD-10-CM

## 2022-03-28 DIAGNOSIS — I65.21 CAROTID STENOSIS, SYMPTOMATIC W/O INFARCT, RIGHT: ICD-10-CM

## 2022-03-28 PROCEDURE — 3017F COLORECTAL CA SCREEN DOC REV: CPT | Performed by: INTERNAL MEDICINE

## 2022-03-28 PROCEDURE — G8484 FLU IMMUNIZE NO ADMIN: HCPCS | Performed by: INTERNAL MEDICINE

## 2022-03-28 PROCEDURE — 3052F HG A1C>EQUAL 8.0%<EQUAL 9.0%: CPT | Performed by: INTERNAL MEDICINE

## 2022-03-28 PROCEDURE — 1036F TOBACCO NON-USER: CPT | Performed by: INTERNAL MEDICINE

## 2022-03-28 PROCEDURE — G8417 CALC BMI ABV UP PARAM F/U: HCPCS | Performed by: INTERNAL MEDICINE

## 2022-03-28 PROCEDURE — G8428 CUR MEDS NOT DOCUMENT: HCPCS | Performed by: INTERNAL MEDICINE

## 2022-03-28 PROCEDURE — 4040F PNEUMOC VAC/ADMIN/RCVD: CPT | Performed by: INTERNAL MEDICINE

## 2022-03-28 PROCEDURE — 99214 OFFICE O/P EST MOD 30 MIN: CPT | Performed by: INTERNAL MEDICINE

## 2022-03-28 PROCEDURE — 1123F ACP DISCUSS/DSCN MKR DOCD: CPT | Performed by: INTERNAL MEDICINE

## 2022-03-28 PROCEDURE — 2022F DILAT RTA XM EVC RTNOPTHY: CPT | Performed by: INTERNAL MEDICINE

## 2022-03-28 RX ORDER — TAMSULOSIN HYDROCHLORIDE 0.4 MG/1
CAPSULE ORAL
Qty: 180 CAPSULE | Refills: 2 | Status: SHIPPED | OUTPATIENT
Start: 2022-03-28

## 2022-03-28 NOTE — ASSESSMENT & PLAN NOTE
MRI shows chronic microvascular disease,   Worse compared to the last scan in 2018. recommednd asa, aggressive BP and lipid control.

## 2022-03-28 NOTE — PROGRESS NOTES
Subjective:      Patient ID: Negrito Cortes is a 67 y.o. male. HPI  Established patient here for a visit to manage acute and chronic medical conditions as detailed below. Cerebrovascular disease  MRI shows chronic microvascular disease,   Worse compared to the last scan in 2018. recommednd asa, aggressive BP and lipid control. Carotid stenosis, symptomatic w/o infarct, right  F/u w Dr Elsy Romero. Essential hypertension  This is a chronic problem. The problem is well controlled. Patient monitors readings regularly. Pertinent negatives include no chest pain, focal sensory loss, focal weakness, leg pain, myalgias or shortness of breath. No headaches or chest pain. Takes medications regularly. Blood pressure has been stable, blood work was reviewed, and advised patient to continue the current instructions or medications. Type 2 diabetes mellitus without complication, without long-term current use of insulin ([address]  This problem is stable, reviewed in detail, and advised patient to continue the current instructions or medications. Will continue to closely monitor the situation. Review of Systems  All the review of systems negative except above   Objective:   Physical Exam  /70 (Site: Right Upper Arm, Position: Sitting, Cuff Size: Medium Adult)   Pulse 67   Temp 96.8 °F (36 °C)   Resp 14   Wt 225 lb (102.1 kg)   SpO2 99%   BMI 28.12 kg/m²    The physical exam reveals a patient who appears well, alert and oriented x 3, pleasant, cooperative. Vitals are as noted. Head is atraumatic and normocephalic. Eyes reveal normal conjunctiva, cornea normal, pupils are equal and rective to light. Nasal mucosa is normal. Throat is normal without exudates. Ears reveal normal tympanic membranes. Neck is supple and free of adenopathy, or masses. No thyromegaly. No jugular venous distension. Lungs are clear to auscultation, no rales or rhonchi noted.  Heart sounds are regular , no murmurs, clicks, gallops or rubs. Abdomen is soft, no tenderness, masses or organomegaly. Bowel sounds are normally heard. Pelvis: normal. Extremities are normal. Peripheral pulses are normal. Screening neurological exam is normal without focal findings. Cranial nerves are intact, reflexes are symmetrical and muscle strength eaqual. Skin is normal without suspicious lesions noted. Assessment:      Cerebrovascular disease  MRI shows chronic microvascular disease,   Worse compared to the last scan in 2018. recommednd asa, aggressive BP and lipid control. Carotid stenosis, symptomatic w/o infarct, right  F/u w Dr Tracy Sharma. Essential hypertension  This is a chronic problem. The problem is well controlled. Patient monitors readings regularly. Pertinent negatives include no chest pain, focal sensory loss, focal weakness, leg pain, myalgias or shortness of breath. No headaches or chest pain. Takes medications regularly. Blood pressure has been stable, blood work was reviewed, and advised patient to continue the current instructions or medications. Type 2 diabetes mellitus without complication, without long-term current use of insulin ([address]  This problem is stable, reviewed in detail, and advised patient to continue the current instructions or medications. Will continue to closely monitor the situation.            Plan:      As above,         Maxime Rubio MD

## 2022-03-31 RX ORDER — ROSUVASTATIN CALCIUM 40 MG/1
40 TABLET, COATED ORAL DAILY
Qty: 90 TABLET | Refills: 0 | Status: SHIPPED | OUTPATIENT
Start: 2022-03-31 | End: 2022-04-05

## 2022-03-31 NOTE — TELEPHONE ENCOUNTER
Medication:   Requested Prescriptions     Pending Prescriptions Disp Refills    rosuvastatin (CRESTOR) 40 MG tablet [Pharmacy Med Name: ROSUVASTATIN 40MG TABLETS] 90 tablet 0     Sig: TAKE 1 TABLET BY MOUTH DAILY     Last Filled: 1.3.22  Last appt: 3/28/2022   Next appt: Visit date not found    Last Lipid:   Lab Results   Component Value Date    CHOL 136 10/04/2021    TRIG 119 10/04/2021    HDL 43 10/04/2021    HDL 38 05/22/2012    LDLCALC 69 10/04/2021

## 2022-04-04 DIAGNOSIS — I25.10 CORONARY ARTERY DISEASE INVOLVING NATIVE CORONARY ARTERY OF NATIVE HEART WITHOUT ANGINA PECTORIS: ICD-10-CM

## 2022-04-04 DIAGNOSIS — I10 ESSENTIAL HYPERTENSION: ICD-10-CM

## 2022-04-04 DIAGNOSIS — R73.9 HYPERGLYCEMIA: ICD-10-CM

## 2022-04-04 DIAGNOSIS — Z12.5 SCREENING FOR PROSTATE CANCER: ICD-10-CM

## 2022-04-04 LAB
A/G RATIO: 2 (ref 1.1–2.2)
ALBUMIN SERPL-MCNC: 4.7 G/DL (ref 3.4–5)
ALP BLD-CCNC: 73 U/L (ref 40–129)
ALT SERPL-CCNC: 21 U/L (ref 10–40)
ANION GAP SERPL CALCULATED.3IONS-SCNC: 12 MMOL/L (ref 3–16)
AST SERPL-CCNC: 19 U/L (ref 15–37)
BASOPHILS ABSOLUTE: 0.1 K/UL (ref 0–0.2)
BASOPHILS RELATIVE PERCENT: 1.1 %
BILIRUB SERPL-MCNC: 0.3 MG/DL (ref 0–1)
BUN BLDV-MCNC: 18 MG/DL (ref 7–20)
CALCIUM SERPL-MCNC: 9.6 MG/DL (ref 8.3–10.6)
CHLORIDE BLD-SCNC: 100 MMOL/L (ref 99–110)
CHOLESTEROL, TOTAL: 143 MG/DL (ref 0–199)
CO2: 27 MMOL/L (ref 21–32)
CREAT SERPL-MCNC: 0.8 MG/DL (ref 0.8–1.3)
CREATININE URINE: 85.5 MG/DL (ref 39–259)
EOSINOPHILS ABSOLUTE: 0.1 K/UL (ref 0–0.6)
EOSINOPHILS RELATIVE PERCENT: 1.9 %
GFR AFRICAN AMERICAN: >60
GFR NON-AFRICAN AMERICAN: >60
GLUCOSE BLD-MCNC: 72 MG/DL (ref 70–99)
HCT VFR BLD CALC: 49 % (ref 40.5–52.5)
HDLC SERPL-MCNC: 47 MG/DL (ref 40–60)
HEMOGLOBIN: 16.6 G/DL (ref 13.5–17.5)
LDL CHOLESTEROL CALCULATED: 76 MG/DL
LYMPHOCYTES ABSOLUTE: 1.9 K/UL (ref 1–5.1)
LYMPHOCYTES RELATIVE PERCENT: 34.2 %
MCH RBC QN AUTO: 31.3 PG (ref 26–34)
MCHC RBC AUTO-ENTMCNC: 33.8 G/DL (ref 31–36)
MCV RBC AUTO: 92.6 FL (ref 80–100)
MICROALBUMIN UR-MCNC: 3.6 MG/DL
MICROALBUMIN/CREAT UR-RTO: 42.1 MG/G (ref 0–30)
MONOCYTES ABSOLUTE: 0.5 K/UL (ref 0–1.3)
MONOCYTES RELATIVE PERCENT: 9.2 %
NEUTROPHILS ABSOLUTE: 3 K/UL (ref 1.7–7.7)
NEUTROPHILS RELATIVE PERCENT: 53.6 %
PDW BLD-RTO: 14.8 % (ref 12.4–15.4)
PLATELET # BLD: 170 K/UL (ref 135–450)
PMV BLD AUTO: 8.2 FL (ref 5–10.5)
POTASSIUM SERPL-SCNC: 4.2 MMOL/L (ref 3.5–5.1)
PROSTATE SPECIFIC ANTIGEN: 0.44 NG/ML (ref 0–4)
RBC # BLD: 5.3 M/UL (ref 4.2–5.9)
SODIUM BLD-SCNC: 139 MMOL/L (ref 136–145)
TOTAL PROTEIN: 7 G/DL (ref 6.4–8.2)
TRIGL SERPL-MCNC: 98 MG/DL (ref 0–150)
TSH SERPL DL<=0.05 MIU/L-ACNC: 3.6 UIU/ML (ref 0.27–4.2)
VLDLC SERPL CALC-MCNC: 20 MG/DL
WBC # BLD: 5.6 K/UL (ref 4–11)

## 2022-04-04 NOTE — TELEPHONE ENCOUNTER
Medication:   Requested Prescriptions     Pending Prescriptions Disp Refills    rosuvastatin (CRESTOR) 40 MG tablet [Pharmacy Med Name: ROSUVASTATIN 40MG TABLETS] 90 tablet 0     Sig: TAKE 1 TABLET BY MOUTH DAILY     Last Filled:   Last appt: 3/28/2022   Next appt: Visit date not found    Last Lipid:   Lab Results   Component Value Date    CHOL 143 04/04/2022    TRIG 98 04/04/2022    HDL 47 04/04/2022    HDL 38 05/22/2012    LDLCALC 76 04/04/2022

## 2022-04-05 LAB
ESTIMATED AVERAGE GLUCOSE: 174.3 MG/DL
HBA1C MFR BLD: 7.7 %

## 2022-04-05 RX ORDER — ROSUVASTATIN CALCIUM 40 MG/1
40 TABLET, COATED ORAL DAILY
Qty: 90 TABLET | Refills: 0 | Status: SHIPPED | OUTPATIENT
Start: 2022-04-05

## 2022-04-06 DIAGNOSIS — I25.10 CORONARY ARTERY DISEASE INVOLVING NATIVE CORONARY ARTERY OF NATIVE HEART WITHOUT ANGINA PECTORIS: ICD-10-CM

## 2022-04-06 RX ORDER — EMPAGLIFLOZIN, METFORMIN HYDROCHLORIDE 12.5; 1 MG/1; MG/1
TABLET, EXTENDED RELEASE ORAL
Qty: 60 TABLET | Refills: 0 | Status: SHIPPED | OUTPATIENT
Start: 2022-04-06 | End: 2022-08-05

## 2022-04-19 ENCOUNTER — OFFICE VISIT (OUTPATIENT)
Dept: ENDOCRINOLOGY | Age: 72
End: 2022-04-19
Payer: COMMERCIAL

## 2022-04-19 VITALS
WEIGHT: 231 LBS | RESPIRATION RATE: 16 BRPM | BODY MASS INDEX: 28.72 KG/M2 | DIASTOLIC BLOOD PRESSURE: 74 MMHG | HEIGHT: 75 IN | HEART RATE: 83 BPM | SYSTOLIC BLOOD PRESSURE: 148 MMHG

## 2022-04-19 DIAGNOSIS — I10 ESSENTIAL HYPERTENSION: ICD-10-CM

## 2022-04-19 DIAGNOSIS — I25.10 CORONARY ARTERY DISEASE INVOLVING NATIVE CORONARY ARTERY OF NATIVE HEART WITHOUT ANGINA PECTORIS: Primary | ICD-10-CM

## 2022-04-19 PROCEDURE — 99214 OFFICE O/P EST MOD 30 MIN: CPT | Performed by: INTERNAL MEDICINE

## 2022-04-19 PROCEDURE — 1036F TOBACCO NON-USER: CPT | Performed by: INTERNAL MEDICINE

## 2022-04-19 PROCEDURE — 1123F ACP DISCUSS/DSCN MKR DOCD: CPT | Performed by: INTERNAL MEDICINE

## 2022-04-19 PROCEDURE — G8417 CALC BMI ABV UP PARAM F/U: HCPCS | Performed by: INTERNAL MEDICINE

## 2022-04-19 PROCEDURE — G8427 DOCREV CUR MEDS BY ELIG CLIN: HCPCS | Performed by: INTERNAL MEDICINE

## 2022-04-19 PROCEDURE — 3017F COLORECTAL CA SCREEN DOC REV: CPT | Performed by: INTERNAL MEDICINE

## 2022-04-19 PROCEDURE — 3051F HG A1C>EQUAL 7.0%<8.0%: CPT | Performed by: INTERNAL MEDICINE

## 2022-04-19 PROCEDURE — 2022F DILAT RTA XM EVC RTNOPTHY: CPT | Performed by: INTERNAL MEDICINE

## 2022-04-19 PROCEDURE — 4040F PNEUMOC VAC/ADMIN/RCVD: CPT | Performed by: INTERNAL MEDICINE

## 2022-04-19 RX ORDER — DULAGLUTIDE 0.75 MG/.5ML
0.75 INJECTION, SOLUTION SUBCUTANEOUS WEEKLY
Qty: 4 PEN | Refills: 3 | Status: SHIPPED | OUTPATIENT
Start: 2022-04-19 | End: 2022-07-18

## 2022-04-19 NOTE — PROGRESS NOTES
Pt is a 67 y.o. male seen management of uncontrolled type 2 diabetes and abnormal thyroid function test.    Diabetes was diagnosed at routine screening . At the time of diagnosis patient had polyuria polydipsia . Suyapa Alicea got diabetic education in the past when he was first diagnosed with diabetes. Comorbid conditions: Neuropathy and Coronary Artery Disease     previous  diabetic medications include: Actos 30 milligrams, metformin thousand twice a day, Amaryl 4 milligrams daily    He has CAD s/p stents age 48, follows with cardiology  Has hypertension and is on medication well controlled   He has hyperlipidemia and is on medication     INTERIM:    Diabetes  He presents for his follow-up diabetic visit. He has type 2 diabetes mellitus. No MedicAlert identification noted. The initial diagnosis of diabetes was made 20 years ago. His disease course has been worsening. There are no hypoglycemic associated symptoms. Associated symptoms include polyphagia and polyuria. Pertinent negatives for diabetes include no weight loss. There are no hypoglycemic complications. Pertinent negatives for hypoglycemia complications include no required glucagon injection. Symptoms are worsening. Diabetic complications include heart disease and peripheral neuropathy. Risk factors for coronary artery disease include diabetes mellitus, dyslipidemia, family history, male sex, obesity, hypertension and tobacco exposure. Current diabetic treatment includes oral agent (triple therapy). He is compliant with treatment most of the time. His weight is stable. He is following a generally unhealthy diet. Meal planning includes avoidance of concentrated sweets. He has had a previous visit with a dietitian. He rarely participates in exercise. There is no change in his home blood glucose trend. His breakfast blood glucose is taken between 7-8 am. His breakfast blood glucose range is generally >200 mg/dl.  An ACE inhibitor/angiotensin II receptor blocker is being taken. He sees a podiatrist.Eye exam is current. Denies any unexplained hypoglycemia, he has gone back to work  Fasting glucose 90--246   Still some dietary noncompliance but has been working on meeting high carb snacks.       Weight trend: stable  Prior visit with dietician: no  Current diet: on average, 3 meals per day  Current exercise: rare        Past Medical History:   Diagnosis Date    CAD (coronary artery disease)     Chronic back pain     lumbar disk disease    Colorectal polyps     Diabetic ulcer of toe of left foot associated with type 2 diabetes mellitus, limited to breakdown of skin ([address] 5/20/2019    Erectile dysfunction     Hyperlipidemia     Hypertension     Hypertrophy of prostate without urinary obstruction and other lower urinary tract symptoms (LUTS)     Penetrating foot wound 2019    L foot     Retrograde ejaculation     Sciatica     Type II or unspecified type diabetes mellitus without mention of complication, not stated as uncontrolled       Patient Active Problem List   Diagnosis    Type 2 diabetes mellitus without complication, without long-term current use of insulin ([address]    Pure hypercholesterolemia    Essential hypertension    Coronary atherosclerosis of native coronary artery    Ear fullness    Paronychia    Kidney stone    Fungal dermatitis    Neck pain on left side    Hx of smoking    Colon polyps    Left hip pain    Ischemic stroke of frontal lobe (HCC)    Carotid stenosis, symptomatic w/o infarct, right    Falls    Cerebrovascular disease     Past Surgical History:   Procedure Laterality Date    CAROTID ENDARTERECTOMY Right 12/31/2018    RIGHT CAROTID ENDARTERECTOMY WITH INTRA OP DUPLEX SCAN performed by Selwyn Mccarthy MD at [address]      x2    CYST REMOVAL      from back and finger x2    TONSILLECTOMY AND ADENOIDECTOMY       Social History     Socioeconomic History    Marital status:  Spouse name: Not on file    Number of children: Not on file    Years of education: Not on file    Highest education level: Not on file   Occupational History    Not on file   Tobacco Use    Smoking status: Former Smoker     Packs/day: 1.00     Years: 48.00     Pack years: 48.00     Types: Cigarettes     Start date: 1959     Quit date: 2007     Years since quittin.4    Smokeless tobacco: Never Used   Vaping Use    Vaping Use: Never used   Substance and Sexual Activity    Alcohol use: Yes     Comment: social    Drug use: No    Sexual activity: Yes     Partners: Female   Other Topics Concern    Not on file   Social History Narrative    Not on file     Social Determinants of Health     Financial Resource Strain: Low Risk     Difficulty of Paying Living Expenses: Not hard at all   Food Insecurity: No Food Insecurity    Worried About [address] in the Last Year: Never true    [facility] [employer] in the Last Year: Never true   Transportation Needs: No Transportation Needs    Lack of Transportation (Medical): No    Lack of Transportation (Non-Medical): No   Physical Activity: Sufficiently Active    Days of Exercise per Week: 4 days    Minutes of Exercise per Session: 40 min   Stress: No Stress Concern Present    Feeling of Stress : Not at all   Social Connections: Moderately Isolated    Frequency of Communication with Friends and Family: Three times a week    Frequency of Social Gatherings with Friends and Family:  Three times a week    Attends [employer] Services: Never    Active Member of Clubs or Organizations: No    Attends Club or Organization Meetings: Never    Marital Status:    Intimate Partner Violence:     Fear of Current or Ex-Partner: Not on file    Emotionally Abused: Not on file    Physically Abused: Not on file    Sexually Abused: Not on file   Housing Stability: [address] Unable to Pay for Housing in the Last Year: No    Number of [place] in the Last Year: 1    Unstable Housing in the Last Year: No     Family History   Problem Relation Age of Onset    Coronary Art Dis Mother         CABG    Diabetes Mother     High Blood Pressure Father     Stroke Father     Hypertension Brother     Diabetes Brother     Coronary Art Dis Brother     Cancer Brother         prostate    Other Brother         PVD    Diabetes Maternal Grandmother     Cancer Maternal Grandmother         skin    Diabetes Brother     Hypertension Brother     Hypertension Brother         throat polyp, guillain barre     Current Outpatient Medications   Medication Sig Dispense Refill    Dulaglutide (TRULICITY) 6.54 OF/4.6KN SOPN Inject 0.75 mg into the skin once a week 4 pen 3    SYNJARDY XR 12.5-1000 MG TB24 TAKE 1 TABLET BY MOUTH TWICE DAILY 60 tablet 0    rosuvastatin (CRESTOR) 40 MG tablet TAKE 1 TABLET BY MOUTH DAILY 90 tablet 0    tamsulosin (FLOMAX) 0.4 MG capsule TAKE 2 CAPSULEs BY MOUTH EVERY  capsule 2    Lancets (ONETOUCH DELICA PLUS [id number]) MISC USE TO TEST 4 TO 6 TIMES DAILY 200 each 3    ONETOUCH VERIO strip TEST FINGERSTICK GLUCOSE THREE TIMES DAILY 100 strip 5    insulin lispro, 1 Unit Dial, 100 UNIT/ML SOPN INJECT 15 UNITS UNDER THE SKIN WITH EACH MEAL 15 mL 3    hydroCHLOROthiazide (MICROZIDE) 12.5 MG capsule Take 1 capsule by mouth every morning 90 capsule 3    amLODIPine-benazepril (LOTREL) 10-20 MG per capsule Take 1 capsule by mouth daily 90 capsule 3    ezetimibe (ZETIA) 10 MG tablet Take 1 tablet by mouth daily 90 tablet 3    LANTUS SOLOSTAR 100 UNIT/ML injection pen ADMINISTER 56 UNITS UNDER THE SKIN EVERY NIGHT 18 mL 2    B-D UF III MINI PEN NEEDLES 31G X 5 MM MISC USE AS DIRECTED 100 each 6    Blood Glucose Monitoring Suppl (ONETOUCH VERIO FLEX SYSTEM) w/Device KIT Check blood sugar 1 kit 0    aspirin 81 MG EC tablet Take 81 mg by mouth daily. No current facility-administered medications for this visit.      Allergies Allergen Reactions    Other      Bee stings       Family Status   Relation Name Status    Mother      Father      Brother   at age 76    MGM  (Not Specified)    Brother   at age 76    Brother  Alive         OBJECTIVE:  BP (!) 148/74   Pulse 83   Resp 16   Ht 6' 3\" (1.905 m)   Wt 231 lb (104.8 kg)   BMI 28.87 kg/m²    Wt Readings from Last 3 Encounters:   22 231 lb (104.8 kg)   22 225 lb (102.1 kg)   22 226 lb 9.6 oz (102.8 kg)     ROS  I have reviewed the review of system questionnaire filled by the patient .   Patient was advised to contact PCP for non endocrine signs and symptoms     EXAM   Constitutional: no acute distress, well appearing, well nourished  Psychiatric: oriented to person, place and time, judgement, insight and normal, recent and remote memory and intact and mood, affect are normal  Skin: skin and subcutaneous tissue is normal without mass,   Head and Face: examination of head and face revealed no abnormalities  Eyes: no lid or conjunctival swelling, no erythema or discharge, pupils are normal,   Ears/Nose: external inspection of ears and nose revealed no abnormalities, hearing is grossly normal  Oropharynx/Mouth/Face: lips, tongue and gums are normal with no lesions, the voice quality was normal  Neck: neck is supple and symmetric, with midline trachea and no masses, thyroid is normal    Pulmonary: no increased work of breathing or signs of respiratory distress, lungs are clear to auscultation  Cardiovascular: normal heart rate and rhythm, normal S1 and S2,   Musculoskeletal: normal gait and station,   Neurological: normal coordination, normal general cortical function      Lab Results   Component Value Date    LABA1C 7.7 2022    LABA1C 8.2 2022    LABA1C 7.3 10/04/2021         ASSESSMENT/PLAN:    --- Uncontrolled type 2 diabetes mellitus with complication, with long-term current use of insulin 9. 3>>9.9>>8.7>>7.8>>7.8>>7.1>>6.9>>7.3  Control has worsened 7.7    reviewed all his labs results with him in detail  ---he has been taking lantus 56  units at bedtime   Fasting glucose are close to target  He takes humalog with meals, he is advised to increase his Humalog with the meals  ---Advised to avoid snacks at bedtime   On synjardy with no SE   Patient is using Humalog  CIR  10:1 patient is familiar with carbohydrate counting. Uses SSI   --We will start him on GLP-1 agonist sent prescription for Trulicity and gave him a detailed side effect handout so he can start diet before starting the medication common side effect discussed with the patient. Patient was advised that sending of his fingerstick blood glucose logs is crucial in management of his  diabetes. I will adjust the  doses of diabetic medications  according to sent data. Health Maintenance       Last Eye Exam: advised to have annual dilated eye exam. his last eye exam was within a year sept 2020   Last Podiatry Exam:  Saw podiatry in the past advised to follow up   Lipids: Last LDL level was 69  In oct  2021   Microalbumin/Creatinine Ratio:pt has microalbuminuria elevated in sept 2019 , normal in dec 2019   . Education: Reviewed ABCs of diabetes management (respective goals in parentheses): A1C (<7), blood pressure (<130/80), and cholesterol (LDL <100). Additional Education: referred to Diabetes Educator but according to patient and his wife had gone through diabetic education in the past and is very well versed  with carbohydrate counting he will rely on her to help him.     Abnormal thyroid function test his TSH has fluctuated in the last 1 year I will check thyroid antibodies and repeat thyroid function test at follow-up  Repeat thyroid fx test were TSH 4.1   Thyroid AB negative   Not symptomatic       -- Coronary artery disease involving native coronary artery of native heart without angina pectoris  Patient had coronary stents put in 20 years ago. He had carotid endarterectomy done in January 2019    --Essential hypertension  Well controlled on current regimen denies any headaches denies any blurred vision     --- Mixed hyperlipidemia  Patient is on statins --Crestor 40 milligrams and ezetimibe  Advised to work on diet   LDL is at target in May 2021 he will continue with the current regimen      Reviewed and/or ordered clinical lab results yes   Reviewed and/or ordered radiology tests Yes  Reviewed and/or ordered other diagnostic tests yes   Made a decision to obtain old records yes   Reviewed and summarized old records yes     Peggy Perales was counseled regarding symptoms of current diagnosis, course and complications of disease if inadequately treated, side effects of medications, diagnosis, treatment options, and prognosis, risks, benefits, complications, and alternatives of treatment, labs, imaging and other studies and treatment targets and goals. He understands instructions and counseling. Return in about 3 months (around 7/19/2022). Please note that some or all of this report was generated using voice recognition software. Please notify me in case of any questions about the content of this document, as some errors in transcription may have occurred .

## 2022-04-19 NOTE — PATIENT INSTRUCTIONS
Patient Education        dulaglutide  Pronunciation: DOO la GLOO tide  Brand: Trulicity Pen  What is the most important information I should know about dulaglutide? You should not use dulaglutide if you have Multiple Endocrine Neoplasia syndrome type 2 (MEN 2), or a personal or family history of medullary thyroid carcinoma (a type of thyroid cancer). Do not use dulaglutide if you are in astate of diabetic ketoacidosis (call your doctor for treatment). In animal studies, dulaglutide caused thyroid tumors or thyroid cancer. It is not known whether these effects would occur in people using regulardoses. Ask your doctor about your risk. Call your doctor at once if you have signs of a thyroid tumor, such as swelling or a lump in your neck, trouble swallowing, a hoarse voice,or shortness of breath. What is dulaglutide? Dulaglutide is used together with diet and exercise to improve blood sugarcontrol in adults with type 2 diabetes mellitus. Dulaglutide is also used to help reduce the risk of serious heart problems suchas heart attack or stroke in adults who have type 2 diabetes and heart disease. This medicine is not for treating type 1 diabetes. Dulaglutide may also be used for purposes not listed in this medication guide. What should I discuss with my healthcare provider before using dulaglutide? You should not use dulaglutide if you are allergic to it, or if you have:   multiple endocrine neoplasia type 2 (tumors in your glands);   a personal or family history of medullary thyroid carcinoma (a type of thyroid cancer); or   diabetic ketoacidosis (call your doctor for treatment).   Tell your doctor if you have ever had:   pancreatitis;   a stomach or intestinal disorder;   gastroesophageal reflux disease (GERD) or slow digestion;   eye problems caused by diabetes (retinopathy);   liver or kidney disease;   if you also use insulin or oral diabetes medicine; or   if you have been sick with vomiting or diarrhea. In animal studies, dulaglutide caused thyroid tumors or thyroid cancer. It is not known whether these effects would occur in people using regulardoses. Ask your doctor about your risk. It is not known whether this medicine will harm an unborn baby. Tell yourdoctor if you are pregnant or plan to become pregnant. It may not be safe to breast-feed while using this medicine. Ask your doctorabout any risk. Dulaglutide is not approved for use by anyone younger than 25years old. How should I use dulaglutide? Follow all directions on your prescription label and read all medication guides or instruction sheets. Your doctor may occasionally change your dose. Use themedicine exactly as directed. Dulaglutide is injected under the skin once per week. Use dulaglutide on the same day each week at the same time of day. If you change your dosing day, allow at least 3 days to pass between doses. You may use dulaglutide with or without food. Read and carefully follow any Instructions for Use provided with your medicine. Ask your doctor or pharmacist if you do not understand these instructions. Your healthcare provider will show you where on your body to inject dulaglutide. Use a different place each time you give an injection. Do notinject into the same place two times in a row. You may have low blood sugar (hypoglycemia) and feel very hungry, dizzy, irritable, confused, anxious, or shaky. To quickly treat hypoglycemia, eat or drink a fast-acting source of sugar (fruitjuice, hard candy, crackers, raisins, or non-diet soda). Your doctor may prescribe a glucagon injection kit in case you have severe hypoglycemia. Be sure your family or close friends know how to give you thisinjection in an emergency. Also watch for signs of high blood sugar (hyperglycemia) such as increased thirst or urination. Blood sugar levels can be affected by stress, illness, surgery, exercise, alcohol use, or skipping meals.  Ask your doctor before changing your dose or medication schedule. Each injection pen or prefilled syringe is for one use only. Throw away after one use, even if there is still medicine left inside. Use a puncture-proof \"sharps\" container. Follow state or local laws about how to dispose of thiscontainer. Keep it out of the reach of children and pets. Store dulaglutide in the refrigerator, protected from light. Do not use past the expiration date on the medicine label. Do not freeze dulaglutide, and throw away the medicine if it has become frozen. You may also store dulaglutide at room temperature for up to 14 days before use. What happens if I miss a dose? Use the medicine as soon as you can, but skip the missed dose if your next dose is due in less than 3 days. Do not use two doses at one time. Do not use this medicine twice within a 72-hour period. What happens if I overdose? Seek emergency medical attention or call the Poison Help line at 1-164.887.9558. What should I avoid while using dulaglutide? Never share an injection pen or prefilled syringe with another person, even if the needle has been changed. Sharing these devices can allow infections or disease to pass from one personto another. What are the possible side effects of dulaglutide? Stop using dulaglutide and get emergency medical help if you have signs of an allergic reaction: hives; difficult breathing; feeling light-headed; swelling of your face, lips,tongue, or throat.   Call your doctor at once if you have:   pancreatitis --severe pain in your upper stomach spreading to your back, nausea and vomiting;   signs of a thyroid tumor --swelling or a lump in your neck, trouble swallowing, a hoarse voice, or if you feel short of breath;   low blood sugar --headache, hunger, weakness, sweating, confusion, irritability, dizziness, fast heart rate, or feeling jittery; or   kidney problems --little or no urination, swelling in your feet or ankles, feeling tired or short of breath. Tell your doctor if you are sick with vomiting or diarrhea, or if you are sweating more than usual. You can easily become dehydrated while usingdulaglutide. This can lead to kidney failure. Common side effects may include:   nausea, vomiting, stomach pain;   diarrhea; or   loss of appetite. This is not a complete list of side effects and others may occur. Call your doctor for medical advice about side effects. You may report side effects toFDA at 0-770-QIC-5020. What other drugs will affect dulaglutide? Dulaglutide can slow your digestion, and it may take longer for your body toabsorb any medicines you take by mouth. Other drugs may affect dulaglutide, including prescription and over-the-counter medicines, vitamins, and herbal products. Tell your doctor about all yourcurrent medicines and any medicine you start or stop using. Where can I get more information? Your pharmacist can provide more information about dulaglutide. Remember, keep this and all other medicines out of the reach of children, never share your medicines with others, and use this medication only for the indication prescribed. Every effort has been made to ensure that the information provided by [address] is accurate, up-to-date, and complete, but no guarantee is made to that effect. Drug information contained herein may be time sensitive. [employer] information has been compiled for use by healthcare practitioners and consumers in the [place] and therefore [employer] does not warrant that uses outside of the [place] are appropriate, unless specifically indicated otherwise. [employer] drug information does not endorse drugs, diagnose patients or recommend therapy.  [employer] drug information is an informational resource designed to assist licensed healthcare practitioners in caring for their patients and/or to serve consumers viewing this service as a supplement to, and not a substitute for, the expertise, skill, knowledge and judgment of healthcare practitioners. The absence of a warning for a given drug or drug combination in no way should be construed to indicate that the drug or drug combination is safe, effective or appropriate for any given patient. [facility] does not assume any responsibility for any aspect of healthcare administered with the aid of information [facility] provides. The information contained herein is not intended to cover all possible uses, directions, precautions, warnings, drug interactions, allergic reactions, or adverse effects. If you have questions about the drugs you are taking, check with yourdoctor, nurse or pharmacist.  Copyright 9826-2410 [facility] [place]. Version: 4.02. Revision date: 10/8/2020. Care instructions adapted under license by [facility] ([facility]). If you have questions about a medical condition or this instruction, always ask your healthcare professional. [place] [address] any warranty or liability for your use of this information.

## 2022-04-21 ENCOUNTER — TELEPHONE (OUTPATIENT)
Dept: ENDOCRINOLOGY | Age: 72
End: 2022-04-21

## 2022-04-21 NOTE — TELEPHONE ENCOUNTER
Patient states he was in the office and his meter was reset by Floretta Hammans. He said now the meter is not working properly and is requesting a call back on his mobile number.

## 2022-06-01 ENCOUNTER — OFFICE VISIT (OUTPATIENT)
Dept: ORTHOPEDIC SURGERY | Age: 72
End: 2022-06-01

## 2022-06-01 VITALS — WEIGHT: 225 LBS | HEIGHT: 75 IN | BODY MASS INDEX: 27.98 KG/M2

## 2022-06-01 DIAGNOSIS — S43.015A ANTERIOR DISLOCATION OF LEFT SHOULDER, INITIAL ENCOUNTER: ICD-10-CM

## 2022-06-01 DIAGNOSIS — M25.512 LEFT SHOULDER PAIN, UNSPECIFIED CHRONICITY: Primary | ICD-10-CM

## 2022-06-01 DIAGNOSIS — G56.22 NEUROPATHY OF LEFT ULNAR NERVE AT WRIST: ICD-10-CM

## 2022-06-01 PROCEDURE — G8427 DOCREV CUR MEDS BY ELIG CLIN: HCPCS | Performed by: ORTHOPAEDIC SURGERY

## 2022-06-01 PROCEDURE — 3017F COLORECTAL CA SCREEN DOC REV: CPT | Performed by: ORTHOPAEDIC SURGERY

## 2022-06-01 PROCEDURE — 1036F TOBACCO NON-USER: CPT | Performed by: ORTHOPAEDIC SURGERY

## 2022-06-01 PROCEDURE — 1123F ACP DISCUSS/DSCN MKR DOCD: CPT | Performed by: ORTHOPAEDIC SURGERY

## 2022-06-01 PROCEDURE — 99203 OFFICE O/P NEW LOW 30 MIN: CPT | Performed by: ORTHOPAEDIC SURGERY

## 2022-06-01 PROCEDURE — G8417 CALC BMI ABV UP PARAM F/U: HCPCS | Performed by: ORTHOPAEDIC SURGERY

## 2022-06-01 RX ORDER — HYDROCODONE BITARTRATE AND ACETAMINOPHEN 5; 325 MG/1; MG/1
1 TABLET ORAL EVERY 6 HOURS PRN
Qty: 20 TABLET | Refills: 0 | Status: SHIPPED | OUTPATIENT
Start: 2022-06-01 | End: 2022-06-06

## 2022-06-01 NOTE — PROGRESS NOTES
CHIEF COMPLAINT:   1-Left shoulder pain/ dislocation with avulsion fracture proximal humerus. 2-Left hand numbness and tingling/ ulnar neuropathy    DATE OF INJURY: 5/28/2022    HISTORY:  Mr. Milena Landeros is a 67 y.o.  male right handed who presents today for evaluation of a left shoulder injury. The patient reports that this injury occurredafter a fall when he was in a hotel shower in [address] [place] [address]. He was first seen and evaluated in an outside hospital in [address] [place] [address], when he was x-rayed, relocated the shoulder and splinted, and asked to f/u with Orthopedics. He rates his pain a 6-7/10 VAS and not improving. Denies having a history of shoulder dislocation the patient denies any other injuries. Movement makes the pain worse, the sling and resting makes the pain better. He reports new numbness or tingling sensation that started a couple days ago from his left elbow to his hand. He states he is having difficulty crossing his index and middle fingers.       Past Medical History:   Diagnosis Date    CAD (coronary artery disease)     Chronic back pain     lumbar disk disease    Colorectal polyps     Diabetic ulcer of toe of left foot associated with type 2 diabetes mellitus, limited to breakdown of skin ([address] 5/20/2019    Erectile dysfunction     Hyperlipidemia     Hypertension     Hypertrophy of prostate without urinary obstruction and other lower urinary tract symptoms (LUTS)     Penetrating foot wound 2019    L foot     Retrograde ejaculation     Sciatica     Type II or unspecified type diabetes mellitus without mention of complication, not stated as uncontrolled        Past Surgical History:   Procedure Laterality Date    CAROTID ENDARTERECTOMY Right 12/31/2018    RIGHT CAROTID ENDARTERECTOMY WITH INTRA OP DUPLEX SCAN performed by Melania Schwarz MD at [address]      x2    CYST REMOVAL      from back and finger x2   [address] 76         Social History Socioeconomic History    Marital status:      Spouse name: Not on file    Number of children: Not on file    Years of education: Not on file    Highest education level: Not on file   Occupational History    Not on file   Tobacco Use    Smoking status: Former Smoker     Packs/day: 1.00     Years: 48.00     Pack years: 48.00     Types: Cigarettes     Start date: 1959     Quit date: 2007     Years since quittin.5    Smokeless tobacco: Never Used   Vaping Use    Vaping Use: Never used   Substance and Sexual Activity    Alcohol use: Yes     Comment: social    Drug use: No    Sexual activity: Yes     Partners: Female   Other Topics Concern    Not on file   Social History Narrative    Not on file     Social Determinants of Health     Financial Resource Strain: Low Risk     Difficulty of Paying Living Expenses: Not hard at all   Food Insecurity: No Food Insecurity    Worried About [employer] [employer] in the Last Year: Never true    [facility] in the Last Year: Never true   Transportation Needs: No Transportation Needs    Lack of Transportation (Medical): No    Lack of Transportation (Non-Medical): No   Physical Activity: Sufficiently Active    Days of Exercise per Week: 4 days    Minutes of Exercise per Session: 40 min   Stress: No Stress Concern Present    Feeling of Stress : Not at all   Social Connections: Moderately Isolated    Frequency of Communication with Friends and Family: Three times a week    Frequency of Social Gatherings with Friends and Family:  Three times a week    Attends Jehovah's witness Services: Never    Active Member of Clubs or Organizations: No    Attends Club or Organization Meetings: Never    Marital Status:    Intimate Partner Violence:     Fear of Current or Ex-Partner: Not on file    Emotionally Abused: Not on file    Physically Abused: Not on file    Sexually Abused: Not on file   Housing Stability: [address] Unable to Pay for Housing in the Last Year: No    Number of Places Lived in the Last Year: 1    Unstable Housing in the Last Year: No       Family History   Problem Relation Age of Onset    Coronary Art Dis Mother         CABG    Diabetes Mother     High Blood Pressure Father     Stroke Father     Hypertension Brother     Diabetes Brother     Coronary Art Dis Brother     Cancer Brother         prostate    Other Brother         PVD    Diabetes Maternal Grandmother     Cancer Maternal Grandmother         skin    Diabetes Brother     Hypertension Brother     Hypertension Brother         throat polyp, guillain barre       Current Outpatient Medications on File Prior to Visit   Medication Sig Dispense Refill    Dulaglutide (TRULICITY) 5.88 NM/8.1TT SOPN Inject 0.75 mg into the skin once a week 4 pen 3    SYNJARDY XR 12.5-1000 MG TB24 TAKE 1 TABLET BY MOUTH TWICE DAILY 60 tablet 0    rosuvastatin (CRESTOR) 40 MG tablet TAKE 1 TABLET BY MOUTH DAILY 90 tablet 0    tamsulosin (FLOMAX) 0.4 MG capsule TAKE 2 CAPSULEs BY MOUTH EVERY  capsule 2    Lancets (ONETOUCH DELICA PLUS [id number]) MISC USE TO TEST 4 TO 6 TIMES DAILY 200 each 3    ONETOUCH VERIO strip TEST FINGERSTICK GLUCOSE THREE TIMES DAILY 100 strip 5    insulin lispro, 1 Unit Dial, 100 UNIT/ML SOPN INJECT 15 UNITS UNDER THE SKIN WITH EACH MEAL 15 mL 3    hydroCHLOROthiazide (MICROZIDE) 12.5 MG capsule Take 1 capsule by mouth every morning 90 capsule 3    amLODIPine-benazepril (LOTREL) 10-20 MG per capsule Take 1 capsule by mouth daily 90 capsule 3    ezetimibe (ZETIA) 10 MG tablet Take 1 tablet by mouth daily 90 tablet 3    LANTUS SOLOSTAR 100 UNIT/ML injection pen ADMINISTER 56 UNITS UNDER THE SKIN EVERY NIGHT 18 mL 2    B-D UF III MINI PEN NEEDLES 31G X 5 MM MISC USE AS DIRECTED 100 each 6    Blood Glucose Monitoring Suppl (ONETOUCH VERIO FLEX SYSTEM) w/Device KIT Check blood sugar 1 kit 0    aspirin 81 MG EC tablet Take 81 mg by mouth daily. No current facility-administered medications on file prior to visit. Pertinent items are noted in HPI  Review of systems reviewed from Patient History Form and available in the patient's chart under the Media tab. No change noted. PHYSICAL EXAMINATION:  Mr. Shahida Kraus is a very pleasant 67 y.o.  male who presents today in no acute distress, awake, alert, and oriented. He is well dressed, nourished and  groomed. Patient with normal affect. Height is  6' 3\" (1.905 m), weight is 225 lb (102.1 kg), Body mass index is 28.12 kg/m². Resting respiratory rate is 16. The patient walks with no limp. On evaluation of his bilateral upper extremity, there is no obvious deformity left shoulder. There is moderate swelling and minimal ecchymosis. He is tender to palpation over the shoulder, and otherwise non tender over the remainder of the extremity. Range of motion is decreased secondary to pain over the left shoulder. The skin overlying the left shoulder is intact without evidence of lesion, laceration He has weakness in the left ulnar nerve. Distal pulses are 2+ and symmetric bilaterally. Sensation is grossly intact to light touch and symmetric bilaterally. IMAGING:  Xrays dated today in office, 3 views of left shoulder were reviewed, and showed no dislocation with possible small greater tuberosity avulsion fracture. IMPRESSION:    1-Left shoulder pain/ dislocation with avulsion fracture proximal humerus. 2-Left hand numbness and tingling/ ulnar neuropathy    PLAN:  I discussed that the overall alignment of the shoulder is good and that we can try to treat this non-operatively in a sling left shoulder, with no heavy impact activities, and gentle ROM with no external rotation for 6 weeks. We discussed the risk of redislocation. We discussed with him the findings of the ulnar neuropathy.   He was instructed to work on range of motion of the left hand and wrist.  We will see him

## 2022-06-16 ENCOUNTER — OFFICE VISIT (OUTPATIENT)
Dept: ORTHOPEDIC SURGERY | Age: 72
End: 2022-06-16

## 2022-06-16 VITALS — HEIGHT: 75 IN | BODY MASS INDEX: 27.98 KG/M2 | WEIGHT: 225 LBS

## 2022-06-16 DIAGNOSIS — M25.512 LEFT SHOULDER PAIN, UNSPECIFIED CHRONICITY: Primary | ICD-10-CM

## 2022-06-16 DIAGNOSIS — R20.0 NUMBNESS AND TINGLING IN LEFT ARM: ICD-10-CM

## 2022-06-16 DIAGNOSIS — R20.2 NUMBNESS AND TINGLING IN LEFT ARM: ICD-10-CM

## 2022-06-16 DIAGNOSIS — G56.22 ULNAR NEUROPATHY OF LEFT UPPER EXTREMITY: ICD-10-CM

## 2022-06-16 PROCEDURE — 99214 OFFICE O/P EST MOD 30 MIN: CPT | Performed by: ORTHOPAEDIC SURGERY

## 2022-06-16 PROCEDURE — 1036F TOBACCO NON-USER: CPT | Performed by: ORTHOPAEDIC SURGERY

## 2022-06-16 PROCEDURE — 3017F COLORECTAL CA SCREEN DOC REV: CPT | Performed by: ORTHOPAEDIC SURGERY

## 2022-06-16 PROCEDURE — 1123F ACP DISCUSS/DSCN MKR DOCD: CPT | Performed by: ORTHOPAEDIC SURGERY

## 2022-06-16 PROCEDURE — G8427 DOCREV CUR MEDS BY ELIG CLIN: HCPCS | Performed by: ORTHOPAEDIC SURGERY

## 2022-06-16 PROCEDURE — G8417 CALC BMI ABV UP PARAM F/U: HCPCS | Performed by: ORTHOPAEDIC SURGERY

## 2022-06-17 PROBLEM — M25.512 LEFT SHOULDER PAIN: Status: ACTIVE | Noted: 2022-06-17

## 2022-06-17 PROBLEM — R20.2 NUMBNESS AND TINGLING IN LEFT ARM: Status: ACTIVE | Noted: 2022-06-17

## 2022-06-17 PROBLEM — R20.0 NUMBNESS AND TINGLING IN LEFT ARM: Status: ACTIVE | Noted: 2022-06-17

## 2022-06-18 NOTE — PROGRESS NOTES
CHIEF COMPLAINT:   1-Left shoulder pain/ dislocation with avulsion fracture proximal humerus. 2-Left hand numbness and tingling/ ulnar neuropathy    DATE OF INJURY: 5/28/2022    HISTORY:  Mr. Radha George is a 67 y.o.  male right handed who presents today for f/u evaluation of a left shoulder injury. The patient reports that this injury occurredafter a fall when he was in a hotel shower in [address] [place] [address]. He was first seen and evaluated in an outside hospital in [address] [place] [address], when he was x-rayed, relocated the shoulder and splinted, and asked to f/u with Orthopedics. He rates his pain a 8/10 VAS and not improving. Denies having a history of shoulder dislocation the patient denies any other injuries. Movement makes the pain worse, the sling and resting makes the pain better. He reports new numbness or tingling sensation that started a couple days ago from his left elbow to his hand. He states he is having difficulty crossing his index and middle fingers.       Past Medical History:   Diagnosis Date    CAD (coronary artery disease)     Chronic back pain     lumbar disk disease    Colorectal polyps     Diabetic ulcer of toe of left foot associated with type 2 diabetes mellitus, limited to breakdown of skin ([address] 5/20/2019    Erectile dysfunction     Hyperlipidemia     Hypertension     Hypertrophy of prostate without urinary obstruction and other lower urinary tract symptoms (LUTS)     Penetrating foot wound 2019    L foot     Retrograde ejaculation     Sciatica     Type II or unspecified type diabetes mellitus without mention of complication, not stated as uncontrolled        Past Surgical History:   Procedure Laterality Date    CAROTID ENDARTERECTOMY Right 12/31/2018    RIGHT CAROTID ENDARTERECTOMY WITH INTRA OP DUPLEX SCAN performed by Pham Varela MD at [address]      x2    CYST REMOVAL      from back and finger x2   [facility] [address] [place] [address] History     Socioeconomic History    Marital status:      Spouse name: Not on file    Number of children: Not on file    Years of education: Not on file    Highest education level: Not on file   Occupational History    Not on file   Tobacco Use    Smoking status: Former Smoker     Packs/day: 1.00     Years: 48.00     Pack years: 48.00     Types: Cigarettes     Start date: 1959     Quit date: 2007     Years since quittin.5    Smokeless tobacco: Never Used   Vaping Use    Vaping Use: Never used   Substance and Sexual Activity    Alcohol use: Yes     Comment: social    Drug use: No    Sexual activity: Yes     Partners: Female   Other Topics Concern    Not on file   Social History Narrative    Not on file     Social Determinants of Health     Financial Resource Strain: Low Risk     Difficulty of Paying Living Expenses: Not hard at all   Food Insecurity: No Food Insecurity    Worried About Running Out of Food in the Last Year: Never true    [address] in the Last Year: Never true   Transportation Needs: No Transportation Needs    Lack of Transportation (Medical): No    Lack of Transportation (Non-Medical): No   Physical Activity: Sufficiently Active    Days of Exercise per Week: 4 days    Minutes of Exercise per Session: 40 min   Stress: No Stress Concern Present    Feeling of Stress : Not at all   Social Connections: Moderately Isolated    Frequency of Communication with Friends and Family: Three times a week    Frequency of Social Gatherings with Friends and Family:  Three times a week    Attends Yazidism Services: Never    Active Member of Clubs or Organizations: No    Attends Club or Organization Meetings: Never    Marital Status:    Intimate Partner Violence:     Fear of Current or Ex-Partner: Not on file    Emotionally Abused: Not on file    Physically Abused: Not on file    Sexually Abused: Not on file   [facility] St[facility] Unable to Pay for Housing in the Last Year: No    Number of Places Lived in the Last Year: 1    Unstable Housing in the Last Year: No       Family History   Problem Relation Age of Onset    Coronary Art Dis Mother         CABG   Mary Kay Her Diabetes Mother     High Blood Pressure Father     Stroke Father     Hypertension Brother     Diabetes Brother     Coronary Art Dis Brother     Cancer Brother         prostate    Other Brother         PVD    Diabetes Maternal Grandmother     Cancer Maternal Grandmother         skin    Diabetes Brother     Hypertension Brother     Hypertension Brother         throat polyp, guillain barre       Current Outpatient Medications on File Prior to Visit   Medication Sig Dispense Refill    Dulaglutide (TRULICITY) 0.86 MY/3.0YR SOPN Inject 0.75 mg into the skin once a week 4 pen 3    SYNJARDY XR 12.5-1000 MG TB24 TAKE 1 TABLET BY MOUTH TWICE DAILY 60 tablet 0    rosuvastatin (CRESTOR) 40 MG tablet TAKE 1 TABLET BY MOUTH DAILY 90 tablet 0    tamsulosin (FLOMAX) 0.4 MG capsule TAKE 2 CAPSULEs BY MOUTH EVERY  capsule 2    Lancets (ONETOUCH DELICA PLUS [id number]) MISC USE TO TEST 4 TO 6 TIMES DAILY 200 each 3    ONETOUCH VERIO strip TEST FINGERSTICK GLUCOSE THREE TIMES DAILY 100 strip 5    insulin lispro, 1 Unit Dial, 100 UNIT/ML SOPN INJECT 15 UNITS UNDER THE SKIN WITH EACH MEAL 15 mL 3    hydroCHLOROthiazide (MICROZIDE) 12.5 MG capsule Take 1 capsule by mouth every morning 90 capsule 3    amLODIPine-benazepril (LOTREL) 10-20 MG per capsule Take 1 capsule by mouth daily 90 capsule 3    ezetimibe (ZETIA) 10 MG tablet Take 1 tablet by mouth daily 90 tablet 3    LANTUS SOLOSTAR 100 UNIT/ML injection pen ADMINISTER 56 UNITS UNDER THE SKIN EVERY NIGHT 18 mL 2    B-D UF III MINI PEN NEEDLES 31G X 5 MM MISC USE AS DIRECTED 100 each 6    Blood Glucose Monitoring Suppl (ONETOUCH VERIO FLEX SYSTEM) w/Device KIT Check blood sugar 1 kit 0    aspirin 81 MG EC tablet Take 81 mg by mouth daily. No current facility-administered medications on file prior to visit. Pertinent items are noted in HPI  Review of systems reviewed from Patient History Form and available in the patient's chart under the Media tab. No change noted. PHYSICAL EXAMINATION:  Mr. Kay Canada is a very pleasant 67 y.o.  male who presents today in no acute distress, awake, alert, and oriented. He is well dressed, nourished and  groomed. Patient with normal affect. Height is  6' 3\" (1.905 m), weight is 225 lb (102.1 kg), Body mass index is 28.12 kg/m². Resting respiratory rate is 16. The patient walks with no limp. On evaluation of his bilateral upper extremity, there is no obvious deformity left shoulder. There is moderate swelling and minimal ecchymosis. He is tender to palpation over the shoulder, and otherwise non tender over the remainder of the extremity. Range of motion is decreased secondary to pain over the left shoulder. The skin overlying the left shoulder is intact without evidence of lesion, laceration He has weakness in the left ulnar nerve. Distal pulses are 2+ and symmetric bilaterally. Sensation is grossly intact to light touch and symmetric bilaterally. IMAGING:  Xrays dated today in office, 3 views of left shoulder were reviewed, and showed no dislocation with possible small greater tuberosity avulsion fracture. IMPRESSION:    1-Left shoulder pain/ dislocation with avulsion fracture proximal humerus. 2-Left hand numbness and tingling/ ulnar neuropathy    PLAN:  I discussed that the overall alignment of the shoulder is good and that we can try to treat this non-operatively in a sling left shoulder, with no heavy impact activities, and aggressive ROM. We discussed the risk of redislocation. I discussed with the patient that I think that he would really benefit from a course of physical therapy for further strengthening and stretching.  An Rx for physical therapy was given to the patient. We discussed with him the findings of the ulnar neuropathy. He was instructed to work on range of motion of the left hand and wrist.  Since he is continuing to have significant symptoms, we will obtain an EMG of the left UE in order to further evaluate ulnar neuropathy. F/u after EMG.           Imani Jeff MD

## 2022-06-20 ENCOUNTER — HOSPITAL ENCOUNTER (OUTPATIENT)
Dept: PHYSICAL THERAPY | Age: 72
Setting detail: THERAPIES SERIES
Discharge: HOME | End: 2022-06-20
Payer: COMMERCIAL

## 2022-06-20 PROCEDURE — 97161 PT EVAL LOW COMPLEX 20 MIN: CPT | Performed by: PHYSICAL THERAPIST

## 2022-06-20 PROCEDURE — 97112 NEUROMUSCULAR REEDUCATION: CPT | Performed by: PHYSICAL THERAPIST

## 2022-06-20 PROCEDURE — 97110 THERAPEUTIC EXERCISES: CPT | Performed by: PHYSICAL THERAPIST

## 2022-06-20 RX ORDER — INSULIN GLARGINE 100 [IU]/ML
INJECTION, SOLUTION SUBCUTANEOUS
Qty: 18 ML | Refills: 2 | Status: SHIPPED | OUTPATIENT
Start: 2022-06-20 | End: 2022-09-22

## 2022-06-20 NOTE — FLOWSHEET NOTE
[facility],  [address]  Phone: [phone]   Fax:     [phone]    Physical Therapy Daily Treatment Note  Date:  2022    Patient Name:  Suyapa Alicea    :  1950  MRN: [id number]  Restrictions/Precautions:    Medical/Treatment Diagnosis Information:  Diagnosis: M25.512 (ICD-10-CM) - Left shoulder pain, unspecified [id number] (ICD-10-CM) - Ulnar neuropathy of left upper extremity  Treatment Diagnosis: M25.512 L shoulder pain  Insurance/Certification information:  PT Insurance Information: [facility]  Physician Information:    Harleen Cannon MD     Has the plan of care been signed (Y/N):        []  Yes  [x]  No     Date of Patient follow up with Physician: per MD      Is this a Progress Report:     []  Yes  [x]  No        If Yes:  Date Range for reporting period:  Beginning  Ending    Progress report will be due (10 Rx or 30 days whichever is less):        Recertification will be due (POC Duration  / 90 days whichever is less):          Visit # Insurance Allowable Auth Required   1 BMN 80/20 []  Yes []  No        Functional Scale: FOTO 36/100    Date assessed:       Latex Allergy:  [x]NO      []YES  Preferred Language for Healthcare:   [x]English       []other:    Pain level:  4-8/10       SUBJECTIVE:   See eval    OBJECTIVE: See eval    Observation:    Test measurements:      RESTRICTIONS/PRECAUTIONS: type two diab     Exercises/Interventions:   Exercises:  Exercise/Equipment Resistance/Repetitions Other comments   Stretching/PROM     Wand     Table Slides Rolling chair flexion [id number]    UE Jerome ER at 0  [id number]    Pulleys     Pendulum CW CCW         Isometrics     Retraction x30         shrugs x30    Flexion     Abduction     External Rotation     Internal Rotation     Biceps     Triceps          PRE's     Flexion     Abduction     External Rotation Internal Rotation     Shrugs     EXT     Reverse Flys     Serratus     Horizontal Abd with ER     Biceps x30    Triceps     Pronation supination x30         Cable Column/Theraband     External Rotation     Internal Rotation     Shrugs     Lats     Ext     Flex     Scapular Retraction     BIC     TRIC     PNF          Dynamic Stability          Plyoback          Manual interventions                     Therapeutic Exercise and NMR EXR  [x] (01295) Provided verbal/tactile cueing for activities related to strengthening, flexibility, endurance, ROM  for improvements in scapular, scapulothoracic and UE control with self care, reaching, carrying, lifting, house/yardwork, driving/computer work. [x] (18783) Provided verbal/tactile cueing for activities related to improving balance, coordination, kinesthetic sense, posture, motor skill, proprioception  to assist with  scapular, scapulothoracic and UE control with self care, reaching, carrying, lifting, house/yardwork, driving/computer work. Therapeutic Activities:    [] (76342 or 15006) Provided verbal/tactile cueing for activities related to improving balance, coordination, kinesthetic sense, posture, motor skill, proprioception and motor activation to allow for proper function of scapular, scapulothoracic and UE control with self care, carrying, lifting, driving/computer work.      Home Exercise Program:    [x] (64420) Reviewed/Progressed HEP activities related to strengthening, flexibility, endurance, ROM of scapular, scapulothoracic and UE control with self care, reaching, carrying, lifting, house/yardwork, driving/computer work  [] (44845) Reviewed/Progressed HEP activities related to improving balance, coordination, kinesthetic sense, posture, motor skill, proprioception of scapular, scapulothoracic and UE control with self care, reaching, carrying, lifting, house/yardwork, driving/computer work      Manual Treatments:  PROM / STM / Oscillations-Mobs:  G-I, II, functional mobility as indicated by patients Functional Deficits. []? Progressing: []? Met: []? Not Met: []? Adjusted  4. Patient will return to  functional activities sleep no pain, reach overhead, reach behind back to waist level  without increased symptoms or restriction. []? Progressing: []? Met: []? Not Met: []? Adjusted  5. Return to yardwork    []? Progressing: []? Met: []? Not Met: []? Adjusted     Overall Progression Towards Functional goals/ Treatment Progress Update:  [] Patient is progressing as expected towards functional goals listed. [] Progression is slowed due to complexities/Impairments listed. [] Progression has been slowed due to co-morbidities. [x] Plan just implemented, too soon to assess goals progression <30days   [] Goals require adjustment due to lack of progress  [] Patient is not progressing as expected and requires additional follow up with physician  [] Other    Prognosis for POC: [x] Good [] Fair  [] Poor      Patient requires continued skilled intervention: [x] Yes  [] No    Treatment/Activity Tolerance:  [x] Patient able to complete treatment  [] Patient limited by fatigue  [x] Patient limited by pain     [] Patient limited by other medical complications  [] Other:                   Patient Education:  Reviewed diagnosis, POC, HEP and its importance. Access Code: [email or web address]  URL: [email or web address]  Date: 06/20/2022  Prepared by: Hazel Rapp     Exercises  Standing Shoulder Shrugs - 2 x daily - 7 x weekly - 3 sets - 10 reps  Seated Scapular Retraction - 2 x daily - 7 x weekly - 3 sets - 10 reps  Standing Bicep Curls Supinated with Dumbbells - 2 x daily - 7 x weekly - 3 sets - 10 reps  Standing Forearm Pronation and Supination AROM - 2 x daily - 7 x weekly - 3 sets - 10 reps  Seated Shoulder Pendulum Exercise - 2 x daily - 7 x weekly - 3 sets - 10 reps  Table slides flexion - 2 x daily - 7 x weekly - 10 reps - 10 hold  Seated Shoulder External Rotation ANTONIO with Dowel - 2 x daily - 7 x weekly - 10 reps - 10 hold    PLAN: See eval  [] Continue per plan of care [] Cara Zapata current plan (see comments above)  [x] Plan of care initiated [] Hold pending MD visit [] Discharge      Electronically signed by:  Ruddy Lu PT    Note: If patient does not return for scheduled/ recommended follow up visits, this note will serve as a discharge from care along with most recent update on progress.

## 2022-06-20 NOTE — PLAN OF CARE
The [address]  Phone [phone]  Fax [phone]      Physical Therapy Certification    Dear Referring provider  ,    We had the pleasure of evaluating the following patient for physical therapy services at [address] [place] [address]. A summary of our findings can be found in the initial assessment below. This includes our plan of care. If you have any questions or concerns regarding these findings, please do not hesitate to contact me at the office phone number checked above. Thank you for the referral.       Physician Signature:_______________________________Date:__________________  By signing above (or electronic signature), therapists plan is approved by physician      Patient: Jonny Bucio   : 1950   MRN: [id number]  Referring Physician:   Cathy Carter MD      Evaluation Date: 2022      Medical Diagnosis Information:  Diagnosis: M25.512 (ICD-10-CM) - Left shoulder pain, unspecified [id number] (ICD-10-CM) - Ulnar neuropathy of left upper extremity                                             Insurance information: PT Insurance Information: [facility]    Precautions/ Contra-indications: type 2 diab    C-SSRS Triggered by Intake questionnaire (Past 2 wk assessment):   [x] No, Questionnaire did not trigger screening.   [] Yes, Patient intake triggered further evaluation      [] C-SSRS Screening completed  [] PCP notified via Plan of Care  [] Emergency services notified     Latex Allergy:  [x]NO      []YES  Preferred Language for Healthcare:   [x]English       []other:    SUBJECTIVE: Patient stated complaint:Nikunj fell in a shower while in a hotel in [place]. Was taken by ambulance and shoulder was relocated in the ER. Pt reports noting numbness and burning into the L arm to hand at the time of the injury which persists . Also decreased fine motor L hand.   EMG study scheduled Relevant Medical History: type 2 diabetes  Functional Disability Index:FOTO  36/100     Pain Scale: 4-8/10  Easing factors: change positions  Provocative factors: sleeping, laying      Type: [x]Constant   []Intermittent  [x]Radiating []Localized []other:     Numbness/Tingling: palm, most of the hand but worse     Occupation/School: retired PG, FT contract work now, computer work    Living Status/Prior Level of Function: Independent with ADLs and IADLs, yard work     OBJECTIVE:     ROM PROM AROM  Comment    L R L R    Flexion ~70 160      Abduction ~45 scaption 180      ER [email or web address] 85@90abd      IR ~30@ 0 abd  [email or web address]      Other  (cervical)        Other             Strength not tested L R Comment   Flexion      Abduction      ER      IR      Supraspinatus      Upper Trap      Lower Trap      Mid Trap      Rhomboids      Biceps      Triceps      Horizontal Abduction      Horizontal Adduction      Lats        Special Tests not tested Results/Comment   Tameka Prieto    Speeds    OBriens    Apprehension     Load & Shift         Reflexes/Sensation: ulnar distribution complaints not formally assessed              []Dermatomes/Myotomes intact               []Reflexes equal and normal bilaterally               []Other:     Joint mobility: not tested              []Normal               []Hypo              []Hyper     Palpation: not tested     Functional Mobility/Transfers: modified I with guarding of L UE     Posture: WFL for a 67 yr old male with guarding of L UE     Bandages/Dressings/Incisions:      Gait: (include devices/WB status): WFL for a 67 yr old male with guarding of L UE                          [x] Patient history, allergies, meds reviewed. Medical chart reviewed. See intake form. Review Of Systems (ROS):  [x]Performed Review of systems (Integumentary, CardioPulmonary, Neurological) by intake and observation. Intake form has been scanned into medical record.  Patient has been instructed to contact their primary care physician regarding ROS issues if not already being addressed at this time. Co-morbidities/Complexities (which will affect course of rehabilitation):   []None              Arthritic conditions   []Rheumatoid arthritis (M05.9)  []Osteoarthritis (M19.91)    Cardiovascular conditions   []Hypertension (I10)  []Hyperlipidemia (E78.5)  []Angina pectoris (I20)  []Atherosclerosis (I70)    Musculoskeletal conditions   []Disc pathology   []Congenital spine pathologies   []Prior surgical intervention  []Osteoporosis (M81.8)  []Osteopenia (M85.8)   Endocrine conditions   []Hypothyroid (E03.9)  []Hyperthyroid Gastrointestinal conditions   []Constipation (Y50.22)    Metabolic conditions   []Morbid obesity (E66.01)  [x]Diabetes type 1(E10.65) or 2 (E11.65)   []Neuropathy (G60.9)      Pulmonary conditions   []Asthma (J45)  []Coughing   []COPD (J44.9)    Psychological Disorders  []Anxiety (F41.9)  []Depression (F32.9)   []Other:    []Other:            Barriers to/and or personal factors that will affect rehab potential:              [x]Age  [x]Sex              [x]Motivation/Lack of Motivation                        [x]Co-Morbidities              []Cognitive Function, education/learning barriers              []Environmental, home barriers              []profession/work barriers  [x]past PT/medical experience  []other:  Justification:      Falls Risk Assessment (30 days):   [x] Falls Risk assessed and no intervention required.   [] Falls Risk assessed and Patient requires intervention due to being higher risk   TUG score (>12s at risk):     [] Falls education provided, including         ASSESSMENT:   Functional Impairments              [x]Noted spinal or UE joint hypomobility              []Noted spinal or UE joint hypermobility              [x]Decreased UE functional ROM              [x]Decreased UE functional strength              [x]Abnormal reflexes/sensation/myotomal/dermatomal deficits [x]Decreased RC/scapular/core strength and neuromuscular control              []other:       Functional Activity Limitations (from functional questionnaire and intake)              [x]Reduced ability to tolerate prolonged functional positions              [x]Reduced ability or difficulty with changes of positions or transfers between positions              [x]Reduced ability to maintain good posture and demonstrate good body mechanics with sitting, bending, and lifting              [x] Reduced ability or tolerance with driving and/or computer work              [x]Reduced ability to sleep              [x]Reduced ability to perform lifting, reaching, carrying tasks              [x]Reduced ability to tolerate impact through UE              [x]Reduced ability to reach behind back              [x]Reduced ability to  or hold objects              [x]Reduced ability to throw or toss an object              []other:       Participation Restrictions              [x]Reduced participation in self care activities              [x]Reduced participation in home management activities              [x]Reduced participation in work activities              [x]Reduced participation in social activities. [x]Reduced participation in sport / recreational activities. Classification:              []Signs/symptoms consistent with post-surgical status including decreased ROM, strength and function.   [x]Signs/symptoms consistent with joint sprain/strain post traumatic dislocation              []Signs/symptoms consistent with shoulder impingement              []Signs/symptoms consistent with shoulder/elbow/wrist tendinopathy              []Signs/symptoms consistent with Rotator cuff tear              []Signs/symptoms consistent with labral tear              []Signs/symptoms consistent with postural dysfunction                         []Signs/symptoms consistent with Glenohumeral IR Deficit - <45 degrees []Signs/symptoms consistent with facet dysfunction of cervical/thoracic spine                         []Signs/symptoms consistent with pathology which may benefit from Dry needling                []other:      Prognosis/Rehab Potential:                                       []Excellent              [x]Good                 []Fair              []Poor     Tolerance of evaluation/treatment:               []Excellent              []Good                 [x]Fair              []Poor     Physical Therapy Evaluation Complexity Justification  [x] A history of present problem with:  [] no personal factors and/or comorbidities that impact the plan of care;  [x]1-2 personal factors and/or comorbidities that impact the plan of care  []3 personal factors and/or comorbidities that impact the plan of care  [x] An examination of body systems using standardized tests and measures addressing any of the following: body structures and functions (impairments), activity limitations, and/or participation restrictions;:  [] a total of 1-2 or more elements   [x] a total of 3 or more elements   [] a total of 4 or more elements   [x] A clinical presentation with:  [x] stable and/or uncomplicated characteristics   [] evolving clinical presentation with changing characteristics  [] unstable and unpredictable characteristics;   [x] Clinical decision making of [x] low, [] moderate, [] high complexity using standardized patient assessment instrument and/or measurable assessment of functional outcome.      [x] EVAL (LOW) 77424 (typically 20 minutes face-to-face)  [] EVAL (MOD) 82577 (typically 30 minutes face-to-face)  [] EVAL (HIGH) 34162 (typically 45 minutes face-to-face)  [] RE-EVAL         PLAN:   Frequency/Duration:  1-2 days per week for 12 Weeks:  INTERVENTIONS:  [x] Therapeutic exercise including: strength training, ROM, for Upper extremity and core   [x]  NMR activation and proprioception for UE, scap and Core   [x] Manual therapy as

## 2022-06-24 ENCOUNTER — HOSPITAL ENCOUNTER (OUTPATIENT)
Dept: PHYSICAL THERAPY | Age: 72
Setting detail: THERAPIES SERIES
Discharge: HOME | End: 2022-06-24
Payer: COMMERCIAL

## 2022-06-24 PROCEDURE — 97110 THERAPEUTIC EXERCISES: CPT | Performed by: PHYSICAL THERAPIST

## 2022-06-24 PROCEDURE — 97112 NEUROMUSCULAR REEDUCATION: CPT | Performed by: PHYSICAL THERAPIST

## 2022-06-24 PROCEDURE — 97140 MANUAL THERAPY 1/> REGIONS: CPT | Performed by: PHYSICAL THERAPIST

## 2022-06-24 NOTE — FLOWSHEET NOTE
[address],  [address]  Phone: [phone]   Fax:     [phone]    Physical Therapy Daily Treatment Note  Date:  2022    Patient Name:  Jonny Bucio    :  1950  MRN: [id number]  Restrictions/Precautions:    Medical/Treatment Diagnosis Information:  Diagnosis: M25.512 (ICD-10-CM) - Left shoulder pain, unspecified nwshhfeorkS36.22 (ICD-10-CM) - Ulnar neuropathy of left upper extremity  Treatment Diagnosis: M25.512 L shoulder pain  Insurance/Certification information:  PT Insurance Information: [facility]  Physician Information:    Cathy Carter MD     Has the plan of care been signed (Y/N):        [x]  Yes  []  No     Date of Patient follow up with Physician: per MD      Is this a Progress Report:     []  Yes  [x]  No        If Yes:  Date Range for reporting period:  Beginning  Ending    Progress report will be due (10 Rx or 30 days whichever is less):        Recertification will be due (POC Duration  / 90 days whichever is less):          Visit # Insurance Allowable Auth Required   2 BMN 80/20 []  Yes []  No        Functional Scale: FOTO 36/100    Date assessed:       Latex Allergy:  [x]NO      []YES  Preferred Language for Healthcare:   [x]English       []other:    Pain level:  4-8/10       SUBJECTIVE:    Same.     OBJECTIVE: See eval   ROM 22 PROM AROM  Comment     L R L R     Flexion ~100 160         Abduction ~100scaption scaption 180         ER [email or web address] 85@90abd         IR ~30@ 0 abd  [email or web address]         Other  (cervical)             Other                     RESTRICTIONS/PRECAUTIONS: type two diab     Exercises/Interventions:   Exercises:  Exercise/Equipment Resistance/Repetitions Other comments   Stretching/PROM     Wand     Table Slides Rolling chair flexion [id number]    UE [place] ER at 0  [id number]    supine AAROM flexion grab wrist  [id number]    Pendulum CW CCWx30  Fl ext x30  Side to side x30   Start6/24  start6/24   Flexion step away at counter  [id number] start6/24   Isometrics     Retraction x30         shrugs x30    Flexion     Abduction     External Rotation     Internal Rotation     Biceps     Triceps          PRE's     Flexion     Abduction     External Rotation     Internal Rotation     Shrugs     EXT     Reverse Flys     Serratus     Horizontal Abd with ER     Biceps x30    Triceps     Pronation supination x30         Cable Column/Theraband     External Rotation     Internal Rotation     Shrugs     Lats     Ext     Flex     Scapular Retraction     BIC     TRIC     PNF          Dynamic Stability          P[place]oback          Manual interventions     PROM fl scap ER IR  12 min start6/24              Therapeutic Exercise and NMR EXR  [x] (87078) Provided verbal/tactile cueing for activities related to strengthening, flexibility, endurance, ROM  for improvements in scapular, scapulothoracic and UE control with self care, reaching, carrying, lifting, house/yardwork, driving/computer work. [x] (76274) Provided verbal/tactile cueing for activities related to improving balance, coordination, kinesthetic sense, posture, motor skill, proprioception  to assist with  scapular, scapulothoracic and UE control with self care, reaching, carrying, lifting, house/yardwork, driving/computer work. Therapeutic Activities:    [] (58530 or 46243) Provided verbal/tactile cueing for activities related to improving balance, coordination, kinesthetic sense, posture, motor skill, proprioception and motor activation to allow for proper function of scapular, scapulothoracic and UE control with self care, carrying, lifting, driving/computer work.      Home Exercise Program:    [x] (52241) Reviewed/Progressed HEP activities related to strengthening, flexibility, endurance, ROM of scapular, scapulothoracic and UE control with self care, reaching, carrying, lifting, house/yardwork, driving/computer work  [] (13938) Reviewed/Progressed HEP activities related to improving balance, coordination, kinesthetic sense, posture, motor skill, proprioception of scapular, scapulothoracic and UE control with self care, reaching, carrying, lifting, house/yardwork, driving/computer work      Manual Treatments:  PROM / STM / Oscillations-Mobs:  G-I, II, III, IV (PA's, Inf., Post.)  [x] (58708) Provided manual therapy to mobilize soft tissue/joints of cervical/CT, scapular GHJ and UE for the purpose of modulating pain, promoting relaxation,  increasing ROM, reducing/eliminating soft tissue swelling/inflammation/restriction, improving soft tissue extensibility and allowing for proper ROM for normal function with self care, reaching, carrying, lifting, house/yardwork, driving/computer work    Modalities: HEP 15-20 prn for pain     Charges:  Timed Code Treatment Minutes: 40   Total Treatment Minutes: 0912-7711       [] EVAL (LOW) 02217 (typically 20 minutes face-to-face)  [] EVAL (MOD) 81091 (typically 30 minutes face-to-face)  [] EVAL (HIGH) 16844 (typically 45 minutes face-to-face)  [] RE-EVAL     [x] PM(16197) x1    [] IONTO  [x] NMR (20944) x 1    [] VASO  [x] Manual (46397) x  1    [] Other:  [] TA x      [] Mech Traction (41516)  [] ES(attended) (41533)      [] ES (un) (10332):     GOALS:  Patient stated goal: increase ROM decrease pain    []? Progressing: []? Met: []? Not Met: []? Adjusted     Therapist goals for Patient:   Short Term Goals: To be achieved in: 2-4 weeks  1. Independent in HEP and progression per patient tolerance, in order to prevent re-injury. []? Progressing: []? Met: []? Not Met: []? Adjusted   2. Patient will have a decrease in pain to facilitate improvement in movement, function, and ADLs as indicated by Functional Deficits. []? Progressing: []? Met: []? Not Met: []? Adjusted     Long Term Goals: To be achieved in: 12 weeks  1.  Foto 62/100 to assist with reaching prior level of function. []? Progressing: []? Met: []? Not Met: []? Adjusted  2. Patient will demonstrate increased AROM to = R to allow for proper joint functioning as indicated by patients Functional Deficits. []? Progressing: []? Met: []? Not Met: []? Adjusted  3. Patient will demonstrate an increase in strength to good scapular and core control  for UE to allow for proper functional mobility as indicated by patients Functional Deficits. []? Progressing: []? Met: []? Not Met: []? Adjusted  4. Patient will return to  functional activities sleep no pain, reach overhead, reach behind back to waist level  without increased symptoms or restriction. []? Progressing: []? Met: []? Not Met: []? Adjusted  5. Return to yardwork    []? Progressing: []? Met: []? Not Met: []? Adjusted     Overall Progression Towards Functional goals/ Treatment Progress Update:  [] Patient is progressing as expected towards functional goals listed. [] Progression is slowed due to complexities/Impairments listed. [] Progression has been slowed due to co-morbidities. [x] Plan just implemented, too soon to assess goals progression <30days   [] Goals require adjustment due to lack of progress  [] Patient is not progressing as expected and requires additional follow up with physician  [] Other    Prognosis for POC: [x] Good [] Fair  [] Poor      Patient requires continued skilled intervention: [x] Yes  [] No    Treatment/Activity Tolerance:  [x] Patient able to complete treatment  [] Patient limited by fatigue  [x] Patient limited by pain     [] Patient limited by other medical complications  [x] Other: increased PROM noted                  Patient Education:  Reviewed diagnosis, POC, HEP and its importance. Access Code: [email or web address]  URL: [email or web address]  Date: 06/20/2022  Prepared by: Alli Pinto     Exercises  Standing Shoulder Shrugs - 2 x daily - 7 x weekly - 3 sets - 10 reps  Seated Scapular Retraction - 2 x daily - 7 x weekly - 3 sets - 10 reps  Standing Bicep Curls Supinated with Dumbbells - 2 x daily - 7 x weekly - 3 sets - 10 reps  Standing Forearm Pronation and Supination AROM - 2 x daily - 7 x weekly - 3 sets - 10 reps  Seated Shoulder Pendulum Exercise - 2 x daily - 7 x weekly - 3 sets - 10 reps  Table slides flexion - 2 x daily - 7 x weekly - 10 reps - 10 hold  Seated Shoulder External Rotation AAROM with Dowel - 2 x daily - 7 x weekly - 10 reps - 10 hold    PLAN: See eval  [x] Continue per plan of care [] Alter current plan (see comments above)  [] Plan of care initiated [] Hold pending MD visit [] Discharge      Electronically signed by:  Sana Thomason PT    Note: If patient does not return for scheduled/ recommended follow up visits, this note will serve as a discharge from care along with most recent update on progress.

## 2022-06-28 ENCOUNTER — HOSPITAL ENCOUNTER (OUTPATIENT)
Dept: PHYSICAL THERAPY | Age: 72
Setting detail: THERAPIES SERIES
Discharge: HOME | End: 2022-06-28
Payer: COMMERCIAL

## 2022-06-28 PROCEDURE — 97110 THERAPEUTIC EXERCISES: CPT | Performed by: PHYSICAL THERAPIST

## 2022-06-28 PROCEDURE — G0283 ELEC STIM OTHER THAN WOUND: HCPCS | Performed by: PHYSICAL THERAPIST

## 2022-06-28 PROCEDURE — 97112 NEUROMUSCULAR REEDUCATION: CPT | Performed by: PHYSICAL THERAPIST

## 2022-06-28 PROCEDURE — 97140 MANUAL THERAPY 1/> REGIONS: CPT | Performed by: PHYSICAL THERAPIST

## 2022-06-28 NOTE — FLOWSHEET NOTE
[facility] [place] [address]  Phone: [phone]   Fax:     [phone]    Physical Therapy Daily Treatment Note  Date:  2022    Patient Name:  Elijah Ochoa    :  1950  MRN: [id number]  Restrictions/Precautions:    Medical/Treatment Diagnosis Information:  Diagnosis: M25.512 (ICD-10-CM) - Left shoulder pain, unspecified [id number] (ICD-10-CM) - Ulnar neuropathy of left upper extremity  Treatment Diagnosis: M25.512 L shoulder pain  Insurance/Certification information:  PT Insurance Information: [facility]  Physician Information:    Keysha Farfan MD     Has the plan of care been signed (Y/N):        [x]  Yes  []  No     Date of Patient follow up with Physician: per MD      Is this a Progress Report:     []  Yes  [x]  No        If Yes:  Date Range for reporting period:  Beginning  Ending    Progress report will be due (10 Rx or 30 days whichever is less): 3/64       Recertification will be due (POC Duration  / 90 days whichever is less):          Visit # Insurance Allowable Auth Required   3 BMN 80/20 []  Yes []  No        Functional Scale: FOTO 36/100    Date assessed:       Latex Allergy:  [x]NO      []YES  Preferred Language for Healthcare:   [x]English       []other:    Pain level:  7/10  6/28     SUBJECTIVE:   increased pain post last session.   Stopped supine AAROM flexion overhead due to pain    OBJECTIVE: See eval   ROM 22 PROM AROM  Comment     L R L R     Flexion ~100 160         Abduction ~100scaption scaption 180         [place]@e27 85@90abd         IR ~30@ 0 abd  [email or web address]         Other  (cervical)             Other                     RESTRICTIONS/PRECAUTIONS: type two diab     Exercises/Interventions:   Exercises:  Exercise/Equipment Resistance/Repetitions Other comments   Stretching/PROM     Wand     Table Slides Rolling chair flexion [id number]    UE [place] [facility] at 0  [id number]    supine AAROM flexion grab wrist   Stopped after increased pain at home    [address]  Fl ext x30  Side to side x30   Start6/24  start6/24   Flexion step away at counter  [id number] start6/24   IT behind back [id number] able to touch fingers below glut fold  start6/28   Isometrics     Retraction x30     2 min start6/28   shrugs x30    Flexion     Abduction     External Rotation     Internal Rotation     Biceps     Triceps          PRE's     Flexion     Abduction     External Rotation     Internal Rotation     Shrugs     EXT     Reverse Flys     Serratus     Horizontal Abd with ER     Biceps x30    Triceps     Pronation supination x30         Cable Column/Theraband     External Rotation     Internal Rotation     Shrugs     Lats     Ext     Flex     Scapular Retraction     BIC     TRIC     PNF          Dynamic Stability          Plyoback          Manual interventions     PROM fl scap ER IR  12 min start6/24              Therapeutic Exercise and NMR EXR  [x] (55638) Provided verbal/tactile cueing for activities related to strengthening, flexibility, endurance, ROM  for improvements in scapular, scapulothoracic and UE control with self care, reaching, carrying, lifting, house/yardwork, driving/computer work. [x] (28846) Provided verbal/tactile cueing for activities related to improving balance, coordination, kinesthetic sense, posture, motor skill, proprioception  to assist with  scapular, scapulothoracic and UE control with self care, reaching, carrying, lifting, house/yardwork, driving/computer work. Therapeutic Activities:    [] (52312 or 17822) Provided verbal/tactile cueing for activities related to improving balance, coordination, kinesthetic sense, posture, motor skill, proprioception and motor activation to allow for proper function of scapular, scapulothoracic and UE control with self care, carrying, lifting, driving/computer work.      Home Exercise Program:    [x] (71677) Reviewed/Progressed HEP activities related to strengthening, flexibility, endurance, ROM of scapular, scapulothoracic and UE control with self care, reaching, carrying, lifting, house/yardwork, driving/computer work  [] (27271) Reviewed/Progressed HEP activities related to improving balance, coordination, kinesthetic sense, posture, motor skill, proprioception of scapular, scapulothoracic and UE control with self care, reaching, carrying, lifting, house/yardwork, driving/computer work      Manual Treatments:  PROM / STM / Oscillations-Mobs:  G-I, II, III, IV (PA's, Inf., Post.)  [x] (18412) Provided manual therapy to mobilize soft tissue/joints of cervical/CT, scapular GHJ and UE for the purpose of modulating pain, promoting relaxation,  increasing ROM, reducing/eliminating soft tissue swelling/inflammation/restriction, improving soft tissue extensibility and allowing for proper ROM for normal function with self care, reaching, carrying, lifting, house/yardwork, driving/computer work    Modalities: 15 min with TENS 6/28    Charges:  Timed Code Treatment Minutes: 40   Total Treatment Minutes: 755-900       [] EVAL (LOW) 20239 (typically 20 minutes face-to-face)  [] EVAL (MOD) 97545 (typically 30 minutes face-to-face)  [] EVAL (HIGH) 59076 (typically 45 minutes face-to-face)  [] RE-EVAL     [x] IM(68565) x1    [] IONTO  [x] NMR (24497) x 1    [] VASO  [x] Manual (47609) x  1    [] Other:  [] TA x      [] Mech Traction (82307)  [] ES(attended) (09930)      [x] ES (un) (70361): TENS with ice     GOALS:  Patient stated goal: increase ROM decrease pain    []? Progressing: []? Met: []? Not Met: []? Adjusted     Therapist goals for Patient:   Short Term Goals: To be achieved in: 2-4 weeks  1. Independent in HEP and progression per patient tolerance, in order to prevent re-injury. []? Progressing: []? Met: []? Not Met: []? Adjusted   2.  Patient will have a decrease in pain to facilitate improvement in movement, function, and ADLs as indicated by Functional Deficits. []? Progressing: []? Met: []? Not Met: []? Adjusted     Long Term Goals: To be achieved in: 12 weeks  1. Foto 62/100 to assist with reaching prior level of function. []? Progressing: []? Met: []? Not Met: []? Adjusted  2. Patient will demonstrate increased AROM to = R to allow for proper joint functioning as indicated by patients Functional Deficits. []? Progressing: []? Met: []? Not Met: []? Adjusted  3. Patient will demonstrate an increase in strength to good scapular and core control  for UE to allow for proper functional mobility as indicated by patients Functional Deficits. []? Progressing: []? Met: []? Not Met: []? Adjusted  4. Patient will return to  functional activities sleep no pain, reach overhead, reach behind back to waist level  without increased symptoms or restriction. []? Progressing: []? Met: []? Not Met: []? Adjusted  5. Return to yardwork    []? Progressing: []? Met: []? Not Met: []? Adjusted     Overall Progression Towards Functional goals/ Treatment Progress Update:  [] Patient is progressing as expected towards functional goals listed. [] Progression is slowed due to complexities/Impairments listed. [] Progression has been slowed due to co-morbidities. [x] Plan just implemented, too soon to assess goals progression <30days   [] Goals require adjustment due to lack of progress  [] Patient is not progressing as expected and requires additional follow up with physician  [] Other    Prognosis for POC: [x] Good [] Fair  [] Poor      Patient requires continued skilled intervention: [x] Yes  [] No    Treatment/Activity Tolerance:  [x] Patient able to complete treatment  [] Patient limited by fatigue  [x] Patient limited by pain     [] Patient limited by other medical complications  [x] Other:need to continue to increase ROM as able.   trial of ice and TENS to manage pain                   Patient Education:  Reviewed diagnosis, POC, HEP and its importance. Access Code: [email or web address]  URL: [email or web address]  Date: 06/20/2022  Prepared by: Rochelle Kilgore     Exercises  Standing Shoulder Shrugs - 2 x daily - 7 x weekly - 3 sets - 10 reps  Seated Scapular Retraction - 2 x daily - 7 x weekly - 3 sets - 10 reps  Standing Bicep Curls Supinated with Dumbbells - 2 x daily - 7 x weekly - 3 sets - 10 reps  Standing Forearm Pronation and Supination AROM - 2 x daily - 7 x weekly - 3 sets - 10 reps  Seated Shoulder Pendulum Exercise - 2 x daily - 7 x weekly - 3 sets - 10 reps  Table slides flexion - 2 x daily - 7 x weekly - 10 reps - 10 hold  Seated Shoulder External Rotation AAROM with Dowel - 2 x daily - 7 x weekly - 10 reps - 10 hold    PLAN: See eval  [x] Continue per plan of care [] Alter current plan (see comments above)  [] Plan of care initiated [] Hold pending MD visit [] Discharge      Electronically signed by:  Rochelle Kilgore PT    Note: If patient does not return for scheduled/ recommended follow up visits, this note will serve as a discharge from care along with most recent update on progress.

## 2022-07-01 ENCOUNTER — HOSPITAL ENCOUNTER (OUTPATIENT)
Dept: PHYSICAL THERAPY | Age: 72
Setting detail: THERAPIES SERIES
Discharge: HOME | End: 2022-07-01
Payer: MEDICARE

## 2022-07-01 PROCEDURE — 97112 NEUROMUSCULAR REEDUCATION: CPT | Performed by: PHYSICAL THERAPIST

## 2022-07-01 PROCEDURE — 97110 THERAPEUTIC EXERCISES: CPT | Performed by: PHYSICAL THERAPIST

## 2022-07-01 PROCEDURE — G0283 ELEC STIM OTHER THAN WOUND: HCPCS | Performed by: PHYSICAL THERAPIST

## 2022-07-01 PROCEDURE — 97140 MANUAL THERAPY 1/> REGIONS: CPT | Performed by: PHYSICAL THERAPIST

## 2022-07-01 NOTE — FLOWSHEET NOTE
[facility] [address]  Phone: [phone]   Fax:     [phone]    Physical Therapy Daily Treatment Note  Date:  2022    Patient Name:  Sourav Powers    :  1950  MRN: [id number]  Restrictions/Precautions:    Medical/Treatment Diagnosis Information:  Diagnosis: M25.512 (ICD-10-CM) - Left shoulder pain, unspecified [id number] (ICD-10-CM) - Ulnar neuropathy of left upper extremity  Treatment Diagnosis: M25.512 L shoulder pain  Insurance/Certification information:  PT Insurance Information: [facility]  Physician Information:    Roderick Rasheed MD     Has the plan of care been signed (Y/N):        [x]  Yes  []  No     Date of Patient follow up with Physician: per MD      Is this a Progress Report:     []  Yes  [x]  No        If Yes:  Date Range for reporting period:  Beginning  Ending    Progress report will be due (10 Rx or 30 days whichever is less):        Recertification will be due (POC Duration  / 90 days whichever is less):          Visit # Insurance Allowable Auth Required   4 BMN 80/20 []  Yes []  No        Functional Scale: FOTO 36/100    Date assessed:       Latex Allergy:  [x]NO      []YES  Preferred Language for Healthcare:   [x]English       []other:    Pain level:  0-4/10       SUBJECTIVE:    No new c/os.   Pain a bit better post last session with use of ice and TENS    OBJECTIVE: See eval   ROM 22 PROM AROM  Comment     L R L R     Flexion ~100 160         Abduction ~100scaption scaption 180         [email or web address] 85@90abd         IR ~30@ 0 abd  [email or web address]         Other  (cervical)             Other                     RESTRICTIONS/PRECAUTIONS: type two diab     Exercises/Interventions:   Exercises:  Exercise/Equipment Resistance/Repetitions Other comments   Stretching/PROM     [place]  Fl x10 start   Table Slides Rolling chair flexion [id number]    abd x10       start7/1   UE [place] [facility] at 0  [id number]    supine AAROM flexion grab wrist   Stopped after increased pain at home    Pendulum CW CCWx30  Fl ext x30  Side to side x30   Start6/24  start6/24   Flexion step away at counter  21a2irb start6/24   Ball  Forward diagonals  B x10  SL x10  start7/1             IT behind back [id number] able to touch fingers below glut fold  start6/28   Isometrics     Retraction x30     2 min start6/28   shrugs x30    Flexion     Abduction     External Rotation     Internal Rotation     Biceps     Triceps          PRE's     Flexion     Abduction     External Rotation     Internal Rotation     Shrugs     EXT     Reverse Flys     Serratus     Horizontal Abd with ER     Biceps x30 +1#    Triceps     Pronation supination x30 +1#         Cable Column/Theraband     External Rotation     Internal Rotation     Shrugs     Lats     Ext     Flex     Scapular Retraction     BIC     TRIC     PNF          Dynamic Stability          Plyoback          Manual interventions     PROM fl scap ER IR  12 min start6/24              Therapeutic Exercise and NMR EXR  [x] (36672) Provided verbal/tactile cueing for activities related to strengthening, flexibility, endurance, ROM  for improvements in scapular, scapulothoracic and UE control with self care, reaching, carrying, lifting, house/yardwork, driving/computer work. [x] (06858) Provided verbal/tactile cueing for activities related to improving balance, coordination, kinesthetic sense, posture, motor skill, proprioception  to assist with  scapular, scapulothoracic and UE control with self care, reaching, carrying, lifting, house/yardwork, driving/computer work.     Therapeutic Activities:    [] (32537 or 99362) Provided verbal/tactile cueing for activities related to improving balance, coordination, kinesthetic sense, posture, motor skill, proprioception and motor activation to allow for proper function of scapular, scapulothoracic and UE control with self care, carrying, lifting, driving/computer work. Home Exercise Program:    [x] (00644) Reviewed/Progressed HEP activities related to strengthening, flexibility, endurance, ROM of scapular, scapulothoracic and UE control with self care, reaching, carrying, lifting, house/yardwork, driving/computer work  [] (34214) Reviewed/Progressed HEP activities related to improving balance, coordination, kinesthetic sense, posture, motor skill, proprioception of scapular, scapulothoracic and UE control with self care, reaching, carrying, lifting, house/yardwork, driving/computer work      Manual Treatments:  PROM / STM / Oscillations-Mobs:  G-I, II, III, IV (PA's, Inf., Post.)  [x] (76776) Provided manual therapy to mobilize soft tissue/joints of cervical/CT, scapular GHJ and UE for the purpose of modulating pain, promoting relaxation,  increasing ROM, reducing/eliminating soft tissue swelling/inflammation/restriction, improving soft tissue extensibility and allowing for proper ROM for normal function with self care, reaching, carrying, lifting, house/yardwork, driving/computer work    Modalities: 15 min with TENS 7/1    Charges:  Timed Code Treatment Minutes: 40   Total Treatment Minutes: 315-420       [] EVAL (LOW) 04676 (typically 20 minutes face-to-face)  [] EVAL (MOD) 69957 (typically 30 minutes face-to-face)  [] EVAL (HIGH) 74563 (typically 45 minutes face-to-face)  [] RE-EVAL     [x] (94313) x1    [] IONTO  [x] NMR (19602) x 1    [] VASO  [x] Manual (51026) x  1    [] Other:  [] TA x      [] Mech Traction (46005)  [] ES(attended) (02661)      [x] ES (un) (57840): TENS with ice     GOALS:  Patient stated goal: increase ROM decrease pain    []? Progressing: []? Met: []? Not Met: []? Adjusted     Therapist goals for Patient:   Short Term Goals: To be achieved in: 2-4 weeks  1. Independent in HEP and progression per patient tolerance, in order to prevent re-injury. []? Progressing: []?  Met: []? Not Met: []? Adjusted   2. Patient will have a decrease in pain to facilitate improvement in movement, function, and ADLs as indicated by Functional Deficits. []? Progressing: []? Met: []? Not Met: []? Adjusted     Long Term Goals: To be achieved in: 12 weeks  1. Foto 62/100 to assist with reaching prior level of function. []? Progressing: []? Met: []? Not Met: []? Adjusted  2. Patient will demonstrate increased AROM to = R to allow for proper joint functioning as indicated by patients Functional Deficits. []? Progressing: []? Met: []? Not Met: []? Adjusted  3. Patient will demonstrate an increase in strength to good scapular and core control  for UE to allow for proper functional mobility as indicated by patients Functional Deficits. []? Progressing: []? Met: []? Not Met: []? Adjusted  4. Patient will return to  functional activities sleep no pain, reach overhead, reach behind back to waist level  without increased symptoms or restriction. []? Progressing: []? Met: []? Not Met: []? Adjusted  5. Return to yardwork    []? Progressing: []? Met: []? Not Met: []? Adjusted     Overall Progression Towards Functional goals/ Treatment Progress Update:  [] Patient is progressing as expected towards functional goals listed. [] Progression is slowed due to complexities/Impairments listed. [] Progression has been slowed due to co-morbidities.   [x] Plan just implemented, too soon to assess goals progression <30days   [] Goals require adjustment due to lack of progress  [] Patient is not progressing as expected and requires additional follow up with physician  [] Other    Prognosis for POC: [x] Good [] Fair  [] Poor      Patient requires continued skilled intervention: [x] Yes  [] No    Treatment/Activity Tolerance:  [x] Patient able to complete treatment  [] Patient limited by fatigue  [x] Patient limited by pain     [] Patient limited by other medical complications  [x] Other:need to continue to increase ROM as able.   ice and TENS to manage pain                   Patient Education:  Reviewed diagnosis, POC, HEP and its importance. Access Code: [email or web address]  URL: [email or web address]  Date: 06/20/2022  Prepared by: Prabhu Tobar     Exercises  Standing Shoulder Shrugs - 2 x daily - 7 x weekly - 3 sets - 10 reps  Seated Scapular Retraction - 2 x daily - 7 x weekly - 3 sets - 10 reps  Standing Bicep Curls Supinated with Dumbbells - 2 x daily - 7 x weekly - 3 sets - 10 reps  Standing Forearm Pronation and Supination AROM - 2 x daily - 7 x weekly - 3 sets - 10 reps  Seated Shoulder Pendulum Exercise - 2 x daily - 7 x weekly - 3 sets - 10 reps  Table slides flexion - 2 x daily - 7 x weekly - 10 reps - 10 hold  Seated Shoulder External Rotation AAROM with Dowel - 2 x daily - 7 x weekly - 10 reps - 10 hold    PLAN: See eval  [x] Continue per plan of care [] Alter current plan (see comments above)  [] Plan of care initiated [] Hold pending MD visit [] Discharge      Electronically signed by:  Prabhu Tobar, PT    Note: If patient does not return for scheduled/ recommended follow up visits, this note will serve as a discharge from care along with most recent update on progress.

## 2022-07-05 ENCOUNTER — HOSPITAL ENCOUNTER (OUTPATIENT)
Dept: PHYSICAL THERAPY | Age: 72
Setting detail: THERAPIES SERIES
Discharge: HOME | End: 2022-07-05
Payer: MEDICARE

## 2022-07-05 PROCEDURE — 97112 NEUROMUSCULAR REEDUCATION: CPT | Performed by: PHYSICAL THERAPIST

## 2022-07-05 PROCEDURE — 97140 MANUAL THERAPY 1/> REGIONS: CPT | Performed by: PHYSICAL THERAPIST

## 2022-07-05 PROCEDURE — G0283 ELEC STIM OTHER THAN WOUND: HCPCS | Performed by: PHYSICAL THERAPIST

## 2022-07-05 PROCEDURE — 97110 THERAPEUTIC EXERCISES: CPT | Performed by: PHYSICAL THERAPIST

## 2022-07-05 NOTE — FLOWSHEET NOTE
[address],  [address]  Phone: [phone]   Fax:     [phone]    Physical Therapy Daily Treatment Note  Date:  2022    Patient Name:  Clyde Mott    :  1950  MRN: [id number]  Restrictions/Precautions:    Medical/Treatment Diagnosis Information:  Diagnosis: M25.512 (ICD-10-CM) - Left shoulder pain, unspecified tockbrrcxrG67.22 (ICD-10-CM) - Ulnar neuropathy of left upper extremity  Treatment Diagnosis: M25.512 L shoulder pain  Insurance/Certification information:  PT Insurance Information: [facility]  Physician Information:    Darron Patel MD     Has the plan of care been signed (Y/N):        [x]  Yes  []  No     Date of Patient follow up with Physician: per MD      Is this a Progress Report:     []  Yes  [x]  No        If Yes:  Date Range for reporting period:  Beginning  Ending    Progress report will be due (10 Rx or 30 days whichever is less):        Recertification will be due (POC Duration  / 90 days whichever is less):          Visit # Insurance Allowable Auth Required   5 BMN 80/20 []  Yes []  No        Functional Scale: FOTO 36/100    Date assessed:       Latex Allergy:  [x]NO      []YES  Preferred Language for Healthcare:   [x]English       []other:    Pain level:  0-4/10       SUBJECTIVE:   TENS seems to help some with pain.   Still unable to lift arm away from body without assist. EMG this week    OBJECTIVE: See eval   ROM 22 PROM AROM  Comment     L R L R     Flexion ~100 160         Abduction ~100scaption scaption 180         ER [email or web address] 85@90abd         IR ~30@ 0 abd  [email or web address]         Other  (cervical)             Other                     RESTRICTIONS/PRECAUTIONS: type two diab     Exercises/Interventions:   Exercises:  Exercise/Equipment Resistance/Repetitions Other comments   Stretching/PROM     Pulley  Fl x10  scap x10 Start  start Table Slides Rolling chair flexion [id number]    abd x10       start7/1   UE [place] [facility] at 0  [id number]    supine AAROM flexion grab wrist   Stopped after increased pain at home    Pendulum CW CCWx30  Fl ext x30  Side to side x30   Start6/24  start6/24   Flexion step away at counter  [id number] start6/24   Ball  Forward diagonals  B x10  SL x10  start7/1             IT behind back [id number] able to touch fingers below glut fold  start6/28   Isometrics     Retraction x30     2 min start6/28   shrugs x30    Flexion     Abduction     External Rotation     Internal Rotation     Biceps     Triceps          PRE's     Flexion     Abduction     External Rotation     Internal Rotation     Shrugs     EXT     Reverse Flys     Serratus     Horizontal Abd with ER     Biceps x30 +2#    Triceps     Pronation supination x30 +2#         Cable Column/Theraband     External Rotation     Internal Rotation     Shrugs     Lats     Ext     Flex     Scapular Retraction     BIC     TRIC     PNF          Dynamic Stability          Plyoback          Manual interventions     PROM fl scap ER IR  12 min start6/24              Therapeutic Exercise and NMR EXR  [x] (40500) Provided verbal/tactile cueing for activities related to strengthening, flexibility, endurance, ROM  for improvements in scapular, scapulothoracic and UE control with self care, reaching, carrying, lifting, house/yardwork, driving/computer work. [x] (15432) Provided verbal/tactile cueing for activities related to improving balance, coordination, kinesthetic sense, posture, motor skill, proprioception  to assist with  scapular, scapulothoracic and UE control with self care, reaching, carrying, lifting, house/yardwork, driving/computer work.     Therapeutic Activities:    [] (48127 or 00830) Provided verbal/tactile cueing for activities related to improving balance, coordination, kinesthetic sense, posture, motor skill, proprioception and motor activation to allow for proper function of scapular, scapulothoracic and UE control with self care, carrying, lifting, driving/computer work. Home Exercise Program:    [x] (47841) Reviewed/Progressed HEP activities related to strengthening, flexibility, endurance, ROM of scapular, scapulothoracic and UE control with self care, reaching, carrying, lifting, house/yardwork, driving/computer work  [] (90416) Reviewed/Progressed HEP activities related to improving balance, coordination, kinesthetic sense, posture, motor skill, proprioception of scapular, scapulothoracic and UE control with self care, reaching, carrying, lifting, house/yardwork, driving/computer work      Manual Treatments:  PROM / STM / Oscillations-Mobs:  G-I, II, III, IV (PA's, Inf., Post.)  [x] (42974) Provided manual therapy to mobilize soft tissue/joints of cervical/CT, scapular GHJ and UE for the purpose of modulating pain, promoting relaxation,  increasing ROM, reducing/eliminating soft tissue swelling/inflammation/restriction, improving soft tissue extensibility and allowing for proper ROM for normal function with self care, reaching, carrying, lifting, house/yardwork, driving/computer work    Modalities: 15 min with TENS 7/1    Charges:  Timed Code Treatment Minutes: 45   Total Treatment Minutes: 105-219       [] EVAL (LOW) 70577 (typically 20 minutes face-to-face)  [] EVAL (MOD) 41961 (typically 30 minutes face-to-face)  [] EVAL (HIGH) 38821 (typically 45 minutes face-to-face)  [] RE-EVAL     [x] BV(80956) x1    [] IONTO  [x] NMR (61986) x 1    [] VASO  [x] Manual (28784) x  1    [] Other:  [] TA x      [] Mech Traction (87343)  [] ES(attended) (20052)      [x] ES (un) (81374): TENS with ice     GOALS:  Patient stated goal: increase ROM decrease pain    []? Progressing: []? Met: []? Not Met: []? Adjusted     Therapist goals for Patient:   Short Term Goals: To be achieved in: 2-4 weeks  1.  Independent in HEP and progression per patient tolerance, in order to prevent re-injury. []? Progressing: []? Met: []? Not Met: []? Adjusted   2. Patient will have a decrease in pain to facilitate improvement in movement, function, and ADLs as indicated by Functional Deficits. []? Progressing: []? Met: []? Not Met: []? Adjusted     Long Term Goals: To be achieved in: 12 weeks  1. Foto 62/100 to assist with reaching prior level of function. []? Progressing: []? Met: []? Not Met: []? Adjusted  2. Patient will demonstrate increased AROM to = R to allow for proper joint functioning as indicated by patients Functional Deficits. []? Progressing: []? Met: []? Not Met: []? Adjusted  3. Patient will demonstrate an increase in strength to good scapular and core control  for UE to allow for proper functional mobility as indicated by patients Functional Deficits. []? Progressing: []? Met: []? Not Met: []? Adjusted  4. Patient will return to  functional activities sleep no pain, reach overhead, reach behind back to waist level  without increased symptoms or restriction. []? Progressing: []? Met: []? Not Met: []? Adjusted  5. Return to yardwork    []? Progressing: []? Met: []? Not Met: []? Adjusted     Overall Progression Towards Functional goals/ Treatment Progress Update:  [] Patient is progressing as expected towards functional goals listed. [] Progression is slowed due to complexities/Impairments listed. [] Progression has been slowed due to co-morbidities.   [x] Plan just implemented, too soon to assess goals progression <30days   [] Goals require adjustment due to lack of progress  [] Patient is not progressing as expected and requires additional follow up with physician  [] Other    Prognosis for POC: [x] Good [] Fair  [] Poor      Patient requires continued skilled intervention: [x] Yes  [] No    Treatment/Activity Tolerance:  [x] Patient able to complete treatment  [] Patient limited by fatigue  [x] Patient limited by pain     [] Patient limited by other medical complications  [x] Other:need to continue to increase ROM as able. ice and TENS to manage pain                   Patient Education:  Reviewed diagnosis, POC, HEP and its importance. Access Code: [email or web address]  URL: [email or web address]  Date: 06/20/2022  Prepared by: Buffy Auguste     Exercises  Standing Shoulder Shrugs - 2 x daily - 7 x weekly - 3 sets - 10 reps  Seated Scapular Retraction - 2 x daily - 7 x weekly - 3 sets - 10 reps  Standing Bicep Curls Supinated with Dumbbells - 2 x daily - 7 x weekly - 3 sets - 10 reps  Standing Forearm Pronation and Supination AROM - 2 x daily - 7 x weekly - 3 sets - 10 reps  Seated Shoulder Pendulum Exercise - 2 x daily - 7 x weekly - 3 sets - 10 reps  Table slides flexion - 2 x daily - 7 x weekly - 10 reps - 10 hold  Seated Shoulder External Rotation AAROM with Dowel - 2 x daily - 7 x weekly - 10 reps - 10 hold    PLAN: See eval  [x] Continue per plan of care [] Alter current plan (see comments above)  [] Plan of care initiated [] Hold pending MD visit [] Discharge      Electronically signed by:  Buffy Auguste PT    Note: If patient does not return for scheduled/ recommended follow up visits, this note will serve as a discharge from care along with most recent update on progress.

## 2022-07-08 ENCOUNTER — APPOINTMENT (OUTPATIENT)
Dept: PHYSICAL THERAPY | Age: 72
End: 2022-07-08
Payer: MEDICARE

## 2022-07-08 ENCOUNTER — HOSPITAL ENCOUNTER (OUTPATIENT)
Dept: PHYSICAL THERAPY | Age: 72
Setting detail: THERAPIES SERIES
Discharge: HOME | End: 2022-07-08
Payer: MEDICARE

## 2022-07-08 PROCEDURE — 97112 NEUROMUSCULAR REEDUCATION: CPT | Performed by: PHYSICAL THERAPIST

## 2022-07-08 PROCEDURE — 97140 MANUAL THERAPY 1/> REGIONS: CPT | Performed by: PHYSICAL THERAPIST

## 2022-07-08 PROCEDURE — 97110 THERAPEUTIC EXERCISES: CPT | Performed by: PHYSICAL THERAPIST

## 2022-07-08 NOTE — FLOWSHEET NOTE
[facility] [address]  Phone: [phone]   Fax:     [phone]    Physical Therapy Daily Treatment Note  Date:  2022    Patient Name:  Ferny Gomez    :  1950  MRN: [id number]  Restrictions/Precautions:    Medical/Treatment Diagnosis Information:  Diagnosis: M25.512 (ICD-10-CM) - Left shoulder pain, unspecified tgetgezzpfJ38.22 (ICD-10-CM) - Ulnar neuropathy of left upper extremity  Treatment Diagnosis: M25.512 L shoulder pain  Insurance/Certification information:  PT Insurance Information: [facility]  Physician Information:    Barbara Gibson MD     Has the plan of care been signed (Y/N):        [x]  Yes  []  No     Date of Patient follow up with Physician: per MD      Is this a Progress Report:     []  Yes  [x]  No        If Yes:  Date Range for reporting period:  Beginning  Ending    Progress report will be due (10 Rx or 30 days whichever is less):        Recertification will be due (POC Duration  / 90 days whichever is less):          Visit # Insurance Allowable Auth Required   6 BMN 80/20 []  Yes []  No        Functional Scale: FOTO 36/100    Date assessed:       Latex Allergy:  [x]NO      []YES  Preferred Language for Healthcare:   [x]English       []other:    Pain level:  0-4/10       SUBJECTIVE:   had emg today.   They did note some nerve damage in the elbow area    OBJECTIVE: See eval   ROM 22 PROM AROM  Comment     L R L R     Flexion ~100 160         Abduction ~100scaption scaption 180         ER [email or web address] 85@90abd         IR ~30@ 0 abd  [email or web address]         Other  (cervical)             Other                     RESTRICTIONS/PRECAUTIONS: type two diab     Exercises/Interventions:   Exercises:  Exercise/Equipment Resistance/Repetitions Other comments   Stretching/PROM     Pulley  Fl x10  scap x10 Start7/1  start7   Table Slides Rolling chair flexion [id number]    abd x10       start7/1   UE [place] ER at 0  [id number]    supine AAROM flexion grab wrist  [id number]    Pendulum CW CCWx30  Fl ext x30  Side to side x30   Start6/24  start6/24   Flexion step away at counter  43z0nwx start6/24   Ball  Forward diagonals  B x10  SL x10  start7/1             IT behind back 72wjsw3 able to touch fingers below glut fold  start6/28   Isometrics     Retraction x30     2 min start6/28   shrugs x30    Flexion     Abduction     External Rotation     Internal Rotation     Biceps     Triceps          PRE's     Flexion     Abduction     External Rotation     Internal Rotation     Shrugs     EXT     Reverse Flys     Serratus     Horizontal Abd with ER     Biceps x30 +2#    Triceps     Pronation supination x30 +2#         Cable Column/Theraband     External Rotation     Internal Rotation     Shrugs     Lats     Ext     Flex     Scapular Retraction     BIC     TRIC     PNF          Dynamic Stability          Plyoback          Manual interventions     PROM fl scap ER IR  12 min start6/24              Therapeutic Exercise and NMR EXR  [x] (67975) Provided verbal/tactile cueing for activities related to strengthening, flexibility, endurance, ROM  for improvements in scapular, scapulothoracic and UE control with self care, reaching, carrying, lifting, house/yardwork, driving/computer work. [x] (21578) Provided verbal/tactile cueing for activities related to improving balance, coordination, kinesthetic sense, posture, motor skill, proprioception  to assist with  scapular, scapulothoracic and UE control with self care, reaching, carrying, lifting, house/yardwork, driving/computer work.     Therapeutic Activities:    [] (20530 or 95042) Provided verbal/tactile cueing for activities related to improving balance, coordination, kinesthetic sense, posture, motor skill, proprioception and motor activation to allow for proper function of scapular, scapulothoracic and UE control with self care, carrying, lifting, driving/computer work. Home Exercise Program:    [x] (54690) Reviewed/Progressed HEP activities related to strengthening, flexibility, endurance, ROM of scapular, scapulothoracic and UE control with self care, reaching, carrying, lifting, house/yardwork, driving/computer work  [] (13085) Reviewed/Progressed HEP activities related to improving balance, coordination, kinesthetic sense, posture, motor skill, proprioception of scapular, scapulothoracic and UE control with self care, reaching, carrying, lifting, house/yardwork, driving/computer work      Manual Treatments:  PROM / STM / Oscillations-Mobs:  G-I, II, III, IV (PA's, Inf., Post.)  [x] (85354) Provided manual therapy to mobilize soft tissue/joints of cervical/CT, scapular GHJ and UE for the purpose of modulating pain, promoting relaxation,  increasing ROM, reducing/eliminating soft tissue swelling/inflammation/restriction, improving soft tissue extensibility and allowing for proper ROM for normal function with self care, reaching, carrying, lifting, house/yardwork, driving/computer work    Modalities: 15 min with TENS 7/1    Charges:  Timed Code Treatment Minutes: 45   Total Treatment Minutes: 240-355       [] EVAL (LOW) 23974 (typically 20 minutes face-to-face)  [] EVAL (MOD) 91954 (typically 30 minutes face-to-face)  [] EVAL (HIGH) 02504 (typically 45 minutes face-to-face)  [] RE-EVAL     [x] DB(59509) x1    [] IONTO  [x] NMR (88578) x 1    [] VASO  [x] Manual (72951) x  1    [] Other:  [] TA x      [] Mech Traction (87839)  [] ES(attended) (07062)      [x] ES (un) (74568): TENS with ice     GOALS:  Patient stated goal: increase ROM decrease pain    []? Progressing: []? Met: []? Not Met: []? Adjusted     Therapist goals for Patient:   Short Term Goals: To be achieved in: 2-4 weeks  1. Independent in HEP and progression per patient tolerance, in order to prevent re-injury. []? Progressing: []? Met: []? Not Met: []? Adjusted   2. manage pain                   Patient Education:  Reviewed diagnosis, POC, HEP and its importance. Access Code: [email or web address]  URL: [email or web address]  Date: 06/20/2022  Prepared by: Annia Johnson     Exercises  Standing Shoulder Shrugs - 2 x daily - 7 x weekly - 3 sets - 10 reps  Seated Scapular Retraction - 2 x daily - 7 x weekly - 3 sets - 10 reps  Standing Bicep Curls Supinated with Dumbbells - 2 x daily - 7 x weekly - 3 sets - 10 reps  Standing Forearm Pronation and Supination AROM - 2 x daily - 7 x weekly - 3 sets - 10 reps  Seated Shoulder Pendulum Exercise - 2 x daily - 7 x weekly - 3 sets - 10 reps  Table slides flexion - 2 x daily - 7 x weekly - 10 reps - 10 hold  Seated Shoulder External Rotation AAROM with Dowel - 2 x daily - 7 x weekly - 10 reps - 10 hold    PLAN: See eval  [x] Continue per plan of care [] Alter current plan (see comments above)  [] Plan of care initiated [] Hold pending MD visit [] Discharge      Electronically signed by:  Annia Johnson, PT    Note: If patient does not return for scheduled/ recommended follow up visits, this note will serve as a discharge from care along with most recent update on progress.

## 2022-07-12 ENCOUNTER — HOSPITAL ENCOUNTER (OUTPATIENT)
Dept: PHYSICAL THERAPY | Age: 72
Setting detail: THERAPIES SERIES
Discharge: HOME | End: 2022-07-12
Payer: MEDICARE

## 2022-07-12 PROCEDURE — 97112 NEUROMUSCULAR REEDUCATION: CPT | Performed by: PHYSICAL THERAPIST

## 2022-07-12 PROCEDURE — 97140 MANUAL THERAPY 1/> REGIONS: CPT | Performed by: PHYSICAL THERAPIST

## 2022-07-12 PROCEDURE — 97110 THERAPEUTIC EXERCISES: CPT | Performed by: PHYSICAL THERAPIST

## 2022-07-12 NOTE — FLOWSHEET NOTE
[address],  [address]  Phone: [phone]   Fax:     [phone]    Physical Therapy Daily Treatment Note  Date:  2022    Patient Name:  Shelley Ernst    :  1950  MRN: [id number]  Restrictions/Precautions:    Medical/Treatment Diagnosis Information:  Diagnosis: M25.512 (ICD-10-CM) - Left shoulder pain, unspecified [id number] (ICD-10-CM) - Ulnar neuropathy of left upper extremity  Treatment Diagnosis: M25.512 L shoulder pain  Insurance/Certification information:  PT Insurance Information: [id number]   Physician Information:    Macario Ortiz MD     Has the plan of care been signed (Y/N):        [x]  Yes  []  No     Date of Patient follow up with Physician: per MD      Is this a Progress Report:     []  Yes  [x]  No        If Yes:  Date Range for reporting period:  Beginning  Ending    Progress report will be due (10 Rx or 30 days whichever is less):        Recertification will be due (POC Duration  / 90 days whichever is less):          Visit # Insurance Allowable Auth Required   7 BMN 80/20 []  Yes []  No        Functional Scale: FOTO 36/100    Date assessed:       Latex Allergy:  [x]NO      []YES  Preferred Language for Healthcare:   [x]English       []other:    Pain level:  0-4/10       SUBJECTIVE:   no new c/o. About the same. Shoulder hurts. No follow up with MD yet.  Advised pt to call and follow up with MD in the next 7-10 days     OBJECTIVE: See eval   ROM 22 PROM AROM  Comment     L R L R     Flexion ~112 160         Abduction ~120scaption scaption 180         ER [email or web address] 85@90abd         IR ~45@ 60abd  [email or web address]         Other  (cervical)             Other                     RESTRICTIONS/PRECAUTIONS: type two diab     Exercises/Interventions:   Exercises:  Exercise/Equipment Resistance/Repetitions Other comments   Stretching/PROM     Pulley  Fl x10  scap x10 Start7/1  start7/5   Table Slides Rolling chair flexion [id number]    abd x10       start7/1   UE [place] ER at 0  84k97hls  ER supien @40  65l63gjq    supine AAROM flexion grab wrist  4k72jpa    Pendulum CW CCWx30  Fl ext x30  Side to side x30   Start6/24  start6/24   Flexion step away at counter  91l4wlk start6/24   Ball  Forward diagonals  B x10  SL x10  start7/1   Butterfly  97l50wvn start7/12        IT behind back 74rkdx9 able to touch fingers below glut fold  start6/28   Isometrics     Retraction x30     2 min start6/28   shrugs x30    Flexion     Abduction     External Rotation     Internal Rotation     Biceps     Triceps          PRE's     Flexion     Abduction     External Rotation     Internal Rotation     Shrugs     EXT     Reverse Flys     Serratus     Horizontal Abd with ER     Biceps x30 +3# ^7/12   Triceps     Pronation supination x30 +3# ^7/12        Cable Column/Theraband     External Rotation     Internal Rotation     Shrugs     Lats     Ext     Flex     Scapular Retraction     BIC     TRIC     PNF          Dynamic Stability          Plyoback          Manual interventions     PROM fl scap ER IR  12 min start6/24              Therapeutic Exercise and NMR EXR  [x] (81831) Provided verbal/tactile cueing for activities related to strengthening, flexibility, endurance, ROM  for improvements in scapular, scapulothoracic and UE control with self care, reaching, carrying, lifting, house/yardwork, driving/computer work. [x] (80700) Provided verbal/tactile cueing for activities related to improving balance, coordination, kinesthetic sense, posture, motor skill, proprioception  to assist with  scapular, scapulothoracic and UE control with self care, reaching, carrying, lifting, house/yardwork, driving/computer work.     Therapeutic Activities:    [] (20288 or 87016) Provided verbal/tactile cueing for activities related to improving balance, coordination, kinesthetic sense, posture, motor skill, proprioception and motor activation to allow for proper function of scapular, scapulothoracic and UE control with self care, carrying, lifting, driving/computer work. Home Exercise Program:    [x] (19444) Reviewed/Progressed HEP activities related to strengthening, flexibility, endurance, ROM of scapular, scapulothoracic and UE control with self care, reaching, carrying, lifting, house/yardwork, driving/computer work  [] (97602) Reviewed/Progressed HEP activities related to improving balance, coordination, kinesthetic sense, posture, motor skill, proprioception of scapular, scapulothoracic and UE control with self care, reaching, carrying, lifting, house/yardwork, driving/computer work      Manual Treatments:  PROM / STM / Oscillations-Mobs:  G-I, II, III, IV (PA's, Inf., Post.)  [x] (67464) Provided manual therapy to mobilize soft tissue/joints of cervical/CT, scapular GHJ and UE for the purpose of modulating pain, promoting relaxation,  increasing ROM, reducing/eliminating soft tissue swelling/inflammation/restriction, improving soft tissue extensibility and allowing for proper ROM for normal function with self care, reaching, carrying, lifting, house/yardwork, driving/computer work    Modalities:     Charges:  Timed Code Treatment Minutes: 45   Total Treatment Minutes: 005-108       [] EVAL (LOW) 27045 (typically 20 minutes face-to-face)  [] EVAL (MOD) 52371 (typically 30 minutes face-to-face)  [] EVAL (HIGH) 98754 (typically 45 minutes face-to-face)  [] RE-EVAL     [x] DE(91505) x1    [] IONTO  [x] NMR (33855) x 1    [] VASO  [x] Manual (94283) x  1    [] Other:  [] TA x      [] Mech Traction (89735)  [] ES(attended) (94716)      [x] ES (un) (14105): TENS with ice     GOALS:  Patient stated goal: increase ROM decrease pain    []? Progressing: []? Met: []? Not Met: []? Adjusted     Therapist goals for Patient:   Short Term Goals: To be achieved in: 2-4 weeks  1.  Independent in HEP and progression per patient tolerance, in order to prevent re-injury. []? Progressing: []? Met: []? Not Met: []? Adjusted   2. Patient will have a decrease in pain to facilitate improvement in movement, function, and ADLs as indicated by Functional Deficits. []? Progressing: []? Met: []? Not Met: []? Adjusted     Long Term Goals: To be achieved in: 12 weeks  1. Foto 62/100 to assist with reaching prior level of function. []? Progressing: []? Met: []? Not Met: []? Adjusted  2. Patient will demonstrate increased AROM to = R to allow for proper joint functioning as indicated by patients Functional Deficits. []? Progressing: []? Met: []? Not Met: []? Adjusted  3. Patient will demonstrate an increase in strength to good scapular and core control  for UE to allow for proper functional mobility as indicated by patients Functional Deficits. []? Progressing: []? Met: []? Not Met: []? Adjusted  4. Patient will return to  functional activities sleep no pain, reach overhead, reach behind back to waist level  without increased symptoms or restriction. []? Progressing: []? Met: []? Not Met: []? Adjusted  5. Return to yardwork    []? Progressing: []? Met: []? Not Met: []? Adjusted     Overall Progression Towards Functional goals/ Treatment Progress Update:  [] Patient is progressing as expected towards functional goals listed. [] Progression is slowed due to complexities/Impairments listed. [] Progression has been slowed due to co-morbidities.   [x] Plan just implemented, too soon to assess goals progression <30days   [] Goals require adjustment due to lack of progress  [] Patient is not progressing as expected and requires additional follow up with physician  [] Other    Prognosis for POC: [x] Good [] Fair  [] Poor      Patient requires continued skilled intervention: [x] Yes  [] No    Treatment/Activity Tolerance:  [x] Patient able to complete treatment  [] Patient limited by fatigue  [x] Patient limited by pain     [] Patient limited by other medical complications  [x] Other:need to continue to increase ROM as able. No ice and TENS to manage pain today due to meeting after PT. Some increase in PROM noted however pt still reports moderate pain and marked difficulty with any AROM away from body with L shoulder                   Patient Education:  Reviewed diagnosis, POC, HEP and its importance. Access Code: [email or web address]  URL: [email or web address]  Date: 06/20/2022  Prepared by: Nathan Ray     Exercises  Standing Shoulder Shrugs - 2 x daily - 7 x weekly - 3 sets - 10 reps  Seated Scapular Retraction - 2 x daily - 7 x weekly - 3 sets - 10 reps  Standing Bicep Curls Supinated with Dumbbells - 2 x daily - 7 x weekly - 3 sets - 10 reps  Standing Forearm Pronation and Supination AROM - 2 x daily - 7 x weekly - 3 sets - 10 reps  Seated Shoulder Pendulum Exercise - 2 x daily - 7 x weekly - 3 sets - 10 reps  Table slides flexion - 2 x daily - 7 x weekly - 10 reps - 10 hold  Seated Shoulder External Rotation AAROM with Dowel - 2 x daily - 7 x weekly - 10 reps - 10 hold    PLAN: See eval  [x] Continue per plan of care [] Alter current plan (see comments above)  [] Plan of care initiated [] Hold pending MD visit [] Discharge      Electronically signed by:  Nathan Ray, PT    Note: If patient does not return for scheduled/ recommended follow up visits, this note will serve as a discharge from care along with most recent update on progress.

## 2022-07-13 DIAGNOSIS — I25.10 CORONARY ARTERY DISEASE INVOLVING NATIVE CORONARY ARTERY OF NATIVE HEART WITHOUT ANGINA PECTORIS: ICD-10-CM

## 2022-07-13 DIAGNOSIS — I10 ESSENTIAL HYPERTENSION: ICD-10-CM

## 2022-07-13 LAB
A/G RATIO: 2.2 (ref 1.1–2.2)
ALBUMIN SERPL-MCNC: 4.8 G/DL (ref 3.4–5)
ALP BLD-CCNC: 78 U/L (ref 40–129)
ALT SERPL-CCNC: 22 U/L (ref 10–40)
ANION GAP SERPL CALCULATED.3IONS-SCNC: 12 MMOL/L (ref 3–16)
AST SERPL-CCNC: 18 U/L (ref 15–37)
BILIRUB SERPL-MCNC: 0.4 MG/DL (ref 0–1)
BUN BLDV-MCNC: 12 MG/DL (ref 7–20)
CALCIUM SERPL-MCNC: 9.8 MG/DL (ref 8.3–10.6)
CHLORIDE BLD-SCNC: 100 MMOL/L (ref 99–110)
CHOLESTEROL, TOTAL: 141 MG/DL (ref 0–199)
CO2: 28 MMOL/L (ref 21–32)
CREAT SERPL-MCNC: 0.7 MG/DL (ref 0.8–1.3)
CREATININE URINE: 99 MG/DL (ref 39–259)
ESTIMATED AVERAGE GLUCOSE: 165.7 MG/DL
GFR AFRICAN AMERICAN: >60
GFR NON-AFRICAN AMERICAN: >60
GLUCOSE BLD-MCNC: 72 MG/DL (ref 70–99)
HBA1C MFR BLD: 7.4 %
HDLC SERPL-MCNC: 43 MG/DL (ref 40–60)
LDL CHOLESTEROL CALCULATED: 77 MG/DL
MICROALBUMIN UR-MCNC: 3 MG/DL
MICROALBUMIN/CREAT UR-RTO: 30.3 MG/G (ref 0–30)
POTASSIUM SERPL-SCNC: 3.9 MMOL/L (ref 3.5–5.1)
SODIUM BLD-SCNC: 140 MMOL/L (ref 136–145)
TOTAL PROTEIN: 7 G/DL (ref 6.4–8.2)
TRIGL SERPL-MCNC: 104 MG/DL (ref 0–150)
TSH SERPL DL<=0.05 MIU/L-ACNC: 4.15 UIU/ML (ref 0.27–4.2)
VLDLC SERPL CALC-MCNC: 21 MG/DL

## 2022-07-15 ENCOUNTER — HOSPITAL ENCOUNTER (OUTPATIENT)
Dept: PHYSICAL THERAPY | Age: 72
Setting detail: THERAPIES SERIES
Discharge: HOME | End: 2022-07-15
Payer: MEDICARE

## 2022-07-15 PROCEDURE — 97110 THERAPEUTIC EXERCISES: CPT | Performed by: PHYSICAL THERAPIST

## 2022-07-15 PROCEDURE — 97140 MANUAL THERAPY 1/> REGIONS: CPT | Performed by: PHYSICAL THERAPIST

## 2022-07-15 PROCEDURE — 97112 NEUROMUSCULAR REEDUCATION: CPT | Performed by: PHYSICAL THERAPIST

## 2022-07-15 NOTE — FLOWSHEET NOTE
[place] [id number] [address]  Phone: [phone]   Fax:     [phone]    Physical Therapy Daily Treatment Note  Date:  7/15/2022    Patient Name:  Franc Hill    :  1950  MRN: [id number]  Restrictions/Precautions:    Medical/Treatment Diagnosis Information:  Diagnosis: M25.512 (ICD-10-CM) - Left shoulder pain, unspecified [id number] (ICD-10-CM) - Ulnar neuropathy of left upper extremity  Treatment Diagnosis: M25.512 L shoulder pain  Insurance/Certification information:  PT Insurance Information: [facility]  Physician Information:    Susy López MD     Has the plan of care been signed (Y/N):        [x]  Yes  []  No     Date of Patient follow up with Physician: per MD      Is this a Progress Report:     []  Yes  [x]  No        If Yes:  Date Range for reporting period:  Beginning  Ending    Progress report will be due (10 Rx or 30 days whichever is less):        Recertification will be due (POC Duration  / 90 days whichever is less):          Visit # Insurance Allowable Auth Required   8 BMN 80/20 []  Yes []  No        Functional Scale: FOTO 36/100    Date assessed:       Latex Allergy:  [x]NO      []YES  Preferred Language for Healthcare:   [x]English       []other:    Pain level:  0-4/10       SUBJECTIVE:  7/15 pain persists.   See md end of month     OBJECTIVE: See eval   ROM 22 PROM AROM  Comment     L R L R     Flexion ~112 160         Abduction ~120scaption scaption 180         ER [email or web address] 85@90abd         IR ~45@ 60abd  [email or web address]         Other  (cervical)             Other                    RESTRICTIONS/PRECAUTIONS: type two diab     Exercises/Interventions:   Exercises:  Exercise/Equipment Resistance/Repetitions Other comments   Stretching/PROM     Pulley  Fl x10  scap x10 Start7/1  start7/5   Table Slides Rolling chair flexion 63q20dpu    abd x10 start7/1   UE [place] ER at 0  44q31sco  ER supine @40  68v57eep    supine AAROM flexion grab wrist  7t12rvj    Pendulum CW CCWx30  Fl ext x30  Side to side x30   Start6/24  start6/24   Flexion step away at counter  22b4xgp start6/24   Ball  Forward diagonals  B x10  SL x10     Abd x10 start7/1   Butterfly  99h43roc start7/12        IT behind back 62jdof8 start6/28   Isometrics     Retraction x30     2 min start6/28   shrugs x30    Flexion     Abduction     External Rotation     Internal Rotation     Biceps     Triceps          PRE's     Flexion     Abduction     External Rotation     Internal Rotation     Shrugs     EXT     Reverse Flys     Serratus     Horizontal Abd with ER     Biceps x30 +3# ^7/12   Triceps     Pronation supination x30 +3# ^7/12        Cable Column/Theraband     External Rotation     Internal Rotation     Shrugs     Lats     Ext     Flex     Scapular Retraction     BIC     TRIC     PNF          Dynamic Stability          Plyoback          Manual interventions     PROM fl scap ER IR  12 min start6/24              Therapeutic Exercise and NMR EXR  [x] (28409) Provided verbal/tactile cueing for activities related to strengthening, flexibility, endurance, ROM  for improvements in scapular, scapulothoracic and UE control with self care, reaching, carrying, lifting, house/yardwork, driving/computer work. [x] (79998) Provided verbal/tactile cueing for activities related to improving balance, coordination, kinesthetic sense, posture, motor skill, proprioception  to assist with  scapular, scapulothoracic and UE control with self care, reaching, carrying, lifting, house/yardwork, driving/computer work.     Therapeutic Activities:    [] (27327 or 10556) Provided verbal/tactile cueing for activities related to improving balance, coordination, kinesthetic sense, posture, motor skill, proprioception and motor activation to allow for proper function of scapular, scapulothoracic and UE control with self care, carrying, lifting, driving/computer work. Home Exercise Program:    [x] (25197) Reviewed/Progressed HEP activities related to strengthening, flexibility, endurance, ROM of scapular, scapulothoracic and UE control with self care, reaching, carrying, lifting, house/yardwork, driving/computer work  [] (34224) Reviewed/Progressed HEP activities related to improving balance, coordination, kinesthetic sense, posture, motor skill, proprioception of scapular, scapulothoracic and UE control with self care, reaching, carrying, lifting, house/yardwork, driving/computer work      Manual Treatments:  PROM / STM / Oscillations-Mobs:  G-I, II, III, IV (PA's, Inf., Post.)  [x] (75021) Provided manual therapy to mobilize soft tissue/joints of cervical/CT, scapular GHJ and UE for the purpose of modulating pain, promoting relaxation,  increasing ROM, reducing/eliminating soft tissue swelling/inflammation/restriction, improving soft tissue extensibility and allowing for proper ROM for normal function with self care, reaching, carrying, lifting, house/yardwork, driving/computer work    Modalities:   Charges:  Timed Code Treatment Minutes: 40   Total Treatment Minutes: 240-330       [] EVAL (LOW) 53296 (typically 20 minutes face-to-face)  [] EVAL (MOD) 21086 (typically 30 minutes face-to-face)  [] EVAL (HIGH) 78658 (typically 45 minutes face-to-face)  [] RE-EVAL     [x] JR(83999) x1    [] IONTO  [x] NMR (66447) x 1    [] VASO  [x] Manual (84746) x  1    [] Other:  [] TA x      [] Mech Traction (08334)  [] ES(attended) (11611)      [] ES (un) (88031): TENS with ice     GOALS:  Patient stated goal: increase ROM decrease pain    [] Progressing: [] Met: [] Not Met: [] Adjusted     Therapist goals for Patient:   Short Term Goals: To be achieved in: 2-4 weeks  1. Independent in HEP and progression per patient tolerance, in order to prevent re-injury. [] Progressing: [] Met: [] Not Met: [] Adjusted   2.  Patient will have a decrease in pain to facilitate improvement in movement, function, and ADLs as indicated by Functional Deficits. [] Progressing: [] Met: [] Not Met: [] Adjusted     Long Term Goals: To be achieved in: 12 weeks  1. Foto 62/100 to assist with reaching prior level of function. [] Progressing: [] Met: [] Not Met: [] Adjusted  2. Patient will demonstrate increased AROM to = R to allow for proper joint functioning as indicated by patients Functional Deficits. [] Progressing: [] Met: [] Not Met: [] Adjusted  3. Patient will demonstrate an increase in strength to good scapular and core control  for UE to allow for proper functional mobility as indicated by patients Functional Deficits. [] Progressing: [] Met: [] Not Met: [] Adjusted  4. Patient will return to  functional activities sleep no pain, reach overhead, reach behind back to waist level  without increased symptoms or restriction. [] Progressing: [] Met: [] Not Met: [] Adjusted  5. Return to yardwork    [] Progressing: [] Met: [] Not Met: [] Adjusted     Overall Progression Towards Functional goals/ Treatment Progress Update:  [] Patient is progressing as expected towards functional goals listed. [] Progression is slowed due to complexities/Impairments listed. [] Progression has been slowed due to co-morbidities. [x] Plan just implemented, too soon to assess goals progression <30days   [] Goals require adjustment due to lack of progress  [] Patient is not progressing as expected and requires additional follow up with physician  [] Other    Prognosis for POC: [x] Good [] Fair  [] Poor      Patient requires continued skilled intervention: [x] Yes  [] No    Treatment/Activity Tolerance:  [x] Patient able to complete treatment  [] Patient limited by fatigue  [x] Patient limited by pain     [] Patient limited by other medical complications  [x] Other:need to continue to increase ROM as able.    No ice and TENS to manage pain today due to meeting after PT. Some increase in PROM noted however pt still reports moderate pain and marked difficulty with any AROM away from body with L shoulder                   Patient Education:  Reviewed diagnosis, POC, HEP and its importance. Access Code: [email or web address]  URL: [email or web address]  Date: 06/20/2022  Prepared by: Leroy Medrano     Exercises  Standing Shoulder Shrugs - 2 x daily - 7 x weekly - 3 sets - 10 reps  Seated Scapular Retraction - 2 x daily - 7 x weekly - 3 sets - 10 reps  Standing Bicep Curls Supinated with Dumbbells - 2 x daily - 7 x weekly - 3 sets - 10 reps  Standing Forearm Pronation and Supination AROM - 2 x daily - 7 x weekly - 3 sets - 10 reps  Seated Shoulder Pendulum Exercise - 2 x daily - 7 x weekly - 3 sets - 10 reps  Table slides flexion - 2 x daily - 7 x weekly - 10 reps - 10 hold  Seated Shoulder External Rotation AAROM with Dowel - 2 x daily - 7 x weekly - 10 reps - 10 hold    PLAN: See eval  [x] Continue per plan of care [] Alter current plan (see comments above)  [] Plan of care initiated [] Hold pending MD visit [] Discharge      Electronically signed by:  Leroy Medrano, PT    Note: If patient does not return for scheduled/ recommended follow up visits, this note will serve as a discharge from care along with most recent update on progress.

## 2022-07-18 ENCOUNTER — OFFICE VISIT (OUTPATIENT)
Dept: ENDOCRINOLOGY | Age: 72
End: 2022-07-18
Payer: COMMERCIAL

## 2022-07-18 VITALS
DIASTOLIC BLOOD PRESSURE: 77 MMHG | BODY MASS INDEX: 27.1 KG/M2 | SYSTOLIC BLOOD PRESSURE: 140 MMHG | WEIGHT: 218 LBS | HEIGHT: 75 IN | RESPIRATION RATE: 16 BRPM | HEART RATE: 70 BPM

## 2022-07-18 DIAGNOSIS — I10 ESSENTIAL HYPERTENSION: Primary | ICD-10-CM

## 2022-07-18 PROCEDURE — 99214 OFFICE O/P EST MOD 30 MIN: CPT | Performed by: INTERNAL MEDICINE

## 2022-07-18 PROCEDURE — 3051F HG A1C>EQUAL 7.0%<8.0%: CPT | Performed by: INTERNAL MEDICINE

## 2022-07-18 PROCEDURE — 1123F ACP DISCUSS/DSCN MKR DOCD: CPT | Performed by: INTERNAL MEDICINE

## 2022-07-18 RX ORDER — PEN NEEDLE, DIABETIC 31 GX5/16"
NEEDLE, DISPOSABLE MISCELLANEOUS
Qty: 200 EACH | Refills: 5 | Status: SHIPPED | OUTPATIENT
Start: 2022-07-18

## 2022-07-18 NOTE — PROGRESS NOTES
Pt is a 67 y.o. male seen management of uncontrolled type 2 diabetes and abnormal thyroid function test.    Diabetes was diagnosed at routine screening . At the time of diagnosis patient had polyuria polydipsia . Berry Kussmaul got diabetic education in the past when he was first diagnosed with diabetes. Comorbid conditions: Neuropathy and Coronary Artery Disease     previous  diabetic medications include: Actos 30 milligrams, metformin thousand twice a day, Amaryl 4 milligrams daily    He has CAD s/p stents age 48, follows with cardiology  Has hypertension and is on medication well controlled   He has hyperlipidemia and is on medication     INTERIM:    Diabetes  He presents for his follow-up diabetic visit. He has type 2 diabetes mellitus. No MedicAlert identification noted. The initial diagnosis of diabetes was made 20 years ago. His disease course has been worsening. There are no hypoglycemic associated symptoms. Associated symptoms include polyphagia and polyuria. Pertinent negatives for diabetes include no weight loss. There are no hypoglycemic complications. Pertinent negatives for hypoglycemia complications include no required glucagon injection. Symptoms are worsening. Diabetic complications include heart disease and peripheral neuropathy. Risk factors for coronary artery disease include diabetes mellitus, dyslipidemia, family history, male sex, obesity, hypertension and tobacco exposure. Current diabetic treatment includes oral agent (triple therapy). He is compliant with treatment most of the time. His weight is stable. He is following a generally unhealthy diet. Meal planning includes avoidance of concentrated sweets. He has had a previous visit with a dietitian. He rarely participates in exercise. There is no change in his home blood glucose trend. His breakfast blood glucose is taken between 7-8 am. His breakfast blood glucose range is generally >200 mg/dl.  An ACE inhibitor/angiotensin II receptor blocker is being taken. He sees a podiatrist.Eye exam is current. Denies any unexplained hypoglycemia, he has gone back to work  Fasting glucose 90--246   Still some dietary noncompliance but has been working on meeting high carb snacks.       Weight trend: stable  Prior visit with dietician: no  Current diet: on average, 3 meals per day  Current exercise: rare        Past Medical History:   Diagnosis Date    CAD (coronary artery disease)     Chronic back pain     lumbar disk disease    Colorectal polyps     Diabetic ulcer of toe of left foot associated with type 2 diabetes mellitus, limited to breakdown of skin ([address] 5/20/2019    Erectile dysfunction     Hyperlipidemia     Hypertension     Hypertrophy of prostate without urinary obstruction and other lower urinary tract symptoms (LUTS)     Penetrating foot wound 2019    L foot     Retrograde ejaculation     Sciatica     Type II or unspecified type diabetes mellitus without mention of complication, not stated as uncontrolled       Patient Active Problem List   Diagnosis    Type 2 diabetes mellitus without complication, without long-term current use of insulin (HCC)    Pure hypercholesterolemia    Essential hypertension    Coronary atherosclerosis of native coronary artery    Ear fullness    Paronychia    Kidney stone    Fungal dermatitis    Neck pain on left side    Hx of smoking    Colon polyps    Left hip pain    Ischemic stroke of frontal lobe (HCC)    Carotid stenosis, symptomatic w/o infarct, right    Falls    Cerebrovascular disease    Ulnar neuropathy of left upper extremity    Anterior dislocation of left shoulder    Numbness and tingling in left arm    Left shoulder pain     Past Surgical History:   Procedure Laterality Date    CAROTID ENDARTERECTOMY Right 12/31/2018    RIGHT CAROTID ENDARTERECTOMY WITH INTRA OP DUPLEX SCAN performed by Jaquelin Harper MD at [facility]      x2    CYST REMOVAL      from back and finger x2 TONSILLECTOMY AND ADENOIDECTOMY       Social History     Socioeconomic History    Marital status:      Spouse name: Not on file    Number of children: Not on file    Years of education: Not on file    Highest education level: Not on file   Occupational History    Not on file   Tobacco Use    Smoking status: Former     Packs/day: 1.00     Years: 48.00     Pack years: 48.00     Types: Cigarettes     Start date: 1959     Quit date: 2007     Years since quittin.6    Smokeless tobacco: Never   Vaping Use    Vaping Use: Never used   Substance and Sexual Activity    Alcohol use: Yes     Comment: social    Drug use: No    Sexual activity: Yes     Partners: Female   Other Topics Concern    Not on file   Social History Narrative    Not on file     Social Determinants of Health     Financial Resource Strain: Low Risk     Difficulty of Paying Living Expenses: Not hard at all   Food Insecurity: No Food Insecurity    Worried About [facility] in the Last Year: Never true    [facility] in the Last Year: Never true   Transportation Needs: No Transportation Needs    Lack of Transportation (Medical): No    Lack of Transportation (Non-Medical): No   Physical Activity: Sufficiently Active    Days of Exercise per Week: 4 days    Minutes of Exercise per Session: 40 min   Stress: No Stress Concern Present    Feeling of Stress : Not at all   Social Connections: Moderately Isolated    Frequency of Communication with Friends and Family: Three times a week    Frequency of Social Gatherings with Friends and Family:  Three times a week    Attends [employer] Services: Never    Active Member of Clubs or Organizations: No    Attends Club or Organization Meetings: Never    Marital Status:    Intimate Partner Violence: Not on file   Housing Stability: Low Risk     Unable to Pay for Housing in the Last Year: No    Number of [place] in the Last Year: 1    Unstable Housing in the Last Year: No     Family History   Problem Relation Age of Onset    Coronary Art Dis Mother         CABG    Diabetes Mother     High Blood Pressure Father     Stroke Father     Hypertension Brother     Diabetes Brother     Coronary Art Dis Brother     Cancer Brother         prostate    Other Brother         PVD    Diabetes Maternal Grandmother     Cancer Maternal Grandmother         skin    Diabetes Brother     Hypertension Brother     Hypertension Brother         throat polyp, guillain barre     Current Outpatient Medications   Medication Sig Dispense Refill    Insulin Pen Needle (B-D UF III MINI PEN NEEDLES) 31G X 5 MM MISC Use 4 times daily to inject insulin 200 each 5    LANTUS SOLOSTAR 100 UNIT/ML injection pen ADMINISTER 56 UNITS UNDER THE SKIN EVERY NIGHT 18 mL 2    SYNJARDY XR 12.5-1000 MG TB24 TAKE 1 TABLET BY MOUTH TWICE DAILY 60 tablet 0    rosuvastatin (CRESTOR) 40 MG tablet TAKE 1 TABLET BY MOUTH DAILY 90 tablet 0    tamsulosin (FLOMAX) 0.4 MG capsule TAKE 2 CAPSULEs BY MOUTH EVERY  capsule 2    Lancets (ONETOUCH DELICA PLUS [id number]) MISC USE TO TEST 4 TO 6 TIMES DAILY 200 each 3    ONETOUCH VERIO strip TEST FINGERSTICK GLUCOSE THREE TIMES DAILY 100 strip 5    insulin lispro, 1 Unit Dial, 100 UNIT/ML SOPN INJECT 15 UNITS UNDER THE SKIN WITH EACH MEAL 15 mL 3    hydroCHLOROthiazide (MICROZIDE) 12.5 MG capsule Take 1 capsule by mouth every morning 90 capsule 3    amLODIPine-benazepril (LOTREL) 10-20 MG per capsule Take 1 capsule by mouth daily 90 capsule 3    ezetimibe (ZETIA) 10 MG tablet Take 1 tablet by mouth daily 90 tablet 3    Blood Glucose Monitoring Suppl (ONETOUCH VERIO FLEX SYSTEM) w/Device KIT Check blood sugar 1 kit 0    aspirin 81 MG EC tablet Take 81 mg by mouth daily. No current facility-administered medications for this visit.      Allergies   Allergen Reactions    Other      Bee stings       Family Status   Relation Name Status    Mother      Father      Brother   at age 76 MGM  (Not Specified)    Brother   at age 76    Brother  Alive         OBJECTIVE:  BP (!) 140/77   Pulse 70   Resp 16   Ht 6' 3\" (1.905 m)   Wt 218 lb (98.9 kg)   BMI 27.25 kg/m²    Wt Readings from Last 3 Encounters:   22 218 lb (98.9 kg)   22 225 lb (102.1 kg)   22 225 lb (102.1 kg)     ROS  I have reviewed the review of system questionnaire filled by the patient .   Patient was advised to contact PCP for non endocrine signs and symptoms     EXAM   Constitutional: no acute distress, well appearing, well nourished  Psychiatric: oriented to person, place and time, judgement, insight and normal, recent and remote memory and intact and mood, affect are normal  Skin: skin and subcutaneous tissue is normal without mass,   Head and Face: examination of head and face revealed no abnormalities  Eyes: no lid or conjunctival swelling, no erythema or discharge, pupils are normal,   Ears/Nose: external inspection of ears and nose revealed no abnormalities, hearing is grossly normal  Oropharynx/Mouth/Face: lips, tongue and gums are normal with no lesions, the voice quality was normal  Neck: neck is supple and symmetric, with midline trachea and no masses, thyroid is normal    Pulmonary: no increased work of breathing or signs of respiratory distress, lungs are clear to auscultation  Cardiovascular: normal heart rate and rhythm, normal S1 and S2,   Musculoskeletal: normal gait and station,   Neurological: normal coordination, normal general cortical function      Lab Results   Component Value Date/Time    LABA1C 7.4 2022 07:34 AM    LABA1C 7.7 2022 08:11 AM    LABA1C 8.2 2022 04:29 PM         ASSESSMENT/PLAN:    --- Uncontrolled type 2 diabetes mellitus with complication, with long-term current use of insulin 9.3>>9.9>>8.7>>7.8>>7.8>>7.1>>6.9>>7.3  Control not at target Aic 7.4    reviewed all his labs results with him in detail  ---he has been taking lantus 56  units at bedtime some fasting in 60s so reduce dose to 50 units   Fasting glucose are close to target  He takes humalog with meals, he is advised to increase his Humalog with the meals  ---Advised to avoid snacks at bedtime   On synjardy with no SE   Patient is using Humalog  CIR  10:1 patient is familiar with carbohydrate counting. Uses SSI     ---didn't want CGMS   Patient was advised that sending of his fingerstick blood glucose logs is crucial in management of his  diabetes. I will adjust the  doses of diabetic medications  according to sent data. Health Maintenance       Last Eye Exam: advised to have annual dilated eye exam. his last eye exam was within a year dec 2021   Last Podiatry Exam:  follows with podiatry closely --- July 2022 had a sore due to inserts   Lipids: Last LDL level was 77  In  July 2022   Microalbumin/Creatinine Ratio:pt has microalbuminuria elevated in sept 2019 , normal in July 2022     . Education: Reviewed ABCs of diabetes management (respective goals in parentheses): A1C (<7), blood pressure (<130/80), and cholesterol (LDL <100). Additional Education: referred to Diabetes Educator but according to patient and his wife had gone through diabetic education in the past and is very well versed  with carbohydrate counting he will rely on her to help him. Abnormal thyroid function test his TSH has fluctuated in the last 1 year I will check thyroid antibodies and repeat thyroid function test at follow-up  Repeat thyroid fx test were TSH 4.1   Thyroid AB negative   Not symptomatic       -- Coronary artery disease involving native coronary artery of native heart without angina pectoris  Patient had coronary stents put in 20 years ago.   He had carotid endarterectomy done in January 2019    --Essential hypertension  Well controlled on current regimen denies any headaches denies any blurred vision     --- Mixed hyperlipidemia  Patient is on statins --Crestor 40 milligrams and ezetimibe  Advised to work on diet   LDL is at target in May 2021 he will continue with the current regimen      Reviewed and/or ordered clinical lab results yes   Reviewed and/or ordered radiology tests Yes  Reviewed and/or ordered other diagnostic tests yes   Made a decision to obtain old records yes   Reviewed and summarized old records yes     Kathryn Barahona was counseled regarding symptoms of current diagnosis, course and complications of disease if inadequately treated, side effects of medications, diagnosis, treatment options, and prognosis, risks, benefits, complications, and alternatives of treatment, labs, imaging and other studies and treatment targets and goals. He understands instructions and counseling. Return in about 4 months (around 11/18/2022). Please note that some or all of this report was generated using voice recognition software. Please notify me in case of any questions about the content of this document, as some errors in transcription may have occurred .

## 2022-07-19 ENCOUNTER — HOSPITAL ENCOUNTER (OUTPATIENT)
Dept: PHYSICAL THERAPY | Age: 72
Setting detail: THERAPIES SERIES
Discharge: HOME | End: 2022-07-19
Payer: MEDICARE

## 2022-07-19 PROCEDURE — 97140 MANUAL THERAPY 1/> REGIONS: CPT | Performed by: PHYSICAL THERAPIST

## 2022-07-19 PROCEDURE — 97112 NEUROMUSCULAR REEDUCATION: CPT | Performed by: PHYSICAL THERAPIST

## 2022-07-19 PROCEDURE — 97110 THERAPEUTIC EXERCISES: CPT | Performed by: PHYSICAL THERAPIST

## 2022-07-19 NOTE — FLOWSHEET NOTE
[facility] [address]  Phone: [phone]   Fax:     [phone]    Physical Therapy Daily Treatment Note  Date:  2022    Patient Name:  Hazel Lima    :  1950  MRN: [id number]  Restrictions/Precautions:    Medical/Treatment Diagnosis Information:  Diagnosis: M25.512 (ICD-10-CM) - Left shoulder pain, unspecified [id number] (ICD-10-CM) - Ulnar neuropathy of left upper extremity  Treatment Diagnosis: M25.512 L shoulder pain  Insurance/Certification information:  PT Insurance Information: [id number] [employer]   Physician Information:    Yakov Chopra MD     Has the plan of care been signed (Y/N):        [x]  Yes    []  No     Date of Patient follow up with Physician: per MD      Is this a Progress Report:     []  Yes  [x]  No        If Yes:  Date Range for reporting period:  Beginning  Ending    Progress report will be due (10 Rx or 30 days whichever is less):       Recertification will be due (POC Duration  / 90 days whichever is less):          Visit # Insurance Allowable Auth Required   9 BMN 80 []  Yes []  No        Functional Scale: FOTO 36/100 38/100    Date assessed:   ,    Latex Allergy:  [x]NO      []YES  Preferred Language for Healthcare:   [x]English       []other:    Pain level:  0-4/10       SUBJECTIVE:   no change  pain persists.   See md end of month     OBJECTIVE: See eval   ROM 22 PROM AROM  Comment     L R L R     Flexion ~115 160  unable to lift        Abduction ~120scaption scaption 180  unable to lift        ER [email or web address] 85@90abd         IR ~45@ 60abd  [email or web address]         Other  (cervical)             Other                    RESTRICTIONS/PRECAUTIONS: type two diab     Exercises/Interventions:   Exercises:  Exercise/Equipment Resistance/Repetitions Other comments   Stretching/PROM     P[place]ley  Fl x10  scap x10 Start  start   Table Slides Rolling chair flexion [id number]    abd x10       start7/1   UE [place] ER at 0  [id number]  ER supine @40  [id number]    supine AAROM flexion grab wrist  [id number]    Pendulum CW CCWx30  Fl ext x30  Side to side x30   Start6/24  start6/24   Flexion step away at counter  11g8tgg start6/24   Ball  Forward diagonals  B x10  SL x10     Abd x10 start7/1   Butterfly  [id number] start7/12        IT behind back [id number] start6/28   Isometrics     Retraction x30     2 min start6/28   shrugs x30    Flexion     Abduction     External Rotation     Internal Rotation     Biceps     Triceps          PRE's     Flexion     Abduction     External Rotation     Internal Rotation     Shrugs     EXT     Reverse Flys     Serratus     Horizontal Abd with ER     Biceps x30 +3# ^7/12   Triceps     Pronation supination x30 +3# ^7/12        Cable Column/Theraband     External Rotation     Internal Rotation     Shrugs     Lats     Ext     Flex     Scapular Retraction     BIC     TRIC     PNF          Dynamic Stability          Plyoback          Manual interventions     PROM fl scap ER IR  12 min start6/24              Therapeutic Exercise and NMR EXR  [x] (32244) Provided verbal/tactile cueing for activities related to strengthening, flexibility, endurance, ROM  for improvements in scapular, scapulothoracic and UE control with self care, reaching, carrying, lifting, house/yardwork, driving/computer work. [x] (28151) Provided verbal/tactile cueing for activities related to improving balance, coordination, kinesthetic sense, posture, motor skill, proprioception  to assist with  scapular, scapulothoracic and UE control with self care, reaching, carrying, lifting, house/yardwork, driving/computer work.     Therapeutic Activities:    [] (73405 or 07699) Provided verbal/tactile cueing for activities related to improving balance, coordination, kinesthetic sense, posture, motor skill, proprioception and motor activation to allow for proper function of scapular, scapulothoracic and UE control with self care, carrying, lifting, driving/computer work. Home Exercise Program:    [x] (06990) Reviewed/Progressed HEP activities related to strengthening, flexibility, endurance, ROM of scapular, scapulothoracic and UE control with self care, reaching, carrying, lifting, house/yardwork, driving/computer work  [] (00696) Reviewed/Progressed HEP activities related to improving balance, coordination, kinesthetic sense, posture, motor skill, proprioception of scapular, scapulothoracic and UE control with self care, reaching, carrying, lifting, house/yardwork, driving/computer work      Manual Treatments:  PROM / STM / Oscillations-Mobs:  G-I, II, III, IV (PA's, Inf., Post.)  [x] (22305) Provided manual therapy to mobilize soft tissue/joints of cervical/CT, scapular GHJ and UE for the purpose of modulating pain, promoting relaxation,  increasing ROM, reducing/eliminating soft tissue swelling/inflammation/restriction, improving soft tissue extensibility and allowing for proper ROM for normal function with self care, reaching, carrying, lifting, house/yardwork, driving/computer work    Modalities:   Charges:  Timed Code Treatment Minutes: 40   Total Treatment Minutes: 240-330       [] EVAL (LOW) 39970 (typically 20 minutes face-to-face)  [] EVAL (MOD) 11605 (typically 30 minutes face-to-face)  [] EVAL (HIGH) 87635 (typically 45 minutes face-to-face)  [] RE-EVAL     [x] EK(20415) x1    [] IONTO  [x] NMR (43479) x 1    [] VASO  [x] Manual (95606) x  1    [] Other:  [] TA x      [] Mech Traction (78499)  [] ES(attended) (09255)      [] ES (un) (06669): TENS with ice     GOALS:  Patient stated goal: increase ROM decrease pain    [] Progressing: [] Met: [] Not Met: [] Adjusted     Therapist goals for Patient:   Short Term Goals: To be achieved in: 2-4 weeks  1. Independent in HEP and progression per patient tolerance, in order to prevent re-injury.      [x] Progressing: [] Met: Other:need to continue to increase ROM as able. No ice and TENS to manage pain today due to meeting after PT. Some increase in PROM noted however pt still reports moderate pain and marked difficulty with any AROM away from body with L shoulder. Pt seeing MD next week. Probable further testing if emg does not show damage that would cause lack of ability amee lift arm                   Patient Education:  Reviewed diagnosis, POC, HEP and its importance. Access Code: [email or web address]  URL: [email or web address]  Date: 06/20/2022  Prepared by: Nusrat Clifford     Exercises  Standing Shoulder Shrugs - 2 x daily - 7 x weekly - 3 sets - 10 reps  Seated Scapular Retraction - 2 x daily - 7 x weekly - 3 sets - 10 reps  Standing Bicep Curls Supinated with Dumbbells - 2 x daily - 7 x weekly - 3 sets - 10 reps  Standing Forearm Pronation and Supination AROM - 2 x daily - 7 x weekly - 3 sets - 10 reps  Seated Shoulder Pendulum Exercise - 2 x daily - 7 x weekly - 3 sets - 10 reps  Table slides flexion - 2 x daily - 7 x weekly - 10 reps - 10 hold  Seated Shoulder External Rotation AAROM with Dowel - 2 x daily - 7 x weekly - 10 reps - 10 hold    PLAN: See eval  [x] Continue per plan of care [] Alter current plan (see comments above)  [] Plan of care initiated [] Hold pending MD visit [] Discharge      Electronically signed by:  Nusrat Clifford, PT    Note: If patient does not return for scheduled/ recommended follow up visits, this note will serve as a discharge from care along with most recent update on progress.

## 2022-07-22 ENCOUNTER — HOSPITAL ENCOUNTER (OUTPATIENT)
Dept: PHYSICAL THERAPY | Age: 72
Setting detail: THERAPIES SERIES
Discharge: HOME | End: 2022-07-22
Payer: MEDICARE

## 2022-07-22 PROCEDURE — 97140 MANUAL THERAPY 1/> REGIONS: CPT | Performed by: PHYSICAL THERAPIST

## 2022-07-22 PROCEDURE — 97112 NEUROMUSCULAR REEDUCATION: CPT | Performed by: PHYSICAL THERAPIST

## 2022-07-22 PROCEDURE — 97110 THERAPEUTIC EXERCISES: CPT | Performed by: PHYSICAL THERAPIST

## 2022-07-22 NOTE — FLOWSHEET NOTE
[address],  [address]  Phone: [phone]   Fax:     [phone]    Physical Therapy Daily Treatment Note  Date:  2022    Patient Name:  Nikita Villarreal    :  1950  MRN: [id number]  Restrictions/Precautions:    Medical/Treatment Diagnosis Information:  Diagnosis: M25.512 (ICD-10-CM) - Left shoulder pain, unspecified [id number] (ICD-10-CM) - Ulnar neuropathy of left upper extremity  Treatment Diagnosis: M25.512 L shoulder pain  Insurance/Certification information:  PT Insurance Information: [id number]   Physician Information:    Viridiana Easton MD     Has the plan of care been signed (Y/N):        []  Yes    [x]  No     Date of Patient follow up with Physician: per MD      Is this a Progress Report:     []  Yes  [x]  No        If Yes:  Date Range for reporting period:  Beginning  Ending    Progress report will be due (10 Rx or 30 days whichever is less):       Recertification will be due (POC Duration  / 90 days whichever is less):          Visit # Insurance Allowable Auth Required   10 BMN 80/20 []  Yes []  No        Functional Scale: FOTO 36/100 38/100    Date assessed:   ,    Latex Allergy:  [x]NO      []YES  Preferred Language for Healthcare:   [x]English       []other:    Pain level:  0-4/10       SUBJECTIVE:   no notable change    OBJECTIVE: See eval   ROM 22 PROM AROM  Comment     L R L R     Flexion ~115 160  unable to lift        Abduction ~120scaption scaption 180  unable to lift        ER Kim@[employer] 85@90abd         IR ~45@ 60abd  Judd@[employer]         Other  (cervical)             Other                    RESTRICTIONS/PRECAUTIONS: type two diab     Exercises/Interventions:   Exercises:  Exercise/Equipment Resistance/Repetitions Other comments   Stretching/PROM     Pulley  Fl x10  scap x10 Start7/  start7   Table Slides Rolling chair flexion [id number]    abd x10       start7/1   UE Omega ER at 0  74f15xnt  ER supine @40  64h21hpq    supine AAROM flexion grab wrist  1b40lmb    Pendulum CW CCWx30  Fl ext x30  Side to side x30   Start6/24  start6/24   Flexion step away at counter  51z6ird start6/24   Ball  Forward diagonals  B x10  SL x10     Abd x10 start7/1   Butterfly  06t51dlp start7/12        IT behind back 60tqcc7 start6/28   Isometrics     Retraction x30     2 min start6/28   shrugs x30    Flexion     Abduction     External Rotation     Internal Rotation     Biceps     Triceps          PRE's     Flexion     Abduction     External Rotation     Internal Rotation     Shrugs     EXT     Reverse Flys     Serratus     Horizontal Abd with ER     Biceps x30 +3# ^7/12   Triceps     Pronation supination x30 +3# ^7/12        Cable Column/Theraband     External Rotation     Internal Rotation     Shrugs     Lats     Ext     Flex     Scapular Retraction     BIC     TRIC     PNF          Dynamic Stability          Plyoback          Manual interventions     PROM fl scap ER IR  12 min start6/24              Therapeutic Exercise and NMR EXR  [x] (12566) Provided verbal/tactile cueing for activities related to strengthening, flexibility, endurance, ROM  for improvements in scapular, scapulothoracic and UE control with self care, reaching, carrying, lifting, house/yardwork, driving/computer work. [x] (44531) Provided verbal/tactile cueing for activities related to improving balance, coordination, kinesthetic sense, posture, motor skill, proprioception  to assist with  scapular, scapulothoracic and UE control with self care, reaching, carrying, lifting, house/yardwork, driving/computer work.     Therapeutic Activities:    [] (79717 or 28101) Provided verbal/tactile cueing for activities related to improving balance, coordination, kinesthetic sense, posture, motor skill, proprioception and motor activation to allow for proper function of scapular, scapulothoracic and UE control with self care, carrying, lifting, driving/computer work. Home Exercise Program:    [x] (70989) Reviewed/Progressed HEP activities related to strengthening, flexibility, endurance, ROM of scapular, scapulothoracic and UE control with self care, reaching, carrying, lifting, house/yardwork, driving/computer work  [] (63402) Reviewed/Progressed HEP activities related to improving balance, coordination, kinesthetic sense, posture, motor skill, proprioception of scapular, scapulothoracic and UE control with self care, reaching, carrying, lifting, house/yardwork, driving/computer work      Manual Treatments:  PROM / STM / Oscillations-Mobs:  G-I, II, III, IV (PA's, Inf., Post.)  [x] (68939) Provided manual therapy to mobilize soft tissue/joints of cervical/CT, scapular GHJ and UE for the purpose of modulating pain, promoting relaxation,  increasing ROM, reducing/eliminating soft tissue swelling/inflammation/restriction, improving soft tissue extensibility and allowing for proper ROM for normal function with self care, reaching, carrying, lifting, house/yardwork, driving/computer work    Modalities:   Charges:  Timed Code Treatment Minutes: 40   Total Treatment Minutes: 240-330       [] EVAL (LOW) 31024 (typically 20 minutes face-to-face)  [] EVAL (MOD) 27776 (typically 30 minutes face-to-face)  [] EVAL (HIGH) 48982 (typically 45 minutes face-to-face)  [] RE-EVAL     [x] OD(98538) x1    [] IONTO  [x] NMR (30837) x 1    [] VASO  [x] Manual (64810) x  1    [] Other:  [] TA x      [] Mech Traction (49469)  [] ES(attended) (02617)      [] ES (un) (94245): TENS with ice     GOALS:  Patient stated goal: increase ROM decrease pain    [] Progressing: [] Met: [] Not Met: [] Adjusted     Therapist goals for Patient:   Short Term Goals: To be achieved in: 2-4 weeks  1. Independent in HEP and progression per patient tolerance, in order to prevent re-injury. [x] Progressing: [] Met: [] Not Met: [] Adjusted   2. Patient will have a decrease in pain to facilitate improvement in movement, function, and ADLs as indicated by Functional Deficits. [x] Progressing: [] Met: [] Not Met: [] Adjusted     Long Term Goals: To be achieved in: 12 weeks  1. Foto 62/100 to assist with reaching prior level of function. [] Progressing: [] Met: [] Not Met: [] Adjusted  2. Patient will demonstrate increased AROM to = R to allow for proper joint functioning as indicated by patients Functional Deficits. [] Progressing: [] Met: [] Not Met: [] Adjusted  3. Patient will demonstrate an increase in strength to good scapular and core control  for UE to allow for proper functional mobility as indicated by patients Functional Deficits. [] Progressing: [] Met: [] Not Met: [] Adjusted  4. Patient will return to  functional activities sleep no pain, reach overhead, reach behind back to waist level  without increased symptoms or restriction. [] Progressing: [] Met: [] Not Met: [] Adjusted  5. Return to yardwork    [] Progressing: [] Met: [] Not Met: [] Adjusted     Overall Progression Towards Functional goals/ Treatment Progress Update:  [] Patient is progressing as expected towards functional goals listed. [x] Progression is slowed due to complexities/Impairments listed. Referred back to MD due to inability to actively lift arm. [] Progression has been slowed due to co-morbidities.   [] Plan just implemented, too soon to assess goals progression <30days   [] Goals require adjustment due to lack of progress  [] Patient is not progressing as expected and requires additional follow up with physician  [] Other    Prognosis for POC: [x] Good [] Fair  [] Poor      Patient requires continued skilled intervention: [x] Yes  [] No    Treatment/Activity Tolerance:  [x] Patient able to complete treatment  [] Patient limited by fatigue  [x] Patient limited by pain     [] Patient limited by other medical complications  [x] Other:need to continue to increase ROM as able. Some increase in PROM noted however pt still reports moderate pain and marked difficulty with any AROM away from body with L shoulder. Pt seeing MD next week. Probable further testing if emg does not show damage that would cause lack of ability amee lift arm                   Patient Education:  Reviewed diagnosis, POC, HEP and its importance. Access Code: [email or web address]  URL: [employer]/  Date: 06/20/2022  Prepared by: Chris Mesa     Exercises  Standing Shoulder Shrugs - 2 x daily - 7 x weekly - 3 sets - 10 reps  Seated Scapular Retraction - 2 x daily - 7 x weekly - 3 sets - 10 reps  Standing Bicep Curls Supinated with Dumbbells - 2 x daily - 7 x weekly - 3 sets - 10 reps  Standing Forearm Pronation and Supination AROM - 2 x daily - 7 x weekly - 3 sets - 10 reps  Seated Shoulder Pendulum Exercise - 2 x daily - 7 x weekly - 3 sets - 10 reps  Table slides flexion - 2 x daily - 7 x weekly - 10 reps - 10 hold  Seated Shoulder External Rotation AAROM with Dowel - 2 x daily - 7 x weekly - 10 reps - 10 hold    PLAN: See eval  [x] Continue per plan of care [] Alter current plan (see comments above)  [] Plan of care initiated [] Hold pending MD visit [] Discharge      Electronically signed by:  Chris Mesa, PT    Note: If patient does not return for scheduled/ recommended follow up visits, this note will serve as a discharge from care along with most recent update on progress.

## 2022-07-28 ENCOUNTER — TELEPHONE (OUTPATIENT)
Dept: ORTHOPEDIC SURGERY | Age: 72
End: 2022-07-28

## 2022-07-28 ENCOUNTER — OFFICE VISIT (OUTPATIENT)
Dept: ORTHOPEDIC SURGERY | Age: 72
End: 2022-07-28
Payer: MEDICARE

## 2022-07-28 VITALS — WEIGHT: 218 LBS | BODY MASS INDEX: 27.1 KG/M2 | HEIGHT: 75 IN

## 2022-07-28 DIAGNOSIS — S43.005D DISLOCATION OF LEFT SHOULDER JOINT, SUBSEQUENT ENCOUNTER: ICD-10-CM

## 2022-07-28 DIAGNOSIS — S43.015A ANTERIOR DISLOCATION OF LEFT SHOULDER, INITIAL ENCOUNTER: Primary | ICD-10-CM

## 2022-07-28 PROCEDURE — 1123F ACP DISCUSS/DSCN MKR DOCD: CPT | Performed by: ORTHOPAEDIC SURGERY

## 2022-07-28 PROCEDURE — 99214 OFFICE O/P EST MOD 30 MIN: CPT | Performed by: ORTHOPAEDIC SURGERY

## 2022-07-28 RX ORDER — MELOXICAM 7.5 MG/1
7.5 TABLET ORAL DAILY
Qty: 30 TABLET | Refills: 0 | Status: SHIPPED | OUTPATIENT
Start: 2022-07-28 | End: 2022-08-25

## 2022-07-28 NOTE — PROGRESS NOTES
CHIEF COMPLAINT:   1-Left shoulder pain/ dislocation with avulsion fracture proximal humerus. 2-Left hand numbness and tingling/ ulnar neuropathy    DATE OF INJURY: 5/28/2022    HISTORY:  Mr. Lamonte Do is a 67 y.o.  male right handed who presents today for f/u evaluation of a left shoulder injury that is not improving. He is here to review his EMG which is consistent with ulnar mononeuropathy and negative for brachial plexus or radiculopathy. He has completed 8 visits of PT and reports no improvement. The patient reports that this injury occurred after a fall when he was in a hotel shower in [address] [place] [address]. He was first seen and evaluated in an outside hospital in [address] [place] [address], when he was x-rayed, relocated the shoulder and splinted, and asked to f/u with Orthopedics. He rates his pain a 8/10 VAS and not improving. Denies having a history of shoulder dislocation the patient denies any other injuries. Movement makes the pain worse and resting makes the pain better. He reports numbness or tingling sensation that started a couple days ago from his left elbow to his hand. He states he is having difficulty crossing his index and middle fingers.       Past Medical History:   Diagnosis Date    CAD (coronary artery disease)     Chronic back pain     lumbar disk disease    Colorectal polyps     Diabetic ulcer of toe of left foot associated with type 2 diabetes mellitus, limited to breakdown of skin ([address] 5/20/2019    Erectile dysfunction     Hyperlipidemia     Hypertension     Hypertrophy of prostate without urinary obstruction and other lower urinary tract symptoms (LUTS)     Penetrating foot wound 2019    L foot     Retrograde ejaculation     Sciatica     Type II or unspecified type diabetes mellitus without mention of complication, not stated as uncontrolled        Past Surgical History:   Procedure Laterality Date    CAROTID ENDARTERECTOMY Right 12/31/2018    RIGHT CAROTID ENDARTERECTOMY WITH INTRA OP DUPLEX SCAN performed by Farnaz Morrissey MD at [address] [place]      x2    CYST REMOVAL      from back and finger x2    TONSILLECTOMY AND ADENOIDECTOMY         Social History     Socioeconomic History    Marital status:      Spouse name: Not on file    Number of children: Not on file    Years of education: Not on file    Highest education level: Not on file   Occupational History    Not on file   Tobacco Use    Smoking status: Former     Packs/day: 1.00     Years: 48.00     Pack years: 48.00     Types: Cigarettes     Start date: 1959     Quit date: 2007     Years since quittin.6    Smokeless tobacco: Never   Vaping Use    Vaping Use: Never used   Substance and Sexual Activity    Alcohol use: Yes     Comment: social    Drug use: No    Sexual activity: Yes     Partners: Female   Other Topics Concern    Not on file   Social History Narrative    Not on file     Social Determinants of Health     Financial Resource Strain: Low Risk     Difficulty of Paying Living Expenses: Not hard at all   Food Insecurity: No Food Insecurity    Worried About [address] in the Last Year: Never true    [address] [employer] in the Last Year: Never true   Transportation Needs: No Transportation Needs    Lack of Transportation (Medical): No    Lack of Transportation (Non-Medical): No   Physical Activity: Sufficiently Active    Days of Exercise per Week: 4 days    Minutes of Exercise per Session: 40 min   Stress: No Stress Concern Present    Feeling of Stress : Not at all   Social Connections: Moderately Isolated    Frequency of Communication with Friends and Family: Three times a week    Frequency of Social Gatherings with Friends and Family:  Three times a week    Attends Hinduism Services: Never    Active Member of Clubs or Organizations: No    Attends Club or Organization Meetings: Never    Marital Status:    Intimate Partner Violence: Not on file   Housing Stability: Low Risk     Unable to Pay for Housing in the Last Year: No    Number of Places Lived in the Last Year: 1    Unstable Housing in the Last Year: No       Family History   Problem Relation Age of Onset    Coronary Art Dis Mother         CABG    Diabetes Mother     High Blood Pressure Father     Stroke Father     Hypertension Brother     Diabetes Brother     Coronary Art Dis Brother     Cancer Brother         prostate    Other Brother         PVD    Diabetes Maternal Grandmother     Cancer Maternal Grandmother         skin    Diabetes Brother     Hypertension Brother     Hypertension Brother         throat polyp, guillain barre       Current Outpatient Medications on File Prior to Visit   Medication Sig Dispense Refill    Insulin Pen Needle (B-D UF III MINI PEN NEEDLES) 31G X 5 MM MISC Use 4 times daily to inject insulin 200 each 5    LANTUS SOLOSTAR 100 UNIT/ML injection pen ADMINISTER 56 UNITS UNDER THE SKIN EVERY NIGHT 18 mL 2    SYNJARDY XR 12.5-1000 MG TB24 TAKE 1 TABLET BY MOUTH TWICE DAILY 60 tablet 0    rosuvastatin (CRESTOR) 40 MG tablet TAKE 1 TABLET BY MOUTH DAILY 90 tablet 0    tamsulosin (FLOMAX) 0.4 MG capsule TAKE 2 CAPSULEs BY MOUTH EVERY  capsule 2    Lancets (ONETOUCH DELICA PLUS [id number]) MISC USE TO TEST 4 TO 6 TIMES DAILY 200 each 3    ONETOUCH VERIO strip TEST FINGERSTICK GLUCOSE THREE TIMES DAILY 100 strip 5    insulin lispro, 1 Unit Dial, 100 UNIT/ML SOPN INJECT 15 UNITS UNDER THE SKIN WITH EACH MEAL 15 mL 3    hydroCHLOROthiazide (MICROZIDE) 12.5 MG capsule Take 1 capsule by mouth every morning 90 capsule 3    amLODIPine-benazepril (LOTREL) 10-20 MG per capsule Take 1 capsule by mouth daily 90 capsule 3    ezetimibe (ZETIA) 10 MG tablet Take 1 tablet by mouth daily 90 tablet 3    Blood Glucose Monitoring Suppl (ONETOUCH VERIO FLEX SYSTEM) w/Device KIT Check blood sugar 1 kit 0    aspirin 81 MG EC tablet Take 81 mg by mouth daily. No current facility-administered medications on file prior to visit. Pertinent items are noted in HPI  Review of systems reviewed from Patient History Form and available in the patient's chart under the Media tab. No change noted. PHYSICAL EXAMINATION:  Mr. Kiley Almazan is a very pleasant 67 y.o.  male who presents today in no acute distress, awake, alert, and oriented. He is well dressed, nourished and  groomed. Patient with normal affect. Height is  6' 3\" (1.905 m), weight is 218 lb (98.9 kg), Body mass index is 27.25 kg/m². Resting respiratory rate is 16. The patient walks with no limp. On evaluation of his bilateral upper extremity, there is no obvious deformity left shoulder. There is mild swelling and no ecchymosis. He is tender to palpation over the shoulder, and otherwise non tender over the remainder of the extremity. Range of motion is decreased secondary to pain over the left shoulder. The skin overlying the left shoulder is intact without evidence of lesion, laceration He has weakness in the left ulnar nerve. Distal pulses are 2+ and symmetric bilaterally. Sensation is grossly intact to light touch and symmetric bilaterally. IMAGING:  Xrays dated t 6/16/2022 in office, 3 views of left shoulder were reviewed, and showed no dislocation with possible small greater tuberosity avulsion fracture. IMPRESSION:    1-Left shoulder pain/ dislocation with avulsion fracture proximal humerus. 2-Left hand numbness and tingling/ ulnar neuropathy    PLAN:  I discussed that the overall alignment of the shoulder is good and discussed the EMG results which were consistent with a ulnar mononeuropathy and negative for brachial plexus or radiculopathy. Since he is having difficulty with PT and is not progressing and is actually worsening, recommend getting an MRI to further evaluate for rotator cuff tear or other occult finding. Follow-up results. In the meantime we can put PT on hold.           Wallace Sanchez MD

## 2022-07-29 ENCOUNTER — TELEPHONE (OUTPATIENT)
Dept: ORTHOPEDIC SURGERY | Age: 72
End: 2022-07-29

## 2022-07-29 NOTE — TELEPHONE ENCOUNTER
Called and LVM. **The soonest SMA has available at [facility] is 8/11. Inna 30 has sooner appointments if he would like to see her. **

## 2022-07-29 NOTE — TELEPHONE ENCOUNTER
Appointment Request     Patient requesting earlier appointment: Yes  Appointment offered to patient: 8-11-22  Patient Contact Number: [phone]

## 2022-08-01 ENCOUNTER — HOSPITAL ENCOUNTER (OUTPATIENT)
Dept: PHYSICAL THERAPY | Age: 72
Setting detail: THERAPIES SERIES
Discharge: HOME | End: 2022-08-01

## 2022-08-01 NOTE — FLOWSHEET NOTE
Physical Therapy  Cancellation/No-show Note  Patient Name:  Amy Gonzalez  :  1950   Date:  2022  Cancelled visits to date: 0  No-shows to date: 0    For today's appointment patient:  [x]  Cancelled on hold per md  having mri  []  Rescheduled appointment  []  No-show     Reason given by patient:  []  Patient ill  []  Conflicting appointment  []  No transportation    []  Conflict with work  []  No reason given  []  Other:     Comments:      Electronically signed by:  Marleny Taveras PT, PT

## 2022-08-02 ENCOUNTER — HOSPITAL ENCOUNTER (OUTPATIENT)
Dept: MRI IMAGING | Age: 72
Discharge: HOME | End: 2022-08-02
Payer: MEDICARE

## 2022-08-02 DIAGNOSIS — S43.005D DISLOCATION OF LEFT SHOULDER JOINT, SUBSEQUENT ENCOUNTER: ICD-10-CM

## 2022-08-02 PROCEDURE — G1010 CDSM STANSON: HCPCS

## 2022-08-04 RX ORDER — BLOOD SUGAR DIAGNOSTIC
STRIP MISCELLANEOUS
Qty: 100 STRIP | Refills: 5 | Status: SHIPPED | OUTPATIENT
Start: 2022-08-04

## 2022-08-05 DIAGNOSIS — I25.10 CORONARY ARTERY DISEASE INVOLVING NATIVE CORONARY ARTERY OF NATIVE HEART WITHOUT ANGINA PECTORIS: ICD-10-CM

## 2022-08-05 RX ORDER — EMPAGLIFLOZIN, METFORMIN HYDROCHLORIDE 12.5; 1 MG/1; MG/1
TABLET, EXTENDED RELEASE ORAL
Qty: 60 TABLET | Refills: 3 | Status: SHIPPED | OUTPATIENT
Start: 2022-08-05

## 2022-08-11 ENCOUNTER — OFFICE VISIT (OUTPATIENT)
Dept: ORTHOPEDIC SURGERY | Age: 72
End: 2022-08-11
Payer: MEDICARE

## 2022-08-11 VITALS — WEIGHT: 218 LBS | HEIGHT: 75 IN | BODY MASS INDEX: 27.1 KG/M2

## 2022-08-11 DIAGNOSIS — S43.015A ANTERIOR DISLOCATION OF LEFT SHOULDER, INITIAL ENCOUNTER: Primary | ICD-10-CM

## 2022-08-11 PROCEDURE — 1123F ACP DISCUSS/DSCN MKR DOCD: CPT | Performed by: ORTHOPAEDIC SURGERY

## 2022-08-11 PROCEDURE — 99214 OFFICE O/P EST MOD 30 MIN: CPT | Performed by: ORTHOPAEDIC SURGERY

## 2022-08-11 NOTE — PROGRESS NOTES
CHIEF COMPLAINT:   1-Left shoulder pain/ dislocation with avulsion fracture proximal humerus. 2-Left hand numbness and tingling/ ulnar neuropathy    DATE OF INJURY: 5/28/2022    HISTORY:  Mr. Gilma Rodriguez is a 67 y.o.  male right handed who presents today for f/u evaluation of a left shoulder injury that is not improving. He is here to review his EMG which is consistent with ulnar mononeuropathy and negative for brachial plexus or radiculopathy. He has completed 8 visits of PT and reports no improvement. The patient reports that this injury occurred after a fall when he was in a hotel shower in [address] [place] [address]. He was first seen and evaluated in an outside hospital in [address] [place] [address], when he was x-rayed, relocated the shoulder and splinted, and asked to f/u with Orthopedics. He rates his pain a 8/10 VAS and not improving. Denies having a history of shoulder dislocation the patient denies any other injuries. Movement makes the pain worse and resting makes the pain better. He reports numbness or tingling sensation that started a couple days ago from his left elbow to his hand. He states he is having difficulty crossing his index and middle fingers.       Past Medical History:   Diagnosis Date    CAD (coronary artery disease)     Chronic back pain     lumbar disk disease    Colorectal polyps     Diabetic ulcer of toe of left foot associated with type 2 diabetes mellitus, limited to breakdown of skin ([address] 5/20/2019    Erectile dysfunction     Hyperlipidemia     Hypertension     Hypertrophy of prostate without urinary obstruction and other lower urinary tract symptoms (LUTS)     Penetrating foot wound 2019    L foot     Retrograde ejaculation     Sciatica     Type II or unspecified type diabetes mellitus without mention of complication, not stated as uncontrolled        Past Surgical History:   Procedure Laterality Date    CAROTID ENDARTERECTOMY Right 12/31/2018    RIGHT CAROTID ENDARTERECTOMY WITH INTRA OP DUPLEX SCAN performed by Jaquelin Harper MD at [address] [place]      x2    CYST REMOVAL      from back and finger x2    TONSILLECTOMY AND ADENOIDECTOMY         Social History     Socioeconomic History    Marital status:      Spouse name: Not on file    Number of children: Not on file    Years of education: Not on file    Highest education level: Not on file   Occupational History    Not on file   Tobacco Use    Smoking status: Former     Packs/day: 1.00     Years: 48.00     Pack years: 48.00     Types: Cigarettes     Start date: 1959     Quit date: 2007     Years since quittin.7    Smokeless tobacco: Never   Vaping Use    Vaping Use: Never used   Substance and Sexual Activity    Alcohol use: Yes     Comment: social    Drug use: No    Sexual activity: Yes     Partners: Female   Other Topics Concern    Not on file   Social History Narrative    Not on file     Social Determinants of Health     Financial Resource Strain: Low Risk     Difficulty of Paying Living Expenses: Not hard at all   Food Insecurity: No Food Insecurity    Worried About [address] in the Last Year: Never true    [address] in the Last Year: Never true   Transportation Needs: No Transportation Needs    Lack of Transportation (Medical): No    Lack of Transportation (Non-Medical): No   Physical Activity: Sufficiently Active    Days of Exercise per Week: 4 days    Minutes of Exercise per Session: 40 min   Stress: No Stress Concern Present    Feeling of Stress : Not at all   Social Connections: Moderately Isolated    Frequency of Communication with Friends and Family: Three times a week    Frequency of Social Gatherings with Friends and Family:  Three times a week    Attends Yazidi Services: Never    Active Member of Clubs or Organizations: No    Attends Club or Organization Meetings: Never    Marital Status:    Intimate Partner Violence: Not on file   Housing Stability: Low Risk     Unable to Pay humerus. 2-Left hand numbness and tingling/ ulnar neuropathy    PLAN:  I discussed with Vipul Larkin the MRI findings and discussed the EMG results which were consistent with a ulnar mononeuropathy and negative for brachial plexus or radiculopathy. Since he is having difficulty with PT and is not progressing, we recommend f/u with Dr Tai Tompkins to discuss possible surgical options.       Christopher Tse MD

## 2022-08-16 ENCOUNTER — OFFICE VISIT (OUTPATIENT)
Dept: PRIMARY CARE CLINIC | Age: 72
End: 2022-08-16
Payer: MEDICARE

## 2022-08-16 VITALS
DIASTOLIC BLOOD PRESSURE: 60 MMHG | SYSTOLIC BLOOD PRESSURE: 118 MMHG | RESPIRATION RATE: 16 BRPM | HEART RATE: 71 BPM | OXYGEN SATURATION: 93 % | WEIGHT: 223.2 LBS | HEIGHT: 75 IN | BODY MASS INDEX: 27.75 KG/M2 | TEMPERATURE: 97 F

## 2022-08-16 DIAGNOSIS — E11.9 TYPE 2 DIABETES MELLITUS WITHOUT COMPLICATION, WITHOUT LONG-TERM CURRENT USE OF INSULIN (HCC): ICD-10-CM

## 2022-08-16 DIAGNOSIS — I10 ESSENTIAL HYPERTENSION: ICD-10-CM

## 2022-08-16 DIAGNOSIS — Z01.818 PRE-OP EVALUATION: ICD-10-CM

## 2022-08-16 DIAGNOSIS — S42.92XK: ICD-10-CM

## 2022-08-16 DIAGNOSIS — N40.1 BENIGN PROSTATIC HYPERPLASIA WITH URINARY HESITANCY: Chronic | ICD-10-CM

## 2022-08-16 DIAGNOSIS — E78.00 PURE HYPERCHOLESTEROLEMIA: ICD-10-CM

## 2022-08-16 DIAGNOSIS — Z01.818 PRE-OP EVALUATION: Primary | ICD-10-CM

## 2022-08-16 DIAGNOSIS — R39.11 BENIGN PROSTATIC HYPERPLASIA WITH URINARY HESITANCY: Chronic | ICD-10-CM

## 2022-08-16 DIAGNOSIS — I25.10 ATHEROSCLEROSIS OF NATIVE CORONARY ARTERY OF NATIVE HEART WITHOUT ANGINA PECTORIS: ICD-10-CM

## 2022-08-16 PROBLEM — I63.9 ISCHEMIC STROKE OF FRONTAL LOBE (HCC): Status: RESOLVED | Noted: 2018-09-18 | Resolved: 2022-08-16

## 2022-08-16 PROBLEM — S42.92XA TRAUMATIC CLOSED DISPLACED FRACTURE OF LEFT SHOULDER WITH ANTERIOR DISLOCATION: Status: ACTIVE | Noted: 2022-08-16

## 2022-08-16 LAB
A/G RATIO: 2.1 (ref 1.1–2.2)
ALBUMIN SERPL-MCNC: 4.7 G/DL (ref 3.4–5)
ALP BLD-CCNC: 70 U/L (ref 40–129)
ALT SERPL-CCNC: 28 U/L (ref 10–40)
ANION GAP SERPL CALCULATED.3IONS-SCNC: 12 MMOL/L (ref 3–16)
AST SERPL-CCNC: 22 U/L (ref 15–37)
BILIRUB SERPL-MCNC: 0.3 MG/DL (ref 0–1)
BUN BLDV-MCNC: 17 MG/DL (ref 7–20)
CALCIUM SERPL-MCNC: 9.7 MG/DL (ref 8.3–10.6)
CHLORIDE BLD-SCNC: 101 MMOL/L (ref 99–110)
CO2: 27 MMOL/L (ref 21–32)
CREAT SERPL-MCNC: 0.8 MG/DL (ref 0.8–1.3)
GFR AFRICAN AMERICAN: >60
GFR NON-AFRICAN AMERICAN: >60
GLUCOSE BLD-MCNC: 209 MG/DL (ref 70–99)
POTASSIUM SERPL-SCNC: 4.1 MMOL/L (ref 3.5–5.1)
SODIUM BLD-SCNC: 140 MMOL/L (ref 136–145)
TOTAL PROTEIN: 6.9 G/DL (ref 6.4–8.2)

## 2022-08-16 PROCEDURE — 93000 ELECTROCARDIOGRAM COMPLETE: CPT | Performed by: INTERNAL MEDICINE

## 2022-08-16 PROCEDURE — 1123F ACP DISCUSS/DSCN MKR DOCD: CPT | Performed by: INTERNAL MEDICINE

## 2022-08-16 PROCEDURE — 3051F HG A1C>EQUAL 7.0%<8.0%: CPT | Performed by: INTERNAL MEDICINE

## 2022-08-16 PROCEDURE — 99215 OFFICE O/P EST HI 40 MIN: CPT | Performed by: INTERNAL MEDICINE

## 2022-08-16 NOTE — ASSESSMENT & PLAN NOTE
On lantus and synjardy, well controlled, This problem was reviewed in detail. It is under control and stable. Will check blood work.

## 2022-08-16 NOTE — ASSESSMENT & PLAN NOTE
This has been a long standing problem, takes crestor and zetia. Monitors diet and tries to follow a low fat diet. Has  been reasonably  compliant w exercise. Lipids have been stable, The problem is controlled. Recent lipid tests were reviewed and are normal. Pertinent negatives include no chest pain, focal sensory loss, focal weakness, leg pain, myalgias or shortness of breath. Advised patient to continue the current instructions or medications.

## 2022-08-16 NOTE — PROGRESS NOTES
Subjective:        Reason for visit. Karolina Mansfield is a 67 y.o. male who presents for a preoperative physical examination. He is scheduled to have left shoulder repair surgery  done by Dr. Rob Gilbert   at [address]  on 8/26/22. History of Present Illness:      Here for a pre op eval for the above surgery,  Traumatic closed displaced fracture of left shoulder with anterior dislocation  Here for a pre op eval for left shoulder repair. He is medically stable. Essential hypertension  This is a chronic problem. The problem is well controlled. Patient monitors readings regularly. Pertinent negatives include no chest pain, focal sensory loss, focal weakness, leg pain, myalgias or shortness of breath. No headaches or chest pain. Takes medications regularly. Blood pressure has been stable, blood work was reviewed, and advised patient to continue the current instructions or medications. Type 2 diabetes mellitus without complication, without long-term current use of insulin (HCC)  On lantus and synjardy, well controlled, This problem was reviewed in detail. It is under control and stable. Will check blood work. Coronary atherosclerosis of native coronary artery  No cp or sob,  On crestor lotrel and zetia,  Patient is compliant w medications, no side effects, effective, provides adequate symptom relief. No new symptoms or problems as noted by patient. The problem is stable, no changes noted by patient. Will consider monitoring labs and refill medications as appropriate. Patient counseled and will continue current plan. Pure hypercholesterolemia  This has been a long standing problem, takes crestor and zetia. Monitors diet and tries to follow a low fat diet. Has  been reasonably  compliant w exercise. Lipids have been stable, The problem is controlled.  Recent lipid tests were reviewed and are normal. Pertinent negatives include no chest pain, focal sensory loss, focal weakness, leg pain, myalgias or shortness of breath. Advised patient to continue the current instructions or medications. Benign prostatic hyperplasia with urinary hesitancy  On flomax,  Patient is compliant w medications, no side effects, effective, provides adequate symptom relief. No new symptoms or problems as noted by patient. The problem is stable, no changes noted by patient. Will consider monitoring labs and refill medications as appropriate. Patient counseled and will continue current plan. Past Medical History:   Diagnosis Date    CAD (coronary artery disease)     Chronic back pain     lumbar disk disease    Colorectal polyps     Diabetic ulcer of toe of left foot associated with type 2 diabetes mellitus, limited to breakdown of skin ([facility] [address] 5/20/2019    Erectile dysfunction     Hyperlipidemia     Hypertension     Hypertrophy of prostate without urinary obstruction and other lower urinary tract symptoms (LUTS)     Penetrating foot wound 2019    L foot     Retrograde ejaculation     Sciatica     Type II or unspecified type diabetes mellitus without mention of complication, not stated as uncontrolled       No  previous anesthesia complications. Past Surgical History:   Procedure Laterality Date    CAROTID ENDARTERECTOMY Right 12/31/2018    RIGHT CAROTID ENDARTERECTOMY WITH INTRA OP DUPLEX SCAN performed by Triny Anderson MD at [address]      x2    CYST REMOVAL      from back and finger x2    TONSILLECTOMY AND ADENOIDECTOMY                                                     Current Outpatient Medications   Medication Sig Dispense Refill    SYNJARDY XR 12.5-1000 MG TB24 TAKE 1 TABLET BY MOUTH TWICE DAILY 60 tablet 3    ONETOUCH VERIO strip As needed. 100 strip 5    meloxicam (MOBIC) 7.5 MG tablet Take 1 tablet by mouth in the morning.  30 tablet 0    Insulin Pen Needle (B-D UF III MINI PEN NEEDLES) 31G X 5 MM MISC Use 4 times daily to inject insulin 200 each 5    LANTUS SOLOSTAR 100 UNIT/ML injection pen ADMINISTER 56 UNITS UNDER THE SKIN EVERY NIGHT 18 mL 2    rosuvastatin (CRESTOR) 40 MG tablet TAKE 1 TABLET BY MOUTH DAILY 90 tablet 0    tamsulosin (FLOMAX) 0.4 MG capsule TAKE 2 CAPSULEs BY MOUTH EVERY  capsule 2    Lancets (ONETOUCH DELICA PLUS JICMOD44L) MISC USE TO TEST 4 TO 6 TIMES DAILY 200 each 3    insulin lispro, 1 Unit Dial, 100 UNIT/ML SOPN INJECT 15 UNITS UNDER THE SKIN WITH EACH MEAL 15 mL 3    hydroCHLOROthiazide (MICROZIDE) 12.5 MG capsule Take 1 capsule by mouth every morning 90 capsule 3    amLODIPine-benazepril (LOTREL) 10-20 MG per capsule Take 1 capsule by mouth daily 90 capsule 3    ezetimibe (ZETIA) 10 MG tablet Take 1 tablet by mouth daily 90 tablet 3    Blood Glucose Monitoring Suppl (ONETOUCH VERIO FLEX SYSTEM) w/Device KIT Check blood sugar 1 kit 0    aspirin 81 MG EC tablet Take 81 mg by mouth daily. No current facility-administered medications for this visit.        Allergies   Allergen Reactions    Other      Bee stings         Social History     Tobacco Use    Smoking status: Former     Packs/day: 1.00     Years: 48.00     Pack years: 48.00     Types: Cigarettes     Start date: 1959     Quit date: 2007     Years since quittin.7    Smokeless tobacco: Never   Vaping Use    Vaping Use: Never used   Substance Use Topics    Alcohol use: Yes     Comment: social    Drug use: No        Family History   Problem Relation Age of Onset    Coronary Art Dis Mother         CABG    Diabetes Mother     High Blood Pressure Father     Stroke Father     Hypertension Brother     Diabetes Brother     Coronary Art Dis Brother     Cancer Brother         prostate    Other Brother         PVD    Diabetes Maternal Grandmother     Cancer Maternal Grandmother         skin    Diabetes Brother     Hypertension Brother     Hypertension Brother         throat polyp, guillain barre        Review Of Systems    Skin: no abnormal pigmentation, rash, scaling, itching, masses, hair or nail changes  Eyes: negative  Ears/Nose/Throat: negative  Respiratory: negative  Cardiovascular: negative  Gastrointestinal: negative  Genitourinary: negative  Musculoskeletal: negative  Neurologic: negative  Psychiatric: negative  Hematologic/Lymphatic/Immunologic: negative  Endocrine: negative       Objective:      /60 (Site: Right Upper Arm, Position: Sitting, Cuff Size: Large Adult)   Pulse 71   Temp 97 °F (36.1 °C) (Infrared)   Resp 16   Ht 6' 3\" (1.905 m)   Wt 223 lb 3.2 oz (101.2 kg)   SpO2 93%   BMI 27.90 kg/m²   General appearance - healthy, alert, no distress  Skin - Skin color, texture, turgor normal. No rashes or lesions. Head - Normocephalic. No masses, lesions, tenderness or abnormalities  Eyes - conjunctivae/corneas clear. PERRL, EOM's intact. Ears - External ears normal. Canals clear. TM's normal.  Nose/Sinuses - Nares normal. Septum midline. Mucosa normal. No drainage or sinus tenderness. Oropharynx - Lips, mucosa, and tongue normal. Teeth and gums normal. Oropharynx pink and patent  Neck - Neck supple. No adenopathy. Thyroid symmetric, normal size,  Back - Back symmetric, no curvature. ROM normal. No CVA tenderness. Lungs - Percussion normal. Good diaphragmatic excursion. Lungs clear  Heart - Regular rate and rhythm, with no rub, murmur or gallop noted. Abdomen - Abdomen soft, non-tender. BS normal. No masses, organomegaly  Extremities - Extremities normal. No deformities, edema, or skin discolora  Musculoskeletal - Spine ROM normal. Muscular strength intact. Peripheral pulses - radial=2+,, femoral=2+, popliteal=2+, dorsalis pedis=2+,  Neuro - Gait normal. Reflexes normal and symmetric. Sensation grossly normal.  No focal weakness    EKG: NSR, RBBB, old, no new changes on EKG, ok for surgery. BLOOD WORK: ordered.      Assessment:      Pre op eval  Traumatic closed displaced fracture of left shoulder with anterior dislocation  Here for a pre op eval for left shoulder repair. He is medically stable. Essential hypertension  This is a chronic problem. The problem is well controlled. Patient monitors readings regularly. Pertinent negatives include no chest pain, focal sensory loss, focal weakness, leg pain, myalgias or shortness of breath. No headaches or chest pain. Takes medications regularly. Blood pressure has been stable, blood work was reviewed, and advised patient to continue the current instructions or medications. Type 2 diabetes mellitus without complication, without long-term current use of insulin (HCC)  On lantus and synjardy, well controlled, This problem was reviewed in detail. It is under control and stable. Will check blood work. Coronary atherosclerosis of native coronary artery  No cp or sob,  On crestor lotrel and zetia,  Patient is compliant w medications, no side effects, effective, provides adequate symptom relief. No new symptoms or problems as noted by patient. The problem is stable, no changes noted by patient. Will consider monitoring labs and refill medications as appropriate. Patient counseled and will continue current plan. Pure hypercholesterolemia  This has been a long standing problem, takes crestor and zetia. Monitors diet and tries to follow a low fat diet. Has  been reasonably  compliant w exercise. Lipids have been stable, The problem is controlled. Recent lipid tests were reviewed and are normal. Pertinent negatives include no chest pain, focal sensory loss, focal weakness, leg pain, myalgias or shortness of breath. Advised patient to continue the current instructions or medications. Benign prostatic hyperplasia with urinary hesitancy  On flomax,  Patient is compliant w medications, no side effects, effective, provides adequate symptom relief. No new symptoms or problems as noted by patient. The problem is stable, no changes noted by patient.  Will consider monitoring labs and refill medications as appropriate. Patient counseled and will continue current plan. Plan:        He is medically cleared for surgery and anesthesia. Per operating surgeon. See also orders filed with this encounter, if any. Instructions to patient: Do not take any NSAIDS 1 week prior to surgery. Indication for zaida-operative beta blocker therapy: N/A  I spent 40-54 minutes in total time on the day of the encounter including the pre-encounter time, face-face encounter time, reviewing the medical history, labs and imaging, and post-encounter time, for the care and pre op clearance of this individual patient ( not separately reported).       Noris Forte MD   8/16/2022 3:13 PM

## 2022-08-16 NOTE — ASSESSMENT & PLAN NOTE
No cp or sob,  On crestor lotrel and zetia,  Patient is compliant w medications, no side effects, effective, provides adequate symptom relief. No new symptoms or problems as noted by patient. The problem is stable, no changes noted by patient. Will consider monitoring labs and refill medications as appropriate. Patient counseled and will continue current plan.

## 2022-08-17 ENCOUNTER — TELEPHONE (OUTPATIENT)
Dept: CARDIOLOGY CLINIC | Age: 72
End: 2022-08-17

## 2022-08-17 NOTE — TELEPHONE ENCOUNTER
Called patient. He has been feeling fine. Denies any cardiac symptoms. Had stress echo 2020. Scheduled appt with [facility] 8/24/22.

## 2022-08-17 NOTE — TELEPHONE ENCOUNTER
CARDIAC CLEARANCE     What type of procedure are you having? Left Shoulder Surgery    Which physician is performing your procedure? Dr. Bethany Wu    When is your procedure scheduled for? August 26th    Where are you having this procedure? [address] [place] [address]    Are you taking Blood Thinners? Yes   If so what? (Name/dose/frequesncy)  aspirin 81 MG EC tablet  Take 81 mg by mouth daily. Does the surgeon want you to stop your blood thinner? If so for how long?    Please advise    Phone Number and Contact Name for Physicians office:  [phone]  Fax number to send information:    [phone] [facility]  [phone] back up

## 2022-08-24 ENCOUNTER — OFFICE VISIT (OUTPATIENT)
Dept: CARDIOLOGY CLINIC | Age: 72
End: 2022-08-24
Payer: MEDICARE

## 2022-08-24 VITALS
SYSTOLIC BLOOD PRESSURE: 142 MMHG | HEIGHT: 75 IN | BODY MASS INDEX: 27.83 KG/M2 | OXYGEN SATURATION: 96 % | HEART RATE: 80 BPM | DIASTOLIC BLOOD PRESSURE: 70 MMHG | WEIGHT: 223.8 LBS

## 2022-08-24 DIAGNOSIS — I25.10 ATHEROSCLEROSIS OF NATIVE CORONARY ARTERY OF NATIVE HEART WITHOUT ANGINA PECTORIS: Primary | ICD-10-CM

## 2022-08-24 DIAGNOSIS — I10 ESSENTIAL HYPERTENSION: ICD-10-CM

## 2022-08-24 DIAGNOSIS — E11.9 TYPE 2 DIABETES MELLITUS WITHOUT COMPLICATION, WITHOUT LONG-TERM CURRENT USE OF INSULIN (HCC): ICD-10-CM

## 2022-08-24 DIAGNOSIS — E78.00 PURE HYPERCHOLESTEROLEMIA: ICD-10-CM

## 2022-08-24 PROCEDURE — 99214 OFFICE O/P EST MOD 30 MIN: CPT | Performed by: INTERNAL MEDICINE

## 2022-08-24 PROCEDURE — 3051F HG A1C>EQUAL 7.0%<8.0%: CPT | Performed by: INTERNAL MEDICINE

## 2022-08-24 PROCEDURE — 1123F ACP DISCUSS/DSCN MKR DOCD: CPT | Performed by: INTERNAL MEDICINE

## 2022-08-24 NOTE — PATIENT INSTRUCTIONS
Follow up with Dr Iveth Fry in 1 year  Okay to proceed with L Shoulder surgery   Call for any questions or concerns.

## 2022-08-24 NOTE — LETTER
[facility] CARDIOLOGY[address]  Dept: [phone]  Dept Fax: [phone]      2022    Patient:Hernandez Cam  : 1950  DOS: 2022    To Whom it May Concern:    Vipul Larkin has been evaluated for cardiac clearance. Based on diagnostic testing including EKG, Vipul Larkin is considered at a low  risk for surgery. There is no further cardiac testing that could be done to lower the risk. Please let my office know if you have any questions or concerns. Please continue aspirin if possible.            Kendra Bautista MD                           A [facility] serving [place] and [place]

## 2022-08-24 NOTE — PROGRESS NOTES
[facility]  Cardiac Consult     Referring Provider:  Keena Harris MD     Chief Complaint   Patient presents with    Follow-up    Cardiac Clearance    Coronary Artery Disease    Hypertension    Hyperlipidemia        History of Present Illness:  77 y/o male seen in f/u for CAD. He retired from [employer] .  started back doing contract work for [employer]. He does have CAD. Prior RCA stenting. Followed by Dr. Ayala Valdez in the past. Last cath . Occluded stent in [facility]. Last stress  normal.    He fell in the hotel shower a few months ago and injured his left shoulder. He now needs surgery. He is doing well from a cardiac standpoint. He walks [place] lot\". 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 Walks around building and up and down stairs. No chest pain or dyspnea. Past Medical History:   has a past medical history of CAD (coronary artery disease), Chronic back pain, Colorectal polyps, Diabetic ulcer of toe of left foot associated with type 2 diabetes mellitus, limited to breakdown of skin ([address] Erectile dysfunction, Hyperlipidemia, Hypertension, Hypertrophy of prostate without urinary obstruction and other lower urinary tract symptoms (LUTS), Penetrating foot wound, Retrograde ejaculation, Sciatica, and Type II or unspecified type diabetes mellitus without mention of complication, not stated as uncontrolled. Surgical History:   has a past surgical history that includes Coronary angioplasty; Tonsillectomy and adenoidectomy; cyst removal; and Carotid endarterectomy (Right, 2018).      Social History:  Social History     Tobacco Use    Smoking status: Former     Packs/day: 1.00     Years: 48.00     Pack years: 48.00     Types: Cigarettes     Start date: 1959     Quit date: 2007     Years since quittin.7    Smokeless tobacco: Never   Substance Use Topics    Alcohol use: Yes     Comment: social        Family History:  family history includes Cancer in his brother and maternal grandmother; Coronary Art Dis in his brother and mother; Diabetes in his brother, brother, maternal grandmother, and mother; High Blood Pressure in his father; Hypertension in his brother, brother, and brother; Other in his brother; Stroke in his father. Allergies: Other     Home Medications:  Prior to Visit Medications    Medication Sig Taking? Authorizing Provider   SYNJARDY XR 12.5-1000 MG TB24 TAKE 1 TABLET BY MOUTH TWICE DAILY Yes Mei Encarnacion MD   [facility] VERIO strip As needed. Yes Mei Encarnacion MD   meloxicam (MOBIC) 7.5 MG tablet Take 1 tablet by mouth in the morning. Yes Viridiana Easton MD   Insulin Pen Needle (B-D UF III MINI PEN NEEDLES) 31G X 5 MM MISC Use 4 times daily to inject insulin Yes Mei Encarnacion MD   LANTUS SOLOSTAR 100 UNIT/ML injection pen ADMINISTER 56 UNITS UNDER Clemencia Lively Yes Mei Encarnacion MD   rosuvastatin (CRESTOR) 40 MG tablet TAKE 1 TABLET BY MOUTH DAILY Yes Rosalva Garrett MD   tamsulosin (FLOMAX) 0.4 MG capsule TAKE 2 CAPSULEs BY MOUTH EVERY DAY Yes Rosalva Garrett MD   Lancets ([address]) [address] USE TO TEST 4 TO 6 TIMES DAILY Yes Mei Encarnacion MD   insulin lispro, 1 Unit Dial, 100 UNIT/ML SOPN INJECT 15 UNITS UNDER THE SKIN WITH EACH MEAL Yes Mei Encarnacion MD   hydroCHLOROthiazide (MICROZIDE) 12.5 MG capsule Take 1 capsule by mouth every morning Yes Rosalva Garrett MD   amLODIPine-benazepril (LOTREL) 10-20 MG per capsule Take 1 capsule by mouth daily Yes Rosalva Garrett MD   ezetimibe (ZETIA) 10 MG tablet Take 1 tablet by mouth daily Yes Rosalva Garrett MD   Blood Glucose Monitoring Suppl ([address]) w/Device KIT Check blood sugar Yes Mei Encarnacion MD   aspirin 81 MG EC tablet Take 81 mg by mouth daily. Yes Historical Provider, MD       [x] Medications and dosages reviewed.     ROS:  [x]Full ROS obtained and negative except as mentioned in HPI      Physical Examination:    Vitals:    08/24/22 1420 08/24/22 1427   BP: (!) 148/70 (!) 142/70   Site: Right Upper Arm Right Upper Arm   Position: Sitting Sitting   Cuff Size: Large Adult Large Adult   Pulse: 80    SpO2: 96%    Weight: 223 lb 12.8 oz (101.5 kg)    Height: 6' 3\" (1.905 m)         GENERAL: Well developed, well nourished, No acute distress  NEUROLOGICAL: Alert and oriented  PSYCH: Calm affect  SKIN: Warm and dry, No visible rash, Some mild ecchymosis on arms   EYES: Pupils equal and round, Sclera non-icteric,   HENT:  External ears and nose unremarkable, mucus membranes moist  MUSCULOSKELETAL: Normal cephalic, neck supple  CAROTID: Normal upstroke, no bruits  CARDIAC: JVP normal, Normal PMI, regular rate and rhythm, normal S1S2, No murmur, rub, or gallop  RESPIRATORY: Normal respiratory effort, clear to auscultation bilaterally  EXTREMITIES: No LE edema  GASTROINTESTINAL: normal bowel sounds, soft, non-tender, No hepatomegaly     EKG 8/16/2022-Personally reviewed  NSR, RBBB    Assessment:     CAD:  Stable without angina. Stress ECHO  (9/30/2020) normal.  Continue medical therapy. Prior RCA stenting. Followed by Dr. Alexander Blum in the past. Last cath 2003. Occluded stent in [facility]. Hyperlipidemia:  LDL=77   Well controlled  Continue crestor 40 and zetia 10       HTN:  BP (!) 142/70 (Site: Right Upper Arm, Position: Sitting, Cuff Size: Large Adult)   Pulse 80   Ht 6' 3\" (1.905 m)   Wt 223 lb 12.8 oz (101.5 kg)   SpO2 96%   BMI 27.97 kg/m²   Well controlled. Continue current medical therapy    Preop:  Stable cardiac status  He may proceed with surgery at low cardiac risk  Needs to continue ASA if possible.      Plan:  Stable cardiac status  OK for surgery   F/u 1 year    Thank you for allowing me to participate in the care of this individual.      Sujey Campbell M.D., [facility]

## 2022-08-25 DIAGNOSIS — S43.005D DISLOCATION OF LEFT SHOULDER JOINT, SUBSEQUENT ENCOUNTER: ICD-10-CM

## 2022-08-25 RX ORDER — MELOXICAM 7.5 MG/1
7.5 TABLET ORAL DAILY
Qty: 30 TABLET | Refills: 0 | Status: SHIPPED | OUTPATIENT
Start: 2022-08-25 | End: 2022-09-21

## 2022-09-06 ENCOUNTER — HOSPITAL ENCOUNTER (OUTPATIENT)
Dept: PHYSICAL THERAPY | Age: 72
Setting detail: THERAPIES SERIES
Discharge: HOME | End: 2022-09-06
Payer: MEDICARE

## 2022-09-06 PROCEDURE — 97161 PT EVAL LOW COMPLEX 20 MIN: CPT | Performed by: PHYSICAL THERAPIST

## 2022-09-06 PROCEDURE — 97110 THERAPEUTIC EXERCISES: CPT | Performed by: PHYSICAL THERAPIST

## 2022-09-06 NOTE — FLOWSHEET NOTE
[facility] [address]  Phone: [phone]   Fax:     [phone]    Physical Therapy Daily Treatment Note  Date:  2022    Patient Name:  Clyde Mott    :  1950  MRN: [id number]  Restrictions/Precautions:    Medical/Treatment Diagnosis Information:  Diagnosis: L truamatic rotator cuff tear M75.122  Treatment Diagnosis: L shoulder [id number]  Insurance/Certification information:  PT Insurance Information: [facility] BMN  Physician Information:   Dr Ant Davis  Has the plan of care been signed (Y/N):        []  Yes  [x]  No     Date of Patient follow up with Physician: per md      Is this a Progress Report:     []  Yes  [x]  No        If Yes:  Date Range for reporting period:  Beginning  Ending    Progress report will be due (10 Rx or 30 days whichever is less):        Recertification will be due (POC Duration  / 90 days whichever is less):          Visit # Insurance Allowable Auth Required   1 post surgery  BMN []  Yes []  No        Functional Scale:  Foto38/100    Date assessed:       Latex Allergy:  [x]NO      []YES  Preferred Language for Healthcare:   [x]English       []other:    Pain level:  3-10/10     SUBJECTIVE:  See eval    OBJECTIVE: See eval       RESTRICTIONS/PRECAUTIONS: RCR,  increase ROM as able per MD due to capsular release and LUIS    Exercises/Interventions:   Exercises:  Exercise/Equipment Resistance/Repetitions Other comments   Stretching/PROM     Wand ER at neutral [id number]    Table Slides With chair fl [id number]    UE [place]     Pulleys     Pendulum Assist with R UE CW CCW fl/ext side to side [id number]     Elbow AROM  x30    Isometrics     Retraction x30    shrug x30    Weight shift     Flexion     Abduction     External Rotation     Internal Rotation     Biceps     Triceps          PRE's     Flexion     Abduction     External Rotation     Internal Rotation     Shrugs     EXT     Reverse Flys     Serratus     Horizontal Abd with ER     Biceps     Triceps     Retraction          Cable Column/Theraband     External Rotation     Internal Rotation     Shrugs     Lats     Ext     Flex     Scapular Retraction     BIC     TRIC     PNF          Dynamic Stability          Plyoback          Manual interventions     Fl scap ER/IR 5 min to assess PROM Start9/7              Therapeutic Exercise and NMR EXR  [x] (10053) Provided verbal/tactile cueing for activities related to strengthening, flexibility, endurance, ROM  for improvements in scapular, scapulothoracic and UE control with self care, reaching, carrying, lifting, house/yardwork, driving/computer work.    [] (82605) Provided verbal/tactile cueing for activities related to improving balance, coordination, kinesthetic sense, posture, motor skill, proprioception  to assist with  scapular, scapulothoracic and UE control with self care, reaching, carrying, lifting, house/yardwork, driving/computer work. Therapeutic Activities:    [] (27369 or 96773) Provided verbal/tactile cueing for activities related to improving balance, coordination, kinesthetic sense, posture, motor skill, proprioception and motor activation to allow for proper function of scapular, scapulothoracic and UE control with self care, carrying, lifting, driving/computer work.      Home Exercise Program:    [x] (40345) Reviewed/Progressed HEP activities related to strengthening, flexibility, endurance, ROM of scapular, scapulothoracic and UE control with self care, reaching, carrying, lifting, house/yardwork, driving/computer work  [] (66837) Reviewed/Progressed HEP activities related to improving balance, coordination, kinesthetic sense, posture, motor skill, proprioception of scapular, scapulothoracic and UE control with self care, reaching, carrying, lifting, house/yardwork, driving/computer work      Manual Treatments:  PROM / STM / Oscillations-Mobs:  G-I, II, III, IV (Isaak, Inf., Post.)  [] (09866) Provided manual therapy to mobilize soft tissue/joints of cervical/CT, scapular GHJ and UE for the purpose of modulating pain, promoting relaxation,  increasing ROM, reducing/eliminating soft tissue swelling/inflammation/restriction, improving soft tissue extensibility and allowing for proper ROM for normal function with self care, reaching, carrying, lifting, house/yardwork, driving/computer work    Modalities:  declined ice but recommended ice 15-20 min on several times per day to help with pain and inflammation     Charges:  Timed Code Treatment Minutes: 20   Total Treatment Minutes: 45       [x] EVAL (LOW) 82215 (typically 20 minutes face-to-face)  [] EVAL (MOD) 10816 (typically 30 minutes face-to-face)  [] EVAL (HIGH) 87921 (typically 45 minutes face-to-face)  [] RE-EVAL     [x] WA(90390) x  1   [] IONTO  [] NMR (05618) x     [] VASO  [] Manual (41513) x      [] Other:  [] TA x      [] Mech Traction (46767)  [] ES(attended) (99768)      [] ES (un) (63226):     GOALS:  Patient stated goal: increase ROM decrease pain, use arm for adls    [] Progressing: [] Met: [] Not Met: [] Adjusted     Therapist goals for Patient:   Short Term Goals: To be achieved in: 2 -4weeks  1. Independent in HEP and progression per patient tolerance, in order to prevent re-injury. [] Progressing: [] Met: [] Not Met: [] Adjusted   2. Patient will have a decrease in pain to facilitate improvement in movement, function, and ADLs as indicated by Functional Deficits. [] Progressing: [] Met: [] Not Met: [] Adjusted     Long Term Goals: To be achieved in: 12 weeks  1. Foto 67/100  to assist with reaching prior level of function. [] Progressing: [] Met: [] Not Met: [] Adjusted  2. Patient will demonstrate increased AROM to = R to allow for proper joint functioning as indicated by patients Functional Deficits. [] Progressing: [] Met: [] Not Met: [] Adjusted  3. Patient will demonstrate an increase in strength to good scapular and core control  for UE to allow for proper functional mobility as indicated by patients Functional Deficits. [] Progressing: [] Met: [] Not Met: [] Adjusted  4. Patient will return to  functional activities reach over head, reach behind back, don doff jacket  without increased symptoms or restriction. [] Progressing: [] Met: [] Not Met: [] Adjusted  Overall Progression Towards Functional goals/ Treatment Progress Update:  [] Patient is progressing as expected towards functional goals listed. [] Progression is slowed due to complexities/Impairments listed. [] Progression has been slowed due to co-morbidities. [x] Plan just implemented, too soon to assess goals progression <30days   [] Goals require adjustment due to lack of progress  [] Patient is not progressing as expected and requires additional follow up with physician  [] Other    Prognosis for POC: [x] Good [] Fair  [] Poor      Patient requires continued skilled intervention: [x] Yes  [] No    Treatment/Activity Tolerance:  [x] Patient able to complete treatment  [] Patient limited by fatigue  [] Patient limited by pain     [] Patient limited by other medical complications  [] Other:                   Patient Education:    Reviewed diagnosis, POC, HEP and its importance. Access Code: [email or web address]  URL: [email or web address]  Date: 9/6/22  Prepared by: Nessa Edwards     Exercises  Standing Shoulder Shrugs - 2 x daily - 7 x weekly - 3 sets - 10 reps  Seated Scapular Retraction - 2 x daily - 7 x weekly - 3 sets - 10 reps  Standing Bicep Curls Supinated with Dumbbells - 2 x daily - 7 x weekly - 3 sets - 10 reps     Seated Shoulder Pendulum Exercise - 2 x daily - 7 x weekly - 3 sets - 10 reps  Table slides flexion - 2 x daily - 7 x weekly - 10 reps - 10 hold  Seated Shoulder External Rotation AAROM with Dowel - 2 x daily - 7 x weekly - 10 reps - 10 hold   PLAN: See damir  [] Continue per plan of care [] Alter current plan (see comments above)  [x] Plan of care initiated [] Hold pending MD visit [] Discharge      Electronically signed by:  Lucy Bernal PT    Note: If patient does not return for scheduled/ recommended follow up visits, this note will serve as a discharge from care along with most recent update on progress.

## 2022-09-06 NOTE — PLAN OF CARE
[facility] and [address]  Phone [phone]  Fax [phone]      Physical Therapy Certification    Dear Dr Tj Strong  ,    We had the pleasure of evaluating the following patient for physical therapy services at [address] [place] [address]. A summary of our findings can be found in the initial assessment below. This includes our plan of care. If you have any questions or concerns regarding these findings, please do not hesitate to contact me at the office phone number checked above. Thank you for the referral.       Physician Signature:_______________________________Date:__________________  By signing above (or electronic signature), therapists plan is approved by physician      Patient: Mei Abraham   : 1950   MRN: [id number]  Referring Physician:        Evaluation Date: 2022      Medical Diagnosis Information:  Diagnosis: L truamatic rotator cuff tear M75.122   Treatment Diagnosis: L shoulder painM25. 512         DOS 2022  L shoulder RC augmentation labral debridement SAD DCE capsular release LUIS                                  Insurance information: PT Insurance Information: [facility] BM    Precautions/ Contra-indications:     C-SSRS Triggered by Intake questionnaire (Past 2 wk assessment):   [x] No, Questionnaire did not trigger screening. verbal  [] Yes, Patient intake triggered further evaluation      [] C-SSRS Screening completed  [] PCP notified via Plan of Care  [] Emergency services notified     Latex Allergy:  [x]NO      []YES  Preferred Language for Healthcare:   [x]English       []other:    SUBJECTIVE: Patient stated complaint:here for post op visit. After MRI patient was referred to new surgeon for surgery. Pt chose to see [facility] MD Hang Gonsalez.      Relevant Medical History:type 2 diab  Functional Disability Index:Foto38/100    Pain Scale: 3-10/10  Easing factors: rest  Provocative factors: movement     Type: [x]Constant   []Intermittent  []Radiating []Localized []other:     Numbness/Tingling: nerve c/o from original injury improving     Occupation/School: PG    Living Status/Prior Level of Function: Independent with ADLs and IADLs, yard work    OBJECTIVE:     ROM PROM AROM  Comment    L R L R    Flexion ~70       Abduction ~75       ER [email or web address]       IR        Other  (cervical)        Other             Strength not tested due to recent surgery  L R Comment   Flexion      Abduction      ER      IR      Supraspinatus      Upper Trap      Lower Trap      Mid Trap      Rhomboids      Biceps      Triceps      Horizontal Abduction      Horizontal Adduction      Lats            Reflexes/Sensation:               [x]Dermatomes/Myotomes intact               []Reflexes equal and normal bilaterally               []Other:     Joint mobility: unable to assess due to pan and guarding               []Normal               []Hypo              []Hyper     Palpation: general TTP L shoulder region due to recent surgery      Functional Mobility/Transfers: modified I, L UE protection      Posture: protective posturing L UE, MD advised to DC sling to avoid stiffness     Bandages/Dressings/Incisions: not formally assessed     Gait: (include devices/WB status): wfl L UE posturing with L shoulder shrug     Orthopedic Special Tests:                        [x] Patient history, allergies, meds reviewed. Medical chart reviewed. See intake form. Review Of Systems (ROS):  [x]Performed Review of systems (Integumentary, CardioPulmonary, Neurological) by intake and observation. Intake form has been scanned into medical record. Patient has been instructed to contact their primary care physician regarding ROS issues if not already being addressed at this time.        Co-morbidities/Complexities (which will affect course of rehabilitation):   []None              Arthritic conditions   []Rheumatoid arthritis (M05.9)  []Osteoarthritis (M19.91)    Cardiovascular conditions   []Hypertension (I10)  []Hyperlipidemia (E78.5)  []Angina pectoris (I20)  []Atherosclerosis (I70)    Musculoskeletal conditions   []Disc pathology   []Congenital spine pathologies   []Prior surgical intervention  []Osteoporosis (M81.8)  []Osteopenia (M85.8)   Endocrine conditions   []Hypothyroid (E03.9)  []Hyperthyroid Gastrointestinal conditions   []Constipation (J22.82)    Metabolic conditions   []Morbid obesity (E66.01)  [x]Diabetes type 1(E10.65) or 2 (E11.65)   []Neuropathy (G60.9)      Pulmonary conditions   []Asthma (J45)  []Coughing   []COPD (J44.9)    Psychological Disorders  []Anxiety (F41.9)  []Depression (F32.9)   []Other:    []Other:            Barriers to/and or personal factors that will affect rehab potential:              [x]Age  [x]Sex              [x]Motivation/Lack of Motivation                        [x]Co-Morbidities              []Cognitive Function, education/learning barriers              []Environmental, home barriers              []profession/work barriers  [x]past PT/medical experience  []other:       Falls Risk Assessment (30 days):   [x] Falls Risk assessed and no intervention required.   [] Falls Risk assessed and Patient requires intervention due to being higher risk   TUG score (>12s at risk):     [] Falls education provided, including         ASSESSMENT:   Functional Impairments              []Noted spinal or UE joint hypomobility              []Noted spinal or UE joint hypermobility              [x]Decreased UE functional ROM              [x]Decreased UE functional strength              []Abnormal reflexes/sensation/myotomal/dermatomal deficits              []Decreased RC/scapular/core strength and neuromuscular control              []other:       Functional Activity Limitations (from functional questionnaire and intake)              [x]Reduced ability to tolerate prolonged functional positions              [x]Reduced ability or difficulty with changes of positions or transfers between positions              [x]Reduced ability to maintain good posture and demonstrate good body mechanics with sitting, bending, and lifting              [x] Reduced ability or tolerance with driving and/or computer work              [x]Reduced ability to sleep              [x]Reduced ability to perform lifting, reaching, carrying tasks              [x]Reduced ability to tolerate impact through UE              [x]Reduced ability to reach behind back              [x]Reduced ability to  or hold objects              [x]Reduced ability to throw or toss an object              []other:       Participation Restrictions              [x]Reduced participation in self care activities              [x]Reduced participation in home management activities              [x]Reduced participation in work activities              [x]Reduced participation in social activities. [x]Reduced participation in sport / recreational activities. Classification:              [x]Signs/symptoms consistent with post-surgical status including decreased ROM, strength and function.   []Signs/symptoms consistent with joint sprain/strain              []Signs/symptoms consistent with shoulder impingement              []Signs/symptoms consistent with shoulder/elbow/wrist tendinopathy              []Signs/symptoms consistent with Rotator cuff tear              []Signs/symptoms consistent with labral tear              []Signs/symptoms consistent with postural dysfunction                         []Signs/symptoms consistent with Glenohumeral IR Deficit - <45 degrees              []Signs/symptoms consistent with facet dysfunction of cervical/thoracic spine                         []Signs/symptoms consistent with pathology which may benefit from Dry needling                []other:      Prognosis/Rehab Potential:                                       []Excellent [x]Good                 []Fair              []Poor     Tolerance of evaluation/treatment:               []Excellent              []Good                 [x]Fair              []Poor     Physical Therapy Evaluation Complexity Justification  [x] A history of present problem with:  [] no personal factors and/or comorbidities that impact the plan of care;  [x]1-2 personal factors and/or comorbidities that impact the plan of care  []3 personal factors and/or comorbidities that impact the plan of care  [x] An examination of body systems using standardized tests and measures addressing any of the following: body structures and functions (impairments), activity limitations, and/or participation restrictions;:  [x] a total of 1-2 or more elements   [] a total of 3 or more elements   [] a total of 4 or more elements   [x] A clinical presentation with:  [x] stable and/or uncomplicated characteristics   [] evolving clinical presentation with changing characteristics  [] unstable and unpredictable characteristics;   [x] Clinical decision making of [x] low, [] moderate, [] high complexity using standardized patient assessment instrument and/or measurable assessment of functional outcome. [x] EVAL (LOW) 74762 (typically 20 minutes face-to-face)  [] EVAL (MOD) 84697 (typically 30 minutes face-to-face)  [] EVAL (HIGH) 17079 (typically 45 minutes face-to-face)  [] RE-EVAL         PLAN:  Frequency/Duration:  2-3 days per week for 12 Weeks:  INTERVENTIONS:  [x] Therapeutic exercise including: strength training, ROM, for Upper extremity and core   [x]  NMR activation and proprioception for UE, scap and Core   [x] Manual therapy as indicated for shoulder, scapula and spine to include: Dry Needling/IASTM, STM, PROM, Gr I-IV mobilizations, manipulation.              [x] Modalities as needed that may include: thermal agents, E-stim, Biofeedback, US, iontophoresis as indicated  [x] Patient education on joint protection, postural re-education, activity modification, progression of HEP. HEP instruction:   Access Code: [email or web address]  URL: [email or web address]  Date: 9/6/22  Prepared by: Grace Gu     Exercises  Standing Shoulder Shrugs - 2 x daily - 7 x weekly - 3 sets - 10 reps  Seated Scapular Retraction - 2 x daily - 7 x weekly - 3 sets - 10 reps  Standing Bicep Curls Supinated with Dumbbells - 2 x daily - 7 x weekly - 3 sets - 10 reps    Seated Shoulder Pendulum Exercise - 2 x daily - 7 x weekly - 3 sets - 10 reps  Table slides flexion - 2 x daily - 7 x weekly - 10 reps - 10 hold  Seated Shoulder External Rotation AAROM with Dowel - 2 x daily - 7 x weekly - 10 reps - 10 hold   GOALS:   Patient stated goal: increase ROM decrease pain, use arm for adls    [] Progressing: [] Met: [] Not Met: [] Adjusted    Therapist goals for Patient:   Short Term Goals: To be achieved in: 2 -4weeks  1. Independent in HEP and progression per patient tolerance, in order to prevent re-injury. [] Progressing: [] Met: [] Not Met: [] Adjusted   2. Patient will have a decrease in pain to facilitate improvement in movement, function, and ADLs as indicated by Functional Deficits. [] Progressing: [] Met: [] Not Met: [] Adjusted    Long Term Goals: To be achieved in: 12 weeks  1. Foto 67/100  to assist with reaching prior level of function. [] Progressing: [] Met: [] Not Met: [] Adjusted  2. Patient will demonstrate increased AROM to = R to allow for proper joint functioning as indicated by patients Functional Deficits. [] Progressing: [] Met: [] Not Met: [] Adjusted  3. Patient will demonstrate an increase in strength to good scapular and core control  for UE to allow for proper functional mobility as indicated by patients Functional Deficits. [] Progressing: [] Met: [] Not Met: [] Adjusted  4. Patient will return to  functional activities reach over head, reach behind back, don doff jacket  without increased symptoms or restriction.    [] Progressing: [] Met: [] Not Met: [] Adjusted      Electronically signed by:  Angelo Arndt PT    Note: If patient does not return for scheduled/ recommended follow up visits, this note will serve as a discharge from care along with most recent update on progress.

## 2022-09-08 ENCOUNTER — HOSPITAL ENCOUNTER (OUTPATIENT)
Dept: PHYSICAL THERAPY | Age: 72
Setting detail: THERAPIES SERIES
Discharge: HOME | End: 2022-09-08
Payer: MEDICARE

## 2022-09-08 PROCEDURE — 97140 MANUAL THERAPY 1/> REGIONS: CPT | Performed by: PHYSICAL THERAPIST

## 2022-09-08 PROCEDURE — 97110 THERAPEUTIC EXERCISES: CPT | Performed by: PHYSICAL THERAPIST

## 2022-09-08 NOTE — FLOWSHEET NOTE
[address],  [address]  Phone: [phone]   Fax:     [phone]    Physical Therapy Daily Treatment Note  Date:  2022    Patient Name:  Harini Kidd    :  1950  MRN: [id number]  Restrictions/Precautions:    Medical/Treatment Diagnosis Information:  Diagnosis: L truamatic rotator cuff tear M75.122  Treatment Diagnosis: L shoulder [id number]  Insurance/Certification information:  PT Insurance Information: [facility] BMN  Physician Information:   Dr Francine De Leon  Has the plan of care been signed (Y/N):        []  Yes  [x]  No     Date of Patient follow up with Physician: per md      Is this a Progress Report:     []  Yes  [x]  No        If Yes:  Date Range for reporting period:  Beginning  Ending    Progress report will be due (10 Rx or 30 days whichever is less): 97/0       Recertification will be due (POC Duration  / 90 days whichever is less):          Visit # Insurance Allowable Auth Required   2 post surgery  BMN []  Yes []  No        Functional Scale:  Foto38/100    Date assessed:       Latex Allergy:  [x]NO      []YES  Preferred Language for Healthcare:   [x]English       []other:    Pain level:  3-10/10     SUBJECTIVE:   no new c/o    OBJECTIVE: See eval       RESTRICTIONS/PRECAUTIONS: RCR,  increase ROM as able per MD due to capsular release and LUIS    Exercises/Interventions:   Exercises:  Exercise/Equipment Resistance/Repetitions Other comments   Stretching/PROM     Wand ER at neutral 81c14lbd    Table Slides With wheelde chair fl 91g50xin     Abd [id number] wheeled chair    ER bend forward 22r51wb       Start9/8      Start9/8   Wall step away for flexion 96g48cxj Start/8   Pulleys     Pendulum Assist with R UE CW CCW fl/ext side to side [id number]     Elbow AROM  x30    Isometrics     Retraction x30    shrug x30    Weight shift     Flexion     Abduction     External Rotation     Internal Rotation     Biceps     Triceps          PRE's     Flexion     Abduction     External Rotation     Internal Rotation     Shrugs     EXT     Reverse Flys     Serratus     Horizontal Abd with ER     Biceps     Triceps     Retraction          Cable Column/Theraband     External Rotation     Internal Rotation     Shrugs     Lats     Ext     Flex     Scapular Retraction     BIC     TRIC     PNF          Dynamic Stability          Plyoback          Manual interventions     PROM Fl scap ER/IR 15 min  Start9/7              Therapeutic Exercise and NMR EXR  [x] (72273) Provided verbal/tactile cueing for activities related to strengthening, flexibility, endurance, ROM  for improvements in scapular, scapulothoracic and UE control with self care, reaching, carrying, lifting, house/yardwork, driving/computer work.    [] (16809) Provided verbal/tactile cueing for activities related to improving balance, coordination, kinesthetic sense, posture, motor skill, proprioception  to assist with  scapular, scapulothoracic and UE control with self care, reaching, carrying, lifting, house/yardwork, driving/computer work. Therapeutic Activities:    [] (85833 or 49082) Provided verbal/tactile cueing for activities related to improving balance, coordination, kinesthetic sense, posture, motor skill, proprioception and motor activation to allow for proper function of scapular, scapulothoracic and UE control with self care, carrying, lifting, driving/computer work.      Home Exercise Program:    [x] (76877) Reviewed/Progressed HEP activities related to strengthening, flexibility, endurance, ROM of scapular, scapulothoracic and UE control with self care, reaching, carrying, lifting, house/yardwork, driving/computer work  [] (13642) Reviewed/Progressed HEP activities related to improving balance, coordination, kinesthetic sense, posture, motor skill, proprioception of scapular, scapulothoracic and UE control with self care, reaching, carrying, lifting, house/yardwork, driving/computer work      Manual Treatments:  PROM / STM / Oscillations-Mobs:  G-I, II, III, IV (PA's, Inf., Post.)  [x] (21390) Provided manual therapy to mobilize soft tissue/joints of cervical/CT, scapular GHJ and UE for the purpose of modulating pain, promoting relaxation,  increasing ROM, reducing/eliminating soft tissue swelling/inflammation/restriction, improving soft tissue extensibility and allowing for proper ROM for normal function with self care, reaching, carrying, lifting, house/yardwork, driving/computer work    Modalities:  declined ice but recommended ice 15-20 min on several times per day to help with pain and inflammation     Charges:  Timed Code Treatment Minutes: 45   Total Treatment Minutes: 310-410       [] EVAL (LOW) 82982 (typically 20 minutes face-to-face)  [] EVAL (MOD) 74372 (typically 30 minutes face-to-face)  [] EVAL (HIGH) 04097 (typically 45 minutes face-to-face)  [] RE-EVAL     [x] JE(43357) x  2  [] IONTO  [] NMR (96195) x     [] VASO  [x] Manual (85022) x  1    [] Other:  [] TA x      [] Mech Traction (17897)  [] ES(attended) (27223)      [] ES (un) (57141):     GOALS:  Patient stated goal: increase ROM decrease pain, use arm for adls    [] Progressing: [] Met: [] Not Met: [] Adjusted     Therapist goals for Patient:   Short Term Goals: To be achieved in: 2 -4weeks  1. Independent in HEP and progression per patient tolerance, in order to prevent re-injury. [] Progressing: [] Met: [] Not Met: [] Adjusted   2. Patient will have a decrease in pain to facilitate improvement in movement, function, and ADLs as indicated by Functional Deficits. [] Progressing: [] Met: [] Not Met: [] Adjusted     Long Term Goals: To be achieved in: 12 weeks  1. Foto 67/100  to assist with reaching prior level of function. [] Progressing: [] Met: [] Not Met: [] Adjusted  2.  Patient will demonstrate increased AROM to = R to allow for proper joint functioning as indicated by patients Functional Deficits. [] Progressing: [] Met: [] Not Met: [] Adjusted  3. Patient will demonstrate an increase in strength to good scapular and core control  for UE to allow for proper functional mobility as indicated by patients Functional Deficits. [] Progressing: [] Met: [] Not Met: [] Adjusted  4. Patient will return to  functional activities reach over head, reach behind back, don doff jacket  without increased symptoms or restriction. [] Progressing: [] Met: [] Not Met: [] Adjusted  Overall Progression Towards Functional goals/ Treatment Progress Update:  [] Patient is progressing as expected towards functional goals listed. [] Progression is slowed due to complexities/Impairments listed. [] Progression has been slowed due to co-morbidities. [x] Plan just implemented, too soon to assess goals progression <30days   [] Goals require adjustment due to lack of progress  [] Patient is not progressing as expected and requires additional follow up with physician  [] Other    Prognosis for POC: [x] Good [] Fair  [] Poor      Patient requires continued skilled intervention: [x] Yes  [] No    Treatment/Activity Tolerance:  [x] Patient able to complete treatment  [] Patient limited by fatigue  [x] Patient limited by pain     [] Patient limited by other medical complications  [] Other:                   Patient Education:    Reviewed diagnosis, POC, HEP and its importance. Access Code: [email or web address]  URL: [email or web address]  Date: 9/6/22  Prepared by: Myah Campoverde     Exercises  Standing Shoulder Shrugs - 2 x daily - 7 x weekly - 3 sets - 10 reps  Seated Scapular Retraction - 2 x daily - 7 x weekly - 3 sets - 10 reps  Standing Bicep Curls Supinated with Dumbbells - 2 x daily - 7 x weekly - 3 sets - 10 reps     Seated Shoulder Pendulum Exercise - 2 x daily - 7 x weekly - 3 sets - 10 reps  Table slides flexion - 2 x daily - 7 x weekly - 10 reps - 10 hold  Seated Shoulder External Rotation AAROM with Dowel - 2 x daily - 7 x weekly - 10 reps - 10 hold   PLAN: See eval  [x] Continue per plan of care [] Alter current plan (see comments above)  [] Plan of care initiated [] Hold pending MD visit [] Discharge      Electronically signed by:  Ralph Solano PT    Note: If patient does not return for scheduled/ recommended follow up visits, this note will serve as a discharge from care along with most recent update on progress.

## 2022-09-12 ENCOUNTER — HOSPITAL ENCOUNTER (OUTPATIENT)
Dept: PHYSICAL THERAPY | Age: 72
Setting detail: THERAPIES SERIES
Discharge: HOME | End: 2022-09-12
Payer: MEDICARE

## 2022-09-12 PROCEDURE — 97110 THERAPEUTIC EXERCISES: CPT | Performed by: PHYSICAL THERAPIST

## 2022-09-12 PROCEDURE — 97140 MANUAL THERAPY 1/> REGIONS: CPT | Performed by: PHYSICAL THERAPIST

## 2022-09-12 NOTE — FLOWSHEET NOTE
[address],[facility] [address],  [address]  Phone: [phone]   Fax:     [phone]    Physical Therapy Daily Treatment Note  Date:  2022    Patient Name:  Lencho Landrum    :  1950  MRN: [id number]  Restrictions/Precautions:    Medical/Treatment Diagnosis Information:  Diagnosis: L truamatic rotator cuff tear M75.122  Treatment Diagnosis: L shoulder [id number]  Insurance/Certification information:  PT Insurance Information: [facility] BMN  Physician Information:   Dr Evelyn Phillips  Has the plan of care been signed (Y/N):        []  Yes  [x]  No     Date of Patient follow up with Physician: per md      Is this a Progress Report:     []  Yes  [x]  No        If Yes:  Date Range for reporting period:  Beginning  Ending    Progress report will be due (10 Rx or 30 days whichever is less):        Recertification will be due (POC Duration  / 90 days whichever is less):          Visit # Insurance Allowable Auth Required   3 post surgery  BMN []  Yes []  No        Functional Scale:  Foto38/100    Date assessed:       Latex Allergy:  [x]NO      []YES  Preferred Language for Healthcare:   [x]English       []other:    Pain level:  3-10/10     SUBJECTIVE:   no new c/o    OBJECTIVE: See eval       RESTRICTIONS/PRECAUTIONS: RCR,  increase ROM as able per MD due to capsular release and LUIS    Exercises/Interventions:   Exercises:  Exercise/Equipment Resistance/Repetitions Other comments   Stretching/PROM     Wand ER at neutral 33c30ojf    Table Slides With wheeled chair fl 93i67wpe     Abd 22x50lnfejtp wheeled chair    ER bend forward 48t26ror       Start9/8      Start9/8   Wall step away for flexion 55p17raz Start9/8   Supine grab L wrist with R hand move into flexion 44a35vsp    Pendulum Assist with R UE CW CCW fl/ext side to side [id number]     Elbow AROM  x30    IR behind back  25h92ske Start9/12   Isometrics Retraction x30    shrug x30    Weight shift     Flexion     Abduction     External Rotation     Internal Rotation     Biceps     Triceps          PRE's     Flexion     Abduction     External Rotation     Internal Rotation     Shrugs     EXT     Reverse Flys     Serratus     Horizontal Abd with ER     Biceps     Triceps     Retraction          Cable Column/Theraband     External Rotation     Internal Rotation     Shrugs     Lats     Ext     Flex     Scapular Retraction     BIC     TRIC     PNF          Dynamic Stability          Plyoback          Manual interventions     PROM Fl scap ER/IR 15 min  Start9/7              Therapeutic Exercise and NMR EXR  [x] (36908) Provided verbal/tactile cueing for activities related to strengthening, flexibility, endurance, ROM  for improvements in scapular, scapulothoracic and UE control with self care, reaching, carrying, lifting, house/yardwork, driving/computer work.    [] (14273) Provided verbal/tactile cueing for activities related to improving balance, coordination, kinesthetic sense, posture, motor skill, proprioception  to assist with  scapular, scapulothoracic and UE control with self care, reaching, carrying, lifting, house/yardwork, driving/computer work. Therapeutic Activities:    [] (04497 or 37676) Provided verbal/tactile cueing for activities related to improving balance, coordination, kinesthetic sense, posture, motor skill, proprioception and motor activation to allow for proper function of scapular, scapulothoracic and UE control with self care, carrying, lifting, driving/computer work.      Home Exercise Program:    [x] (98524) Reviewed/Progressed HEP activities related to strengthening, flexibility, endurance, ROM of scapular, scapulothoracic and UE control with self care, reaching, carrying, lifting, house/yardwork, driving/computer work  [] (95671) Reviewed/Progressed HEP activities related to improving balance, coordination, kinesthetic sense, posture, motor skill, proprioception of scapular, scapulothoracic and UE control with self care, reaching, carrying, lifting, house/yardwork, driving/computer work      Manual Treatments:  PROM / STM / Oscillations-Mobs:  G-I, II, III, IV (PA's, Inf., Post.)  [x] (51514) Provided manual therapy to mobilize soft tissue/joints of cervical/CT, scapular GHJ and UE for the purpose of modulating pain, promoting relaxation,  increasing ROM, reducing/eliminating soft tissue swelling/inflammation/restriction, improving soft tissue extensibility and allowing for proper ROM for normal function with self care, reaching, carrying, lifting, house/yardwork, driving/computer work    Modalities:  declined ice but recommended ice 15-20 min on several times per day to help with pain and inflammation     Charges:  Timed Code Treatment Minutes: 45   Total Treatment Minutes: 240-340       [] EVAL (LOW) 03250 (typically 20 minutes face-to-face)  [] EVAL (MOD) 80966 (typically 30 minutes face-to-face)  [] EVAL (HIGH) 00717 (typically 45 minutes face-to-face)  [] RE-EVAL     [x] PV(01511) x  2  [] IONTO  [] NMR (15001) x     [] VASO  [x] Manual (20626) x  1    [] Other:  [] TA x      [] Mech Traction (06484)  [] ES(attended) (05741)      [] ES (un) (29455):     GOALS:  Patient stated goal: increase ROM decrease pain, use arm for adls    [] Progressing: [] Met: [] Not Met: [] Adjusted     Therapist goals for Patient:   Short Term Goals: To be achieved in: 2 -4weeks  1. Independent in HEP and progression per patient tolerance, in order to prevent re-injury. [] Progressing: [] Met: [] Not Met: [] Adjusted   2. Patient will have a decrease in pain to facilitate improvement in movement, function, and ADLs as indicated by Functional Deficits. [] Progressing: [] Met: [] Not Met: [] Adjusted     Long Term Goals: To be achieved in: 12 weeks  1. Foto 67/100  to assist with reaching prior level of function.    [] Progressing: [] Met: [] Not Met: [] Adjusted  2. Patient will demonstrate increased AROM to = R to allow for proper joint functioning as indicated by patients Functional Deficits. [] Progressing: [] Met: [] Not Met: [] Adjusted  3. Patient will demonstrate an increase in strength to good scapular and core control  for UE to allow for proper functional mobility as indicated by patients Functional Deficits. [] Progressing: [] Met: [] Not Met: [] Adjusted  4. Patient will return to  functional activities reach over head, reach behind back, don doff jacket  without increased symptoms or restriction. [] Progressing: [] Met: [] Not Met: [] Adjusted  Overall Progression Towards Functional goals/ Treatment Progress Update:  [] Patient is progressing as expected towards functional goals listed. [] Progression is slowed due to complexities/Impairments listed. [] Progression has been slowed due to co-morbidities. [x] Plan just implemented, too soon to assess goals progression <30days   [] Goals require adjustment due to lack of progress  [] Patient is not progressing as expected and requires additional follow up with physician  [] Other    Prognosis for POC: [x] Good [] Fair  [] Poor      Patient requires continued skilled intervention: [x] Yes  [] No    Treatment/Activity Tolerance:  [x] Patient able to complete treatment  [] Patient limited by fatigue  [x] Patient limited by pain     [] Patient limited by other medical complications  [] Other:                   Patient Education:    Reviewed diagnosis, POC, HEP and its importance. Access Code: [email or web address]  URL: [email or web address]  Date: 9/6/22  Prepared by: John Gonzalez     Exercises  Standing Shoulder Shrugs - 2 x daily - 7 x weekly - 3 sets - 10 reps  Seated Scapular Retraction - 2 x daily - 7 x weekly - 3 sets - 10 reps  Standing Bicep Curls Supinated with Dumbbells - 2 x daily - 7 x weekly - 3 sets - 10 reps     Seated Shoulder Pendulum Exercise - 2 x daily - 7 x weekly - 3 sets - 10 reps  Table slides flexion - 2 x daily - 7 x weekly - 10 reps - 10 hold  Seated Shoulder External Rotation AAROM with Dowel - 2 x daily - 7 x weekly - 10 reps - 10 hold   PLAN: See eval  [x] Continue per plan of care [] Alter current plan (see comments above)  [] Plan of care initiated [] Hold pending MD visit [] Discharge      Electronically signed by:  Marleny Taveras PT    Note: If patient does not return for scheduled/ recommended follow up visits, this note will serve as a discharge from care along with most recent update on progress.

## 2022-09-15 ENCOUNTER — APPOINTMENT (OUTPATIENT)
Dept: PHYSICAL THERAPY | Age: 72
End: 2022-09-15
Payer: MEDICARE

## 2022-09-16 ENCOUNTER — HOSPITAL ENCOUNTER (OUTPATIENT)
Dept: PHYSICAL THERAPY | Age: 72
Setting detail: THERAPIES SERIES
Discharge: HOME | End: 2022-09-16
Payer: MEDICARE

## 2022-09-16 PROCEDURE — 97140 MANUAL THERAPY 1/> REGIONS: CPT | Performed by: PHYSICAL THERAPIST

## 2022-09-16 PROCEDURE — 97110 THERAPEUTIC EXERCISES: CPT | Performed by: PHYSICAL THERAPIST

## 2022-09-16 NOTE — FLOWSHEET NOTE
[address],  [address]  Phone: [phone]   Fax:     [phone]    Physical Therapy Daily Treatment Note  Date:  2022    Patient Name:  Clyde Mott    :  1950  MRN: [id number]  Restrictions/Precautions:    Medical/Treatment Diagnosis Information:  Diagnosis: L truamatic rotator cuff tear M75.122  Treatment Diagnosis: L shoulder [id number]  Insurance/Certification information:  PT Insurance Information: [facility] BMN  Physician Information:   Dr Ant Davis  Has the plan of care been signed (Y/N):        []  Yes  [x]  No     Date of Patient follow up with Physician: per md      Is this a Progress Report:     []  Yes  [x]  No        If Yes:  Date Range for reporting period:  Beginning  Ending    Progress report will be due (10 Rx or 30 days whichever is less): 63/0       Recertification will be due (POC Duration  / 90 days whichever is less):          Visit # Insurance Allowable Auth Required   4 post surgery  BMN []  Yes []  No        Functional Scale:  Foto38/100    Date assessed:       Latex Allergy:  [x]NO      []YES  Preferred Language for Healthcare:   [x]English       []other:    Pain level:  3-10/10     SUBJECTIVE:    pain more outside of upper arm then shoulder     OBJECTIVE: See eval       RESTRICTIONS/PRECAUTIONS: RCR,  increase ROM as able per MD due to capsular release and LUIS    Exercises/Interventions:   Exercises:  Exercise/Equipment Resistance/Repetitions Other comments   Stretching/PROM     Wand ER at neutral [id number]    Table Slides With wheeled chair fl 55i61joq     Abd 34m60wzgzgoy wheeled chair    ER bend forward 90n62fux       Start9/8      Start9/8   Wall step away for flexion 63n18lot Start9/8   Pulley flex Mod A by PT x5 Start/16   Supine grab L wrist with R hand move into flexion 53w01lps    Red ball roll into flexion on table  16l19anl driving/computer work  [] (85136) Reviewed/Progressed HEP activities related to improving balance, coordination, kinesthetic sense, posture, motor skill, proprioception of scapular, scapulothoracic and UE control with self care, reaching, carrying, lifting, house/yardwork, driving/computer work      Manual Treatments:  PROM / STM / Oscillations-Mobs:  G-I, II, III, IV (PA's, Inf., Post.)  [x] (96071) Provided manual therapy to mobilize soft tissue/joints of cervical/CT, scapular GHJ and UE for the purpose of modulating pain, promoting relaxation,  increasing ROM, reducing/eliminating soft tissue swelling/inflammation/restriction, improving soft tissue extensibility and allowing for proper ROM for normal function with self care, reaching, carrying, lifting, house/yardwork, driving/computer work    Modalities:  declined ice but recommended ice 15-20 min on several times per day to help with pain and inflammation     Charges:  Timed Code Treatment Minutes: 45   Total Treatment Minutes: 240-340       [] EVAL (LOW) 61983 (typically 20 minutes face-to-face)  [] EVAL (MOD) 59631 (typically 30 minutes face-to-face)  [] EVAL (HIGH) 67634 (typically 45 minutes face-to-face)  [] RE-EVAL     [x] LF(64612) x  2  [] IONTO  [] NMR (42285) x     [] VASO  [x] Manual (43767) x  1    [] Other:  [] TA x      [] Mech Traction (10471)  [] ES(attended) (49770)      [] ES (un) (45401):     GOALS:  Patient stated goal: increase ROM decrease pain, use arm for adls    [] Progressing: [] Met: [] Not Met: [] Adjusted     Therapist goals for Patient:   Short Term Goals: To be achieved in: 2 -4weeks  1. Independent in HEP and progression per patient tolerance, in order to prevent re-injury. [] Progressing: [] Met: [] Not Met: [] Adjusted   2. Patient will have a decrease in pain to facilitate improvement in movement, function, and ADLs as indicated by Functional Deficits.     [] Progressing: [] Met: [] Not Met: [] Adjusted     Long Term Goals: To be achieved in: 12 weeks  1. Foto 67/100  to assist with reaching prior level of function. [] Progressing: [] Met: [] Not Met: [] Adjusted  2. Patient will demonstrate increased AROM to = R to allow for proper joint functioning as indicated by patients Functional Deficits. [] Progressing: [] Met: [] Not Met: [] Adjusted  3. Patient will demonstrate an increase in strength to good scapular and core control  for UE to allow for proper functional mobility as indicated by patients Functional Deficits. [] Progressing: [] Met: [] Not Met: [] Adjusted  4. Patient will return to  functional activities reach over head, reach behind back, don doff jacket  without increased symptoms or restriction. [] Progressing: [] Met: [] Not Met: [] Adjusted  Overall Progression Towards Functional goals/ Treatment Progress Update:  [] Patient is progressing as expected towards functional goals listed. [] Progression is slowed due to complexities/Impairments listed. [] Progression has been slowed due to co-morbidities. [x] Plan just implemented, too soon to assess goals progression <30days   [] Goals require adjustment due to lack of progress  [] Patient is not progressing as expected and requires additional follow up with physician  [] Other    Prognosis for POC: [x] Good [] Fair  [] Poor      Patient requires continued skilled intervention: [x] Yes  [] No    Treatment/Activity Tolerance:  [x] Patient able to complete treatment  [] Patient limited by fatigue  [x] Patient limited by pain     [] Patient limited by other medical complications  [] Other:                   Patient Education:    Reviewed diagnosis, POC, HEP and its importance. Access Code: [email or web address]  URL: [email or web address]  Date: 9/6/22  Prepared by: Grace Gu     Exercises  Standing Shoulder Shrugs - 2 x daily - 7 x weekly - 3 sets - 10 reps  Seated Scapular Retraction - 2 x daily - 7 x weekly - 3 sets - 10 reps  Standing Bicep Curls Supinated with Dumbbells - 2 x daily - 7 x weekly - 3 sets - 10 reps     Seated Shoulder Pendulum Exercise - 2 x daily - 7 x weekly - 3 sets - 10 reps  Table slides flexion - 2 x daily - 7 x weekly - 10 reps - 10 hold  Seated Shoulder External Rotation AAROM with Dowel - 2 x daily - 7 x weekly - 10 reps - 10 hold   PLAN: See eval  [x] Continue per plan of care [] Alter current plan (see comments above)  [] Plan of care initiated [] Hold pending MD visit [] Discharge      Electronically signed by:  Mayra Quintanilla PT    Note: If patient does not return for scheduled/ recommended follow up visits, this note will serve as a discharge from care along with most recent update on progress.

## 2022-09-19 ENCOUNTER — HOSPITAL ENCOUNTER (OUTPATIENT)
Dept: PHYSICAL THERAPY | Age: 72
Setting detail: THERAPIES SERIES
Discharge: HOME | End: 2022-09-19
Payer: MEDICARE

## 2022-09-19 PROCEDURE — 97110 THERAPEUTIC EXERCISES: CPT | Performed by: PHYSICAL THERAPIST

## 2022-09-19 PROCEDURE — 97140 MANUAL THERAPY 1/> REGIONS: CPT | Performed by: PHYSICAL THERAPIST

## 2022-09-19 NOTE — FLOWSHEET NOTE
to side [id number]     Elbow AROM  x30    IR behind back  [id number] Start9/12   Isometrics     Retraction x30    shrug x30    Weight shift     Flexion     Abduction     External Rotation     Internal Rotation     Biceps     Triceps          PRE's     Flexion     Abduction     External Rotation     Internal Rotation     Shrugs     EXT     Reverse Flys     Serratus     Horizontal Abd with ER     Biceps     Triceps     Retraction          Cable Column/Theraband     External Rotation     Internal Rotation     Shrugs     Lats     Ext     Flex     Scapular Retraction     BIC     TRIC     PNF          Dynamic Stability          Plyoback          Manual interventions     PROM Fl scap ER/IR 15 min  Start9/7              Therapeutic Exercise and NMR EXR  [x] (85862) Provided verbal/tactile cueing for activities related to strengthening, flexibility, endurance, ROM  for improvements in scapular, scapulothoracic and UE control with self care, reaching, carrying, lifting, house/yardwork, driving/computer work.    [] (45001) Provided verbal/tactile cueing for activities related to improving balance, coordination, kinesthetic sense, posture, motor skill, proprioception  to assist with  scapular, scapulothoracic and UE control with self care, reaching, carrying, lifting, house/yardwork, driving/computer work. Therapeutic Activities:    [] (19110 or 19915) Provided verbal/tactile cueing for activities related to improving balance, coordination, kinesthetic sense, posture, motor skill, proprioception and motor activation to allow for proper function of scapular, scapulothoracic and UE control with self care, carrying, lifting, driving/computer work.      Home Exercise Program:    [x] (64957) Reviewed/Progressed HEP activities related to strengthening, flexibility, endurance, ROM of scapular, scapulothoracic and UE control with self care, reaching, carrying, lifting, house/yardwork, driving/computer work  [] (21614) Reviewed/Progressed assist with reaching prior level of function. [] Progressing: [] Met: [] Not Met: [] Adjusted  2. Patient will demonstrate increased AROM to = R to allow for proper joint functioning as indicated by patients Functional Deficits. [] Progressing: [] Met: [] Not Met: [] Adjusted  3. Patient will demonstrate an increase in strength to good scapular and core control  for UE to allow for proper functional mobility as indicated by patients Functional Deficits. [] Progressing: [] Met: [] Not Met: [] Adjusted  4. Patient will return to  functional activities reach over head, reach behind back, don doff jacket  without increased symptoms or restriction. [] Progressing: [] Met: [] Not Met: [] Adjusted  Overall Progression Towards Functional goals/ Treatment Progress Update:  [] Patient is progressing as expected towards functional goals listed. [] Progression is slowed due to complexities/Impairments listed. [] Progression has been slowed due to co-morbidities. [x] Plan just implemented, too soon to assess goals progression <30days   [] Goals require adjustment due to lack of progress  [] Patient is not progressing as expected and requires additional follow up with physician  [] Other    Prognosis for POC: [x] Good [] Fair  [] Poor      Patient requires continued skilled intervention: [x] Yes  [] No    Treatment/Activity Tolerance:  [x] Patient able to complete treatment  [] Patient limited by fatigue  [x] Patient limited by pain     [] Patient limited by other medical complications  [x] Other:  pt tracking behind in ROM due to pain. Will continue to address ROM as tolerated                    Patient Education:    Reviewed diagnosis, POC, HEP and its importance. Access Code: [email or web address]  URL: [email or web address]  Date: 9/6/22  Prepared by: Prabhu Tboar     Exercises  Standing Shoulder Shrugs - 2 x daily - 7 x weekly - 3 sets - 10 reps  Seated Scapular Retraction - 2 x daily - 7 x weekly - 3 sets - 10 reps  Standing Bicep Curls Supinated with Dumbbells - 2 x daily - 7 x weekly - 3 sets - 10 reps     Seated Shoulder Pendulum Exercise - 2 x daily - 7 x weekly - 3 sets - 10 reps  Table slides flexion - 2 x daily - 7 x weekly - 10 reps - 10 hold  Seated Shoulder External Rotation AAROM with Dowel - 2 x daily - 7 x weekly - 10 reps - 10 hold   PLAN: See eval  [x] Continue per plan of care [] Alter current plan (see comments above)  [] Plan of care initiated [] Hold pending MD visit [] Discharge      Electronically signed by:  Tessy Mejias, PT    Note: If patient does not return for scheduled/ recommended follow up visits, this note will serve as a discharge from care along with most recent update on progress.

## 2022-09-21 DIAGNOSIS — S43.005D DISLOCATION OF LEFT SHOULDER JOINT, SUBSEQUENT ENCOUNTER: ICD-10-CM

## 2022-09-21 RX ORDER — MELOXICAM 7.5 MG/1
7.5 TABLET ORAL DAILY
Qty: 30 TABLET | Refills: 0 | Status: SHIPPED | OUTPATIENT
Start: 2022-09-21 | End: 2022-10-21

## 2022-09-21 NOTE — TELEPHONE ENCOUNTER
Last office visit 8/11/2022     Last writteN: 7- and 8-    Next office visit scheduled Not scheduled    Requested Prescriptions     Pending Prescriptions Disp Refills    meloxicam (MOBIC) 7.5 MG tablet [Pharmacy Med Name: MELOXICAM 7.5MG TABLETS] 30 tablet 0     Sig: TAKE 1 TABLET BY MOUTH IN THE MORNING         1-Left shoulder pain/ dislocation with avulsion fracture proximal humerus.   2-Left hand numbness and tingling/ ulnar neuropathy

## 2022-09-22 ENCOUNTER — HOSPITAL ENCOUNTER (OUTPATIENT)
Dept: PHYSICAL THERAPY | Age: 72
Setting detail: THERAPIES SERIES
Discharge: HOME | End: 2022-09-22
Payer: MEDICARE

## 2022-09-22 PROCEDURE — 97110 THERAPEUTIC EXERCISES: CPT | Performed by: PHYSICAL THERAPIST

## 2022-09-22 PROCEDURE — 97140 MANUAL THERAPY 1/> REGIONS: CPT | Performed by: PHYSICAL THERAPIST

## 2022-09-22 RX ORDER — INSULIN GLARGINE 100 [IU]/ML
INJECTION, SOLUTION SUBCUTANEOUS
Qty: 18 ML | Refills: 3 | Status: SHIPPED | OUTPATIENT
Start: 2022-09-22

## 2022-09-22 NOTE — FLOWSHEET NOTE
[id number] assist with R UE Start9/22   Wall step away for flexion [id number] Start9/8   Pulley flex Mod A by PT x10 Start9/16   Supine grab L wrist with R hand move into flexion [id number]    blue ball roll into flexion on table  [id number]    Pendulum Assist with R UE CW CCW fl/ext side to side [id number]     Elbow AROM  x30    IR behind back  [id number] Start9/12   Isometrics     Retraction x30    shrug x30    Weight shift     Flexion     Abduction     External Rotation     Internal Rotation     Biceps     Triceps          PRE's     Flexion     Abduction     External Rotation     Internal Rotation     Shrugs     EXT     Reverse Flys     Serratus     Horizontal Abd with ER     Biceps     Triceps     Retraction          Cable Column/Theraband     External Rotation     Internal Rotation     Shrugs     Lats     Ext     Flex     Scapular Retraction     BIC     TRIC     PNF          Dynamic Stability          Plyoback          Manual interventions     PROM Fl scap ER/IR 15 min  Start9/7              Therapeutic Exercise and NMR EXR  [x] (02969) Provided verbal/tactile cueing for activities related to strengthening, flexibility, endurance, ROM  for improvements in scapular, scapulothoracic and UE control with self care, reaching, carrying, lifting, house/yardwork, driving/computer work.    [] (71149) Provided verbal/tactile cueing for activities related to improving balance, coordination, kinesthetic sense, posture, motor skill, proprioception  to assist with  scapular, scapulothoracic and UE control with self care, reaching, carrying, lifting, house/yardwork, driving/computer work. Therapeutic Activities:    [] (56498 or 54620) Provided verbal/tactile cueing for activities related to improving balance, coordination, kinesthetic sense, posture, motor skill, proprioception and motor activation to allow for proper function of scapular, scapulothoracic and UE control with self care, carrying, lifting, driving/computer work.      Home Exercise Program:    [x] (62300) Reviewed/Progressed HEP activities related to strengthening, flexibility, endurance, ROM of scapular, scapulothoracic and UE control with self care, reaching, carrying, lifting, house/yardwork, driving/computer work  [] (46916) Reviewed/Progressed HEP activities related to improving balance, coordination, kinesthetic sense, posture, motor skill, proprioception of scapular, scapulothoracic and UE control with self care, reaching, carrying, lifting, house/yardwork, driving/computer work      Manual Treatments:  PROM / STM / Oscillations-Mobs:  G-I, II, III, IV (PA's, Inf., Post.)  [x] (67874) Provided manual therapy to mobilize soft tissue/joints of cervical/CT, scapular GHJ and UE for the purpose of modulating pain, promoting relaxation,  increasing ROM, reducing/eliminating soft tissue swelling/inflammation/restriction, improving soft tissue extensibility and allowing for proper ROM for normal function with self care, reaching, carrying, lifting, house/yardwork, driving/computer work    Modalities:  declined ice but recommended ice 15-20 min on several times per day to help with pain and inflammation     Charges:  Timed Code Treatment Minutes: 45   Total Treatment Minutes: 800-910       [] EVAL (LOW) 23618 (typically 20 minutes face-to-face)  [] EVAL (MOD) 29897 (typically 30 minutes face-to-face)  [] EVAL (HIGH) 09229 (typically 45 minutes face-to-face)  [] RE-EVAL     [x] QR(88377) x  2  [] IONTO  [] NMR (98764) x     [] VASO  [x] Manual (09445) x  1    [] Other:  [] TA x      [] Mech Traction (63438)  [] ES(attended) (99945)      [] ES (un) (60608):     GOALS:  Patient stated goal: increase ROM decrease pain, use arm for adls    [] Progressing: [] Met: [] Not Met: [] Adjusted     Therapist goals for Patient:   Short Term Goals: To be achieved in: 2 -4weeks  1. Independent in HEP and progression per patient tolerance, in order to prevent re-injury.      [] Progressing: [] Met: [] Not Met: [] Adjusted   2. Patient will have a decrease in pain to facilitate improvement in movement, function, and ADLs as indicated by Functional Deficits. [] Progressing: [] Met: [] Not Met: [] Adjusted     Long Term Goals: To be achieved in: 12 weeks  1. Foto 67/100  to assist with reaching prior level of function. [] Progressing: [] Met: [] Not Met: [] Adjusted  2. Patient will demonstrate increased AROM to = R to allow for proper joint functioning as indicated by patients Functional Deficits. [] Progressing: [] Met: [] Not Met: [] Adjusted  3. Patient will demonstrate an increase in strength to good scapular and core control  for UE to allow for proper functional mobility as indicated by patients Functional Deficits. [] Progressing: [] Met: [] Not Met: [] Adjusted  4. Patient will return to  functional activities reach over head, reach behind back, don doff jacket  without increased symptoms or restriction. [] Progressing: [] Met: [] Not Met: [] Adjusted  Overall Progression Towards Functional goals/ Treatment Progress Update:  [] Patient is progressing as expected towards functional goals listed. [] Progression is slowed due to complexities/Impairments listed. [] Progression has been slowed due to co-morbidities. [x] Plan just implemented, too soon to assess goals progression <30days   [] Goals require adjustment due to lack of progress  [] Patient is not progressing as expected and requires additional follow up with physician  [] Other    Prognosis for POC: [x] Good [] Fair  [] Poor      Patient requires continued skilled intervention: [x] Yes  [] No    Treatment/Activity Tolerance:  [x] Patient able to complete treatment  [] Patient limited by fatigue  [x] Patient limited by pain     [] Patient limited by other medical complications  [x] Other:  pt tracking behind in ROM due to pain. Will continue to address ROM as tolerated .   ROM updated this date                    Patient Education: Reviewed diagnosis, POC, HEP and its importance. Access Code: [email or web address]  URL: [email or web address]  Date: 9/6/22  Prepared by: Leroy Medrano     Exercises  Standing Shoulder Shrugs - 2 x daily - 7 x weekly - 3 sets - 10 reps  Seated Scapular Retraction - 2 x daily - 7 x weekly - 3 sets - 10 reps  Standing Bicep Curls Supinated with Dumbbells - 2 x daily - 7 x weekly - 3 sets - 10 reps     Seated Shoulder Pendulum Exercise - 2 x daily - 7 x weekly - 3 sets - 10 reps  Table slides flexion - 2 x daily - 7 x weekly - 10 reps - 10 hold  Seated Shoulder External Rotation AAROM with Dowel - 2 x daily - 7 x weekly - 10 reps - 10 hold   PLAN: See eval  [x] Continue per plan of care [] Alter current plan (see comments above)  [] Plan of care initiated [] Hold pending MD visit [] Discharge      Electronically signed by:  Leroy Medrano PT    Note: If patient does not return for scheduled/ recommended follow up visits, this note will serve as a discharge from care along with most recent update on progress.

## 2022-09-27 ENCOUNTER — HOSPITAL ENCOUNTER (OUTPATIENT)
Dept: PHYSICAL THERAPY | Age: 72
Setting detail: THERAPIES SERIES
Discharge: HOME | End: 2022-09-27
Payer: MEDICARE

## 2022-09-27 PROCEDURE — 97110 THERAPEUTIC EXERCISES: CPT | Performed by: PHYSICAL THERAPIST

## 2022-09-27 PROCEDURE — 97140 MANUAL THERAPY 1/> REGIONS: CPT | Performed by: PHYSICAL THERAPIST

## 2022-09-27 NOTE — FLOWSHEET NOTE
[place] [id number] [address]  Phone: [phone]   Fax:     [phone]    Physical Therapy Daily Treatment Note  Date:  2022    Patient Name:  Mei Abraham    :  1950  MRN: [id number]  Restrictions/Precautions:    Medical/Treatment Diagnosis Information:  Diagnosis: L truamatic rotator cuff tear M75.122  Treatment Diagnosis: L shoulder [id number]  Insurance/Certification information:  PT Insurance Information: [facility] BMN  Physician Information:   Dr Hang Gonsalez  Has the plan of care been signed (Y/N):        [x]  Yes  []  No     Date of Patient follow up with Physician: per md      Is this a Progress Report:     []  Yes  [x]  No        If Yes:  Date Range for reporting period:  Beginning  Ending    Progress report will be due (10 Rx or 30 days whichever is less): 09/3       Recertification will be due (POC Duration  / 90 days whichever is less):          Visit # Insurance Allowable Auth Required   7 post surgery  BMN []  Yes []  No        Functional Scale: Foto38/100    Date assessed:       Latex Allergy:  [x]NO      []YES  Preferred Language for Healthcare:   [x]English       []other:    Pain level:  3-10/10     SUBJECTIVE:   stiff.   Md said \"it will come\"  follow up in 5 weeks    OBJECTIVE: See eval   ROM PROM  AROM  Comment     L R L R     Flexion ~115           scap ~125           ER [email or web address]           IR             Other  (cervical)             Other                  RESTRICTIONS/PRECAUTIONS: RCR,  increase ROM as able per MD due to capsular release and LUIS    Exercises/Interventions:   Exercises:  Exercise/Equipment Resistance/Repetitions Other comments   Stretching/PROM     Wand ER at neutral 96w25fug    Supine @ 60abd [id number]       Start   Table Slides With wheeled chair fl [id number]     Abd [id number] wheeled chair    ER bend forward 64m41opq Start9/8      Start9/8   Wall slide 71m40lkt assist with R UE Start9/22   Wall step away for flexion [id number] Start9/8   Pulley flex   X10    Scap x10 Start9/16   Supine grab L wrist with R hand move into flexion 79m78atp    blue ball roll into flexion on table  [id number]    Pendulum Assist with R UE CW CCW fl/ext side to side [id number]     Elbow AROM  x30    IR behind back  [id number] Start9/12   Isometrics     Retraction x30    shrug x30    Weight shift     Flexion     Abduction     External Rotation     Internal Rotation     Biceps     Triceps          PRE's     Flexion     Abduction     External Rotation     Internal Rotation     Shrugs     EXT     Reverse Flys     Serratus     Horizontal Abd with ER     Biceps     Triceps     Retraction          Cable Column/Theraband     External Rotation     Internal Rotation     Shrugs     Lats     Ext     Flex     Scapular Retraction     BIC     TRIC     PNF          Dynamic Stability          Plyoback          Manual interventions     PROM Fl scap ER/IR 15 min  Start9/7              Therapeutic Exercise and NMR EXR  [x] (75706) Provided verbal/tactile cueing for activities related to strengthening, flexibility, endurance, ROM  for improvements in scapular, scapulothoracic and UE control with self care, reaching, carrying, lifting, house/yardwork, driving/computer work.    [] (01715) Provided verbal/tactile cueing for activities related to improving balance, coordination, kinesthetic sense, posture, motor skill, proprioception  to assist with  scapular, scapulothoracic and UE control with self care, reaching, carrying, lifting, house/yardwork, driving/computer work.     Therapeutic Activities:    [] (97962 or 47158) Provided verbal/tactile cueing for activities related to improving balance, coordination, kinesthetic sense, posture, motor skill, proprioception and motor activation to allow for proper function of scapular, scapulothoracic and UE control with self care, carrying, lifting, driving/computer work. Home Exercise Program:    [x] (80992) Reviewed/Progressed HEP activities related to strengthening, flexibility, endurance, ROM of scapular, scapulothoracic and UE control with self care, reaching, carrying, lifting, house/yardwork, driving/computer work  [] (54906) Reviewed/Progressed HEP activities related to improving balance, coordination, kinesthetic sense, posture, motor skill, proprioception of scapular, scapulothoracic and UE control with self care, reaching, carrying, lifting, house/yardwork, driving/computer work      Manual Treatments:  PROM / STM / Oscillations-Mobs:  G-I, II, III, IV (PA's, Inf., Post.)  [x] (27496) Provided manual therapy to mobilize soft tissue/joints of cervical/CT, scapular GHJ and UE for the purpose of modulating pain, promoting relaxation,  increasing ROM, reducing/eliminating soft tissue swelling/inflammation/restriction, improving soft tissue extensibility and allowing for proper ROM for normal function with self care, reaching, carrying, lifting, house/yardwork, driving/computer work    Modalities:  declined ice but recommended ice 15-20 min on several times per day to help with pain and inflammation     Charges:  Timed Code Treatment Minutes: 45   Total Treatment Minutes: 240-350       [] EVAL (LOW) 27567 (typically 20 minutes face-to-face)  [] EVAL (MOD) 36138 (typically 30 minutes face-to-face)  [] EVAL (HIGH) 81141 (typically 45 minutes face-to-face)  [] RE-EVAL     [x] HV(69431) x  2  [] IONTO  [] NMR (25571) x     [] VASO  [x] Manual (59507) x  1    [] Other:  [] TA x      [] Mech Traction (38356)  [] ES(attended) (50906)      [] ES (un) (67178):     GOALS:  Patient stated goal: increase ROM decrease pain, use arm for adls    [] Progressing: [] Met: [] Not Met: [] Adjusted     Therapist goals for Patient:   Short Term Goals: To be achieved in: 2 -4weeks  1.  Independent in HEP and progression per patient tolerance, in order to prevent re-injury. [] Progressing: [] Met: [] Not Met: [] Adjusted   2. Patient will have a decrease in pain to facilitate improvement in movement, function, and ADLs as indicated by Functional Deficits. [] Progressing: [] Met: [] Not Met: [] Adjusted     Long Term Goals: To be achieved in: 12 weeks  1. Foto 67/100  to assist with reaching prior level of function. [] Progressing: [] Met: [] Not Met: [] Adjusted  2. Patient will demonstrate increased AROM to = R to allow for proper joint functioning as indicated by patients Functional Deficits. [] Progressing: [] Met: [] Not Met: [] Adjusted  3. Patient will demonstrate an increase in strength to good scapular and core control  for UE to allow for proper functional mobility as indicated by patients Functional Deficits. [] Progressing: [] Met: [] Not Met: [] Adjusted  4. Patient will return to  functional activities reach over head, reach behind back, don doff jacket  without increased symptoms or restriction. [] Progressing: [] Met: [] Not Met: [] Adjusted  Overall Progression Towards Functional goals/ Treatment Progress Update:  [] Patient is progressing as expected towards functional goals listed. [] Progression is slowed due to complexities/Impairments listed. [] Progression has been slowed due to co-morbidities. [x] Plan just implemented, too soon to assess goals progression <30days   [] Goals require adjustment due to lack of progress  [] Patient is not progressing as expected and requires additional follow up with physician  [] Other    Prognosis for POC: [x] Good [] Fair  [] Poor      Patient requires continued skilled intervention: [x] Yes  [] No    Treatment/Activity Tolerance:  [x] Patient able to complete treatment  [] Patient limited by fatigue  [x] Patient limited by pain     [] Patient limited by other medical complications  [x] Other:  pt tracking behind in ROM due to pain. Will continue to address ROM as tolerated . Patient Education:    Reviewed diagnosis, POC, HEP and its importance. Access Code: [email or web address]  URL: [email or web address]/  Date: 9/6/22  Prepared by: Dalia Winters     Exercises  Standing Shoulder Shrugs - 2 x daily - 7 x weekly - 3 sets - 10 reps  Seated Scapular Retraction - 2 x daily - 7 x weekly - 3 sets - 10 reps  Standing Bicep Curls Supinated with Dumbbells - 2 x daily - 7 x weekly - 3 sets - 10 reps     Seated Shoulder Pendulum Exercise - 2 x daily - 7 x weekly - 3 sets - 10 reps  Table slides flexion - 2 x daily - 7 x weekly - 10 reps - 10 hold  Seated Shoulder External Rotation AAROM with Dowel - 2 x daily - 7 x weekly - 10 reps - 10 hold   PLAN: See eval  [x] Continue per plan of care [] Alter current plan (see comments above)  [] Plan of care initiated [] Hold pending MD visit [] Discharge      Electronically signed by:  Dalia Winters PT    Note: If patient does not return for scheduled/ recommended follow up visits, this note will serve as a discharge from care along with most recent update on progress.

## 2022-09-29 ENCOUNTER — HOSPITAL ENCOUNTER (OUTPATIENT)
Dept: PHYSICAL THERAPY | Age: 72
Setting detail: THERAPIES SERIES
Discharge: HOME | End: 2022-09-29
Payer: MEDICARE

## 2022-09-29 PROCEDURE — 97140 MANUAL THERAPY 1/> REGIONS: CPT | Performed by: PHYSICAL THERAPIST

## 2022-09-29 PROCEDURE — 97110 THERAPEUTIC EXERCISES: CPT | Performed by: PHYSICAL THERAPIST

## 2022-09-29 NOTE — FLOWSHEET NOTE
[address],  [address]  Phone: [phone]   Fax:     [phone]    Physical Therapy Daily Treatment Note  Date:  2022    Patient Name:  Rita Guzmán    :  1950  MRN: [id number]  Restrictions/Precautions:    Medical/Treatment Diagnosis Information:  Diagnosis: L truamatic rotator cuff tear M75.122  Treatment Diagnosis: L shoulder [id number]  Insurance/Certification information:  PT Insurance Information: [facility] BMN  Physician Information:   Dr Lyle Melendez  Has the plan of care been signed (Y/N):        [x]  Yes  []  No     Date of Patient follow up with Physician: per md      Is this a Progress Report:     []  Yes  [x]  No        If Yes:  Date Range for reporting period:  Beginning  Ending    Progress report will be due (10 Rx or 30 days whichever is less):        Recertification will be due (POC Duration  / 90 days whichever is less):          Visit # Insurance Allowable Auth Required   8 post surgery  BMN []  Yes []  No        Functional Scale:  Foto38/100    Date assessed:       Latex Allergy:  [x]NO      []YES  Preferred Language for Healthcare:   [x]English       []other:    Pain level:  1-6/10     SUBJECTIVE:  same    OBJECTIVE: See eval   ROM PROM  AROM  Comment     L R L R     Flexion ~115           scap ~125           ER [email or web address]           IR             Other  (cervical)             Other                  RESTRICTIONS/PRECAUTIONS: RCR,  increase ROM as able per MD due to capsular release and LUIS    Exercises/Interventions:   Exercises:  Exercise/Equipment Resistance/Repetitions Other comments   Stretching/PROM     Wand ER at neutral 17l52tcr    Supine @ 60abd 32g27byd       Start   Table Slides fl [id number]     Abd 74v09nvp    ER bend forward 62r48mlt       Start      Start   Wall slide [id number] assist with R UE Start   Wall step away for flexion 32o18jwp Start9/8   Pulley flex   X10    Scap x10 Start9/16   Supine grab L wrist with R hand move into flexion 26h58pov    blue ball roll into flexion on table  [id number]    Pendulum Assist with R UE CW CCW fl/ext side to side [id number]     Elbow AROM  x30    IR behind back  85v90nfe Start9/12   Isometrics     Retraction x30    shrug x30    Weight shift     Flexion     Abduction     External Rotation     Internal Rotation     Biceps     Triceps          PRE's     Flexion     Abduction     External Rotation     Internal Rotation     Shrugs     EXT     Reverse Flys     Serratus     Horizontal Abd with ER     Biceps     Triceps     Retraction          Cable Column/Theraband     External Rotation     Internal Rotation     Shrugs     Lats     Ext     Flex     Scapular Retraction     BIC     TRIC     PNF          Dynamic Stability          Plyoback          Manual interventions     PROM Fl scap ER/IR 15 min  Start9/7              Therapeutic Exercise and NMR EXR  [x] (92377) Provided verbal/tactile cueing for activities related to strengthening, flexibility, endurance, ROM  for improvements in scapular, scapulothoracic and UE control with self care, reaching, carrying, lifting, house/yardwork, driving/computer work.    [] (96871) Provided verbal/tactile cueing for activities related to improving balance, coordination, kinesthetic sense, posture, motor skill, proprioception  to assist with  scapular, scapulothoracic and UE control with self care, reaching, carrying, lifting, house/yardwork, driving/computer work. Therapeutic Activities:    [] (29388 or 51591) Provided verbal/tactile cueing for activities related to improving balance, coordination, kinesthetic sense, posture, motor skill, proprioception and motor activation to allow for proper function of scapular, scapulothoracic and UE control with self care, carrying, lifting, driving/computer work.      Home Exercise Program:    [x] (23459) Reviewed/Progressed HEP activities related to strengthening, flexibility, endurance, ROM of scapular, scapulothoracic and UE control with self care, reaching, carrying, lifting, house/yardwork, driving/computer work  [] (23786) Reviewed/Progressed HEP activities related to improving balance, coordination, kinesthetic sense, posture, motor skill, proprioception of scapular, scapulothoracic and UE control with self care, reaching, carrying, lifting, house/yardwork, driving/computer work      Manual Treatments:  PROM / STM / Oscillations-Mobs:  G-I, II, III, IV (PA's, Inf., Post.)  [x] (20960) Provided manual therapy to mobilize soft tissue/joints of cervical/CT, scapular GHJ and UE for the purpose of modulating pain, promoting relaxation,  increasing ROM, reducing/eliminating soft tissue swelling/inflammation/restriction, improving soft tissue extensibility and allowing for proper ROM for normal function with self care, reaching, carrying, lifting, house/yardwork, driving/computer work    Modalities:  declined ice but recommended ice 15-20 min on several times per day to help with pain and inflammation     Charges:  Timed Code Treatment Minutes: 45   Total Treatment Minutes: 240-340       [] EVAL (LOW) 52675 (typically 20 minutes face-to-face)  [] EVAL (MOD) 26327 (typically 30 minutes face-to-face)  [] EVAL (HIGH) 60516 (typically 45 minutes face-to-face)  [] RE-EVAL     [x] QQ(69747) x  2  [] IONTO  [] NMR (25693) x     [] VASO  [x] Manual (45879) x  1    [] Other:  [] TA x      [] Mech Traction (52635)  [] ES(attended) (93387)      [] ES (un) (82023):     GOALS:  Patient stated goal: increase ROM decrease pain, use arm for adls    [] Progressing: [] Met: [] Not Met: [] Adjusted     Therapist goals for Patient:   Short Term Goals: To be achieved in: 2 -4weeks  1. Independent in HEP and progression per patient tolerance, in order to prevent re-injury. [] Progressing: [] Met: [] Not Met: [] Adjusted   2.  Patient will have a decrease in pain to facilitate improvement in movement, function, and ADLs as indicated by Functional Deficits. [] Progressing: [] Met: [] Not Met: [] Adjusted     Long Term Goals: To be achieved in: 12 weeks  1. Foto 67/100  to assist with reaching prior level of function. [] Progressing: [] Met: [] Not Met: [] Adjusted  2. Patient will demonstrate increased AROM to = R to allow for proper joint functioning as indicated by patients Functional Deficits. [] Progressing: [] Met: [] Not Met: [] Adjusted  3. Patient will demonstrate an increase in strength to good scapular and core control  for UE to allow for proper functional mobility as indicated by patients Functional Deficits. [] Progressing: [] Met: [] Not Met: [] Adjusted  4. Patient will return to  functional activities reach over head, reach behind back, don doff jacket  without increased symptoms or restriction. [] Progressing: [] Met: [] Not Met: [] Adjusted  Overall Progression Towards Functional goals/ Treatment Progress Update:  [] Patient is progressing as expected towards functional goals listed. [] Progression is slowed due to complexities/Impairments listed. [] Progression has been slowed due to co-morbidities. [x] Plan just implemented, too soon to assess goals progression <30days   [] Goals require adjustment due to lack of progress  [] Patient is not progressing as expected and requires additional follow up with physician  [] Other    Prognosis for POC: [x] Good [] Fair  [] Poor      Patient requires continued skilled intervention: [x] Yes  [] No    Treatment/Activity Tolerance:  [x] Patient able to complete treatment  [] Patient limited by fatigue  [x] Patient limited by pain     [] Patient limited by other medical complications  [x] Other:  pt tracking behind in A/AAROM due to pain. Will continue to address PROM as tolerated . Patient Education:    Reviewed diagnosis, POC, HEP and its importance.

## 2022-10-04 ENCOUNTER — HOSPITAL ENCOUNTER (OUTPATIENT)
Dept: PHYSICAL THERAPY | Age: 72
Setting detail: THERAPIES SERIES
Discharge: HOME | End: 2022-10-04
Payer: MEDICARE

## 2022-10-04 PROCEDURE — 97110 THERAPEUTIC EXERCISES: CPT | Performed by: PHYSICAL THERAPIST

## 2022-10-04 PROCEDURE — 97140 MANUAL THERAPY 1/> REGIONS: CPT | Performed by: PHYSICAL THERAPIST

## 2022-10-04 NOTE — FLOWSHEET NOTE
[facility] [address]  Phone: [phone]   Fax:     [phone]    Physical Therapy Daily Treatment Note  Date:  10/4/2022    Patient Name:  Amos Hicks    :  1950  MRN: [id number]  Restrictions/Precautions:    Medical/Treatment Diagnosis Information:  Diagnosis: L truamatic rotator cuff tear M75.122  Treatment Diagnosis: L shoulder [id number]  Insurance/Certification information:  PT Insurance Information: [facility] BMN  Physician Information:   Dr Parker Samuel  Has the plan of care been signed (Y/N):        [x]  Yes  []  No     Date of Patient follow up with Physician: per md      Is this a Progress Report:     []  Yes  [x]  No        If Yes:  Date Range for reporting period:  Beginning  Ending    Progress report will be due (10 Rx or 30 days whichever is less):        Recertification will be due (POC Duration  / 90 days whichever is less):          Visit # Insurance Allowable Auth Required   9 post surgery PN NPV  BMN []  Yes []  No        Functional Scale:  Foto38/100    Date assessed:       Latex Allergy:  [x]NO      []YES  Preferred Language for Healthcare:   [x]English       []other:    Pain level:  1-6/10 9/29    SUBJECTIVE:  10/4 same    OBJECTIVE: See eval   ROM PROM 10/4 AROM  Comment     L R L R     Flexion ~130           scap ~140           ER [email or web address]           IR             Other  (cervical)             Other                  RESTRICTIONS/PRECAUTIONS: RCR,  increase ROM as able per MD due to capsular release and LUIS    Exercises/Interventions:   Exercises:  Exercise/Equipment Resistance/Repetitions Other comments   Stretching/PROM     Wand ER at neutral 49c36qeh    Supine @ 60abd 64t33mqr       Start   Table Slides fl 05t41fux     Abd 40r99zdm    ER bend forward 74d04buk       Start      Start   Wall slide 83w34fjx assist with R UE Start   Wall step away for flexion 22c87rtb Start9/8   Pulley flex   X10    Scap x10 Start9/16   Supine grab L wrist with R hand move into flexion 79o22egq    blue ball roll into flexion on table  [id number]    Pendulum Assist with R UE CW CCW fl/ext side to side [id number]     Elbow AROM  x30    IR behind back  18z23dvg Start9/12   Supine butterfly 2 min Start10/4   Isometrics     Retraction x30    shrug x30    Weight shift     Flexion     Abduction     External Rotation     Internal Rotation     Biceps     Triceps          PRE's     Flexion     Abduction     External Rotation SL x15 no towel Start10/4   Internal Rotation     Shrugs     EXT NPV    Reverse Flys     Serratus NPV    Horizontal Abd with ER     Biceps     Triceps     Retraction NPV         Cable Column/Theraband     External Rotation     Internal Rotation     Shrugs     Lats     Ext     Flex     Scapular Retraction     BIC     TRIC     PNF          Dynamic Stability          Plyoback          Manual interventions     PROM Fl scap ER/IR 15 min  Start9/7              Therapeutic Exercise and NMR EXR  [x] (42266) Provided verbal/tactile cueing for activities related to strengthening, flexibility, endurance, ROM  for improvements in scapular, scapulothoracic and UE control with self care, reaching, carrying, lifting, house/yardwork, driving/computer work.    [] (11159) Provided verbal/tactile cueing for activities related to improving balance, coordination, kinesthetic sense, posture, motor skill, proprioception  to assist with  scapular, scapulothoracic and UE control with self care, reaching, carrying, lifting, house/yardwork, driving/computer work.     Therapeutic Activities:    [] (37508 or 85748) Provided verbal/tactile cueing for activities related to improving balance, coordination, kinesthetic sense, posture, motor skill, proprioception and motor activation to allow for proper function of scapular, scapulothoracic and UE control with self care, carrying, lifting, driving/computer work. Home Exercise Program:    [x] (32223) Reviewed/Progressed HEP activities related to strengthening, flexibility, endurance, ROM of scapular, scapulothoracic and UE control with self care, reaching, carrying, lifting, house/yardwork, driving/computer work  [] (26806) Reviewed/Progressed HEP activities related to improving balance, coordination, kinesthetic sense, posture, motor skill, proprioception of scapular, scapulothoracic and UE control with self care, reaching, carrying, lifting, house/yardwork, driving/computer work      Manual Treatments:  PROM / STM / Oscillations-Mobs:  G-I, II, III, IV (PA's, Inf., Post.)  [x] (18524) Provided manual therapy to mobilize soft tissue/joints of cervical/CT, scapular GHJ and UE for the purpose of modulating pain, promoting relaxation,  increasing ROM, reducing/eliminating soft tissue swelling/inflammation/restriction, improving soft tissue extensibility and allowing for proper ROM for normal function with self care, reaching, carrying, lifting, house/yardwork, driving/computer work    Modalities:  declined ice but recommended ice 15-20 min on several times per day to help with pain and inflammation     Charges:  Timed Code Treatment Minutes: 45   Total Treatment Minutes: 240-340       [] EVAL (LOW) 19050 (typically 20 minutes face-to-face)  [] EVAL (MOD) 78204 (typically 30 minutes face-to-face)  [] EVAL (HIGH) 80509 (typically 45 minutes face-to-face)  [] RE-EVAL     [x] RP(50357) x  2  [] IONTO  [] NMR (40941) x     [] VASO  [x] Manual (47824) x  1    [] Other:  [] TA x      [] Mech Traction (71986)  [] ES(attended) (58915)      [] ES (un) (63328):     GOALS:  Patient stated goal: increase ROM decrease pain, use arm for adls    [] Progressing: [] Met: [] Not Met: [] Adjusted     Therapist goals for Patient:   Short Term Goals: To be achieved in: 2 -4weeks  1. Independent in HEP and progression per patient tolerance, in order to prevent re-injury. [] Progressing: [] Met: [] Not Met: [] Adjusted   2. Patient will have a decrease in pain to facilitate improvement in movement, function, and ADLs as indicated by Functional Deficits. [] Progressing: [] Met: [] Not Met: [] Adjusted     Long Term Goals: To be achieved in: 12 weeks  1. Foto 67/100  to assist with reaching prior level of function. [] Progressing: [] Met: [] Not Met: [] Adjusted  2. Patient will demonstrate increased AROM to = R to allow for proper joint functioning as indicated by patients Functional Deficits. [] Progressing: [] Met: [] Not Met: [] Adjusted  3. Patient will demonstrate an increase in strength to good scapular and core control  for UE to allow for proper functional mobility as indicated by patients Functional Deficits. [] Progressing: [] Met: [] Not Met: [] Adjusted  4. Patient will return to  functional activities reach over head, reach behind back, don doff jacket  without increased symptoms or restriction. [] Progressing: [] Met: [] Not Met: [] Adjusted  Overall Progression Towards Functional goals/ Treatment Progress Update:  [] Patient is progressing as expected towards functional goals listed. [] Progression is slowed due to complexities/Impairments listed. [] Progression has been slowed due to co-morbidities. [x] Plan just implemented, too soon to assess goals progression <30days   [] Goals require adjustment due to lack of progress  [] Patient is not progressing as expected and requires additional follow up with physician  [] Other    Prognosis for POC: [x] Good [] Fair  [] Poor      Patient requires continued skilled intervention: [x] Yes  [] No    Treatment/Activity Tolerance:  [x] Patient able to complete treatment  [] Patient limited by fatigue  [x] Patient limited by pain     [] Patient limited by other medical complications  [x] Other:  pt tracking behind in A/AAROM due to pain. Will continue to address PROM as tolerated .                     Patient Education:    Reviewed diagnosis, POC, HEP and its importance. Access Code: [email or web address]  URL: [email or web address]  Date: 9/6/22  Prepared by: Tiarra Suazo     Exercises  Standing Shoulder Shrugs - 2 x daily - 7 x weekly - 3 sets - 10 reps  Seated Scapular Retraction - 2 x daily - 7 x weekly - 3 sets - 10 reps  Standing Bicep Curls Supinated with Dumbbells - 2 x daily - 7 x weekly - 3 sets - 10 reps     Seated Shoulder Pendulum Exercise - 2 x daily - 7 x weekly - 3 sets - 10 reps  Table slides flexion - 2 x daily - 7 x weekly - 10 reps - 10 hold  Seated Shoulder External Rotation AAROM with Dowel - 2 x daily - 7 x weekly - 10 reps - 10 hold   PLAN: See eval  [x] Continue per plan of care [] Alter current plan (see comments above)  [] Plan of care initiated [] Hold pending MD visit [] Discharge      Electronically signed by:  Tiarra Suazo PT    Note: If patient does not return for scheduled/ recommended follow up visits, this note will serve as a discharge from care along with most recent update on progress.

## 2022-10-06 ENCOUNTER — HOSPITAL ENCOUNTER (OUTPATIENT)
Dept: PHYSICAL THERAPY | Age: 72
Setting detail: THERAPIES SERIES
Discharge: HOME | End: 2022-10-06
Payer: MEDICARE

## 2022-10-06 PROCEDURE — 97110 THERAPEUTIC EXERCISES: CPT | Performed by: PHYSICAL THERAPIST

## 2022-10-06 PROCEDURE — 97140 MANUAL THERAPY 1/> REGIONS: CPT | Performed by: PHYSICAL THERAPIST

## 2022-10-06 NOTE — FLOWSHEET NOTE
[address],  [address]  Phone: [phone]   Fax:     [phone]    Physical Therapy Daily Treatment Note  Date:  10/6/2022    Patient Name:  Ramiro Ty    :  1950  MRN: [id number]  Restrictions/Precautions:    Medical/Treatment Diagnosis Information:  Diagnosis: L truamatic rotator cuff tear M75.122  Treatment Diagnosis: L shoulder [id number]  Insurance/Certification information:  PT Insurance Information: [facility] BMN  Physician Information:   Dr Kimberli Elias  Has the plan of care been signed (Y/N):        [x]  Yes  []  No     Date of Patient follow up with Physician: per md      Is this a Progress Report:     [x]  Yes 10/6  []  No        If Yes:  Date Range for reporting period:  Beginning  Tavniu43/6    Progress report will be due (10 Rx or 30 days whichever is less):       Recertification will be due (POC Duration  / 90 days whichever is less):          Visit # Insurance Allowable Auth Required   10 post surgery   BMN []  Yes []  No        Functional Scale: Foto38/100    Date assessed:       Latex Allergy:  [x]NO      []YES  Preferred Language for Healthcare:   [x]English       []other:    Pain level:  1-5/10 10/6    SUBJECTIVE:  10/6 pain managed at rest.  Painful when I move arm.   Overall no notable change     OBJECTIVE: See eval   ROM PROM 10/6 AROM  Comment     L R L R     Flexion ~135   115 supine 10/6        scap ~150           ER [email or web address]           IR             Other  (cervical)             Other                L shoulder flexion abd MMT 2/5 10/6    RESTRICTIONS/PRECAUTIONS: RCR,  increase ROM as able per MD due to capsular release and LUIS    Exercises/Interventions:   Exercises:  Exercise/Equipment Resistance/Repetitions Other comments   Stretching/PROM     Wand ER at neutral [id number]    Supine @ 60abd 05d52fsu      Supine cane flex 3x10 Start9/22        Start10/6   Table Slides fl 12t21mhv     Abd 76s47byn    ER bend forward 66h45lwx       Start9/8      Start9/8   Wall slide [id number] assist with R UE Start9/22   Wall step away for flexion 59t88phn Start9/8   Pulley flex   X10    Scap x10 Start9/16   Supine grab L wrist with R hand move into flexion [id number]    blue ball roll into flexion on table  [id number]    Pendulum Assist with R UE CW CCW fl/ext side to side [id number]     Elbow AROM  x30    IR behind back  59t70voo Start9/12   Supine butterfly 2 min Start10/4   Isometrics     Retraction x30    shrug x30    Weight shift     Flexion     Abduction     External Rotation     Internal Rotation     Biceps     Triceps          PRE's     Flexion     Abduction     External Rotation SL x15 no towel Start10/4   Internal Rotation     Shrugs     EXT EOB 2x10 Start10/6   Reverse Flys     Serratus 2x10 Start10/6   Horizontal Abd with ER     Biceps     Triceps     Retraction EOB 2x10 Start10/6        Cable Column/Theraband     External Rotation     Internal Rotation     Shrugs     Lats     Ext     Flex     Scapular Retraction     BIC     TRIC     PNF          Dynamic Stability          Plyoback          Manual interventions     PROM Fl scap ER/IR 15 min  Start9/7              Therapeutic Exercise and NMR EXR  [x] (25324) Provided verbal/tactile cueing for activities related to strengthening, flexibility, endurance, ROM  for improvements in scapular, scapulothoracic and UE control with self care, reaching, carrying, lifting, house/yardwork, driving/computer work.    [] (11060) Provided verbal/tactile cueing for activities related to improving balance, coordination, kinesthetic sense, posture, motor skill, proprioception  to assist with  scapular, scapulothoracic and UE control with self care, reaching, carrying, lifting, house/yardwork, driving/computer work.     Therapeutic Activities:    [] (31002 or ) Provided verbal/tactile cueing for activities related to improving balance, coordination, kinesthetic sense, posture, motor skill, proprioception and motor activation to allow for proper function of scapular, scapulothoracic and UE control with self care, carrying, lifting, driving/computer work.      Home Exercise Program:    [x] (50644) Reviewed/Progressed HEP activities related to strengthening, flexibility, endurance, ROM of scapular, scapulothoracic and UE control with self care, reaching, carrying, lifting, house/yardwork, driving/computer work  [] (86122) Reviewed/Progressed HEP activities related to improving balance, coordination, kinesthetic sense, posture, motor skill, proprioception of scapular, scapulothoracic and UE control with self care, reaching, carrying, lifting, house/yardwork, driving/computer work      Manual Treatments:  PROM / STM / Oscillations-Mobs:  G-I, II, III, IV (PA's, Inf., Post.)  [x] (48998) Provided manual therapy to mobilize soft tissue/joints of cervical/CT, scapular GHJ and UE for the purpose of modulating pain, promoting relaxation,  increasing ROM, reducing/eliminating soft tissue swelling/inflammation/restriction, improving soft tissue extensibility and allowing for proper ROM for normal function with self care, reaching, carrying, lifting, house/yardwork, driving/computer work    Modalities:  declined ice but recommended ice 15-20 min on several times per day to help with pain and inflammation     Charges:  Timed Code Treatment Minutes: 45   Total Treatment Minutes: 250-355       [] EVAL (LOW) 20662 (typically 20 minutes face-to-face)  [] EVAL (MOD) 51708 (typically 30 minutes face-to-face)  [] EVAL (HIGH) 36910 (typically 45 minutes face-to-face)  [] RE-EVAL     [x] IJ(57689) x  2  [] IONTO  [] NMR (89586) x     [] VASO  [x] Manual (68459) x  1    [] Other:  [] TA x      [] Mech Traction (81235)  [] ES(attended) (25209)      [] ES (un) (13930):     GOALS:  Patient stated goal: increase ROM decrease pain, use arm for adls    [] Progressing: [] Met: [] Not Met: [] Adjusted     Therapist goals for Patient:   Short Term Goals: To be achieved in: 2 -4weeks  1. Independent in HEP and progression per patient tolerance, in order to prevent re-injury. [x] Progressing: [] Met: [] Not Met: [] Adjusted   2. Patient will have a decrease in pain to facilitate improvement in movement, function, and ADLs as indicated by Functional Deficits. [x] Progressing: [] Met: [] Not Met: [] Adjusted     Long Term Goals: To be achieved in: 12 weeks  1. Foto 67/100  to assist with reaching prior level of function. [] Progressing: [] Met: [] Not Met: [] Adjusted  2. Patient will demonstrate increased AROM to = R to allow for proper joint functioning as indicated by patients Functional Deficits. [x] Progressing: [] Met: [] Not Met: [] Adjusted  3. Patient will demonstrate an increase in strength to good scapular and core control  for UE to allow for proper functional mobility as indicated by patients Functional Deficits. [] Progressing: [] Met: [] Not Met: [] Adjusted  4. Patient will return to  functional activities reach over head, reach behind back, don doff jacket  without increased symptoms or restriction. [] Progressing: [] Met: [] Not Met: [] Adjusted  Overall Progression Towards Functional goals/ Treatment Progress Update:  [] Patient is progressing as expected towards functional goals listed. [x] Progression is slowed due to complexities/Impairments listed. Trauma , complexity of injury   [] Progression has been slowed due to co-morbidities.   [] Plan just implemented, too soon to assess goals progression <30days   [] Goals require adjustment due to lack of progress  [] Patient is not progressing as expected and requires additional follow up with physician  [] Other    Prognosis for POC: [x] Good [] Fair  [] Poor      Patient requires continued skilled intervention: [x] Yes  [] No    Treatment/Activity Tolerance:  [x] Patient able to complete treatment  [] Patient limited by fatigue  [x] Patient limited by pain     [] Patient limited by other medical complications  [x] Other:  pt tracking behind in A/AAROM due to pain and weakness. Will continue to address PROM, AAROM light PREs as able to progress to PLOF L UE                     Patient Education:    Reviewed diagnosis, POC, HEP and its importance. Access Code: [email or web address]  URL: [email or web address]  Date: 9/6/22  Prepared by: Evangelista Miller     Exercises  Standing Shoulder Shrugs - 2 x daily - 7 x weekly - 3 sets - 10 reps  Seated Scapular Retraction - 2 x daily - 7 x weekly - 3 sets - 10 reps  Standing Bicep Curls Supinated with Dumbbells - 2 x daily - 7 x weekly - 3 sets - 10 reps     Seated Shoulder Pendulum Exercise - 2 x daily - 7 x weekly - 3 sets - 10 reps  Table slides flexion - 2 x daily - 7 x weekly - 10 reps - 10 hold  Seated Shoulder External Rotation AAROM with Dowel - 2 x daily - 7 x weekly - 10 reps - 10 hold   PLAN: See eval  [x] Continue per plan of care [] Alter current plan (see comments above)  [] Plan of care initiated [] Hold pending MD visit [] Discharge      Electronically signed by:  Evangelista Miller, PT    Note: If patient does not return for scheduled/ recommended follow up visits, this note will serve as a discharge from care along with most recent update on progress.

## 2022-10-07 ENCOUNTER — OFFICE VISIT (OUTPATIENT)
Dept: PRIMARY CARE CLINIC | Age: 72
End: 2022-10-07
Payer: MEDICARE

## 2022-10-07 VITALS
TEMPERATURE: 97.8 F | OXYGEN SATURATION: 99 % | BODY MASS INDEX: 27.5 KG/M2 | DIASTOLIC BLOOD PRESSURE: 74 MMHG | HEART RATE: 74 BPM | RESPIRATION RATE: 14 BRPM | WEIGHT: 220 LBS | SYSTOLIC BLOOD PRESSURE: 120 MMHG

## 2022-10-07 DIAGNOSIS — Z00.00 MEDICARE ANNUAL WELLNESS VISIT, SUBSEQUENT: Primary | ICD-10-CM

## 2022-10-07 DIAGNOSIS — E78.00 PURE HYPERCHOLESTEROLEMIA: ICD-10-CM

## 2022-10-07 DIAGNOSIS — E11.9 TYPE 2 DIABETES MELLITUS WITHOUT COMPLICATION, WITHOUT LONG-TERM CURRENT USE OF INSULIN (HCC): ICD-10-CM

## 2022-10-07 DIAGNOSIS — R39.11 BENIGN PROSTATIC HYPERPLASIA WITH URINARY HESITANCY: Chronic | ICD-10-CM

## 2022-10-07 DIAGNOSIS — Z23 NEEDS FLU SHOT: ICD-10-CM

## 2022-10-07 DIAGNOSIS — I10 ESSENTIAL HYPERTENSION: ICD-10-CM

## 2022-10-07 DIAGNOSIS — N40.1 BENIGN PROSTATIC HYPERPLASIA WITH URINARY HESITANCY: Chronic | ICD-10-CM

## 2022-10-07 DIAGNOSIS — I25.10 ATHEROSCLEROSIS OF NATIVE CORONARY ARTERY OF NATIVE HEART WITHOUT ANGINA PECTORIS: ICD-10-CM

## 2022-10-07 PROCEDURE — 1123F ACP DISCUSS/DSCN MKR DOCD: CPT | Performed by: INTERNAL MEDICINE

## 2022-10-07 PROCEDURE — G0439 PPPS, SUBSEQ VISIT: HCPCS | Performed by: INTERNAL MEDICINE

## 2022-10-07 PROCEDURE — G0008 ADMIN INFLUENZA VIRUS VAC: HCPCS | Performed by: INTERNAL MEDICINE

## 2022-10-07 PROCEDURE — 3051F HG A1C>EQUAL 7.0%<8.0%: CPT | Performed by: INTERNAL MEDICINE

## 2022-10-07 PROCEDURE — 90694 VACC AIIV4 NO PRSRV 0.5ML IM: CPT | Performed by: INTERNAL MEDICINE

## 2022-10-07 ASSESSMENT — PATIENT HEALTH QUESTIONNAIRE - PHQ9
SUM OF ALL RESPONSES TO PHQ QUESTIONS 1-9: 0
1. LITTLE INTEREST OR PLEASURE IN DOING THINGS: 0
2. FEELING DOWN, DEPRESSED OR HOPELESS: 0
SUM OF ALL RESPONSES TO PHQ QUESTIONS 1-9: 0
SUM OF ALL RESPONSES TO PHQ9 QUESTIONS 1 & 2: 0
SUM OF ALL RESPONSES TO PHQ QUESTIONS 1-9: 0
SUM OF ALL RESPONSES TO PHQ QUESTIONS 1-9: 0

## 2022-10-07 NOTE — PATIENT INSTRUCTIONS
Personalized Preventive Plan for [address] - 10/7/2022  Medicare offers a range of preventive health benefits. Some of the tests and screenings are paid in full while other may be subject to a deductible, co-insurance, and/or copay. Some of these benefits include a comprehensive review of your medical history including lifestyle, illnesses that may run in your family, and various assessments and screenings as appropriate. After reviewing your medical record and screening and assessments performed today your provider may have ordered immunizations, labs, imaging, and/or referrals for you. A list of these orders (if applicable) as well as your Preventive Care list are included within your After Visit Summary for your review. Other Preventive Recommendations:    A preventive eye exam performed by an eye specialist is recommended every 1-2 years to screen for glaucoma; cataracts, macular degeneration, and other eye disorders. A preventive dental visit is recommended every 6 months. Try to get at least 150 minutes of exercise per week or 10,000 steps per day on a pedometer . Order or download the FREE \"Exercise & Physical Activity: Your Everyday Guide\" from [employer]. Call 3-165.945.2384 or search [employer] online. You need 0090-3301 mg of calcium and 4370-1744 IU of vitamin D per day. It is possible to meet your calcium requirement with diet alone, but a vitamin D supplement is usually necessary to meet this goal.  When exposed to the sun, use a sunscreen that protects against both UVA and UVB radiation with an SPF of 30 or greater. Reapply every 2 to 3 hours or after sweating, drying off with a towel, or swimming. Always wear a seat belt when traveling in a car. Always wear a helmet when riding a bicycle or motorcycle.

## 2022-10-07 NOTE — ASSESSMENT & PLAN NOTE
Diabetes Mellitus Type II:  Home blood sugar records reviewed: fasting range: 120-130. No significant episodes of hypoglycemia. Compliant with medications. No polyuria, polydipsia, visual changes, foot problems, GI upset. Diabetic diet compliance:  compliant most of the time. Current exercise: none. Will check labs, and refill medications as appropriate. Hypertension:  Denies CP, SOB, visual changes, dizziness, palpitations or HA. He is adherent to a low sodium diet. Blood pressure typically runs ok  outside of the office. Continue current medications. Hyperlipidemia:  No new myalgias or GI upset on current medications. Lab Results   Component Value Date    LABA1C 7.4 07/13/2022    LABA1C 7.7 04/04/2022    LABA1C 8.2 03/22/2022     Lab Results   Component Value Date    LABMICR 3.00 (H) 07/13/2022    CREATININE 0.8 08/16/2022     Lab Results   Component Value Date    ALT 28 08/16/2022    AST 22 08/16/2022     No components found for: CHLPL  Lab Results   Component Value Date    TRIG 104 07/13/2022     Lab Results   Component Value Date    HDL 43 07/13/2022     Lab Results   Component Value Date/Time    LDLCALC 77 07/13/2022 07:34 AM       This problem is stable, reviewed in detail, and advised patient to continue the current instructions or medications. Will continue to closely monitor the situation.

## 2022-10-07 NOTE — PROGRESS NOTES
Medicare Annual Wellness Visit    Mayo Montenegro is here for Medicare AW    Assessment & Plan    Recommendations for Preventive Services Due: see orders and patient instructions/AVS.  Recommended screening schedule for the next 5-10 years is provided to the patient in written form: see Patient Instructions/AVS.  Established patient here for a visit to manage acute and chronic medical conditions as detailed below. No follow-ups on file. Subjective   The following acute and/or chronic problems were also addressed today:  Essential hypertension  This is a chronic problem. The problem is well controlled. Patient monitors readings regularly. Pertinent negatives include no chest pain, focal sensory loss, focal weakness, leg pain, myalgias or shortness of breath. No headaches or chest pain. Takes medications regularly. Blood pressure has been stable, blood work was reviewed, and advised patient to continue the current instructions or medications. Coronary atherosclerosis of native coronary artery  No cp or sob,  Patient is compliant w medications, no side effects, effective, provides adequate symptom relief. No new symptoms or problems as noted by patient. The problem is stable, no changes noted by patient. Will consider monitoring labs and refill medications as appropriate. Patient counseled and will continue current plan. Type 2 diabetes mellitus without complication, without long-term current use of insulin ([facility])  Diabetes Mellitus Type II:  Home blood sugar records reviewed: fasting range: 120-130. No significant episodes of hypoglycemia. Compliant with medications. No polyuria, polydipsia, visual changes, foot problems, GI upset. Diabetic diet compliance:  compliant most of the time. Current exercise: none. Will check labs, and refill medications as appropriate. Hypertension:  Denies CP, SOB, visual changes, dizziness, palpitations or HA. He is adherent to a low sodium diet. Blood pressure typically runs ok  outside of the office. Continue current medications. Hyperlipidemia:  No new myalgias or GI upset on current medications. Lab Results   Component Value Date    LABA1C 7.4 07/13/2022    LABA1C 7.7 04/04/2022    LABA1C 8.2 03/22/2022     Lab Results   Component Value Date    LABMICR 3.00 (H) 07/13/2022    CREATININE 0.8 08/16/2022     Lab Results   Component Value Date    ALT 28 08/16/2022    AST 22 08/16/2022     No components found for: CHLPL  Lab Results   Component Value Date    TRIG 104 07/13/2022     Lab Results   Component Value Date    HDL 43 07/13/2022     Lab Results   Component Value Date/Time    LDLCALC 77 07/13/2022 07:34 AM       This problem is stable, reviewed in detail, and advised patient to continue the current instructions or medications. Will continue to closely monitor the situation. Pure hypercholesterolemia  This has been a long standing problem, takes statin      Monitors diet and tries to follow a low fat diet. Has  been reasonably  compliant w exercise. Lipids have been stable, The problem is controlled. Recent lipid tests were reviewed and are normal. Pertinent negatives include no chest pain, focal sensory loss, focal weakness, leg pain, myalgias or shortness of breath. Advised patient to continue the current instructions or medications. Benign prostatic hyperplasia with urinary hesitancy  On flomax,  Patient is compliant w medications, no side effects, effective, provides adequate symptom relief. No new symptoms or problems as noted by patient. The problem is stable, no changes noted by patient. Will consider monitoring labs and refill medications as appropriate. Patient counseled and will continue current plan. Patient's complete Health Risk Assessment and screening values have been reviewed and are found in Flowsheets.  The following problems were reviewed today and where indicated follow up appointments were made and/or referrals ordered. Positive Risk Factor Screenings with Interventions:             General Health and ACP:  General  In general, how would you say your health is?: Good  In the past 7 days, have you experienced any of the following: New or Increased Pain, New or Increased Fatigue, Loneliness, Social Isolation, Stress or Anger?: No  Do you get the social and emotional support that you need?: Yes  Do you have a Living Will?: Yes    Advance Directives       Power of  Living Will ACP-Advance Directive ACP-Power of     Not on File Not on File Filed Mike Interventions:  No issues              Objective   Vitals:    10/07/22 1504   BP: 120/74   Site: Right Upper Arm   Position: Sitting   Cuff Size: Medium Adult   Pulse: 74   Resp: 14   Temp: 97.8 °F (36.6 °C)   SpO2: 99%   Weight: 220 lb (99.8 kg)      Body mass index is 27.5 kg/m².       General Appearance: alert and oriented to person, place and time, well developed and well- nourished, in no acute distress  Skin: warm and dry, no rash or erythema  Head: normocephalic and atraumatic  Eyes: pupils equal, round, and reactive to light, extraocular eye movements intact, conjunctivae normal  ENT: tympanic membrane, external ear and ear canal normal bilaterally, nose without deformity, nasal mucosa and turbinates normal without polyps  Neck: supple and non-tender without mass, no thyromegaly or thyroid nodules, no cervical lymphadenopathy  Pulmonary/Chest: clear to auscultation bilaterally- no wheezes, rales or rhonchi, normal air movement, no respiratory distress  Cardiovascular: normal rate, regular rhythm, normal S1 and S2, no murmurs, rubs, clicks, or gallops, distal pulses intact, no carotid bruits  Abdomen: soft, non-tender, non-distended, normal bowel sounds, no masses or organomegaly  Extremities: no cyanosis, clubbing or edema  Musculoskeletal: normal range of motion, no joint swelling, deformity or tenderness  Neurologic: reflexes normal and symmetric, no cranial nerve deficit, gait, coordination and speech normal       Allergies   Allergen Reactions    Other      Bee stings       Prior to Visit Medications    Medication Sig Taking? Authorizing Provider   LANTUS SOLOSTAR 100 UNIT/ML injection pen ADMINISTER 56 UNITS UNDER THE SKIN EVERY NIGHT Yes Hardy March MD   meloxicam (MOBIC) 7.5 MG tablet TAKE 1 TABLET BY MOUTH IN THE MORNING Yes Gabriela Ramos MD   SYNJARDY XR 12.5-1000 MG TB24 TAKE 1 TABLET BY MOUTH TWICE DAILY Yes Hardy March MD   [facility] VERIO strip As needed. Yes Hardy March MD   Insulin Pen Needle (B-D UF III MINI PEN NEEDLES) 31G X 5 MM MISC Use 4 times daily to inject insulin Yes Hardy March MD   rosuvastatin (CRESTOR) 40 MG tablet TAKE 1 TABLET BY MOUTH DAILY Yes Niki Paniagua MD   tamsulosin (FLOMAX) 0.4 MG capsule TAKE 2 CAPSULEs BY MOUTH EVERY DAY Yes Niki Paniagua MD   Lancets (Malva Kelvin) MISC USE TO TEST 4 TO 6 TIMES DAILY Yes Hardy March MD   insulin lispro, 1 Unit Dial, 100 UNIT/ML SOPN INJECT 15 UNITS UNDER THE SKIN WITH EACH MEAL Yes Hardy March MD   hydroCHLOROthiazide (MICROZIDE) 12.5 MG capsule Take 1 capsule by mouth every morning Yes Niki Paniagua MD   amLODIPine-benazepril (LOTREL) 10-20 MG per capsule Take 1 capsule by mouth daily Yes Niki Paniagua MD   ezetimibe (ZETIA) 10 MG tablet Take 1 tablet by mouth daily Yes Niki Paniagua MD   Blood Glucose Monitoring Suppl ([address]) w/Device KIT Check blood sugar Yes Hardy March MD   aspirin 81 MG EC tablet Take 81 mg by mouth daily.  Yes Historical Provider, MD [place] (Including outside providers/suppliers regularly involved in providing care):   Patient Care Team:  Niki Paniagua MD as PCP - General (Internal Medicine)  Niki Paniagua MD as PCP - [facility] Empaneled Provider     Reviewed and updated this visit:  Tobacco  Allergies  Meds  Med Hx  Surg Hx  Soc Hx  Fam Hx

## 2022-10-08 DIAGNOSIS — I10 ESSENTIAL HYPERTENSION: ICD-10-CM

## 2022-10-08 LAB
A/G RATIO: 1.6 (ref 1.1–2.2)
ALBUMIN SERPL-MCNC: 4.2 G/DL (ref 3.4–5)
ALP BLD-CCNC: 73 U/L (ref 40–129)
ALT SERPL-CCNC: 28 U/L (ref 10–40)
ANION GAP SERPL CALCULATED.3IONS-SCNC: 11 MMOL/L (ref 3–16)
AST SERPL-CCNC: 22 U/L (ref 15–37)
BILIRUB SERPL-MCNC: 0.4 MG/DL (ref 0–1)
BUN BLDV-MCNC: 16 MG/DL (ref 7–20)
CALCIUM SERPL-MCNC: 9.6 MG/DL (ref 8.3–10.6)
CHLORIDE BLD-SCNC: 101 MMOL/L (ref 99–110)
CHOLESTEROL, TOTAL: 139 MG/DL (ref 0–199)
CO2: 26 MMOL/L (ref 21–32)
CREAT SERPL-MCNC: 0.8 MG/DL (ref 0.8–1.3)
CREATININE URINE: 74.5 MG/DL (ref 39–259)
GFR AFRICAN AMERICAN: >60
GFR NON-AFRICAN AMERICAN: >60
GLUCOSE BLD-MCNC: 87 MG/DL (ref 70–99)
HDLC SERPL-MCNC: 45 MG/DL (ref 40–60)
LDL CHOLESTEROL CALCULATED: 69 MG/DL
MICROALBUMIN UR-MCNC: 1.6 MG/DL
MICROALBUMIN/CREAT UR-RTO: 21.5 MG/G (ref 0–30)
POTASSIUM SERPL-SCNC: 4.1 MMOL/L (ref 3.5–5.1)
SODIUM BLD-SCNC: 138 MMOL/L (ref 136–145)
TOTAL PROTEIN: 6.9 G/DL (ref 6.4–8.2)
TRIGL SERPL-MCNC: 124 MG/DL (ref 0–150)
TSH SERPL DL<=0.05 MIU/L-ACNC: 3.11 UIU/ML (ref 0.27–4.2)
VLDLC SERPL CALC-MCNC: 25 MG/DL

## 2022-10-09 LAB
ESTIMATED AVERAGE GLUCOSE: 151.3 MG/DL
HBA1C MFR BLD: 6.9 %

## 2022-10-10 RX ORDER — AMLODIPINE BESYLATE AND BENAZEPRIL HYDROCHLORIDE 10; 20 MG/1; MG/1
1 CAPSULE ORAL DAILY
Qty: 90 CAPSULE | Refills: 3 | Status: SHIPPED | OUTPATIENT
Start: 2022-10-10

## 2022-10-10 RX ORDER — HYDROCHLOROTHIAZIDE 12.5 MG/1
12.5 CAPSULE, GELATIN COATED ORAL EVERY MORNING
Qty: 90 CAPSULE | Refills: 3 | Status: SHIPPED | OUTPATIENT
Start: 2022-10-10

## 2022-10-10 RX ORDER — EZETIMIBE 10 MG/1
10 TABLET ORAL DAILY
Qty: 90 TABLET | Refills: 3 | Status: SHIPPED | OUTPATIENT
Start: 2022-10-10

## 2022-10-10 NOTE — TELEPHONE ENCOUNTER
Medication:   Requested Prescriptions     Pending Prescriptions Disp Refills    hydroCHLOROthiazide (MICROZIDE) 12.5 MG capsule [Pharmacy Med Name: HYDROCHLOROTHIAZIDE 12.5MG CAPSULES] 90 capsule 3     Sig: TAKE 1 CAPSULE BY MOUTH EVERY MORNING    ezetimibe (Mitcheal Heady) 10 MG tablet [Pharmacy Med Name: EZETIMIBE 10MG TABLETS] 90 tablet 3     Sig: TAKE 1 TABLET BY MOUTH DAILY    amLODIPine-benazepril (LOTREL) 10-20 MG per capsule [Pharmacy Med Name: Janel Hawkezekiel 90 capsule 3     Sig: TAKE 1 CAPSULE BY MOUTH DAILY     Last Filled:  01/03/2022    Last appt: 10/7/2022   Next appt: Visit date not found    Last OARRS: No flowsheet data found.

## 2022-10-17 ENCOUNTER — OFFICE VISIT (OUTPATIENT)
Dept: ENDOCRINOLOGY | Age: 72
End: 2022-10-17
Payer: MEDICARE

## 2022-10-17 VITALS
RESPIRATION RATE: 16 BRPM | BODY MASS INDEX: 27.6 KG/M2 | WEIGHT: 222 LBS | SYSTOLIC BLOOD PRESSURE: 139 MMHG | HEIGHT: 75 IN | HEART RATE: 74 BPM | DIASTOLIC BLOOD PRESSURE: 61 MMHG

## 2022-10-17 DIAGNOSIS — I10 ESSENTIAL HYPERTENSION: ICD-10-CM

## 2022-10-17 DIAGNOSIS — E78.2 MIXED HYPERLIPIDEMIA: ICD-10-CM

## 2022-10-17 DIAGNOSIS — I67.9 CEREBROVASCULAR DISEASE: Chronic | ICD-10-CM

## 2022-10-17 DIAGNOSIS — E11.9 TYPE 2 DIABETES MELLITUS WITHOUT COMPLICATION, WITHOUT LONG-TERM CURRENT USE OF INSULIN (HCC): Primary | ICD-10-CM

## 2022-10-17 PROCEDURE — 3044F HG A1C LEVEL LT 7.0%: CPT | Performed by: INTERNAL MEDICINE

## 2022-10-17 PROCEDURE — 1123F ACP DISCUSS/DSCN MKR DOCD: CPT | Performed by: INTERNAL MEDICINE

## 2022-10-17 PROCEDURE — 99213 OFFICE O/P EST LOW 20 MIN: CPT | Performed by: INTERNAL MEDICINE

## 2022-10-17 NOTE — PROGRESS NOTES
Pt is a 67 y.o. male seen management of uncontrolled type 2 diabetes and abnormal thyroid function test.    Diabetes was diagnosed at routine screening . At the time of diagnosis patient had polyuria polydipsia . Leopoldo Martini got diabetic education in the past when he was first diagnosed with diabetes. Comorbid conditions: Neuropathy and Coronary Artery Disease     previous  diabetic medications include: Actos 30 milligrams, metformin thousand twice a day, Amaryl 4 milligrams daily    He has CAD s/p stents age 48, follows with cardiology  Has hypertension and is on medication well controlled   He has hyperlipidemia and is on medication     INTERIM:    Diabetes  He presents for his follow-up diabetic visit. He has type 2 diabetes mellitus. No MedicAlert identification noted. The initial diagnosis of diabetes was made 20 years ago. His disease course has been worsening. There are no hypoglycemic associated symptoms. Associated symptoms include polyphagia and polyuria. Pertinent negatives for diabetes include no weight loss. There are no hypoglycemic complications. Pertinent negatives for hypoglycemia complications include no required glucagon injection. Symptoms are worsening. Diabetic complications include heart disease and peripheral neuropathy. Risk factors for coronary artery disease include diabetes mellitus, dyslipidemia, family history, male sex, obesity, hypertension and tobacco exposure. Current diabetic treatment includes oral agent (triple therapy). He is compliant with treatment most of the time. His weight is stable. He is following a generally unhealthy diet. Meal planning includes avoidance of concentrated sweets. He has had a previous visit with a dietitian. He rarely participates in exercise. There is no change in his home blood glucose trend. His breakfast blood glucose is taken between 7-8 am. His breakfast blood glucose range is generally >200 mg/dl.  An ACE inhibitor/angiotensin II receptor blocker is being taken. He sees a podiatrist.Eye exam is current. Denies any unexplained hypoglycemia, fasting glucose mostly above 120 but some days he is in the low 100 range as well.  still some dietary noncompliance but has been working on meeting high carb snacks.       Weight trend: stable  Prior visit with dietician: no  Current diet: on average, 3 meals per day  Current exercise: rare        Past Medical History:   Diagnosis Date    CAD (coronary artery disease)     Chronic back pain     lumbar disk disease    Colorectal polyps     Diabetic ulcer of toe of left foot associated with type 2 diabetes mellitus, limited to breakdown of skin ([facility] [address] 5/20/2019    Erectile dysfunction     Hyperlipidemia     Hypertension     Hypertrophy of prostate without urinary obstruction and other lower urinary tract symptoms (LUTS)     Penetrating foot wound 2019    L foot     Retrograde ejaculation     Sciatica     Type II or unspecified type diabetes mellitus without mention of complication, not stated as uncontrolled       Patient Active Problem List   Diagnosis    Type 2 diabetes mellitus without complication, without long-term current use of insulin (HCC)    Pure hypercholesterolemia    Essential hypertension    Coronary atherosclerosis of native coronary artery    Fungal dermatitis    Hx of smoking    Colon polyps    Left hip pain    Carotid stenosis, symptomatic w/o infarct, right    Falls    Cerebrovascular disease    Ulnar neuropathy of left upper extremity    Anterior dislocation of left shoulder    Numbness and tingling in left arm    Left shoulder pain    Traumatic closed displaced fracture of left shoulder with anterior dislocation    Benign prostatic hyperplasia with urinary hesitancy     Past Surgical History:   Procedure Laterality Date    CAROTID ENDARTERECTOMY Right 12/31/2018    RIGHT CAROTID ENDARTERECTOMY WITH INTRA OP DUPLEX SCAN performed by Rosie Feng MD at [address] [place] [address] ANGIOPLASTY      x2    CYST REMOVAL      from back and finger x2    TONSILLECTOMY AND ADENOIDECTOMY       Social History     Socioeconomic History    Marital status:      Spouse name: Not on file    Number of children: Not on file    Years of education: Not on file    Highest education level: Not on file   Occupational History    Not on file   Tobacco Use    Smoking status: Former     Packs/day: 1.00     Years: 48.00     Pack years: 48.00     Types: Cigarettes     Start date: 1959     Quit date: 2007     Years since quittin.8    Smokeless tobacco: Never   Vaping Use    Vaping Use: Never used   Substance and Sexual Activity    Alcohol use: Yes     Comment: social    Drug use: No    Sexual activity: Yes     Partners: Female   Other Topics Concern    Not on file   Social History Narrative    Not on file     Social Determinants of Health     Financial Resource Strain: Low Risk     Difficulty of Paying Living Expenses: Not hard at all   Food Insecurity: No Food Insecurity    Worried About [employer] in the Last Year: Never true    [address] in the Last Year: Never true   Transportation Needs: No Transportation Needs    Lack of Transportation (Medical): No    Lack of Transportation (Non-Medical): No   Physical Activity: Insufficiently Active    Days of Exercise per Week: 3 days    Minutes of Exercise per Session: 30 min   Stress: No Stress Concern Present    Feeling of Stress : Not at all   Social Connections: Moderately Isolated    Frequency of Communication with Friends and Family: Three times a week    Frequency of Social Gatherings with Friends and Family:  Three times a week    Attends L[employer] Services: Never    Active Member of Clubs or Organizations: No    Attends Club or Organization Meetings: Never    Marital Status:    Intimate Partner Violence: Not on file   Housing Stability: Low Risk     Unable to Pay for Housing in the Last Year: No    Number of Places Lived in the Last Year: 1    Unstable Housing in the Last Year: No     Family History   Problem Relation Age of Onset    Coronary Art Dis Mother         CABG    Diabetes Mother     High Blood Pressure Father     Stroke Father     Hypertension Brother     Diabetes Brother     Coronary Art Dis Brother     Cancer Brother         prostate    Other Brother         PVD    Diabetes Maternal Grandmother     Cancer Maternal Grandmother         skin    Diabetes Brother     Hypertension Brother     Hypertension Brother         throat polyp, guillain barre     Current Outpatient Medications   Medication Sig Dispense Refill    hydroCHLOROthiazide (MICROZIDE) 12.5 MG capsule TAKE 1 CAPSULE BY MOUTH EVERY MORNING 90 capsule 3    ezetimibe (ZETIA) 10 MG tablet TAKE 1 TABLET BY MOUTH DAILY 90 tablet 3    amLODIPine-benazepril (LOTREL) 10-20 MG per capsule TAKE 1 CAPSULE BY MOUTH DAILY 90 capsule 3    LANTUS SOLOSTAR 100 UNIT/ML injection pen ADMINISTER 56 UNITS UNDER THE SKIN EVERY NIGHT (Patient taking differently: ADMINISTER 50 UNITS UNDER THE SKIN EVERY NIGHT) 18 mL 3    meloxicam (MOBIC) 7.5 MG tablet TAKE 1 TABLET BY MOUTH IN THE MORNING (Patient taking differently: Take 7.5 mg by mouth as needed) 30 tablet 0    SYNJARDY XR 12.5-1000 MG TB24 TAKE 1 TABLET BY MOUTH TWICE DAILY 60 tablet 3    ONETOUCH VERIO strip As needed.  100 strip 5    Insulin Pen Needle (B-D UF III MINI PEN NEEDLES) 31G X 5 MM MISC Use 4 times daily to inject insulin 200 each 5    rosuvastatin (CRESTOR) 40 MG tablet TAKE 1 TABLET BY MOUTH DAILY 90 tablet 0    tamsulosin (FLOMAX) 0.4 MG capsule TAKE 2 CAPSULEs BY MOUTH EVERY  capsule 2    Lancets (ONETOUCH DELICA PLUS [id number]) MISC USE TO TEST 4 TO 6 TIMES DAILY 200 each 3    insulin lispro, 1 Unit Dial, 100 UNIT/ML SOPN INJECT 15 UNITS UNDER THE SKIN WITH EACH MEAL 15 mL 3    Blood Glucose Monitoring Suppl (ONETOUCH VERIO FLEX SYSTEM) w/Device KIT Check blood sugar 1 kit 0    aspirin 81 MG EC tablet Take 81 mg by mouth daily. No current facility-administered medications for this visit. Allergies   Allergen Reactions    Other      Bee stings       Family Status   Relation Name Status    Mother      Father      Brother   at age 76    MGM  (Not Specified)    Brother   at age 76    Brother  Alive         OBJECTIVE:  /61   Pulse 74   Resp 16   Ht 6' 3\" (1.905 m)   Wt 222 lb (100.7 kg)   BMI 27.75 kg/m²    Wt Readings from Last 3 Encounters:   10/17/22 222 lb (100.7 kg)   10/07/22 220 lb (99.8 kg)   22 223 lb 12.8 oz (101.5 kg)     ROS  I have reviewed the review of system questionnaire filled by the patient .   Patient was advised to contact PCP for non endocrine signs and symptoms     EXAM   Constitutional: no acute distress, well appearing, well nourished  Psychiatric: oriented to person, place and time, judgement, insight and normal, recent and remote memory and intact and mood, affect are normal  Skin: skin and subcutaneous tissue is normal without mass,   Head and Face: examination of head and face revealed no abnormalities  Eyes: no lid or conjunctival swelling, no erythema or discharge, pupils are normal,   Ears/Nose: external inspection of ears and nose revealed no abnormalities, hearing is grossly normal  Oropharynx/Mouth/Face: lips, tongue and gums are normal with no lesions, the voice quality was normal  Neck: neck is supple and symmetric, with midline trachea and no masses, thyroid is normal    Pulmonary: no increased work of breathing or signs of respiratory distress, lungs are clear to auscultation  Cardiovascular: normal heart rate and rhythm, normal S1 and S2,   Musculoskeletal: normal gait and station,   Neurological: normal coordination, normal general cortical function      Lab Results   Component Value Date/Time    LABA1C 6.9 10/08/2022 09:54 AM    LABA1C 7.4 2022 07:34 AM    LABA1C 7.7 2022 08:11 AM         ASSESSMENT/PLAN:    --- Uncontrolled type 2 diabetes mellitus with complication, with long-term current use of insulin 9.3>>9.9>>8.7>>7.8>>7.8>>7.1>>6.9>>7.3  Control not at target Aic 7.4 >>6.9   reviewed all his labs results with him in detail  ---he has been taking lantus 50 units at bedtime fasting are 120 range   Fasting glucose are close to target  He takes humalog with meals, 4--8 units with each meal   ---Advised to avoid snacks at bedtime   On synjardy with no SE   Patient is using Humalog  CIR  10:1 patient is familiar with carbohydrate counting. Uses SSI   Will start Dexcom   Patient was advised that sending of his fingerstick blood glucose logs is crucial in management of his  diabetes. I will adjust the  doses of diabetic medications  according to sent data. Health Maintenance       Last Eye Exam: advised to have annual dilated eye exam. his last eye exam was within a year dec 2021   Last Podiatry Exam:  follows with podiatry closely --- oct 2022 had a sore due to inserts   Lipids: Last LDL level was 77  In  July 2022   Microalbumin/Creatinine Ratio:pt has microalbuminuria elevated in sept 2019 , normal in July 2022     . Education: Reviewed ABCs of diabetes management (respective goals in parentheses): A1C (<7), blood pressure (<130/80), and cholesterol (LDL <100). Additional Education: referred to Diabetes Educator but according to patient and his wife had gone through diabetic education in the past and is very well versed  with carbohydrate counting he will rely on her to help him. Abnormal thyroid function test his TSH has fluctuated in the last 1 year I will check thyroid antibodies and repeat thyroid function test at follow-up  Repeat thyroid fx test were TSH 4.1   Thyroid AB negative   Not symptomatic       -- Coronary artery disease involving native coronary artery of native heart without angina pectoris  Patient had coronary stents put in 20 years ago.   He had carotid endarterectomy done in January 2019    --Essential hypertension  Well controlled on current regimen denies any headaches denies any blurred vision     --- Mixed hyperlipidemia  Patient is on statins --Crestor 40 milligrams and ezetimibe  Advised to work on diet   LDL is at target in May 2021 he will continue with the current regimen      Reviewed and/or ordered clinical lab results yes   Reviewed and/or ordered radiology tests Yes  Reviewed and/or ordered other diagnostic tests yes   Made a decision to obtain old records yes   Reviewed and summarized old records yes     Jaime Valencia was counseled regarding symptoms of current diagnosis, course and complications of disease if inadequately treated, side effects of medications, diagnosis, treatment options, and prognosis, risks, benefits, complications, and alternatives of treatment, labs, imaging and other studies and treatment targets and goals. He understands instructions and counseling. Return in about 3 months (around 1/17/2023). Please note that some or all of this report was generated using voice recognition software. Please notify me in case of any questions about the content of this document, as some errors in transcription may have occurred .

## 2022-10-18 ENCOUNTER — HOSPITAL ENCOUNTER (OUTPATIENT)
Dept: PHYSICAL THERAPY | Age: 72
Setting detail: THERAPIES SERIES
Discharge: HOME | End: 2022-10-18
Payer: MEDICARE

## 2022-10-18 PROCEDURE — 97140 MANUAL THERAPY 1/> REGIONS: CPT | Performed by: PHYSICAL THERAPIST

## 2022-10-18 PROCEDURE — 97110 THERAPEUTIC EXERCISES: CPT | Performed by: PHYSICAL THERAPIST

## 2022-10-18 NOTE — FLOWSHEET NOTE
[address],  [address]  Phone: [phone]   Fax:     [phone]    Physical Therapy Daily Treatment Note  Date:  10/18/2022    Patient Name:  Roberto Romero    :  1950  MRN: [id number]  Restrictions/Precautions:    Medical/Treatment Diagnosis Information:  Diagnosis: L truamatic rotator cuff tear M75.122  Treatment Diagnosis: L shoulder LOLML41.399  Insurance/Certification information:  PT Insurance Information: [facility] BMN  Physician Information:   Dr Cezar Khan  Has the plan of care been signed (Y/N):        [x]  Yes  []  No     Date of Patient follow up with Physician: per md      Is this a Progress Report:     []  Yes   [x]  No        If Yes:  Date Range for reporting period:  Beginning  Bktujy11/6    Progress report will be due (10 Rx or 30 days whichever is less):       Recertification will be due (POC Duration  / 90 days whichever is less):          Visit # Insurance Allowable Auth Required   11 post surgery   BMN []  Yes []  No        Functional Scale:  Foto38/100    Date assessed:       Latex Allergy:  [x]NO      []YES  Preferred Language for Healthcare:   [x]English       []other:    Pain level:  3/10 at rest 10/18    SUBJECTIVE:  10/18 seems to be a bit better    OBJECTIVE: See eval   ROM PROM 10/6 AROM  Comment     L R L R     Flexion ~135   115 supine 10/6        scap ~150           ER [email or web address]           IR             Other  (cervical)             Other                L shoulder flexion abd MMT 2/5 10/6    RESTRICTIONS/PRECAUTIONS: RCR,  increase ROM as able per MD due to capsular release and LUIS    Exercises/Interventions:   Exercises:  Exercise/Equipment Resistance/Repetitions Other comments   Stretching/PROM     Wand ER at neutral [id number]    Supine @ 60abd [id number]      Supine cane flex 3x10       Start        Start10/6   Table Slides fl [id number]     Abd [id number]    ER bend forward [id number]       Start9/8      Start9/8   Wall slide [id number] assist with R UE Start9/22   Wall step away for flexion [id number] Start9/8   Pulley flex   X10    Scap x10 Start9/16   Supine grab L wrist with R hand move into flexion [id number]    blue ball roll into flexion on table  [id number]    Pendulum Assist with R UE CW CCW fl/ext side to side [id number]     Elbow AROM  x30    IR behind back  [id number] Start9/12   Supine butterfly 2 min Start10/4   Isometrics     Retraction x30    shrug x30    Weight shift     Flexion     Abduction     External Rotation     Internal Rotation     Biceps     Triceps          PRE's     Flexion     Abduction     External Rotation SL x15 no towel Start10/4   Internal Rotation     Shrugs     EXT EOB 2x10 Start10/6   Reverse Flys     Serratus 2x10 Start10/6   Horizontal Abd with ER     Biceps     Triceps     Retraction EOB 2x10 Start10/6        Cable Column/Theraband     External Rotation     Internal Rotation     Shrugs     Lats     Ext     Flex     Scapular Retraction     BIC     TRIC     PNF          Dynamic Stability          Plyoback          Manual interventions     PROM Fl scap ER/IR 15 min  Start9/7              Therapeutic Exercise and NMR EXR  [x] (34519) Provided verbal/tactile cueing for activities related to strengthening, flexibility, endurance, ROM  for improvements in scapular, scapulothoracic and UE control with self care, reaching, carrying, lifting, house/yardwork, driving/computer work.    [] (43875) Provided verbal/tactile cueing for activities related to improving balance, coordination, kinesthetic sense, posture, motor skill, proprioception  to assist with  scapular, scapulothoracic and UE control with self care, reaching, carrying, lifting, house/yardwork, driving/computer work.     Therapeutic Activities:    [] (03117 or 07682) Provided verbal/tactile cueing for activities related to improving balance, coordination, kinesthetic sense, posture, motor skill, proprioception and motor activation to allow for proper function of scapular, scapulothoracic and UE control with self care, carrying, lifting, driving/computer work.      Home Exercise Program:    [x] (32189) Reviewed/Progressed HEP activities related to strengthening, flexibility, endurance, ROM of scapular, scapulothoracic and UE control with self care, reaching, carrying, lifting, house/yardwork, driving/computer work  [] (54743) Reviewed/Progressed HEP activities related to improving balance, coordination, kinesthetic sense, posture, motor skill, proprioception of scapular, scapulothoracic and UE control with self care, reaching, carrying, lifting, house/yardwork, driving/computer work      Manual Treatments:  PROM / STM / Oscillations-Mobs:  G-I, II, III, IV (PA's, Inf., Post.)  [x] (31152) Provided manual therapy to mobilize soft tissue/joints of cervical/CT, scapular GHJ and UE for the purpose of modulating pain, promoting relaxation,  increasing ROM, reducing/eliminating soft tissue swelling/inflammation/restriction, improving soft tissue extensibility and allowing for proper ROM for normal function with self care, reaching, carrying, lifting, house/yardwork, driving/computer work    Modalities:  declined ice but recommended ice 15-20 min on several times per day to help with pain and inflammation     Charges:  Timed Code Treatment Minutes: 45   Total Treatment Minutes: 235-345       [] EVAL (LOW) 41208 (typically 20 minutes face-to-face)  [] EVAL (MOD) 70583 (typically 30 minutes face-to-face)  [] EVAL (HIGH) 16942 (typically 45 minutes face-to-face)  [] RE-EVAL     [x] NG(43296) x  2  [] IONTO  [] NMR (23202) x     [] VASO  [x] Manual (69252) x  1    [] Other:  [] TA x      [] Mech Traction (06556)  [] ES(attended) (52671)      [] ES (un) (78683):     GOALS:  Patient stated goal: increase ROM decrease pain, use arm for adls    [] Progressing: [] Met: [] Not Met: [] Adjusted     Therapist goals for Patient:   Short Term Goals: To be achieved in: 2 -4weeks  1. Independent in HEP and progression per patient tolerance, in order to prevent re-injury. [x] Progressing: [] Met: [] Not Met: [] Adjusted   2. Patient will have a decrease in pain to facilitate improvement in movement, function, and ADLs as indicated by Functional Deficits. [x] Progressing: [] Met: [] Not Met: [] Adjusted     Long Term Goals: To be achieved in: 12 weeks  1. Foto 67/100  to assist with reaching prior level of function. [] Progressing: [] Met: [] Not Met: [] Adjusted  2. Patient will demonstrate increased AROM to = R to allow for proper joint functioning as indicated by patients Functional Deficits. [x] Progressing: [] Met: [] Not Met: [] Adjusted  3. Patient will demonstrate an increase in strength to good scapular and core control  for UE to allow for proper functional mobility as indicated by patients Functional Deficits. [] Progressing: [] Met: [] Not Met: [] Adjusted  4. Patient will return to  functional activities reach over head, reach behind back, don doff jacket  without increased symptoms or restriction. [] Progressing: [] Met: [] Not Met: [] Adjusted  Overall Progression Towards Functional goals/ Treatment Progress Update:  [] Patient is progressing as expected towards functional goals listed. [x] Progression is slowed due to complexities/Impairments listed. Trauma , complexity of injury   [] Progression has been slowed due to co-morbidities.   [] Plan just implemented, too soon to assess goals progression <30days   [] Goals require adjustment due to lack of progress  [] Patient is not progressing as expected and requires additional follow up with physician  [] Other    Prognosis for POC: [x] Good [] Fair  [] Poor      Patient requires continued skilled intervention: [x] Yes  [] No    Treatment/Activity Tolerance:  [x] Patient able to complete treatment  [] Patient limited by fatigue  [x] Patient limited by pain     [] Patient limited by other medical complications  [x] Other:   Will continue to address PROM, AAROM light PREs as able to progress to PLOF L UE                     Patient Education:    Reviewed diagnosis, POC, HEP and its importance. Access Code: [email or web address]  URL: [facility]/  Date: 9/6/22  Prepared by: Aidan Alexandre     Exercises  Standing Shoulder Shrugs - 2 x daily - 7 x weekly - 3 sets - 10 reps  Seated Scapular Retraction - 2 x daily - 7 x weekly - 3 sets - 10 reps  Standing Bicep Curls Supinated with Dumbbells - 2 x daily - 7 x weekly - 3 sets - 10 reps     Seated Shoulder Pendulum Exercise - 2 x daily - 7 x weekly - 3 sets - 10 reps  Table slides flexion - 2 x daily - 7 x weekly - 10 reps - 10 hold  Seated Shoulder External Rotation AAROM with Dowel - 2 x daily - 7 x weekly - 10 reps - 10 hold   PLAN: See eval  [x] Continue per plan of care [] Alter current plan (see comments above)  [] Plan of care initiated [] Hold pending MD visit [] Discharge      Electronically signed by:  Aidan Alexandre, PT    Note: If patient does not return for scheduled/ recommended follow up visits, this note will serve as a discharge from care along with most recent update on progress.

## 2022-10-20 ENCOUNTER — HOSPITAL ENCOUNTER (OUTPATIENT)
Dept: PHYSICAL THERAPY | Age: 72
Setting detail: THERAPIES SERIES
Discharge: HOME | End: 2022-10-20
Payer: MEDICARE

## 2022-10-20 PROCEDURE — 97110 THERAPEUTIC EXERCISES: CPT | Performed by: PHYSICAL THERAPIST

## 2022-10-20 PROCEDURE — 97140 MANUAL THERAPY 1/> REGIONS: CPT | Performed by: PHYSICAL THERAPIST

## 2022-10-20 NOTE — FLOWSHEET NOTE
[address],  [address]  Phone: [phone]   Fax:     [phone]    Physical Therapy Daily Treatment Note  Date:  10/20/2022    Patient Name:  Irvin Lewis    :  1950  MRN: [id number]  Restrictions/Precautions:    Medical/Treatment Diagnosis Information:  Diagnosis: L truamatic rotator cuff tear M75.122  Treatment Diagnosis: L shoulder [id number]  Insurance/Certification information:  PT Insurance Information: [facility] BMN  Physician Information:   Dr Keyla Ramirez  Has the plan of care been signed (Y/N):        [x]  Yes  []  No     Date of Patient follow up with Physician: per md      Is this a Progress Report:     []  Yes   [x]  No        If Yes:  Date Range for reporting period:  Beginning  Hikhxq69/6    Progress report will be due (10 Rx or 30 days whichever is less):       Recertification will be due (POC Duration  / 90 days whichever is less):          Visit # Insurance Allowable Auth Required   12post surgery   BMN []  Yes []  No        Functional Scale:  Foto38/100    Date assessed:       Latex Allergy:  [x]NO      []YES  Preferred Language for Healthcare:   [x]English       []other:    Pain level:  3/10 at rest 10/18    SUBJECTIVE:  10/20 same    OBJECTIVE: See eval   ROM PROM 10/6 AROM  Comment     L R L R     Flexion ~135   115 supine 10/6        scap ~150           ER [email or web address]           IR             Other  (cervical)             Other                L shoulder flexion abd MMT 2/5 10/6    RESTRICTIONS/PRECAUTIONS: RCR,  increase ROM as able per MD due to capsular release and LUIS    Exercises/Interventions:   Exercises:  Exercise/Equipment Resistance/Repetitions Other comments   Stretching/PROM     Wand ER at neutral [id number]    Supine @ 60abd 43w62zae      Supine cane flex 3x10       Start        Start10/6   Table Slides fl [id number]     Abd 48p84nop    ER bend forward 81w04frq       Start9/8      Start9/8   Wall slide 96w77hlj assist with R UE Start9/22   Wall step away for flexion 11c41jjm Start9/8   Pulley flex   X10    Scap x10 Start9/16   Supine grab L wrist with R hand move into flexion [id number]    blue ball roll into flexion on table  [id number]    Pendulum Assist with R UE CW CCW fl/ext side to side [id number]     Elbow AROM  x30    IR behind back  44c54rkm Start9/12   Supine butterfly 2 min Start10/4   Isometrics     Retraction x30    shrug x30    Weight shift     Flexion     Abduction     External Rotation     Internal Rotation     Biceps     Triceps          PRE's     Flexion     Abduction     External Rotation SL x15 no towel Start10/4   Internal Rotation     Shrugs     EXT EOB 2x10 Start10/6   Reverse Flys     Serratus 2x10 needs verbal and tactile cueing  Start10/6   Horizontal Abd with ER     Biceps     Triceps     Retraction EOB 2x10 Start10/6        Cable Column/Theraband     External Rotation     Internal Rotation     Shrugs     Lats     Ext     Flex     Scapular Retraction     BIC     TRIC     PNF          Dynamic Stability          Plyoback          Manual interventions     PROM Fl scap ER/IR 15 min  Start9/7              Therapeutic Exercise and NMR EXR  [x] (33027) Provided verbal/tactile cueing for activities related to strengthening, flexibility, endurance, ROM  for improvements in scapular, scapulothoracic and UE control with self care, reaching, carrying, lifting, house/yardwork, driving/computer work.    [] (83055) Provided verbal/tactile cueing for activities related to improving balance, coordination, kinesthetic sense, posture, motor skill, proprioception  to assist with  scapular, scapulothoracic and UE control with self care, reaching, carrying, lifting, house/yardwork, driving/computer work.     Therapeutic Activities:    [] (40931 or 89435) Provided verbal/tactile cueing for activities related to improving balance, coordination, kinesthetic sense, posture, motor skill, proprioception and motor activation to allow for proper function of scapular, scapulothoracic and UE control with self care, carrying, lifting, driving/computer work.      Home Exercise Program:    [x] (32660) Reviewed/Progressed HEP activities related to strengthening, flexibility, endurance, ROM of scapular, scapulothoracic and UE control with self care, reaching, carrying, lifting, house/yardwork, driving/computer work  [] (54661) Reviewed/Progressed HEP activities related to improving balance, coordination, kinesthetic sense, posture, motor skill, proprioception of scapular, scapulothoracic and UE control with self care, reaching, carrying, lifting, house/yardwork, driving/computer work      Manual Treatments:  PROM / STM / Oscillations-Mobs:  G-I, II, III, IV (PA's, Inf., Post.)  [x] (01550) Provided manual therapy to mobilize soft tissue/joints of cervical/CT, scapular GHJ and UE for the purpose of modulating pain, promoting relaxation,  increasing ROM, reducing/eliminating soft tissue swelling/inflammation/restriction, improving soft tissue extensibility and allowing for proper ROM for normal function with self care, reaching, carrying, lifting, house/yardwork, driving/computer work    Modalities:  declined ice but recommended ice 15-20 min on several times per day to help with pain and inflammation     Charges:  Timed Code Treatment Minutes: 40   Total Treatment Minutes: 235-330       [] EVAL (LOW) 83063 (typically 20 minutes face-to-face)  [] EVAL (MOD) 59061 (typically 30 minutes face-to-face)  [] EVAL (HIGH) 46877 (typically 45 minutes face-to-face)  [] RE-EVAL     [x] WZ(58177) x  2  [] IONTO  [] NMR (44402) x     [] VASO  [x] Manual (83901) x  1    [] Other:  [] TA x      [] Mech Traction (17342)  [] ES(attended) (78318)      [] ES (un) (08676):     GOALS:  Patient stated goal: increase ROM decrease pain, use arm for adls    [] Progressing: [] Met: [] Not Met: [] Adjusted Therapist goals for Patient:   Short Term Goals: To be achieved in: 2 -4weeks  1. Independent in HEP and progression per patient tolerance, in order to prevent re-injury. [x] Progressing: [] Met: [] Not Met: [] Adjusted   2. Patient will have a decrease in pain to facilitate improvement in movement, function, and ADLs as indicated by Functional Deficits. [x] Progressing: [] Met: [] Not Met: [] Adjusted     Long Term Goals: To be achieved in: 12 weeks  1. Foto 67/100  to assist with reaching prior level of function. [] Progressing: [] Met: [] Not Met: [] Adjusted  2. Patient will demonstrate increased AROM to = R to allow for proper joint functioning as indicated by patients Functional Deficits. [x] Progressing: [] Met: [] Not Met: [] Adjusted  3. Patient will demonstrate an increase in strength to good scapular and core control  for UE to allow for proper functional mobility as indicated by patients Functional Deficits. [] Progressing: [] Met: [] Not Met: [] Adjusted  4. Patient will return to  functional activities reach over head, reach behind back, don doff jacket  without increased symptoms or restriction. [] Progressing: [] Met: [] Not Met: [] Adjusted  Overall Progression Towards Functional goals/ Treatment Progress Update:  [] Patient is progressing as expected towards functional goals listed. [x] Progression is slowed due to complexities/Impairments listed. Trauma , complexity of injury   [] Progression has been slowed due to co-morbidities.   [] Plan just implemented, too soon to assess goals progression <30days   [] Goals require adjustment due to lack of progress  [] Patient is not progressing as expected and requires additional follow up with physician  [] Other    Prognosis for POC: [x] Good [] Fair  [] Poor      Patient requires continued skilled intervention: [x] Yes  [] No    Treatment/Activity Tolerance:  [x] Patient able to complete treatment  [] Patient limited by fatigue  [x] Patient limited by pain     [] Patient limited by other medical complications  [x] Other:   Will continue to address PROM, AAROM light PREs as able to progress to PLOF L UE                     Patient Education:    Reviewed diagnosis, POC, HEP and its importance. Access Code: [email or web address]  URL: [email or web address]/  Date: 9/6/22  Prepared by: Jaylin Veliz     Exercises  Standing Shoulder Shrugs - 2 x daily - 7 x weekly - 3 sets - 10 reps  Seated Scapular Retraction - 2 x daily - 7 x weekly - 3 sets - 10 reps  Standing Bicep Curls Supinated with Dumbbells - 2 x daily - 7 x weekly - 3 sets - 10 reps     Seated Shoulder Pendulum Exercise - 2 x daily - 7 x weekly - 3 sets - 10 reps  Table slides flexion - 2 x daily - 7 x weekly - 10 reps - 10 hold  Seated Shoulder External Rotation AAROM with Dowel - 2 x daily - 7 x weekly - 10 reps - 10 hold   PLAN: See eval  [x] Continue per plan of care [] Alter current plan (see comments above)  [] Plan of care initiated [] Hold pending MD visit [] Discharge      Electronically signed by:  Jaylin Veliz, PT    Note: If patient does not return for scheduled/ recommended follow up visits, this note will serve as a discharge from care along with most recent update on progress.

## 2022-10-25 ENCOUNTER — HOSPITAL ENCOUNTER (OUTPATIENT)
Dept: PHYSICAL THERAPY | Age: 72
Setting detail: THERAPIES SERIES
Discharge: HOME | End: 2022-10-25
Payer: MEDICARE

## 2022-10-25 PROCEDURE — 97112 NEUROMUSCULAR REEDUCATION: CPT | Performed by: PHYSICAL THERAPIST

## 2022-10-25 PROCEDURE — 97110 THERAPEUTIC EXERCISES: CPT | Performed by: PHYSICAL THERAPIST

## 2022-10-25 NOTE — FLOWSHEET NOTE
[address],  [address]  Phone: [phone]   Fax:     [phone]    Physical Therapy Daily Treatment Note  Date:  10/25/2022    Patient Name:  Irvin Lewis    :  1950  MRN: [id number]  Restrictions/Precautions:    Medical/Treatment Diagnosis Information:  Diagnosis: L truamatic rotator cuff tear M75.122  Treatment Diagnosis: L shoulder [id number]  Insurance/Certification information:  PT Insurance Information: [facility] BMN  Physician Information:   Dr Keyla Ramirez  Has the plan of care been signed (Y/N):        [x]  Yes  []  No     Date of Patient follow up with Physician: per md      Is this a Progress Report:     []  Yes   [x]  No        If Yes:  Date Range for reporting period:  Beginning  Qwdcql23/6    Progress report will be due (10 Rx or 30 days whichever is less):       Recertification will be due (POC Duration  / 90 days whichever is less):          Visit # Insurance Allowable Auth Required   13post surgery   BMN []  Yes []  No        Functional Scale: Foto38/100    Date assessed:       Latex Allergy:  [x]NO      []YES  Preferred Language for Healthcare:   [x]English       []other:    Pain level:  3/10 at rest 10/18    SUBJECTIVE:  10/25 same.  Can put hand up higher on steering wheel     OBJECTIVE: See eval   ROM PROM 10/6 AROM  Comment     L R L R     Flexion ~135   115 supine 10/6        scap ~150           ER [email or web address]           IR             Other  (cervical)             Other                L shoulder flexion abd MMT 2/5 10/6    RESTRICTIONS/PRECAUTIONS: RCR,  increase ROM as able per MD due to capsular release and LUIS    Exercises/Interventions:   Exercises:  Exercise/Equipment Resistance/Repetitions Other comments   Stretching/PROM     Wand ER at neutral [id number]    Supine @ 60abd 95w75hwv      Supine cane flex 3x10       Start        Start10/6   Table Slides fl [id number]     Abd 30r11iwx    ER bend forward 39b48ugs       Start9/8      Start9/8   Wall slide 67o80xhs assist with R UE Start9/22   Wall step away for flexion 92b86gtr Start9/8   Pulley flex   X10    Scap x10 Start9/16   Supine grab L wrist with R hand move into flexion 79a25qkw    blue ball roll into flexion on table  65a93lwg    Pendulum Assist with R UE CW CCW fl/ext side to side [id number]     Elbow AROM  x30    IR behind back  98o11fzw Start9/12   Supine butterfly 2 min Start10/4   Isometrics     Retraction x30    shrug x30    Weight shift     Flexion     Abduction     External Rotation     Internal Rotation     Biceps     Triceps          PRE's     Flexion     Abduction     External Rotation SL3x10 0/1 no towel Start10/4   Internal Rotation     Shrugs     EXT EOB 3x10 2# ^10/25   Reverse Flys     Serratus 2x10 needs verbal and tactile cueing  Start10/6   Horizontal Abd with ER     Biceps     Triceps     Retraction EOB 3x10 2# ^10/25        Cable Column/Theraband     External Rotation     Internal Rotation     Shrugs     Lats     Ext     Flex     Scapular Retraction     BIC     TRIC     PNF          Dynamic Stability          Plyoback          Manual interventions     PROM Fl scap ER/IR 15 min  Start9/7              Therapeutic Exercise and NMR EXR  [x] (09031) Provided verbal/tactile cueing for activities related to strengthening, flexibility, endurance, ROM  for improvements in scapular, scapulothoracic and UE control with self care, reaching, carrying, lifting, house/yardwork, driving/computer work.    [] (19630) Provided verbal/tactile cueing for activities related to improving balance, coordination, kinesthetic sense, posture, motor skill, proprioception  to assist with  scapular, scapulothoracic and UE control with self care, reaching, carrying, lifting, house/yardwork, driving/computer work.     Therapeutic Activities:    [] (11496 or [address]) Provided verbal/tactile cueing for activities related to improving balance, coordination, kinesthetic sense, posture, motor skill, proprioception and motor activation to allow for proper function of scapular, scapulothoracic and UE control with self care, carrying, lifting, driving/computer work.      Home Exercise Program:    [x] (51374) Reviewed/Progressed HEP activities related to strengthening, flexibility, endurance, ROM of scapular, scapulothoracic and UE control with self care, reaching, carrying, lifting, house/yardwork, driving/computer work  [] (79767) Reviewed/Progressed HEP activities related to improving balance, coordination, kinesthetic sense, posture, motor skill, proprioception of scapular, scapulothoracic and UE control with self care, reaching, carrying, lifting, house/yardwork, driving/computer work      Manual Treatments:  PROM / STM / Oscillations-Mobs:  G-I, II, III, IV (PA's, Inf., Post.)  [x] (29784) Provided manual therapy to mobilize soft tissue/joints of cervical/CT, scapular GHJ and UE for the purpose of modulating pain, promoting relaxation,  increasing ROM, reducing/eliminating soft tissue swelling/inflammation/restriction, improving soft tissue extensibility and allowing for proper ROM for normal function with self care, reaching, carrying, lifting, house/yardwork, driving/computer work    Modalities:  declined ice but recommended ice 15-20 min on several times per day to help with pain and inflammation     Charges:  Timed Code Treatment Minutes: 40   Total Treatment Minutes: 235-335       [] EVAL (LOW) 59554 (typically 20 minutes face-to-face)  [] EVAL (MOD) 41092 (typically 30 minutes face-to-face)  [] EVAL (HIGH) 17357 (typically 45 minutes face-to-face)  [] RE-EVAL     [x] XN(84722) x  2  [] IONTO  [] NMR (92796) x     [] VASO  [x] Manual (16830) x  1    [] Other:  [] TA x      [] Mech Traction (04977)  [] ES(attended) (80414)      [] ES (un) (08299):     GOALS:  Patient stated goal: increase ROM decrease pain, use arm for adls    [] Progressing: [] Met: [] Not Met: [] Adjusted     Therapist goals for Patient:   Short Term Goals: To be achieved in: 2 -4weeks  1. Independent in HEP and progression per patient tolerance, in order to prevent re-injury. [x] Progressing: [] Met: [] Not Met: [] Adjusted   2. Patient will have a decrease in pain to facilitate improvement in movement, function, and ADLs as indicated by Functional Deficits. [x] Progressing: [] Met: [] Not Met: [] Adjusted     Long Term Goals: To be achieved in: 12 weeks  1. Foto 67/100  to assist with reaching prior level of function. [] Progressing: [] Met: [] Not Met: [] Adjusted  2. Patient will demonstrate increased AROM to = R to allow for proper joint functioning as indicated by patients Functional Deficits. [x] Progressing: [] Met: [] Not Met: [] Adjusted  3. Patient will demonstrate an increase in strength to good scapular and core control  for UE to allow for proper functional mobility as indicated by patients Functional Deficits. [] Progressing: [] Met: [] Not Met: [] Adjusted  4. Patient will return to  functional activities reach over head, reach behind back, don doff jacket  without increased symptoms or restriction. [] Progressing: [] Met: [] Not Met: [] Adjusted  Overall Progression Towards Functional goals/ Treatment Progress Update:  [] Patient is progressing as expected towards functional goals listed. [x] Progression is slowed due to complexities/Impairments listed. Trauma , complexity of injury   [] Progression has been slowed due to co-morbidities.   [] Plan just implemented, too soon to assess goals progression <30days   [] Goals require adjustment due to lack of progress  [] Patient is not progressing as expected and requires additional follow up with physician  [] Other    Prognosis for POC: [x] Good [] Fair  [] Poor      Patient requires continued skilled intervention: [x] Yes  [] No    Treatment/Activity Tolerance:  [x] Patient able to complete treatment  [] Patient limited by fatigue  [x] Patient limited by pain     [] Patient limited by other medical complications  [x] Other:   Will continue to address PROM, AAROM light PREs as able to progress to PLOF L UE                     Patient Education:    Reviewed diagnosis, POC, HEP and its importance. Access Code: [email or web address]  URL: [employer]/  Date: 9/6/22  Prepared by: Josemanuel Trujillo     Exercises  Standing Shoulder Shrugs - 2 x daily - 7 x weekly - 3 sets - 10 reps  Seated Scapular Retraction - 2 x daily - 7 x weekly - 3 sets - 10 reps  Standing Bicep Curls Supinated with Dumbbells - 2 x daily - 7 x weekly - 3 sets - 10 reps     Seated Shoulder Pendulum Exercise - 2 x daily - 7 x weekly - 3 sets - 10 reps  Table slides flexion - 2 x daily - 7 x weekly - 10 reps - 10 hold  Seated Shoulder External Rotation AAROM with Dowel - 2 x daily - 7 x weekly - 10 reps - 10 hold   PLAN: See eval  [x] Continue per plan of care [] Alter current plan (see comments above)  [] Plan of care initiated [] Hold pending MD visit [] Discharge      Electronically signed by:  Josemanuel Trujillo, PT    Note: If patient does not return for scheduled/ recommended follow up visits, this note will serve as a discharge from care along with most recent update on progress.

## 2022-10-27 ENCOUNTER — HOSPITAL ENCOUNTER (OUTPATIENT)
Dept: PHYSICAL THERAPY | Age: 72
Setting detail: THERAPIES SERIES
Discharge: HOME | End: 2022-10-27
Payer: MEDICARE

## 2022-10-27 PROCEDURE — 97110 THERAPEUTIC EXERCISES: CPT | Performed by: PHYSICAL THERAPIST

## 2022-10-27 PROCEDURE — 97140 MANUAL THERAPY 1/> REGIONS: CPT | Performed by: PHYSICAL THERAPIST

## 2022-10-27 NOTE — FLOWSHEET NOTE
[address], [address] [place] [address], [address] [place] [address]  Phone: [phone]   Fax:     [phone]    Physical Therapy Daily Treatment Note  Date:  10/27/2022    Patient Name:  Justo Rea    :  1950  MRN: [id number]  Restrictions/Precautions:    Medical/Treatment Diagnosis Information:  Diagnosis: L truamatic rotator cuff tear M75.122  Treatment Diagnosis: L shoulder painM25. 512  DOS 2022  L shoulder RC augmentation labral debridement SAD DCE capsular release LUIS      Insurance/Certification information:  PT Insurance Information: [facility] BMN  Physician Information:   Dr Von Bhagat  Has the plan of care been signed (Y/N):        [x]  Yes  []  No     Date of Patient follow up with Physician: per md      Is this a Progress Report:     []  Yes   [x]  No        If Yes:  Date Range for reporting period:  Beginning  Okqckf82/6    Progress report will be due (10 Rx or 30 days whichever is less): /3      Recertification will be due (POC Duration  / 90 days whichever is less):          Visit # Insurance Allowable Auth Required   14post surgery   BMN []  Yes []  No        Functional Scale:  Foto38/100    Date assessed:       Latex Allergy:  [x]NO      []YES  Preferred Language for Healthcare:   [x]English       []other:    Pain level:  3/10 at rest 10/18    SUBJECTIVE:  10/27 no new c/o    OBJECTIVE: See eval   ROM PROM 10/6 AROM  Comment     L R L R     Flexion ~135   115 supine 10/6        scap ~150           ER [email or web address]           IR             Other  (cervical)             Other                L shoulder flexion abd MMT 2/5 10/6    RESTRICTIONS/PRECAUTIONS: RCR,  increase ROM as able per MD due to capsular release and LUIS    Exercises/Interventions:   Exercises:  Exercise/Equipment Resistance/Repetitions Other comments   Stretching/PROM     Wand ER at neutral 07l18qqm    Supine @ 60abd 22o49fkx      Supine cane flex 3x10       Start9/22        Start10/6   Table Slides fl [id number]     Abd 64o45wao    ER bend forward 54m90bna       Start9/8      Start9/8   Wall slide [id number] assist with R UE Start9/22   Wall step away for flexion 09r21oga Start9/8   Pulley flex   X10    Scap x10 Start9/16   Supine grab L wrist with R hand move into flexion 62v10fko    blue ball roll into flexion on table  65s32hiy    Pendulum Assist with R UE CW CCW fl/ext side to side [id number]     Elbow AROM  x30    IR behind back  81p31bsx Start9/12   Supine butterfly 2 min Start10/4   Isometrics     Retraction x30    shrug x30    Weight shift     Flexion     Abduction     External Rotation     Internal Rotation     Biceps     Triceps          PRE's     Flexion     Abduction     External Rotation SL3x10 0/1 no towel Start10/4   Internal Rotation     Shrugs     EXT EOB 3x10 2# ^10/25   Reverse Flys     Serratus 2x10 needs verbal and tactile cueing  Start10/6   Horizontal Abd with ER     Biceps 3x10 4# Start 10/27   Triceps     Retraction EOB 3x10 2# ^10/25        Cable Column/Theraband     External Rotation     Internal Rotation     Shrugs     Lats     Ext     Flex     Scapular Retraction     BIC     TRIC 3x10 green Start10/27   PNF          Dynamic Stability          Plyoback          Manual interventions     PROM Fl scap ER/IR 15 min  Start9/7              Therapeutic Exercise and NMR EXR  [x] (14297) Provided verbal/tactile cueing for activities related to strengthening, flexibility, endurance, ROM  for improvements in scapular, scapulothoracic and UE control with self care, reaching, carrying, lifting, house/yardwork, driving/computer work.    [] (65087) Provided verbal/tactile cueing for activities related to improving balance, coordination, kinesthetic sense, posture, motor skill, proprioception  to assist with  scapular, scapulothoracic and UE control with self care, reaching, carrying, lifting, house/yardwork, driving/computer work.     Therapeutic Activities:    [] (15564 or 56578) Provided verbal/tactile cueing for activities related to improving balance, coordination, kinesthetic sense, posture, motor skill, proprioception and motor activation to allow for proper function of scapular, scapulothoracic and UE control with self care, carrying, lifting, driving/computer work.      Home Exercise Program:    [x] (19354) Reviewed/Progressed HEP activities related to strengthening, flexibility, endurance, ROM of scapular, scapulothoracic and UE control with self care, reaching, carrying, lifting, house/yardwork, driving/computer work  [] (96909) Reviewed/Progressed HEP activities related to improving balance, coordination, kinesthetic sense, posture, motor skill, proprioception of scapular, scapulothoracic and UE control with self care, reaching, carrying, lifting, house/yardwork, driving/computer work      Manual Treatments:  PROM / STM / Oscillations-Mobs:  G-I, II, III, IV (PA's, Inf., Post.)  [x] (91703) Provided manual therapy to mobilize soft tissue/joints of cervical/CT, scapular GHJ and UE for the purpose of modulating pain, promoting relaxation,  increasing ROM, reducing/eliminating soft tissue swelling/inflammation/restriction, improving soft tissue extensibility and allowing for proper ROM for normal function with self care, reaching, carrying, lifting, house/yardwork, driving/computer work    Modalities:  declined ice but recommended ice 15-20 min on several times per day to help with pain and inflammation     Charges:  Timed Code Treatment Minutes: 40   Total Treatment Minutes: 235-335       [] EVAL (LOW) 70591 (typically 20 minutes face-to-face)  [] EVAL (MOD) 41109 (typically 30 minutes face-to-face)  [] EVAL (HIGH) 86078 (typically 45 minutes face-to-face)  [] RE-EVAL     [x] BK(22584) x  2  [] IONTO  [] NMR (12521) x     [] VASO  [x] Manual (82542) x  1    [] Other:  [] TA x      [] Mech Traction (18875)  [] ES(attended) (35085)      [] ES (un) skilled intervention: [x] Yes  [] No    Treatment/Activity Tolerance:  [x] Patient able to complete treatment  [] Patient limited by fatigue  [x] Patient limited by pain     [] Patient limited by other medical complications  [x] Other:   Will continue to address PROM, AAROM light PREs as able to progress to PLOF L UE                     Patient Education:    Reviewed diagnosis, POC, HEP and its importance. Access Code: [email or web address]  URL: [email or web address]  Date: 9/6/22  Prepared by: Alejandro Keating     Exercises  Standing Shoulder Shrugs - 2 x daily - 7 x weekly - 3 sets - 10 reps  Seated Scapular Retraction - 2 x daily - 7 x weekly - 3 sets - 10 reps  Standing Bicep Curls Supinated with Dumbbells - 2 x daily - 7 x weekly - 3 sets - 10 reps     Seated Shoulder Pendulum Exercise - 2 x daily - 7 x weekly - 3 sets - 10 reps  Table slides flexion - 2 x daily - 7 x weekly - 10 reps - 10 hold  Seated Shoulder External Rotation AAROM with Dowel - 2 x daily - 7 x weekly - 10 reps - 10 hold   PLAN: See eval  [x] Continue per plan of care [] Alter current plan (see comments above)  [] Plan of care initiated [] Hold pending MD visit [] Discharge      Electronically signed by:  Alejandro Keating, PT    Note: If patient does not return for scheduled/ recommended follow up visits, this note will serve as a discharge from care along with most recent update on progress.

## 2022-11-01 ENCOUNTER — HOSPITAL ENCOUNTER (OUTPATIENT)
Dept: PHYSICAL THERAPY | Age: 72
Setting detail: THERAPIES SERIES
Discharge: HOME | End: 2022-11-01
Payer: MEDICARE

## 2022-11-01 PROCEDURE — 97110 THERAPEUTIC EXERCISES: CPT | Performed by: PHYSICAL THERAPIST

## 2022-11-01 PROCEDURE — 97140 MANUAL THERAPY 1/> REGIONS: CPT | Performed by: PHYSICAL THERAPIST

## 2022-11-01 NOTE — FLOWSHEET NOTE
The [facility] and [address] [facility], [place], [address] [place] [address]  Phone: [phone]   Fax:     [phone]    Physical Therapy Daily Treatment Note  Date:  2022    Patient Name:  Yeimy Payan    :  1950  MRN: [id number]  Restrictions/Precautions:    Medical/Treatment Diagnosis Information:  Diagnosis: L truamatic rotator cuff tear M75.122  Treatment Diagnosis: L shoulder painM25. 512  DOS 2022  L shoulder RC augmentation labral debridement SAD DCE capsular release LUIS      Insurance/Certification information:  PT Insurance Information: [facility] BMN  Physician Information:   Dr Eulalio Meyers  Has the plan of care been signed (Y/N):        [x]  Yes  []  No     Date of Patient follow up with Physician: per md      Is this a Progress Report:     []  Yes   [x]  No        If Yes:  Date Range for reporting period:  Beginning  Ignttk74/6    Progress report will be due (10 Rx or 30 days whichever is less):       Recertification will be due (POC Duration  / 90 days whichever is less):          Visit # Insurance Allowable Auth Required   15post surgery   BMN []  Yes []  No        Functional Scale:  Foto38/100    Date assessed:       Latex Allergy:  [x]NO      []YES  Preferred Language for Healthcare:   [x]English       []other:    Pain level:  3/10 at rest 10/18    SUBJECTIVE:   decreased ROM and pain L deltoid     OBJECTIVE: See eval   ROM PROM 10/6 AROM  Comment     L R L R     Flexion ~135   115 supine 10/6        scap ~150           ER [email or web address]           IR             Other  (cervical)             Other                L shoulder flexion abd MMT 2/5 10/6    RESTRICTIONS/PRECAUTIONS: RCR,  increase ROM as able per MD due to capsular release and LUIS    Exercises/Interventions:   Exercises:  Exercise/Equipment Resistance/Repetitions Other comments   Stretching/PROM     Wand ER at neutral 28a72rad    Supine @ 60abd 48l37odr      Supine cane flex 3x10       Start9/22        Start10/6   Table Slides fl 25n35pze     Abd 14z06tam    ER bend forward 08z25rey       Start9/8      Start9/8   Wall slide 92l17yfs assist with R UE Start9/22   Wall step away for flexion 84t03jnh Start9/8   Pulley flex   X10    Scap x10 Start9/16   Supine grab L wrist with R hand move into flexion 54s71zbk    blue ball roll into flexion on table  01i89qsf    Pendulum Assist with R UE CW CCW fl/ext side to side [id number]     Elbow AROM  x30    IR behind back  73y64bav Start9/12   Supine butterfly 2 min Start10/4   Isometrics     Retraction x30    shrug x30    Weight shift     Flexion     Abduction     External Rotation     Internal Rotation     Biceps     Triceps          PRE's     Flexion     Abduction     External Rotation SL3x10 0/1 no towel Start10/4   Internal Rotation     Shrugs     EXT EOB 3x10 2# ^10/25   Reverse Flys     Serratus 2x10 needs verbal and tactile cueing  Start10/6   Horizontal Abd with ER     Biceps 3x10 4# Start 10/27   Triceps     Retraction EOB 3x10 2# ^10/25        Cable Column/Theraband     External Rotation     Internal Rotation     Shrugs     Lats     Ext     Flex     Scapular Retraction 3x10 green Start11/1   BIC     TRIC 3x10 green Start10/27   PNF          Dynamic Stability          Plyoback          Manual interventions     PROM Fl scap ER/IR; IASTM sweeping of lateral deltoid 20 min  ^11/1              Therapeutic Exercise and NMR EXR  [x] (37397) Provided verbal/tactile cueing for activities related to strengthening, flexibility, endurance, ROM  for improvements in scapular, scapulothoracic and UE control with self care, reaching, carrying, lifting, house/yardwork, driving/computer work.    [] (66432) Provided verbal/tactile cueing for activities related to improving balance, coordination, kinesthetic sense, posture, motor skill, proprioception  to assist with  scapular, scapulothoracic and UE control with self care, reaching, carrying, lifting, house/yardwork, driving/computer work. Therapeutic Activities:    [] (06272 or 97575) Provided verbal/tactile cueing for activities related to improving balance, coordination, kinesthetic sense, posture, motor skill, proprioception and motor activation to allow for proper function of scapular, scapulothoracic and UE control with self care, carrying, lifting, driving/computer work.      Home Exercise Program:    [x] (28784) Reviewed/Progressed HEP activities related to strengthening, flexibility, endurance, ROM of scapular, scapulothoracic and UE control with self care, reaching, carrying, lifting, house/yardwork, driving/computer work  [] (64696) Reviewed/Progressed HEP activities related to improving balance, coordination, kinesthetic sense, posture, motor skill, proprioception of scapular, scapulothoracic and UE control with self care, reaching, carrying, lifting, house/yardwork, driving/computer work      Manual Treatments:  PROM / STM / Oscillations-Mobs:  G-I, II, III, IV (PA's, Inf., Post.)  [x] (77460) Provided manual therapy to mobilize soft tissue/joints of cervical/CT, scapular GHJ and UE for the purpose of modulating pain, promoting relaxation,  increasing ROM, reducing/eliminating soft tissue swelling/inflammation/restriction, improving soft tissue extensibility and allowing for proper ROM for normal function with self care, reaching, carrying, lifting, house/yardwork, driving/computer work    Modalities:  declined ice but recommended ice 15-20 min on several times per day to help with pain and inflammation     Charges:  Timed Code Treatment Minutes: 40   Total Treatment Minutes: 245-345       [] EVAL (LOW) 96669 (typically 20 minutes face-to-face)  [] EVAL (MOD) 15329 (typically 30 minutes face-to-face)  [] EVAL (HIGH) 36147 (typically 45 minutes face-to-face)  [] RE-EVAL     [x] AJ(40191) x  2  [] IONTO  [] NMR (96795) x     [] VASO  [x] Manual (15363) x  1    [] Other:  [] TA x      [] [facility] Traction (85548)  [] ES(attended) (47286)      [] ES (un) (52848):     GOALS:  Patient stated goal: increase ROM decrease pain, use arm for adls    [] Progressing: [] Met: [] Not Met: [] Adjusted     Therapist goals for Patient:   Short Term Goals: To be achieved in: 2 -4weeks  1. Independent in HEP and progression per patient tolerance, in order to prevent re-injury. [x] Progressing: [] Met: [] Not Met: [] Adjusted   2. Patient will have a decrease in pain to facilitate improvement in movement, function, and ADLs as indicated by Functional Deficits. [x] Progressing: [] Met: [] Not Met: [] Adjusted     Long Term Goals: To be achieved in: 12 weeks  1. Foto 67/100  to assist with reaching prior level of function. [] Progressing: [] Met: [] Not Met: [] Adjusted  2. Patient will demonstrate increased AROM to = R to allow for proper joint functioning as indicated by patients Functional Deficits. [x] Progressing: [] Met: [] Not Met: [] Adjusted  3. Patient will demonstrate an increase in strength to good scapular and core control  for UE to allow for proper functional mobility as indicated by patients Functional Deficits. [] Progressing: [] Met: [] Not Met: [] Adjusted  4. Patient will return to  functional activities reach over head, reach behind back, don doff jacket  without increased symptoms or restriction. [] Progressing: [] Met: [] Not Met: [] Adjusted  Overall Progression Towards Functional goals/ Treatment Progress Update:  [] Patient is progressing as expected towards functional goals listed. [x] Progression is slowed due to complexities/Impairments listed. Trauma , complexity of injury   [] Progression has been slowed due to co-morbidities.   [] Plan just implemented, too soon to assess goals progression <30days   [] Goals require adjustment due to lack of progress  [] Patient is not progressing as expected and requires additional follow up with physician  [] Other    Prognosis for POC: [x] Good [] Fair  [] Poor      Patient requires continued skilled intervention: [x] Yes  [] No    Treatment/Activity Tolerance:  [x] Patient able to complete treatment  [] Patient limited by fatigue  [x] Patient limited by pain     [] Patient limited by other medical complications  [x] Other:   Will continue to address PROM, AAROM light PREs as able to progress to PLOF L UE. Trial of iastm to note if lateral deltoid pain improves                     Patient Education:    Reviewed diagnosis, POC, HEP and its importance. Access Code: [email or web address]  URL: [email or web address]  Date: 9/6/22  Prepared by: Jono Storm     Exercises  Standing Shoulder Shrugs - 2 x daily - 7 x weekly - 3 sets - 10 reps  Seated Scapular Retraction - 2 x daily - 7 x weekly - 3 sets - 10 reps  Standing Bicep Curls Supinated with Dumbbells - 2 x daily - 7 x weekly - 3 sets - 10 reps     Seated Shoulder Pendulum Exercise - 2 x daily - 7 x weekly - 3 sets - 10 reps  Table slides flexion - 2 x daily - 7 x weekly - 10 reps - 10 hold  Seated Shoulder External Rotation AAROM with Dowel - 2 x daily - 7 x weekly - 10 reps - 10 hold   PLAN: See eval  [x] Continue per plan of care [] Alter current plan (see comments above)  [] Plan of care initiated [] Hold pending MD visit [] Discharge      Electronically signed by:  Jono Storm, PT    Note: If patient does not return for scheduled/ recommended follow up visits, this note will serve as a discharge from care along with most recent update on progress.

## 2022-11-03 ENCOUNTER — APPOINTMENT (OUTPATIENT)
Dept: PHYSICAL THERAPY | Age: 72
End: 2022-11-03
Payer: MEDICARE

## 2022-11-08 ENCOUNTER — HOSPITAL ENCOUNTER (OUTPATIENT)
Dept: PHYSICAL THERAPY | Age: 72
Setting detail: THERAPIES SERIES
Discharge: HOME | End: 2022-11-08
Payer: MEDICARE

## 2022-11-08 PROCEDURE — 97140 MANUAL THERAPY 1/> REGIONS: CPT | Performed by: PHYSICAL THERAPIST

## 2022-11-08 PROCEDURE — 97110 THERAPEUTIC EXERCISES: CPT | Performed by: PHYSICAL THERAPIST

## 2022-11-08 NOTE — FLOWSHEET NOTE
The [facility], [address] [place] [address], [address] [place] [address]  Phone: [phone]   Fax:     [phone]    Physical Therapy Daily Treatment Note  Date:  2022    Patient Name:  Andreina Preston    :  1950  MRN: [id number]  Restrictions/Precautions:    Medical/Treatment Diagnosis Information:  Diagnosis: L truamatic rotator cuff tear M75.122  Treatment Diagnosis: L shoulder painM25. 512  DOS 2022  L shoulder RC augmentation labral debridement SAD DCE capsular release LUIS      Insurance/Certification information:  PT Insurance Information: [facility] BMN  Physician Information:   Dr Delma Hook  Has the plan of care been signed (Y/N):        [x]  Yes  []  No     Date of Patient follow up with Physician: per md      Is this a Progress Report:     [x]  Yes   []  No        If Yes:  Date Range for reporting period:  Zmpwgcjbk20/6  Vvzafw85/8    Progress report will be due (10 Rx or 30 days whichever is less):  with POC      Recertification will be due (POC Duration  / 90 days whichever is less):          Visit # Insurance Allowable Auth Required   15post surgery   BMN []  Yes []  No        Functional Scale: Foto38/100 38/100 54/100    Date assessed:  ,10/6 11/8     Latex Allergy:  [x]NO      []YES  Preferred Language for Healthcare:   [x]English       []other:    Pain level:  1-3/10 at rest     SUBJECTIVE:   shoulder is not painful  it is the outer humeral area that hurts when I use it.   See md this week     OBJECTIVE: See eval   ROM PROM  AROM  Comment     L R L R     Flexion ~160   ~90 stand     140 supine        scap ~160    ~90 stand       ER [email or web address]           IR             Other  (cervical)             Other                L shoulder flexion abd MMT 2+/5     RESTRICTIONS/PRECAUTIONS: RCR,  increase ROM as able per MD due to capsular release and LUIS    Exercises/Interventions: Exercises:  Exercise/Equipment Resistance/Repetitions Other comments   Stretching/PROM     Wand ER at neutral 81f16bvl    Supine @ 60abd 55u56pfj      Supine cane flex 3x10       Start9/22        Start10/6   Table Slides fl 52c82hlb     Abd 97a38gys    ER bend forward 99q08waw       Start9/8      Start9/8   Wall slide [id number]  Start9/22   Wall step away for flexion 49h83tmf Start9/8   Pulley flex   X10    Scap x10 Start9/16   Supine grab L wrist with R hand move into flexion 91s21cqw    blue ball roll into flexion on table  64o22nne    Pendulum AROM CW CCW fl/ext side to side [id number]     Elbow AROM  x30    IR behind back  81y67ulg Start9/12   Supine butterfly 2 min Start10/4   Isometrics     Retraction x30    shrug x30    Weight shift     Flexion     Abduction     External Rotation     Internal Rotation     Biceps     Triceps          PRE's     Flexion     Abduction     External Rotation SL3x10 0/1 no towel Start10/4   Internal Rotation     Shrugs     EXT EOB 3x10 2# ^10/25   Reverse Flys     Serratus 2x10 needs verbal and tactile cueing  Start10/6   Horizontal Abd with ER     Biceps 3x10 4# Start 10/27   Triceps     Retraction EOB 3x10 2# ^10/25        Cable Column/Theraband     External Rotation     Internal Rotation     Shrugs     Lats     Ext     Flex     Scapular Retraction 3x10 blue ^11/8   BIC     TRIC 3x10 blue ^11/8   PNF          Dynamic Stability          Plyoback          Manual interventions     PROM Fl scap ER/IR;  15 min  ^11/1              Therapeutic Exercise and NMR EXR  [x] (29440) Provided verbal/tactile cueing for activities related to strengthening, flexibility, endurance, ROM  for improvements in scapular, scapulothoracic and UE control with self care, reaching, carrying, lifting, house/yardwork, driving/computer work.    [] (85808) Provided verbal/tactile cueing for activities related to improving balance, coordination, kinesthetic sense, posture, motor skill, proprioception  to assist with  scapular, scapulothoracic and UE control with self care, reaching, carrying, lifting, house/yardwork, driving/computer work. Therapeutic Activities:    [] (06426 or 10246) Provided verbal/tactile cueing for activities related to improving balance, coordination, kinesthetic sense, posture, motor skill, proprioception and motor activation to allow for proper function of scapular, scapulothoracic and UE control with self care, carrying, lifting, driving/computer work.      Home Exercise Program:    [x] (03541) Reviewed/Progressed HEP activities related to strengthening, flexibility, endurance, ROM of scapular, scapulothoracic and UE control with self care, reaching, carrying, lifting, house/yardwork, driving/computer work  [] (14226) Reviewed/Progressed HEP activities related to improving balance, coordination, kinesthetic sense, posture, motor skill, proprioception of scapular, scapulothoracic and UE control with self care, reaching, carrying, lifting, house/yardwork, driving/computer work      Manual Treatments:  PROM / STM / Oscillations-Mobs:  G-I, II, III, IV (PA's, Inf., Post.)  [x] (47700) Provided manual therapy to mobilize soft tissue/joints of cervical/CT, scapular GHJ and UE for the purpose of modulating pain, promoting relaxation,  increasing ROM, reducing/eliminating soft tissue swelling/inflammation/restriction, improving soft tissue extensibility and allowing for proper ROM for normal function with self care, reaching, carrying, lifting, house/yardwork, driving/computer work    Modalities:  declined ice but recommended ice 15-20 min on several times per day to help with pain and inflammation     Charges:  Timed Code Treatment Minutes: 45   Total Treatment Minutes: 235-340       [] EVAL (LOW) 87340 (typically 20 minutes face-to-face)  [] EVAL (MOD) 07395 (typically 30 minutes face-to-face)  [] EVAL (HIGH) 13412 (typically 45 minutes face-to-face)  [] RE-EVAL     [x] TZ(03641) x  2  [] IONTO  [] NMR (28582) x     [] VASO  [x] Manual (59369) x  1    [] Other:  [] TA x      [] Mech Traction (20024)  [] ES(attended) (46610)      [] ES (un) (56615):     GOALS:  Patient stated goal: increase ROM decrease pain, use arm for adls    [] Progressing: [] Met: [] Not Met: [] Adjusted     Therapist goals for Patient:   Short Term Goals: To be achieved in: 2 -4weeks  1. Independent in HEP and progression per patient tolerance, in order to prevent re-injury. [] Progressing: [x] Met: [] Not Met: [] Adjusted   2. Patient will have a decrease in pain to facilitate improvement in movement, function, and ADLs as indicated by Functional Deficits. [x] Progressing: [] Met: [] Not Met: [] Adjusted     Long Term Goals: To be achieved in: 12 weeks  1. Foto 67/100  to assist with reaching prior level of function. [x] Progressing: [] Met: [] Not Met: [] Adjusted  2. Patient will demonstrate increased AROM to = R to allow for proper joint functioning as indicated by patients Functional Deficits. [x] Progressing: [] Met: [] Not Met: [] Adjusted  3. Patient will demonstrate an increase in strength to good scapular and core control  for UE to allow for proper functional mobility as indicated by patients Functional Deficits. [x] Progressing: [] Met: [] Not Met: [] Adjusted  4. Patient will return to  functional activities reach over head, reach behind back, don doff jacket  without increased symptoms or restriction. [x] Progressing: [] Met: [] Not Met: [] Adjusted  Overall Progression Towards Functional goals/ Treatment Progress Update:  [] Patient is progressing as expected towards functional goals listed. [x] Progression is slowed due to complexities/Impairments listed. Trauma , complexity of injury   [] Progression has been slowed due to co-morbidities.   [] Plan just implemented, too soon to assess goals progression <30days   [] Goals require adjustment due to lack of progress  [] Patient is not progressing as expected and requires additional follow up with physician  [] Other    Prognosis for POC: [x] Good [] Fair  [] Poor      Patient requires continued skilled intervention: [x] Yes  [] No    Treatment/Activity Tolerance:  [x] Patient able to complete treatment  [] Patient limited by fatigue  [x] Patient limited by pain     [] Patient limited by other medical complications  [x] Other:   Will continue to progress PROM, AAROM light PREs as able to progress to PLOF L UE. Progress noted this month with PROM and AROM. Patient Education:    Reviewed diagnosis, POC, HEP and its importance. Access Code: [email or web address]  URL: [email or web address]  Date: 9/6/22  Prepared by: Vianney Posada     Exercises  Standing Shoulder Shrugs - 2 x daily - 7 x weekly - 3 sets - 10 reps  Seated Scapular Retraction - 2 x daily - 7 x weekly - 3 sets - 10 reps  Standing Bicep Curls Supinated with Dumbbells - 2 x daily - 7 x weekly - 3 sets - 10 reps     Seated Shoulder Pendulum Exercise - 2 x daily - 7 x weekly - 3 sets - 10 reps  Table slides flexion - 2 x daily - 7 x weekly - 10 reps - 10 hold  Seated Shoulder External Rotation AAROM with Dowel - 2 x daily - 7 x weekly - 10 reps - 10 hold   PLAN: See eval  [x] Continue per plan of care [] Alter current plan (see comments above)  [] Plan of care initiated [] Hold pending MD visit [] Discharge      Electronically signed by:  Vianney Posada, PT    Note: If patient does not return for scheduled/ recommended follow up visits, this note will serve as a discharge from care along with most recent update on progress.

## 2022-11-10 ENCOUNTER — HOSPITAL ENCOUNTER (OUTPATIENT)
Dept: PHYSICAL THERAPY | Age: 72
Setting detail: THERAPIES SERIES
Discharge: HOME | End: 2022-11-10
Payer: MEDICARE

## 2022-11-10 PROCEDURE — 97110 THERAPEUTIC EXERCISES: CPT | Performed by: PHYSICAL THERAPIST

## 2022-11-10 PROCEDURE — 97140 MANUAL THERAPY 1/> REGIONS: CPT | Performed by: PHYSICAL THERAPIST

## 2022-11-10 NOTE — FLOWSHEET NOTE
The [address] [place] [address] 200, [address] [place] [address], [address] [place] [address]  Phone: [phone]   Fax:     [phone]    Physical Therapy Daily Treatment Note  Date:  11/10/2022    Patient Name:  Gabby Flores    :  1950  MRN: [id number]  Restrictions/Precautions:    Medical/Treatment Diagnosis Information:  Diagnosis: L truamatic rotator cuff tear M75.122  Treatment Diagnosis: L shoulder painM25. 512  DOS 2022  L shoulder RC augmentation labral debridement SAD DCE capsular release LUIS      Insurance/Certification information:  PT Insurance Information: [facility] BMN  Physician Information:   Dr Lety Epstein  Has the plan of care been signed (Y/N):        [x]  Yes  []  No     Date of Patient follow up with Physician: per md      Is this a Progress Report:     []  Yes   [x]  No        If Yes:  Date Range for reporting period:  Gfjbvwirt18/6  Bstixs25/8    Progress report will be due (10 Rx or 30 days whichever is less):  with POC      Recertification will be due (POC Duration  / 90 days whichever is less):          Visit # Insurance Allowable Auth Required   16post surgery   BMN []  Yes []  No        Functional Scale: Foto38/100 38/100 54/100    Date assessed:  ,10/6 11/8     Latex Allergy:  [x]NO      []YES  Preferred Language for Healthcare:   [x]English       []other:    Pain level:  1-3/10 at rest     SUBJECTIVE:  11/10 MD notes improvement. F/u in 6 weeks.  Md advised IASTM and Dry needling as needed for lateral muscle c/os    OBJECTIVE: See eval   ROM PROM  AROM  Comment     L R L R     Flexion ~160   ~90 stand     140 supine        scap ~160    ~90 stand       BLANCA De [email or web address]           IR             Other  (cervical)             Other                L shoulder flexion abd MMT 2+/5     RESTRICTIONS/PRECAUTIONS: RCR,  increase ROM as able per MD due to capsular release and LUIS    Exercises/Interventions: Exercises:  Exercise/Equipment Resistance/Repetitions Other comments   Stretching/PROM     Wand ER at neutral 48b75zir    Supine @ 60abd 74g78hui      Supine cane flex 3x10       Start9/22        Start10/6   Table Slides fl 93g39wsm     Abd 31e86ibe    ER bend forward 09i81hee       Start9/8      Start9/8   Wall slide 48j86jcv  Start9/22   Wall step away for flexion 97g35ixx Start9/8   Pulley flex   X10    Scap x10 Start9/16   Supine grab L wrist with R hand move into flexion 17h74zgf    blue ball roll into flexion on table  14a69rjc    Pendulum AROM CW CCW fl/ext side to side [id number]     Elbow AROM  x30    IR behind back  86b96naa Start9/12   Supine butterfly 2 min Start10/4   Isometrics     Retraction x30    shrug x30    Weight shift     Flexion     Abduction     External Rotation     Internal Rotation     Biceps     Triceps          PRE's     Flexion     Abduction     External Rotation SL3x10 0/1 no towel Start10/4   Internal Rotation     Shrugs     EXT EOB 3x10 2# ^10/25   Reverse Flys     Serratus 2x10 needs verbal and tactile cueing  Start10/6   Horizontal Abd with ER     Biceps 3x10 5# ^11/10   Triceps     Retraction EOB 3x10 2# ^10/25        Cable Column/Theraband     External Rotation Modified no money 3x10 red Start11/10   Internal Rotation     Shrugs     Lats     Ext     Flex     Scapular Retraction 3x10 blue ^11/8   BIC     TRIC 3x10 blue ^11/8   PNF          Dynamic Stability          Plyoback          Manual interventions     PROM Fl scap ER/IR;  15 min  ^11/1              Therapeutic Exercise and NMR EXR  [x] (35411) Provided verbal/tactile cueing for activities related to strengthening, flexibility, endurance, ROM  for improvements in scapular, scapulothoracic and UE control with self care, reaching, carrying, lifting, house/yardwork, driving/computer work.    [] (43378) Provided verbal/tactile cueing for activities related to improving balance, coordination, kinesthetic sense, posture, motor skill, proprioception  to assist with  scapular, scapulothoracic and UE control with self care, reaching, carrying, lifting, house/yardwork, driving/computer work. Therapeutic Activities:    [] (49388 or 92606) Provided verbal/tactile cueing for activities related to improving balance, coordination, kinesthetic sense, posture, motor skill, proprioception and motor activation to allow for proper function of scapular, scapulothoracic and UE control with self care, carrying, lifting, driving/computer work.      Home Exercise Program:    [x] (90883) Reviewed/Progressed HEP activities related to strengthening, flexibility, endurance, ROM of scapular, scapulothoracic and UE control with self care, reaching, carrying, lifting, house/yardwork, driving/computer work  [] (35659) Reviewed/Progressed HEP activities related to improving balance, coordination, kinesthetic sense, posture, motor skill, proprioception of scapular, scapulothoracic and UE control with self care, reaching, carrying, lifting, house/yardwork, driving/computer work      Manual Treatments:  PROM / STM / Oscillations-Mobs:  G-I, II, III, IV (PA's, Inf., Post.)  [x] (97246) Provided manual therapy to mobilize soft tissue/joints of cervical/CT, scapular GHJ and UE for the purpose of modulating pain, promoting relaxation,  increasing ROM, reducing/eliminating soft tissue swelling/inflammation/restriction, improving soft tissue extensibility and allowing for proper ROM for normal function with self care, reaching, carrying, lifting, house/yardwork, driving/computer work    Modalities:  declined ice but recommended ice 15-20 min on several times per day to help with pain and inflammation     Charges:  Timed Code Treatment Minutes: 45   Total Treatment Minutes: 240-340       [] EVAL (LOW) 47859 (typically 20 minutes face-to-face)  [] EVAL (MOD) 28338 (typically 30 minutes face-to-face)  [] EVAL (HIGH) 51261 (typically 45 minutes face-to-face)  [] RE-EVAL [x] NO(50759) x  2  [] IONTO  [] NMR (37808) x     [] VASO  [x] Manual (23216) x  1    [] Other:  [] TA x      [] Mech Traction (32482)  [] ES(attended) (59618)      [] ES (un) (67285):     GOALS:  Patient stated goal: increase ROM decrease pain, use arm for adls    [] Progressing: [] Met: [] Not Met: [] Adjusted     Therapist goals for Patient:   Short Term Goals: To be achieved in: 2 -4weeks  1. Independent in HEP and progression per patient tolerance, in order to prevent re-injury. [] Progressing: [x] Met: [] Not Met: [] Adjusted   2. Patient will have a decrease in pain to facilitate improvement in movement, function, and ADLs as indicated by Functional Deficits. [x] Progressing: [] Met: [] Not Met: [] Adjusted     Long Term Goals: To be achieved in: 12 weeks  1. Foto 67/100  to assist with reaching prior level of function. [x] Progressing: [] Met: [] Not Met: [] Adjusted  2. Patient will demonstrate increased AROM to = R to allow for proper joint functioning as indicated by patients Functional Deficits. [x] Progressing: [] Met: [] Not Met: [] Adjusted  3. Patient will demonstrate an increase in strength to good scapular and core control  for UE to allow for proper functional mobility as indicated by patients Functional Deficits. [x] Progressing: [] Met: [] Not Met: [] Adjusted  4. Patient will return to  functional activities reach over head, reach behind back, don doff jacket  without increased symptoms or restriction. [x] Progressing: [] Met: [] Not Met: [] Adjusted  Overall Progression Towards Functional goals/ Treatment Progress Update:  [] Patient is progressing as expected towards functional goals listed. [x] Progression is slowed due to complexities/Impairments listed. Trauma , complexity of injury   [] Progression has been slowed due to co-morbidities.   [] Plan just implemented, too soon to assess goals progression <30days   [] Goals require adjustment due to lack of progress  [] Patient is not progressing as expected and requires additional follow up with physician  [] Other    Prognosis for POC: [x] Good [] Fair  [] Poor      Patient requires continued skilled intervention: [x] Yes  [] No    Treatment/Activity Tolerance:  [x] Patient able to complete treatment  [] Patient limited by fatigue  [x] Patient limited by pain     [] Patient limited by other medical complications  [x] Other:   Will continue to progress PROM, AAROM light PREs as able to progress to PLOF L UE. Progress noted this month with PROM and AROM. Patient Education:    Reviewed diagnosis, POC, HEP and its importance. Access Code: [email or web address]  URL: [email or web address]  Date: 9/6/22  Prepared by: Rick Conteh     Exercises  Standing Shoulder Shrugs - 2 x daily - 7 x weekly - 3 sets - 10 reps  Seated Scapular Retraction - 2 x daily - 7 x weekly - 3 sets - 10 reps  Standing Bicep Curls Supinated with Dumbbells - 2 x daily - 7 x weekly - 3 sets - 10 reps     Seated Shoulder Pendulum Exercise - 2 x daily - 7 x weekly - 3 sets - 10 reps  Table slides flexion - 2 x daily - 7 x weekly - 10 reps - 10 hold  Seated Shoulder External Rotation AAROM with Dowel - 2 x daily - 7 x weekly - 10 reps - 10 hold   PLAN: See eval  [x] Continue per plan of care [] Alter current plan (see comments above)  [] Plan of care initiated [] Hold pending MD visit [] Discharge      Electronically signed by:  Rick Conteh, PT    Note: If patient does not return for scheduled/ recommended follow up visits, this note will serve as a discharge from care along with most recent update on progress.

## 2022-11-15 ENCOUNTER — HOSPITAL ENCOUNTER (OUTPATIENT)
Dept: PHYSICAL THERAPY | Age: 72
Setting detail: THERAPIES SERIES
Discharge: HOME | End: 2022-11-15
Payer: MEDICARE

## 2022-11-15 PROCEDURE — 97110 THERAPEUTIC EXERCISES: CPT | Performed by: PHYSICAL THERAPIST

## 2022-11-15 PROCEDURE — 97140 MANUAL THERAPY 1/> REGIONS: CPT | Performed by: PHYSICAL THERAPIST

## 2022-11-15 NOTE — FLOWSHEET NOTE
The [address] [place] [address] 200, [address] [place] [address], [address] [place] [address]  Phone: [phone]   Fax:     [phone]    Physical Therapy Daily Treatment Note  Date:  11/15/2022    Patient Name:  Servando France    :  1950  MRN: [id number]  Restrictions/Precautions:    Medical/Treatment Diagnosis Information:  Diagnosis: L truamatic rotator cuff tear M75.122  Treatment Diagnosis: L shoulder painM25. 512  DOS 2022  L shoulder RC augmentation labral debridement SAD DCE capsular release LUIS      Insurance/Certification information:  PT Insurance Information: [facility] BMN  Physician Information:   Dr Will Quinn  Has the plan of care been signed (Y/N):        [x]  Yes  []  No     Date of Patient follow up with Physician: per md      Is this a Progress Report:     []  Yes   [x]  No        If Yes:  Date Range for reporting period:  Sjyszeute47/6  Escnld38/8    Progress report will be due (10 Rx or 30 days whichever is less):  with POC      Recertification will be due (POC Duration  / 90 days whichever is less):          Visit # Insurance Allowable Auth Required   17 post surgery   BMN []  Yes []  No        Functional Scale:  Foto38/100 38/100 54/100    Date assessed:  ,10/6 11/8     Latex Allergy:  [x]NO      []YES  Preferred Language for Healthcare:   [x]English       []other:    Pain level:  1-3/10 at rest     SUBJECTIVE:  11/15 shoulder hurting more at night     OBJECTIVE: See eval   ROM PROM  AROM  Comment     L R L R     Flexion ~160   ~90 stand     140 supine        scap ~160    ~90 stand       ER [email or web address]           IR             Other  (cervical)             Other                L shoulder flexion abd MMT 2+/5     RESTRICTIONS/PRECAUTIONS: RCR,  increase ROM as able per MD due to capsular release and LUIS    Exercises/Interventions:   Exercises:  Exercise/Equipment Resistance/Repetitions Other comments Stretching/PROM     Wand ER at neutral 33x41yrv    ER Supine @ 60abd 75z58cqt      Supine cane flex 3x10       Start9/22        Start10/6   Table Slides fl 00b21yer     Abd 01m99yin    ER bend forward 67x09utu       Start9/8      Start9/8   Wall slide 20h40btq  Start9/22   Wall step away for flexion 83t73fbd Start9/8   Pulley flex   X10    Scap x10 Start9/16   Supine grab L wrist with R hand move into flexion 35j58bjv    blue ball roll into flexion on table  88t59nhe    Pendulum AROM CW CCW fl/ext side to side [id number]  1#    Elbow AROM      IR behind back  10m17kmg Start9/12   Supine butterfly 2 min Start10/4   Isometrics     Retraction x30    shrug x30    Weight shift     Flexion     Abduction     External Rotation     Internal Rotation     Biceps     Triceps          PRE's     Flexion     Abduction     External Rotation SL3x10 0/1 no towel Start10/4   Internal Rotation     Shrugs     EXT EOB 3x10 2# ^10/25   Reverse Flys     Serratus 2x10 needs verbal and tactile cueing  Start10/6   Horizontal Abd with ER     Biceps 3x10 7# ^11/10   Triceps     Retraction EOB 3x10 2# ^10/25        Cable Column/Theraband     External Rotation Modified no money 3x10 red Start11/10   Internal Rotation     Shrugs     Lats     Ext     Flex     Scapular Retraction 3x10 blue ^11/8   BIC     TRIC 3x10 blue ^11/8   PNF          Dynamic Stability     Supine  abc's Start11/15   Plyoback          Manual interventions     PROM Fl scap ER/IR; IASTM lateral delt 15 min  ^11/1              Therapeutic Exercise and NMR EXR  [x] (74441) Provided verbal/tactile cueing for activities related to strengthening, flexibility, endurance, ROM  for improvements in scapular, scapulothoracic and UE control with self care, reaching, carrying, lifting, house/yardwork, driving/computer work.    [] (36683) Provided verbal/tactile cueing for activities related to improving balance, coordination, kinesthetic sense, posture, motor skill, proprioception  to assist with  scapular, scapulothoracic and UE control with self care, reaching, carrying, lifting, house/yardwork, driving/computer work. Therapeutic Activities:    [] (73720 or 50992) Provided verbal/tactile cueing for activities related to improving balance, coordination, kinesthetic sense, posture, motor skill, proprioception and motor activation to allow for proper function of scapular, scapulothoracic and UE control with self care, carrying, lifting, driving/computer work.      Home Exercise Program:    [x] (81612) Reviewed/Progressed HEP activities related to strengthening, flexibility, endurance, ROM of scapular, scapulothoracic and UE control with self care, reaching, carrying, lifting, house/yardwork, driving/computer work  [] (36775) Reviewed/Progressed HEP activities related to improving balance, coordination, kinesthetic sense, posture, motor skill, proprioception of scapular, scapulothoracic and UE control with self care, reaching, carrying, lifting, house/yardwork, driving/computer work      Manual Treatments:  PROM / STM / Oscillations-Mobs:  G-I, II, III, IV (PA's, Inf., Post.)  [x] (11312) Provided manual therapy to mobilize soft tissue/joints of cervical/CT, scapular GHJ and UE for the purpose of modulating pain, promoting relaxation,  increasing ROM, reducing/eliminating soft tissue swelling/inflammation/restriction, improving soft tissue extensibility and allowing for proper ROM for normal function with self care, reaching, carrying, lifting, house/yardwork, driving/computer work    Modalities:  declined ice but recommended ice 15-20 min on several times per day to help with pain and inflammation     Charges:  Timed Code Treatment Minutes: 45   Total Treatment Minutes: 240-340       [] EVAL (LOW) 57044 (typically 20 minutes face-to-face)  [] EVAL (MOD) 19313 (typically 30 minutes face-to-face)  [] EVAL (HIGH) 55257 (typically 45 minutes face-to-face)  [] RE-EVAL     [x] HN(08923) x  2  [] IONTO  [] NMR (94454) x     [] VASO  [x] Manual (78226) x  1    [] Other:  [] TA x      [] Mech Traction (69611)  [] ES(attended) (40664)      [] ES (un) (99594):     GOALS:  Patient stated goal: increase ROM decrease pain, use arm for adls    [] Progressing: [] Met: [] Not Met: [] Adjusted     Therapist goals for Patient:   Short Term Goals: To be achieved in: 2 -4weeks  1. Independent in HEP and progression per patient tolerance, in order to prevent re-injury. [] Progressing: [x] Met: [] Not Met: [] Adjusted   2. Patient will have a decrease in pain to facilitate improvement in movement, function, and ADLs as indicated by Functional Deficits. [x] Progressing: [] Met: [] Not Met: [] Adjusted     Long Term Goals: To be achieved in: 12 weeks  1. Foto 67/100  to assist with reaching prior level of function. [x] Progressing: [] Met: [] Not Met: [] Adjusted  2. Patient will demonstrate increased AROM to = R to allow for proper joint functioning as indicated by patients Functional Deficits. [x] Progressing: [] Met: [] Not Met: [] Adjusted  3. Patient will demonstrate an increase in strength to good scapular and core control  for UE to allow for proper functional mobility as indicated by patients Functional Deficits. [x] Progressing: [] Met: [] Not Met: [] Adjusted  4. Patient will return to  functional activities reach over head, reach behind back, don doff jacket  without increased symptoms or restriction. [x] Progressing: [] Met: [] Not Met: [] Adjusted  Overall Progression Towards Functional goals/ Treatment Progress Update:  [] Patient is progressing as expected towards functional goals listed. [x] Progression is slowed due to complexities/Impairments listed. Trauma , complexity of injury   [] Progression has been slowed due to co-morbidities.   [] Plan just implemented, too soon to assess goals progression <30days   [] Goals require adjustment due to lack of progress  [] Patient is not progressing as expected and requires additional follow up with physician  [] Other    Prognosis for POC: [x] Good [] Fair  [] Poor      Patient requires continued skilled intervention: [x] Yes  [] No    Treatment/Activity Tolerance:  [x] Patient able to complete treatment  [] Patient limited by fatigue  [x] Patient limited by pain     [] Patient limited by other medical complications  [x] Other:   Will continue to progress PROM, AAROM light PREs as able to progress to PLOF L UE. Patient Education:    Reviewed diagnosis, POC, HEP and its importance. Access Code: [email or web address]  URL: [employer]/  Date: 9/6/22  Prepared by: Tressa Reynolds     Exercises  Standing Shoulder Shrugs - 2 x daily - 7 x weekly - 3 sets - 10 reps  Seated Scapular Retraction - 2 x daily - 7 x weekly - 3 sets - 10 reps  Standing Bicep Curls Supinated with Dumbbells - 2 x daily - 7 x weekly - 3 sets - 10 reps     Seated Shoulder Pendulum Exercise - 2 x daily - 7 x weekly - 3 sets - 10 reps  Table slides flexion - 2 x daily - 7 x weekly - 10 reps - 10 hold  Seated Shoulder External Rotation AAROM with Dowel - 2 x daily - 7 x weekly - 10 reps - 10 hold   PLAN: See eval  [x] Continue per plan of care [] Alter current plan (see comments above)  [] Plan of care initiated [] Hold pending MD visit [] Discharge      Electronically signed by:  Tressa Reynolds, PT    Note: If patient does not return for scheduled/ recommended follow up visits, this note will serve as a discharge from care along with most recent update on progress.

## 2022-11-17 ENCOUNTER — HOSPITAL ENCOUNTER (OUTPATIENT)
Dept: PHYSICAL THERAPY | Age: 72
Setting detail: THERAPIES SERIES
Discharge: HOME | End: 2022-11-17
Payer: MEDICARE

## 2022-11-17 PROCEDURE — 97140 MANUAL THERAPY 1/> REGIONS: CPT | Performed by: PHYSICAL THERAPIST

## 2022-11-17 PROCEDURE — 97110 THERAPEUTIC EXERCISES: CPT | Performed by: PHYSICAL THERAPIST

## 2022-11-17 NOTE — FLOWSHEET NOTE
[facility], [address] [place] [address], [address] [place] [address]  Phone: [phone]   Fax:     [phone]    Physical Therapy Daily Treatment Note  Date:  2022    Patient Name:  Vinicio Zuñiga    :  1950  MRN: [id number]  Restrictions/Precautions:    Medical/Treatment Diagnosis Information:  Diagnosis: L truamatic rotator cuff tear M75.122  Treatment Diagnosis: L shoulder painM25. 512  DOS 2022  L shoulder RC augmentation labral debridement SAD DCE capsular release LUIS      Insurance/Certification information:  PT Insurance Information: [employer] BMN  Physician Information:   Dr Marii Eagle  Has the plan of care been signed (Y/N):        [x]  Yes  []  No     Date of Patient follow up with Physician: per md      Is this a Progress Report:     []  Yes   [x]  No        If Yes:  Date Range for reporting period:  Diavmwiil89/6  Csstbj15/8    Progress report will be due (10 Rx or 30 days whichever is less):  with POC      Recertification will be due (POC Duration  / 90 days whichever is less):          Visit # Insurance Allowable Auth Required   17 post surgery   BMN []  Yes []  No        Functional Scale:  Foto38/100 38/100 54/100    Date assessed:  ,10/6 11/8     Latex Allergy:  [x]NO      []YES  Preferred Language for Healthcare:   [x]English       []other:    Pain level:  1-3/10 at rest     SUBJECTIVE:   shoulder not bad    OBJECTIVE: See eval   ROM PROM  AROM  Comment     L R L R     Flexion ~160   ~90 stand     140 supine        scap ~160    ~90 stand       ER [email or web address]           IR             Other  (cervical)             Other                L shoulder flexion abd MMT 2+/5     RESTRICTIONS/PRECAUTIONS: RCR,  increase ROM as able per MD due to capsular release and LUIS    Exercises/Interventions:   Exercises:  Exercise/Equipment Resistance/Repetitions Other comments   Stretching/PROM Wand ER at neutral 01d14qsi    ER Supine @ 60abd 33s00phn      Supine cane flex 3x10       Start9/22        Start10/6   Table Slides fl 88u40tzu     Abd 28z72czo    ER bend forward 02z67pam       Start9/8      Start9/8   Wall slide 14o01nxx  Start9/22   Wall step away for flexion 97w24qyw Start9/8   Pulley flex   X10    Scap x10 Start9/16   Supine grab L wrist with R hand move into flexion 89l16dqc    blue ball roll into flexion on table  24j07equ    Pendulum AROM CW CCW fl/ext side to side h85twce  1#    Elbow AROM      IR behind back  40k61amy Start9/12   Supine butterfly 2 min Start10/4   Isometrics     Retraction x30    shrug x30    Weight shift     Flexion     Abduction     External Rotation     Internal Rotation     Biceps     Triceps          PRE's     Flexion     Abduction     External Rotation SL3x10 0/1 no towel Start10/4   Internal Rotation     Shrugs     EXT EOB 3x10 3# ^11/17   Reverse Flys     Serratus 2x10 needs verbal and tactile cueing  Start10/6   Horizontal Abd with ER     Biceps 3x10 7# ^11/10   Triceps     Retraction EOB 3x10 3# ^11/17        Cable Column/Theraband     External Rotation Modified no money 3x10 red Start11/10   Internal Rotation     Shrugs     Lats     Ext     Flex     Scapular Retraction 3x10 blue ^11/8   BIC     TRIC 3x10 blue ^11/8   PNF          Dynamic Stability     Supine  abc's Start11/15   Plyoback          Manual interventions     PROM Fl scap ER/IR; IASTM lateral delt 15 min  ^11/1              Therapeutic Exercise and NMR EXR  [x] (57380) Provided verbal/tactile cueing for activities related to strengthening, flexibility, endurance, ROM  for improvements in scapular, scapulothoracic and UE control with self care, reaching, carrying, lifting, house/yardwork, driving/computer work.    [] (30615) Provided verbal/tactile cueing for activities related to improving balance, coordination, kinesthetic sense, posture, motor skill, proprioception  to assist with  scapular, scapulothoracic and UE control with self care, reaching, carrying, lifting, house/yardwork, driving/computer work. Therapeutic Activities:    [] (41114 or 94778) Provided verbal/tactile cueing for activities related to improving balance, coordination, kinesthetic sense, posture, motor skill, proprioception and motor activation to allow for proper function of scapular, scapulothoracic and UE control with self care, carrying, lifting, driving/computer work.      Home Exercise Program:    [x] (23739) Reviewed/Progressed HEP activities related to strengthening, flexibility, endurance, ROM of scapular, scapulothoracic and UE control with self care, reaching, carrying, lifting, house/yardwork, driving/computer work  [] (79814) Reviewed/Progressed HEP activities related to improving balance, coordination, kinesthetic sense, posture, motor skill, proprioception of scapular, scapulothoracic and UE control with self care, reaching, carrying, lifting, house/yardwork, driving/computer work      Manual Treatments:  PROM / STM / Oscillations-Mobs:  G-I, II, III, IV (PA's, Inf., Post.)  [x] (71160) Provided manual therapy to mobilize soft tissue/joints of cervical/CT, scapular GHJ and UE for the purpose of modulating pain, promoting relaxation,  increasing ROM, reducing/eliminating soft tissue swelling/inflammation/restriction, improving soft tissue extensibility and allowing for proper ROM for normal function with self care, reaching, carrying, lifting, house/yardwork, driving/computer work    Modalities:  declined ice but recommended ice 15-20 min on several times per day to help with pain and inflammation     Charges:  Timed Code Treatment Minutes: 45   Total Treatment Minutes: 240-340       [] EVAL (LOW) 14061 (typically 20 minutes face-to-face)  [] EVAL (MOD) 80013 (typically 30 minutes face-to-face)  [] EVAL (HIGH) 41160 (typically 45 minutes face-to-face)  [] RE-EVAL     [x] AQ(73608) x  2  [] IONTO  [] NMR (73556) x [] VASO  [x] Manual (46774) x  1    [] Other:  [] TA x      [] Mech Traction (70644)  [] ES(attended) (51944)      [] ES (un) (58713):     GOALS:  Patient stated goal: increase ROM decrease pain, use arm for adls    [] Progressing: [] Met: [] Not Met: [] Adjusted     Therapist goals for Patient:   Short Term Goals: To be achieved in: 2 -4weeks  1. Independent in HEP and progression per patient tolerance, in order to prevent re-injury. [] Progressing: [x] Met: [] Not Met: [] Adjusted   2. Patient will have a decrease in pain to facilitate improvement in movement, function, and ADLs as indicated by Functional Deficits. [x] Progressing: [] Met: [] Not Met: [] Adjusted     Long Term Goals: To be achieved in: 12 weeks  1. Foto 67/100  to assist with reaching prior level of function. [x] Progressing: [] Met: [] Not Met: [] Adjusted  2. Patient will demonstrate increased AROM to = R to allow for proper joint functioning as indicated by patients Functional Deficits. [x] Progressing: [] Met: [] Not Met: [] Adjusted  3. Patient will demonstrate an increase in strength to good scapular and core control  for UE to allow for proper functional mobility as indicated by patients Functional Deficits. [x] Progressing: [] Met: [] Not Met: [] Adjusted  4. Patient will return to  functional activities reach over head, reach behind back, don doff jacket  without increased symptoms or restriction. [x] Progressing: [] Met: [] Not Met: [] Adjusted  Overall Progression Towards Functional goals/ Treatment Progress Update:  [] Patient is progressing as expected towards functional goals listed. [x] Progression is slowed due to complexities/Impairments listed. Trauma , complexity of injury   [] Progression has been slowed due to co-morbidities.   [] Plan just implemented, too soon to assess goals progression <30days   [] Goals require adjustment due to lack of progress  [] Patient is not progressing as expected and requires additional follow up with physician  [] Other    Prognosis for POC: [x] Good [] Fair  [] Poor      Patient requires continued skilled intervention: [x] Yes  [] No    Treatment/Activity Tolerance:  [x] Patient able to complete treatment  [] Patient limited by fatigue  [x] Patient limited by pain     [] Patient limited by other medical complications  [x] Other:   Will continue to progress PROM, AAROM light PREs as able to progress to PLOF L UE. Patient Education:    Reviewed diagnosis, POC, HEP and its importance. Access Code: [email or web address]  URL: [employer]/  Date: 9/6/22  Prepared by: Cherrie Buck     Exercises  Standing Shoulder Shrugs - 2 x daily - 7 x weekly - 3 sets - 10 reps  Seated Scapular Retraction - 2 x daily - 7 x weekly - 3 sets - 10 reps  Standing Bicep Curls Supinated with Dumbbells - 2 x daily - 7 x weekly - 3 sets - 10 reps     Seated Shoulder Pendulum Exercise - 2 x daily - 7 x weekly - 3 sets - 10 reps  Table slides flexion - 2 x daily - 7 x weekly - 10 reps - 10 hold  Seated Shoulder External Rotation AAROM with Dowel - 2 x daily - 7 x weekly - 10 reps - 10 hold   PLAN: See eval  [x] Continue per plan of care [] Alter current plan (see comments above)  [] Plan of care initiated [] Hold pending MD visit [] Discharge      Electronically signed by:  Cherrie Buck PT    Note: If patient does not return for scheduled/ recommended follow up visits, this note will serve as a discharge from care along with most recent update on progress.

## 2022-11-22 ENCOUNTER — HOSPITAL ENCOUNTER (OUTPATIENT)
Dept: PHYSICAL THERAPY | Age: 72
Setting detail: THERAPIES SERIES
Discharge: HOME | End: 2022-11-22
Payer: MEDICARE

## 2022-11-22 PROCEDURE — 97110 THERAPEUTIC EXERCISES: CPT | Performed by: PHYSICAL THERAPIST

## 2022-11-22 PROCEDURE — 97140 MANUAL THERAPY 1/> REGIONS: CPT | Performed by: PHYSICAL THERAPIST

## 2022-11-22 RX ORDER — ROSUVASTATIN CALCIUM 40 MG/1
40 TABLET, COATED ORAL DAILY
Qty: 90 TABLET | Refills: 0 | Status: SHIPPED | OUTPATIENT
Start: 2022-11-22

## 2022-11-22 NOTE — TELEPHONE ENCOUNTER
Medication:   Requested Prescriptions     Pending Prescriptions Disp Refills    rosuvastatin (CRESTOR) 40 MG tablet [Pharmacy Med Name: ROSUVASTATIN 40MG TABLETS] 90 tablet 0     Sig: TAKE 1 TABLET BY MOUTH DAILY     Last Filled:  4.5.22    Last appt: 10/7/2022   Next appt: Visit date not found    Last Lipid:   Lab Results   Component Value Date/Time    CHOL 139 10/08/2022 09:54 AM    TRIG 124 10/08/2022 09:54 AM    HDL 45 10/08/2022 09:54 AM    HDL 38 05/22/2012 08:41 AM    LDLCALC 69 10/08/2022 09:54 AM

## 2022-11-22 NOTE — FLOWSHEET NOTE
The [address] [place] [address] 200, [address], [address] [place] [address]  Phone: [phone]   Fax:     [phone]    Physical Therapy Daily Treatment Note  Date:  2022    Patient Name:  Servando France    :  1950  MRN: [id number]  Restrictions/Precautions:    Medical/Treatment Diagnosis Information:  Diagnosis: L truamatic rotator cuff tear M75.122  Treatment Diagnosis: L shoulder painM25. 512  DOS 2022  L shoulder RC augmentation labral debridement SAD DCE capsular release LUIS      Insurance/Certification information:  PT Insurance Information: [facility] BMN  Physician Information:   Dr Will Quinn  Has the plan of care been signed (Y/N):        [x]  Yes  []  No     Date of Patient follow up with Physician: per md      Is this a Progress Report:     []  Yes   [x]  No        If Yes:  Date Range for reporting period:  Ckufjeuqr48/6  Tbijbi59/8    Progress report will be due (10 Rx or 30 days whichever is less):  with POC      Recertification will be due (POC Duration  / 90 days whichever is less):          Visit # Insurance Allowable Auth Required   18 post surgery   BMN []  Yes []  No        Functional Scale: Foto38/100 38/100 54/100    Date assessed:  ,10/6 11/8     Latex Allergy:  [x]NO      []YES  Preferred Language for Healthcare:   [x]English       []other:    Pain level:  1-3/10 at rest     SUBJECTIVE:   about the same.   Lump in humeral area smaller but pain the same     OBJECTIVE: See eval   ROM PROM  AROM  Comment     L R L R     Flexion ~160   ~90 stand     140 supine        scap ~160    ~90 stand       ER [email or web address]           IR             Other  (cervical)             Other                L shoulder flexion abd MMT 2+/5     RESTRICTIONS/PRECAUTIONS: RCR,  increase ROM as able per MD due to capsular release and LUIS    Exercises/Interventions:   Exercises:  Exercise/Equipment Resistance/Repetitions Other comments   Stretching/PROM     Wand ER at neutral 15m99thz    ER Supine @ 60abd [id number]      Supine cane flex 3x10       Start9/22        Start10/6   Table Slides fl [id number]     Abd 79d44hqh    ER bend forward 71y13oxp       Start9/8      Start9/8   Wall slide 95v76nze  Start9/22   Wall step away for flexion 79r57xru Start9/8   Pulley flex   X10    Scap x10 Start9/16   Supine grab L wrist with R hand move into flexion 57t17gfg    blue ball roll into flexion on table  58o26nbc    Pendulum AROM CW CCW fl/ext side to side [id number]  1#    Elbow AROM      IR behind back  35v60hpl Start9/12   Supine butterfly 2 min Start10/4   Isometrics     Retraction x30    shrug x30    Weight shift     Flexion     Abduction     External Rotation     Internal Rotation     Biceps     Triceps          PRE's     Flexion     Abduction     External Rotation SL3x10 0/1 no towel Start10/4   Internal Rotation     Shrugs     EXT EOB 3x10 3# ^11/17   Reverse Flys     Serratus 2x10 needs verbal and tactile cueing  Start10/6   Horizontal Abd with ER     Biceps 3x10 7# ^11/10   Triceps     Retraction EOB 3x10 3# ^11/17        Cable Column/Theraband     External Rotation Modified no money 3x10 red Start11/10   Internal Rotation     Shrugs     Lats     Ext     Flex     Scapular Retraction 3x10 blue ^11/8   BIC     TRIC 3x10 blue ^11/8   PNF          Dynamic Stability     Supine  abc's Start11/15   Plyoback          Manual interventions     PROM Fl scap ER/IR; 15 min  ^11/1              Therapeutic Exercise and NMR EXR  [x] (01947) Provided verbal/tactile cueing for activities related to strengthening, flexibility, endurance, ROM  for improvements in scapular, scapulothoracic and UE control with self care, reaching, carrying, lifting, house/yardwork, driving/computer work.    [] (44745) Provided verbal/tactile cueing for activities related to improving balance, coordination, kinesthetic sense, posture, motor skill, proprioception  to assist with  scapular, scapulothoracic and UE control with self care, reaching, carrying, lifting, house/yardwork, driving/computer work. Therapeutic Activities:    [] (04247 or 03800) Provided verbal/tactile cueing for activities related to improving balance, coordination, kinesthetic sense, posture, motor skill, proprioception and motor activation to allow for proper function of scapular, scapulothoracic and UE control with self care, carrying, lifting, driving/computer work.      Home Exercise Program:    [x] (41313) Reviewed/Progressed HEP activities related to strengthening, flexibility, endurance, ROM of scapular, scapulothoracic and UE control with self care, reaching, carrying, lifting, house/yardwork, driving/computer work  [] (70707) Reviewed/Progressed HEP activities related to improving balance, coordination, kinesthetic sense, posture, motor skill, proprioception of scapular, scapulothoracic and UE control with self care, reaching, carrying, lifting, house/yardwork, driving/computer work      Manual Treatments:  PROM / STM / Oscillations-Mobs:  G-I, II, III, IV (PA's, Inf., Post.)  [x] (26497) Provided manual therapy to mobilize soft tissue/joints of cervical/CT, scapular GHJ and UE for the purpose of modulating pain, promoting relaxation,  increasing ROM, reducing/eliminating soft tissue swelling/inflammation/restriction, improving soft tissue extensibility and allowing for proper ROM for normal function with self care, reaching, carrying, lifting, house/yardwork, driving/computer work    Modalities:  declined ice but recommended ice 15-20 min on several times per day to help with pain and inflammation     Charges:  Timed Code Treatment Minutes: 45   Total Treatment Minutes: 230-330       [] EVAL (LOW) 09997 (typically 20 minutes face-to-face)  [] EVAL (MOD) 42375 (typically 30 minutes face-to-face)  [] EVAL (HIGH) 39660 (typically 45 minutes face-to-face)  [] RE-EVAL     [x] RH(55207) x  2  [] IONTO  [] NMR (78549) x     [] VASO  [x] Manual (35988) x  1    [] Other:  [] TA x      [] Mech Traction (36293)  [] ES(attended) (22287)      [] ES (un) (21392):     GOALS:  Patient stated goal: increase ROM decrease pain, use arm for adls    [] Progressing: [] Met: [] Not Met: [] Adjusted     Therapist goals for Patient:   Short Term Goals: To be achieved in: 2 -4weeks  1. Independent in HEP and progression per patient tolerance, in order to prevent re-injury. [] Progressing: [x] Met: [] Not Met: [] Adjusted   2. Patient will have a decrease in pain to facilitate improvement in movement, function, and ADLs as indicated by Functional Deficits. [x] Progressing: [] Met: [] Not Met: [] Adjusted     Long Term Goals: To be achieved in: 12 weeks  1. Foto 67/100  to assist with reaching prior level of function. [x] Progressing: [] Met: [] Not Met: [] Adjusted  2. Patient will demonstrate increased AROM to = R to allow for proper joint functioning as indicated by patients Functional Deficits. [x] Progressing: [] Met: [] Not Met: [] Adjusted  3. Patient will demonstrate an increase in strength to good scapular and core control  for UE to allow for proper functional mobility as indicated by patients Functional Deficits. [x] Progressing: [] Met: [] Not Met: [] Adjusted  4. Patient will return to  functional activities reach over head, reach behind back, don doff jacket  without increased symptoms or restriction. [x] Progressing: [] Met: [] Not Met: [] Adjusted  Overall Progression Towards Functional goals/ Treatment Progress Update:  [] Patient is progressing as expected towards functional goals listed. [x] Progression is slowed due to complexities/Impairments listed. Trauma , complexity of injury   [] Progression has been slowed due to co-morbidities.   [] Plan just implemented, too soon to assess goals progression <30days   [] Goals require adjustment due to lack of progress  [] Patient is not progressing as expected and requires additional follow up with physician  [] Other    Prognosis for POC: [x] Good [] Fair  [] Poor      Patient requires continued skilled intervention: [x] Yes  [] No    Treatment/Activity Tolerance:  [x] Patient able to complete treatment  [] Patient limited by fatigue  [x] Patient limited by pain     [] Patient limited by other medical complications  [x] Other:   Will continue to progress PROM, AAROM light PREs as able to progress to PLOF L UE. Patient Education:    Reviewed diagnosis, POC, HEP and its importance. Access Code: [email or web address]  URL: [email or web address]  Date: 9/6/22  Prepared by: Yonatan Mccullough     Exercises  Standing Shoulder Shrugs - 2 x daily - 7 x weekly - 3 sets - 10 reps  Seated Scapular Retraction - 2 x daily - 7 x weekly - 3 sets - 10 reps  Standing Bicep Curls Supinated with Dumbbells - 2 x daily - 7 x weekly - 3 sets - 10 reps     Seated Shoulder Pendulum Exercise - 2 x daily - 7 x weekly - 3 sets - 10 reps  Table slides flexion - 2 x daily - 7 x weekly - 10 reps - 10 hold  Seated Shoulder External Rotation AAROM with Dowel - 2 x daily - 7 x weekly - 10 reps - 10 hold   PLAN: See eval  [x] Continue per plan of care [] Alter current plan (see comments above)  [] Plan of care initiated [] Hold pending MD visit [] Discharge      Electronically signed by:  Yonatan Mccullough PT    Note: If patient does not return for scheduled/ recommended follow up visits, this note will serve as a discharge from care along with most recent update on progress.

## 2022-11-29 ENCOUNTER — HOSPITAL ENCOUNTER (OUTPATIENT)
Dept: PHYSICAL THERAPY | Age: 72
Setting detail: THERAPIES SERIES
Discharge: HOME | End: 2022-11-29
Payer: MEDICARE

## 2022-11-29 PROCEDURE — 97140 MANUAL THERAPY 1/> REGIONS: CPT | Performed by: PHYSICAL THERAPIST

## 2022-11-29 PROCEDURE — 97110 THERAPEUTIC EXERCISES: CPT | Performed by: PHYSICAL THERAPIST

## 2022-11-29 NOTE — FLOWSHEET NOTE
The [address] [place] [address] 200, [address] [place] [address], [address] [place] [address]  Phone: [phone]   Fax:     [phone]    Physical Therapy Daily Treatment Note  Date:  2022    Patient Name:  Servando France    :  1950  MRN: [id number]  Restrictions/Precautions:    Medical/Treatment Diagnosis Information:  Diagnosis: L truamatic rotator cuff tear M75.122  Treatment Diagnosis: L shoulder painM25. 512  DOS 2022  L shoulder RC augmentation labral debridement SAD DCE capsular release LUIS      Insurance/Certification information:  PT Insurance Information: [facility] BMN  Physician Information:   Dr Will Quinn  Has the plan of care been signed (Y/N):        [x]  Yes  []  No     Date of Patient follow up with Physician: per md      Is this a Progress Report:     []  Yes   [x]  No        If Yes:  Date Range for reporting period:  Dwrihllnu70/6  Xpwkih21/8    Progress report will be due (10 Rx or 30 days whichever is less):  with POC      Recertification will be due (POC Duration  / 90 days whichever is less):          Visit # Insurance Allowable Auth Required   19 post surgery   BMN []  Yes []  No        Functional Scale: Foto38/100 38/100 54/100    Date assessed:  ,10/6 11/8     Latex Allergy:  [x]NO      []YES  Preferred Language for Healthcare:   [x]English       []other:    Pain level:  1-3/10 at rest     SUBJECTIVE:   about the same.       OBJECTIVE: See eval   ROM PROM  AROM  Comment     L R L R     Flexion ~160   ~90 stand     140 supine        scap ~160    ~90 stand       ER [email or web address]           IR             Other  (cervical)             Other                L shoulder flexion abd MMT 2+/5     RESTRICTIONS/PRECAUTIONS: RCR,  increase ROM as able per MD due to capsular release and LUIS    Exercises/Interventions:   Exercises:  Exercise/Equipment Resistance/Repetitions Other comments   Stretching/PROM Wand ER at neutral 57a84mvp    ER Supine @ 60abd 87u67qng      Supine cane flex 3x10       Start9/22        Start10/6   Table Slides fl 27a08xeg     Abd 26c93kxz    ER bend forward 69w02wlz       Start9/8      Start9/8   Wall slide 53p20pxm  Start9/22   Wall step away for flexion 03j77ilk Start9/8   Pulley flex   X10    Scap x10 Start9/16   Supine grab L wrist with R hand move into flexion 03p11yhp    blue ball roll into flexion on table  57h56dqb    Pendulum AROM CW CCW fl/ext side to side [id number]  1#    Elbow AROM      IR behind back  31a50iyk Start9/12   Supine butterfly 2 min Start10/4   Isometrics     Retraction x30    shrug x30    Weight shift     Flexion     Abduction     External Rotation     Internal Rotation     Biceps     Triceps          PRE's     Flexion     Abduction     External Rotation SL3x10  1 no towel Start10/4   Internal Rotation     Shrugs     EXT EOB 3x10 4# ^11/29   Reverse Flys     Serratus 2x10 needs verbal and tactile cueing  Start10/6   Horizontal Abd      Biceps 3x10 7# ^11/10   Triceps     Retraction EOB 3x10 4# ^11/29        Cable Column/Theraband     External Rotation Modified no money 3x10 red Start11/10   Internal Rotation     Shrugs     Lats     Ext     Flex     Scapular Retraction 3x10 blue ^11/8   BIC     TRIC 3x10 blue ^11/8   PNF          Dynamic Stability     Supine  abc's Start11/15   Plyoback          Manual interventions     PROM Fl scap ER/IR; 15 min  ^11/1              Therapeutic Exercise and NMR EXR  [x] (47331) Provided verbal/tactile cueing for activities related to strengthening, flexibility, endurance, ROM  for improvements in scapular, scapulothoracic and UE control with self care, reaching, carrying, lifting, house/yardwork, driving/computer work.    [] (95881) Provided verbal/tactile cueing for activities related to improving balance, coordination, kinesthetic sense, posture, motor skill, proprioception  to assist with  scapular, scapulothoracic and UE control with self care, reaching, carrying, lifting, house/yardwork, driving/computer work. Therapeutic Activities:    [] (39273 or 93162) Provided verbal/tactile cueing for activities related to improving balance, coordination, kinesthetic sense, posture, motor skill, proprioception and motor activation to allow for proper function of scapular, scapulothoracic and UE control with self care, carrying, lifting, driving/computer work.      Home Exercise Program:    [x] (16770) Reviewed/Progressed HEP activities related to strengthening, flexibility, endurance, ROM of scapular, scapulothoracic and UE control with self care, reaching, carrying, lifting, house/yardwork, driving/computer work  [] (60507) Reviewed/Progressed HEP activities related to improving balance, coordination, kinesthetic sense, posture, motor skill, proprioception of scapular, scapulothoracic and UE control with self care, reaching, carrying, lifting, house/yardwork, driving/computer work      Manual Treatments:  PROM / STM / Oscillations-Mobs:  G-I, II, III, IV (PA's, Inf., Post.)  [x] (91400) Provided manual therapy to mobilize soft tissue/joints of cervical/CT, scapular GHJ and UE for the purpose of modulating pain, promoting relaxation,  increasing ROM, reducing/eliminating soft tissue swelling/inflammation/restriction, improving soft tissue extensibility and allowing for proper ROM for normal function with self care, reaching, carrying, lifting, house/yardwork, driving/computer work    Modalities:  declined ice but recommended ice 15-20 min on several times per day to help with pain and inflammation     Charges:  Timed Code Treatment Minutes: 45   Total Treatment Minutes: 230-330       [] EVAL (LOW) 20437 (typically 20 minutes face-to-face)  [] EVAL (MOD) 75095 (typically 30 minutes face-to-face)  [] EVAL (HIGH) 57348 (typically 45 minutes face-to-face)  [] RE-EVAL     [x] NM(08880) x  2  [] IONTO  [] NMR (11968) x     [] VASO  [x] Manual ([id number]) x 1    [] Other:  [] TA x      [] [facility] Traction (31625)  [] ES(attended) (61054)      [] ES (un) (62406):     GOALS:  Patient stated goal: increase ROM decrease pain, use arm for adls    [] Progressing: [] Met: [] Not Met: [] Adjusted     Therapist goals for Patient:   Short Term Goals: To be achieved in: 2 -4weeks  1. Independent in HEP and progression per patient tolerance, in order to prevent re-injury. [] Progressing: [x] Met: [] Not Met: [] Adjusted   2. Patient will have a decrease in pain to facilitate improvement in movement, function, and ADLs as indicated by Functional Deficits. [x] Progressing: [] Met: [] Not Met: [] Adjusted     Long Term Goals: To be achieved in: 12 weeks  1. Foto 67/100  to assist with reaching prior level of function. [x] Progressing: [] Met: [] Not Met: [] Adjusted  2. Patient will demonstrate increased AROM to = R to allow for proper joint functioning as indicated by patients Functional Deficits. [x] Progressing: [] Met: [] Not Met: [] Adjusted  3. Patient will demonstrate an increase in strength to good scapular and core control  for UE to allow for proper functional mobility as indicated by patients Functional Deficits. [x] Progressing: [] Met: [] Not Met: [] Adjusted  4. Patient will return to  functional activities reach over head, reach behind back, don doff jacket  without increased symptoms or restriction. [x] Progressing: [] Met: [] Not Met: [] Adjusted  Overall Progression Towards Functional goals/ Treatment Progress Update:  [] Patient is progressing as expected towards functional goals listed. [x] Progression is slowed due to complexities/Impairments listed. Trauma , complexity of injury   [] Progression has been slowed due to co-morbidities.   [] Plan just implemented, too soon to assess goals progression <30days   [] Goals require adjustment due to lack of progress  [] Patient is not progressing as expected and requires additional follow up with physician  [] Other    Prognosis for POC: [x] Good [] Fair  [] Poor      Patient requires continued skilled intervention: [x] Yes  [] No    Treatment/Activity Tolerance:  [x] Patient able to complete treatment  [] Patient limited by fatigue  [x] Patient limited by pain     [] Patient limited by other medical complications  [x] Other:   Will continue to progress PROM, AAROM light PREs as able to progress to PLOF L UE. Patient Education:    Reviewed diagnosis, POC, HEP and its importance. Access Code: [email or web address]  URL: [email or web address]  Date: 9/6/22  Prepared by: Carlotta Harry     Exercises  Standing Shoulder Shrugs - 2 x daily - 7 x weekly - 3 sets - 10 reps  Seated Scapular Retraction - 2 x daily - 7 x weekly - 3 sets - 10 reps  Standing Bicep Curls Supinated with Dumbbells - 2 x daily - 7 x weekly - 3 sets - 10 reps     Seated Shoulder Pendulum Exercise - 2 x daily - 7 x weekly - 3 sets - 10 reps  Table slides flexion - 2 x daily - 7 x weekly - 10 reps - 10 hold  Seated Shoulder External Rotation AAROM with Dowel - 2 x daily - 7 x weekly - 10 reps - 10 hold   PLAN: See eval  [x] Continue per plan of care [] Alter current plan (see comments above)  [] Plan of care initiated [] Hold pending MD visit [] Discharge      Electronically signed by:  Carlotta Harry, PT    Note: If patient does not return for scheduled/ recommended follow up visits, this note will serve as a discharge from care along with most recent update on progress.

## 2022-12-01 ENCOUNTER — HOSPITAL ENCOUNTER (OUTPATIENT)
Dept: PHYSICAL THERAPY | Age: 72
Setting detail: THERAPIES SERIES
Discharge: HOME | End: 2022-12-01
Payer: MEDICARE

## 2022-12-01 PROCEDURE — 97140 MANUAL THERAPY 1/> REGIONS: CPT | Performed by: PHYSICAL THERAPIST

## 2022-12-01 PROCEDURE — 97110 THERAPEUTIC EXERCISES: CPT | Performed by: PHYSICAL THERAPIST

## 2022-12-01 NOTE — FLOWSHEET NOTE
[facility], [address] [employer] [address] [place] [address], [address] [place] [address]  Phone: [phone]   Fax:     [phone]    Physical Therapy Daily Treatment Note  Date:  2022    Patient Name:  Justo Rea    :  1950  MRN: [id number]  Restrictions/Precautions:    Medical/Treatment Diagnosis Information:  Diagnosis: L truamatic rotator cuff tear M75.122  Treatment Diagnosis: L shoulder painM25. 512  DOS 2022  L shoulder RC augmentation labral debridement SAD DCE capsular release LUIS      Insurance/Certification information:  PT Insurance Information: [facility] BMN  Physician Information:   Dr Von Bhagat  Has the plan of care been signed (Y/N):        [x]  Yes  []  No     Date of Patient follow up with Physician: per md      Is this a Progress Report:     []  Yes   [x]  No        If Yes:  Date Range for reporting period:  Ipqaopsbs07/6  Rzfkxr30/8    Progress report will be due (10 Rx or 30 days whichever is less):  with POC      Recertification will be due (POC Duration  / 90 days whichever is less):          Visit # Insurance Allowable Auth Required   20 post surgery   BMN []  Yes []  No        Functional Scale: Foto38/100 38/100 54/100    Date assessed:  ,10/6 11/8     Latex Allergy:  [x]NO      []YES  Preferred Language for Healthcare:   [x]English       []other:    Pain level:  1-3/10 at rest     SUBJECTIVE:   about the same.       OBJECTIVE: See eval   ROM PROM  AROM  Comment     L R L R     Flexion ~160   ~90 stand     140 supine        scap ~160    ~90 stand       ER [email or web address]           IR             Other  (cervical)             Other                L shoulder flexion abd MMT 2+/5     RESTRICTIONS/PRECAUTIONS: RCR,  increase ROM as able per MD due to capsular release and LUIS    Exercises/Interventions:   Exercises:  Exercise/Equipment Resistance/Repetitions Other comments   Stretching/PROM with self care, reaching, carrying, lifting, house/yardwork, driving/computer work. Therapeutic Activities:    [] (52195 or 32703) Provided verbal/tactile cueing for activities related to improving balance, coordination, kinesthetic sense, posture, motor skill, proprioception and motor activation to allow for proper function of scapular, scapulothoracic and UE control with self care, carrying, lifting, driving/computer work.      Home Exercise Program:    [x] (57734) Reviewed/Progressed HEP activities related to strengthening, flexibility, endurance, ROM of scapular, scapulothoracic and UE control with self care, reaching, carrying, lifting, house/yardwork, driving/computer work  [] (04786) Reviewed/Progressed HEP activities related to improving balance, coordination, kinesthetic sense, posture, motor skill, proprioception of scapular, scapulothoracic and UE control with self care, reaching, carrying, lifting, house/yardwork, driving/computer work      Manual Treatments:  PROM / STM / Oscillations-Mobs:  G-I, II, III, IV (PA's, Inf., Post.)  [x] (18470) Provided manual therapy to mobilize soft tissue/joints of cervical/CT, scapular GHJ and UE for the purpose of modulating pain, promoting relaxation,  increasing ROM, reducing/eliminating soft tissue swelling/inflammation/restriction, improving soft tissue extensibility and allowing for proper ROM for normal function with self care, reaching, carrying, lifting, house/yardwork, driving/computer work    Modalities:  declined ice but recommended ice 15-20 min on several times per day to help with pain and inflammation     Charges:  Timed Code Treatment Minutes: 45   Total Treatment Minutes: 230-340       [] EVAL (LOW) 59253 (typically 20 minutes face-to-face)  [] EVAL (MOD) 60929 (typically 30 minutes face-to-face)  [] EVAL (HIGH) 29819 (typically 45 minutes face-to-face)  [] RE-EVAL     [x] DQ(09122) x  2  [] IONTO  [] NMR (98975) x     [] VASO  [x] Manual (53278) x 1    [] Other:  [] TA x      [] [facility] Traction (91611)  [] ES(attended) (47378)      [] ES (un) (69367):     GOALS:  Patient stated goal: increase ROM decrease pain, use arm for adls    [] Progressing: [] Met: [] Not Met: [] Adjusted     Therapist goals for Patient:   Short Term Goals: To be achieved in: 2 -4weeks  1. Independent in HEP and progression per patient tolerance, in order to prevent re-injury. [] Progressing: [x] Met: [] Not Met: [] Adjusted   2. Patient will have a decrease in pain to facilitate improvement in movement, function, and ADLs as indicated by Functional Deficits. [x] Progressing: [] Met: [] Not Met: [] Adjusted     Long Term Goals: To be achieved in: 12 weeks  1. Foto 67/100  to assist with reaching prior level of function. [x] Progressing: [] Met: [] Not Met: [] Adjusted  2. Patient will demonstrate increased AROM to = R to allow for proper joint functioning as indicated by patients Functional Deficits. [x] Progressing: [] Met: [] Not Met: [] Adjusted  3. Patient will demonstrate an increase in strength to good scapular and core control  for UE to allow for proper functional mobility as indicated by patients Functional Deficits. [x] Progressing: [] Met: [] Not Met: [] Adjusted  4. Patient will return to  functional activities reach over head, reach behind back, don doff jacket  without increased symptoms or restriction. [x] Progressing: [] Met: [] Not Met: [] Adjusted  Overall Progression Towards Functional goals/ Treatment Progress Update:  [] Patient is progressing as expected towards functional goals listed. [x] Progression is slowed due to complexities/Impairments listed. Trauma , complexity of injury   [] Progression has been slowed due to co-morbidities.   [] Plan just implemented, too soon to assess goals progression <30days   [] Goals require adjustment due to lack of progress  [] Patient is not progressing as expected and requires additional follow up with physician  [] Other    Prognosis for POC: [x] Good [] Fair  [] Poor      Patient requires continued skilled intervention: [x] Yes  [] No    Treatment/Activity Tolerance:  [x] Patient able to complete treatment  [] Patient limited by fatigue  [x] Patient limited by pain     [] Patient limited by other medical complications  [x] Other:   Will continue to progress PROM, AAROM light PREs as able to progress to PLOF L UE. Patient Education:    Reviewed diagnosis, POC, HEP and its importance. Access Code: [email or web address]  URL: [email or web address]  Date: 9/6/22  Prepared by: Estella Bonilla     Exercises  Standing Shoulder Shrugs - 2 x daily - 7 x weekly - 3 sets - 10 reps  Seated Scapular Retraction - 2 x daily - 7 x weekly - 3 sets - 10 reps  Standing Bicep Curls Supinated with Dumbbells - 2 x daily - 7 x weekly - 3 sets - 10 reps     Seated Shoulder Pendulum Exercise - 2 x daily - 7 x weekly - 3 sets - 10 reps  Table slides flexion - 2 x daily - 7 x weekly - 10 reps - 10 hold  Seated Shoulder External Rotation AAROM with Dowel - 2 x daily - 7 x weekly - 10 reps - 10 hold   PLAN: See eval  [x] Continue per plan of care [] Alter current plan (see comments above)  [] Plan of care initiated [] Hold pending MD visit [] Discharge      Electronically signed by:  Estella Bonilla, PT    Note: If patient does not return for scheduled/ recommended follow up visits, this note will serve as a discharge from care along with most recent update on progress.

## 2022-12-06 ENCOUNTER — HOSPITAL ENCOUNTER (OUTPATIENT)
Dept: PHYSICAL THERAPY | Age: 72
Setting detail: THERAPIES SERIES
Discharge: HOME | End: 2022-12-06
Payer: MEDICARE

## 2022-12-06 PROCEDURE — 97140 MANUAL THERAPY 1/> REGIONS: CPT | Performed by: PHYSICAL THERAPIST

## 2022-12-06 PROCEDURE — 97110 THERAPEUTIC EXERCISES: CPT | Performed by: PHYSICAL THERAPIST

## 2022-12-06 NOTE — PLAN OF CARE
[facility], [address] [facility] , [address] [place] [address]  Phone: [phone]   Fax:     [phone]   Physical Therapy Re-Certification Plan of Care    Dear Dr Raphael Camara  ,    We had the pleasure of treating the following patient for physical therapy services at [address] [place] [address]. A summary of our findings can be found in the updated assessment below. This includes our plan of care. If you have any questions or concerns regarding these findings, please do not hesitate to contact me at the office phone number checked above. Thank you for the referral.     Physician Signature:________________________________Date:__________________  By signing above (or electronic signature), therapists plan is approved by physician      Overall Response to Treatment:   [x]Patient is responding gradually to treatment and improvement is noted with regards  to goals   []Patient should continue to improve in reasonable time if they continue HEP   []Patient has plateaued and is no longer responding to skilled PT intervention    []Patient is getting worse and would benefit from return to referring MD   []Patient unable to adhere to initial POC   []Other: pt continues to make slow but gradual improvement s/p L shoulder RC augmentation labral debridement SAD DCE capsular release LUIS. ROM and strength slowly improving. Need to continue skilled PT 1-2x week for 12 weeks to achieve maximum ROM and strength to allow pt to return to PLOF. Physical Therapy Daily Treatment Note  Date:  2022    Patient Name:  Maren Iyer    :  1950  MRN: [id number]  Restrictions/Precautions:    Medical/Treatment Diagnosis Information:  Diagnosis: L truamatic rotator cuff tear M75.122  Treatment Diagnosis: L shoulder painM25. 512  DOS 2022  L shoulder RC augmentation labral debridement SAD DCE capsular release LUIS Insurance/Certification information:  PT Insurance Information: [facility] BMN  Physician Information:   Dr Jacoby Jane  Has the plan of care been signed (Y/N):        [x]  Yes  []  No     Date of Patient follow up with Physician: per md      Is this a Progress Report:     [x]  Yes and POC 12/6  []  No        If Yes:  Date Range for reporting period:  Jgnnjjtui69/8  Wffgnx27/6    Progress report will be due (10 Rx or 30 days whichever is less): 1/6      Recertification will be due (POC Duration  / 90 days whichever is less): 3/6        Visit # Insurance Allowable Auth Required   21 post surgery   BMN []  Yes []  No        Functional Scale: Foto38/100 38/100 54/100    Date assessed:  9/6,10/6 11/8     Latex Allergy:  [x]NO      []YES  Preferred Language for Healthcare:   [x]English       []other:    Pain level:  1-3/10 at rest 11/8    SUBJECTIVE:  12/1 about the same.       OBJECTIVE: See eval 9/6  ROM PROM 12/6 AROM12/6  Comment     L R L R     Flexion ~160   ~115 stand 12/6    150 supine 12/6       scap ~160    ~90 stand12/6*       ER Stephen@[employer]    75@80       IR             Other  (cervical)             Other                  Strength  12/6 L R Comment   Flexion  3+ limited range       Abduction  3+ limited range       ER  4+ at side        IR  4 atside       Supraspinatus         Upper Trap         Lower Trap         Mid Trap         Rhomboids         Biceps         Triceps         Horizontal Abduction         Horizontal Adduction         Lats          RESTRICTIONS/PRECAUTIONS: RCR,  increase ROM as able per MD due to capsular release and LUIS    Exercises/Interventions:   Exercises:  Exercise/Equipment Resistance/Repetitions Other comments   Stretching/PROM     Wand ER at neutral [id number]    ER Supine @ 60abd [id number]      Supine cane flex 3x10       Start9/22        Start10/6   Table Slides fl [id number]     Abd 29c84soe    ER bend forward 24s97eeg       Start9/8      Start9/8   Wall slide [id number]  Start9/22   Wall step away for flexion [id number] Start9/8   Pulley flex   X10    Scap x10 Start9/16   Supine grab L wrist with R hand move into flexion [id number]    blue ball roll into flexion on table  [id number]    Pendulum AROM CW CCW fl/ext side to side [id number]  4#    Elbow AROM      IR behind back  [id number] Start9/12   Supine butterfly 2 min Start10/4   Isometrics     Retraction x30    shrug x30    Weight shift     Flexion     Abduction     External Rotation     Internal Rotation     Biceps     Triceps          PRE's     Flexion     Abduction     External Rotation SL3x10  2# no towel Start10/4   Internal Rotation     Shrugs     EXT EOB 3x10 4# ^11/29   Reverse Flys     Serratus 2x10 needs verbal and tactile cueing  Start10/6   Horizontal Abd      Biceps 3x10 7# ^11/10   Triceps     Retraction EOB 3x10 4# ^11/29        Cable Column/Theraband     External Rotation Modified no money 3x10 red Start11/10   Internal Rotation     Shrugs     Lats     Ext     Flex     Scapular Retraction 3x10 blk ^11/8   BIC     TRIC 3x10 blue ^11/8   PNF          Dynamic Stability     Supine  abc's Start11/15   Plyoback          Manual interventions     PROM Fl scap ER/IR; 15 min  ^11/1              Therapeutic Exercise and NMR EXR  [x] (63956) Provided verbal/tactile cueing for activities related to strengthening, flexibility, endurance, ROM  for improvements in scapular, scapulothoracic and UE control with self care, reaching, carrying, lifting, house/yardwork, driving/computer work.    [] (19039) Provided verbal/tactile cueing for activities related to improving balance, coordination, kinesthetic sense, posture, motor skill, proprioception  to assist with  scapular, scapulothoracic and UE control with self care, reaching, carrying, lifting, house/yardwork, driving/computer work.     Therapeutic Activities:    [] (65497 or 19799) Provided verbal/tactile cueing for activities related to improving balance, coordination, kinesthetic sense, posture, motor skill, proprioception and motor activation to allow for proper function of scapular, scapulothoracic and UE control with self care, carrying, lifting, driving/computer work.      Home Exercise Program:    [x] (56479) Reviewed/Progressed HEP activities related to strengthening, flexibility, endurance, ROM of scapular, scapulothoracic and UE control with self care, reaching, carrying, lifting, house/yardwork, driving/computer work  [] (91660) Reviewed/Progressed HEP activities related to improving balance, coordination, kinesthetic sense, posture, motor skill, proprioception of scapular, scapulothoracic and UE control with self care, reaching, carrying, lifting, house/yardwork, driving/computer work      Manual Treatments:  PROM / STM / Oscillations-Mobs:  G-I, II, III, IV (PA's, Inf., Post.)  [x] (97288) Provided manual therapy to mobilize soft tissue/joints of cervical/CT, scapular GHJ and UE for the purpose of modulating pain, promoting relaxation,  increasing ROM, reducing/eliminating soft tissue swelling/inflammation/restriction, improving soft tissue extensibility and allowing for proper ROM for normal function with self care, reaching, carrying, lifting, house/yardwork, driving/computer work    Modalities:  declined ice but recommended ice 15-20 min on several times per day to help with pain and inflammation     Charges:  Timed Code Treatment Minutes: 45   Total Treatment Minutes: 235-335       [] EVAL (LOW) 28477 (typically 20 minutes face-to-face)  [] EVAL (MOD) 00591 (typically 30 minutes face-to-face)  [] EVAL (HIGH) 25004 (typically 45 minutes face-to-face)  [] RE-EVAL     [x] RR(20156) x  2  [] IONTO  [] NMR (60862) x     [] VASO  [x] Manual (66744) x  1    [] Other:  [] TA x      [] [employer] Traction (50323)  [] ES(attended) (09745)      [] ES (un) (98209):     GOALS:  Patient stated goal: increase ROM decrease pain, use arm for adls    [] Progressing: [] Met: [] Not Met: [] Adjusted     Therapist goals for Patient: Short Term Goals: To be achieved in: 2 -4weeks  1. Independent in HEP and progression per patient tolerance, in order to prevent re-injury. [] Progressing: [x] Met: [] Not Met: [] Adjusted   2. Patient will have a decrease in pain to facilitate improvement in movement, function, and ADLs as indicated by Functional Deficits. [x] Progressing: [] Met: [] Not Met: [] Adjusted     Long Term Goals: To be achieved in: 12 weeks  1. Foto 67/100  to assist with reaching prior level of function. [x] Progressing: [] Met: [] Not Met: [] Adjusted  2. Patient will demonstrate increased AROM to = R to allow for proper joint functioning as indicated by patients Functional Deficits. [x] Progressing: [] Met: [] Not Met: [] Adjusted  3. Patient will demonstrate an increase in strength to good scapular and core control  for UE to allow for proper functional mobility as indicated by patients Functional Deficits. [x] Progressing: [] Met: [] Not Met: [] Adjusted  4. Patient will return to  functional activities reach over head, reach behind back, don doff jacket  without increased symptoms or restriction. [x] Progressing: [] Met: [] Not Met: [] Adjusted  Overall Progression Towards Functional goals/ Treatment Progress Update:  [] Patient is progressing as expected towards functional goals listed. [x] Progression is slowed due to complexities/Impairments listed. Trauma , complexity of injury   [] Progression has been slowed due to co-morbidities.   [] Plan just implemented, too soon to assess goals progression <30days   [] Goals require adjustment due to lack of progress  [] Patient is not progressing as expected and requires additional follow up with physician  [] Other    Prognosis for POC: [x] Good [] Fair  [] Poor      Patient requires continued skilled intervention: [x] Yes  [] No    Treatment/Activity Tolerance:  [x] Patient able to complete treatment  [] Patient limited by fatigue  [x] Patient limited by pain [] Patient limited by other medical complications  [x] Other:   Will continue to progress PROM, AAROM light PREs as able to progress to PLOF L UE. See POC notes above                   Patient Education:    Reviewed diagnosis, POC, HEP and its importance. Access Code: [email or web address]  URL: [email or web address]  Date: 9/6/22  Prepared by: Mouna Dowling     Exercises  Standing Shoulder Shrugs - 2 x daily - 7 x weekly - 3 sets - 10 reps  Seated Scapular Retraction - 2 x daily - 7 x weekly - 3 sets - 10 reps  Standing Bicep Curls Supinated with Dumbbells - 2 x daily - 7 x weekly - 3 sets - 10 reps     Seated Shoulder Pendulum Exercise - 2 x daily - 7 x weekly - 3 sets - 10 reps  Table slides flexion - 2 x daily - 7 x weekly - 10 reps - 10 hold  Seated Shoulder External Rotation AAROM with Dowel - 2 x daily - 7 x weekly - 10 reps - 10 hold   PLAN: See eval  [x] Continue per plan of care [] Alter current plan (see comments above)  [] Plan of care initiated [] Hold pending MD visit [] Discharge      Electronically signed by:  Mouna Dowling, PT    Note: If patient does not return for scheduled/ recommended follow up visits, this note will serve as a discharge from care along with most recent update on progress.

## 2022-12-08 ENCOUNTER — APPOINTMENT (OUTPATIENT)
Dept: PHYSICAL THERAPY | Age: 72
End: 2022-12-08
Payer: MEDICARE

## 2022-12-09 ENCOUNTER — HOSPITAL ENCOUNTER (OUTPATIENT)
Dept: PHYSICAL THERAPY | Age: 72
Setting detail: THERAPIES SERIES
Discharge: HOME | End: 2022-12-09
Payer: MEDICARE

## 2022-12-09 PROCEDURE — 97140 MANUAL THERAPY 1/> REGIONS: CPT | Performed by: PHYSICAL THERAPIST

## 2022-12-09 PROCEDURE — 97110 THERAPEUTIC EXERCISES: CPT | Performed by: PHYSICAL THERAPIST

## 2022-12-09 NOTE — FLOWSHEET NOTE
The [address] [place] [address] 200, [address] [place] [address], [address] [place] [address]  Phone: [phone]   Fax:     [phone]        Physical Therapy Daily Treatment Note  Date:  2022    Patient Name:  Roberto Romero    :  1950  MRN: [id number]  Restrictions/Precautions:    Medical/Treatment Diagnosis Information:  Diagnosis: L truamatic rotator cuff tear M75.122  Treatment Diagnosis: L shoulder painM25. 512  DOS 2022  L shoulder RC augmentation labral debridement SAD DCE capsular release LUIS      Insurance/Certification information:  PT Insurance Information: [facility] BMN  Physician Information:   Dr Cezar Khan  Has the plan of care been signed (Y/N):        [x]  Yes  []  No     Date of Patient follow up with Physician: per md      Is this a Progress Report:     []  Yes   []  No        If Yes:  Date Range for reporting period:  Pvsagtpzq00/8  Gqpcfe09/6    Progress report will be due (10 Rx or 30 days whichever is less):       Recertification will be due (POC Duration  / 90 days whichever is less): 3/6        Visit # Insurance Allowable Auth Required   22 post surgery   BMN []  Yes []  No        Functional Scale: Foto38/100 38/100 54/100    Date assessed:  ,10/6 11/8     Latex Allergy:  [x]NO      []YES  Preferred Language for Healthcare:   [x]English       []other:    Pain level:  1-3/10 at rest     SUBJECTIVE:   meeting ran late.       OBJECTIVE: See eval   ROM PROM  AROM  Comment     L R L R     Flexion ~160   ~115 stand     150 supine        scap ~160    ~90 stand*       ER Heather@[employer]    75@80       IR             Other  (cervical)             Other                  Strength   L R Comment   Flexion  3+ limited range       Abduction  3+ limited range       ER  4+ at side        IR  4 atside       Supraspinatus         Upper Trap         Lower Trap         Mid Trap         Rhomboids         Biceps Triceps         Horizontal Abduction         Horizontal Adduction         Lats          RESTRICTIONS/PRECAUTIONS: RCR,  increase ROM as able per MD due to capsular release and LUIS    Exercises/Interventions:   Exercises:  Exercise/Equipment Resistance/Repetitions Other comments   Stretching/PROM     Wand ER at neutral 35x49rpu    ER Supine @ 60abd 73j06iim      Supine cane flex 3x10       Start9/22        Start10/6   Table Slides fl 91u76bfj     Abd 69g61hyh    ER bend forward 26h20sji       Start9/8      Start9/8   Wall slide 29f38hoj  Start9/22   Wall step away for flexion 24b81ode Start9/8   Pulley flex   X10    Scap x10 Start9/16   Supine grab L wrist with R hand move into flexion 12t58trv    blue ball roll into flexion on table  39c10xos    Pendulum AROM CW CCW fl/ext side to side [id number]  4#    Elbow AROM      IR behind back  24v22mnq Start9/12   Supine butterfly 2 min Start10/4   Isometrics     Retraction x30    shrug x30    Weight shift     Flexion     Abduction     External Rotation     Internal Rotation     Biceps     Triceps          PRE's     Flexion     Abduction     External Rotation SL3x10  2# no towel Start10/4   Internal Rotation     Shrugs     EXT EOB 3x10 4# ^11/29   Reverse Flys     Serratus 2x10 needs verbal and tactile cueing  Start10/6   Horizontal Abd      Biceps 3x10 7# ^11/10   Triceps     Retraction EOB 3x10 4# ^11/29        Cable Column/Theraband     External Rotation Modified no money 3x10 red Start11/10   Internal Rotation     Shrugs     Lats     Ext     Flex     Scapular Retraction 3x10 blk ^11/8   BIC     TRIC 3x10 blue ^11/8   PNF          Dynamic Stability     Supine  abc's Start11/15   Plyoback          Manual interventions     PROM Fl scap ER/IR; 15 min  ^11/1              Therapeutic Exercise and NMR EXR  [x] (88802) Provided verbal/tactile cueing for activities related to strengthening, flexibility, endurance, ROM  for improvements in scapular, scapulothoracic and UE control with self care, reaching, carrying, lifting, house/yardwork, driving/computer work.    [] (33382) Provided verbal/tactile cueing for activities related to improving balance, coordination, kinesthetic sense, posture, motor skill, proprioception  to assist with  scapular, scapulothoracic and UE control with self care, reaching, carrying, lifting, house/yardwork, driving/computer work. Therapeutic Activities:    [] (03143 or 15340) Provided verbal/tactile cueing for activities related to improving balance, coordination, kinesthetic sense, posture, motor skill, proprioception and motor activation to allow for proper function of scapular, scapulothoracic and UE control with self care, carrying, lifting, driving/computer work.      Home Exercise Program:    [x] (70860) Reviewed/Progressed HEP activities related to strengthening, flexibility, endurance, ROM of scapular, scapulothoracic and UE control with self care, reaching, carrying, lifting, house/yardwork, driving/computer work  [] (15821) Reviewed/Progressed HEP activities related to improving balance, coordination, kinesthetic sense, posture, motor skill, proprioception of scapular, scapulothoracic and UE control with self care, reaching, carrying, lifting, house/yardwork, driving/computer work      Manual Treatments:  PROM / STM / Oscillations-Mobs:  G-I, II, III, IV (PA's, Inf., Post.)  [x] (28765) Provided manual therapy to mobilize soft tissue/joints of cervical/CT, scapular GHJ and UE for the purpose of modulating pain, promoting relaxation,  increasing ROM, reducing/eliminating soft tissue swelling/inflammation/restriction, improving soft tissue extensibility and allowing for proper ROM for normal function with self care, reaching, carrying, lifting, house/yardwork, driving/computer work    Modalities:  declined ice but recommended ice 15-20 min on several times per day to help with pain and inflammation     Charges:  Timed Code Treatment Minutes: 45   Total Treatment Minutes: 281-238       [] EVAL (LOW) 07957 (typically 20 minutes face-to-face)  [] EVAL (MOD) 33338 (typically 30 minutes face-to-face)  [] EVAL (HIGH) 70230 (typically 45 minutes face-to-face)  [] RE-EVAL     [x] CR(57779) x  2  [] IONTO  [] NMR (92476) x     [] VASO  [x] Manual (43472) x  1    [] Other:  [] TA x      [] Mech Traction (05504)  [] ES(attended) (65408)      [] ES (un) (12897):     GOALS:  Patient stated goal: increase ROM decrease pain, use arm for adls    [] Progressing: [] Met: [] Not Met: [] Adjusted     Therapist goals for Patient:   Short Term Goals: To be achieved in: 2 -4weeks  1. Independent in HEP and progression per patient tolerance, in order to prevent re-injury. [] Progressing: [x] Met: [] Not Met: [] Adjusted   2. Patient will have a decrease in pain to facilitate improvement in movement, function, and ADLs as indicated by Functional Deficits. [x] Progressing: [] Met: [] Not Met: [] Adjusted     Long Term Goals: To be achieved in: 12 weeks  1. Foto 67/100  to assist with reaching prior level of function. [x] Progressing: [] Met: [] Not Met: [] Adjusted  2. Patient will demonstrate increased AROM to = R to allow for proper joint functioning as indicated by patients Functional Deficits. [x] Progressing: [] Met: [] Not Met: [] Adjusted  3. Patient will demonstrate an increase in strength to good scapular and core control  for UE to allow for proper functional mobility as indicated by patients Functional Deficits. [x] Progressing: [] Met: [] Not Met: [] Adjusted  4. Patient will return to  functional activities reach over head, reach behind back, don doff jacket  without increased symptoms or restriction. [x] Progressing: [] Met: [] Not Met: [] Adjusted  Overall Progression Towards Functional goals/ Treatment Progress Update:  [] Patient is progressing as expected towards functional goals listed. [x] Progression is slowed due to complexities/Impairments listed. Trauma , complexity of injury   [] Progression has been slowed due to co-morbidities. [] Plan just implemented, too soon to assess goals progression <30days   [] Goals require adjustment due to lack of progress  [] Patient is not progressing as expected and requires additional follow up with physician  [] Other    Prognosis for POC: [x] Good [] Fair  [] Poor      Patient requires continued skilled intervention: [x] Yes  [] No    Treatment/Activity Tolerance:  [x] Patient able to complete treatment  [] Patient limited by fatigue  [x] Patient limited by pain     [] Patient limited by other medical complications  [x] Other:   Will continue to progress PROM, AAROM light PREs as able to progress to PLOF L UE. Patient Education:    Reviewed diagnosis, POC, HEP and its importance. Access Code: [email or web address]  URL: [email or web address]  Date: 9/6/22  Prepared by: Josemanuel Trujillo     Exercises  Standing Shoulder Shrugs - 2 x daily - 7 x weekly - 3 sets - 10 reps  Seated Scapular Retraction - 2 x daily - 7 x weekly - 3 sets - 10 reps  Standing Bicep Curls Supinated with Dumbbells - 2 x daily - 7 x weekly - 3 sets - 10 reps     Seated Shoulder Pendulum Exercise - 2 x daily - 7 x weekly - 3 sets - 10 reps  Table slides flexion - 2 x daily - 7 x weekly - 10 reps - 10 hold  Seated Shoulder External Rotation AAROM with Dowel - 2 x daily - 7 x weekly - 10 reps - 10 hold   PLAN: See eval  [x] Continue per plan of care [] Alter current plan (see comments above)  [] Plan of care initiated [] Hold pending MD visit [] Discharge      Electronically signed by:  Josemanuel Trujillo PT    Note: If patient does not return for scheduled/ recommended follow up visits, this note will serve as a discharge from care along with most recent update on progress.

## 2022-12-12 DIAGNOSIS — I25.10 CORONARY ARTERY DISEASE INVOLVING NATIVE CORONARY ARTERY OF NATIVE HEART WITHOUT ANGINA PECTORIS: ICD-10-CM

## 2022-12-12 RX ORDER — EMPAGLIFLOZIN, METFORMIN HYDROCHLORIDE 12.5; 1 MG/1; MG/1
TABLET, EXTENDED RELEASE ORAL
Qty: 60 TABLET | Refills: 4 | Status: SHIPPED | OUTPATIENT
Start: 2022-12-12

## 2022-12-13 DIAGNOSIS — I25.10 CORONARY ARTERY DISEASE INVOLVING NATIVE CORONARY ARTERY OF NATIVE HEART WITHOUT ANGINA PECTORIS: ICD-10-CM

## 2022-12-13 RX ORDER — EMPAGLIFLOZIN, METFORMIN HYDROCHLORIDE 12.5; 1 MG/1; MG/1
TABLET, EXTENDED RELEASE ORAL
Qty: 60 TABLET | Refills: 4 | OUTPATIENT
Start: 2022-12-13

## 2022-12-14 ENCOUNTER — HOSPITAL ENCOUNTER (OUTPATIENT)
Dept: PHYSICAL THERAPY | Age: 72
Setting detail: THERAPIES SERIES
Discharge: HOME | End: 2022-12-14
Payer: MEDICARE

## 2022-12-14 NOTE — FLOWSHEET NOTE
Physical Therapy  Cancellation/No-show Note  Patient Name:  Mariza Ibarra  :  1950   Date:  2022  Cancelled visits to date: 0  No-shows to date: 0    For today's appointment patient:  [x]  Cancelled  []  Rescheduled appointment  []  No-show     Reason given by patient:  []  Patient ill  []  Conflicting appointment  []  No transportation    [x]  Conflict with work  []  No reason given  []  Other:     Comments:      Electronically signed by:  Roberto Young PT, PT

## 2022-12-16 ENCOUNTER — HOSPITAL ENCOUNTER (OUTPATIENT)
Dept: PHYSICAL THERAPY | Age: 72
Setting detail: THERAPIES SERIES
Discharge: HOME | End: 2022-12-16
Payer: MEDICARE

## 2022-12-16 PROCEDURE — 97110 THERAPEUTIC EXERCISES: CPT | Performed by: PHYSICAL THERAPIST

## 2022-12-16 PROCEDURE — 97140 MANUAL THERAPY 1/> REGIONS: CPT | Performed by: PHYSICAL THERAPIST

## 2022-12-16 NOTE — FLOWSHEET NOTE
The [address] [place] [address], [address] [place] [address], [address] [place] [address]  Phone: [phone]   Fax:     [phone]        Physical Therapy Daily Treatment Note  Date:  2022    Patient Name:  Maliha Sibley    :  1950  MRN: [id number]  Restrictions/Precautions:    Medical/Treatment Diagnosis Information:  Diagnosis: L truamatic rotator cuff tear M75.122  Treatment Diagnosis: L shoulder painM25. 512  DOS 2022  L shoulder RC augmentation labral debridement SAD DCE capsular release LUIS      Insurance/Certification information:  PT Insurance Information: [facility] BMN  Physician Information:   Dr Julieta Dahl  Has the plan of care been signed (Y/N):        [x]  Yes  []  No     Date of Patient follow up with Physician: per md      Is this a Progress Report:     []  Yes   []  No        If Yes:  Date Range for reporting period:  Yxcyrpdot12/8  Olaggc11/6    Progress report will be due (10 Rx or 30 days whichever is less):       Recertification will be due (POC Duration  / 90 days whichever is less): 3/6        Visit # Insurance Allowable Auth Required   23 post surgery   BMN []  Yes []  No        Functional Scale:  Foto38/100 38/100 54/100,55/100    Date assessed:  ,10/6 11/8,     Latex Allergy:  [x]NO      []YES  Preferred Language for Healthcare:   [x]English       []other:    Pain level:  1-3/10 at rest     SUBJECTIVE:   see md next week    OBJECTIVE: See eval   ROM PROM  AROM  Comment     L R L R     Flexion ~160   ~115 stand     150 supine        scap ~160    ~90 stand*       ER Ezra@[employer]    75@80       IR             Other  (cervical)             Other                  Strength   L R Comment   Flexion  3+ limited range       Abduction  3+ limited range       ER  4+ at side        IR  4 atside       Supraspinatus         Upper Trap         Lower Trap         Mid Trap         Rhomboids Biceps         Triceps         Horizontal Abduction         Horizontal Adduction         Lats          RESTRICTIONS/PRECAUTIONS: RCR,  increase ROM as able per MD due to capsular release and LUIS    Exercises/Interventions:   Exercises:  Exercise/Equipment Resistance/Repetitions Other comments   Stretching/PROM     Wand ER at neutral 85s49gbn    ER Supine @ 60abd 64m84ikn      Supine cane flex 3x10       Start9/22        Start10/6   Table Slides fl 75d26blv     Abd 16z29lke    ER bend forward 04f14kqr       Start9/8      Start9/8   Wall slide 36d45own  Start9/22   Wall step away for flexion 79y22jsg Start9/8   Pulley flex   X10    Scap x10 Start9/16   Supine grab L wrist with R hand move into flexion 02u88klk    blue ball roll into flexion on table  50h38xad    Pendulum AROM CW CCW fl/ext side to side s18tuak  4#    Elbow AROM      IR behind back  28i22sdr Start9/12   Supine butterfly 2 min Start10/4   Isometrics     Retraction x30    shrug x30    Weight shift     Flexion     Abduction     External Rotation     Internal Rotation     Biceps     Triceps          PRE's     Flexion     Abduction     External Rotation SL3x10  2# no towel Start10/4   Internal Rotation     Shrugs     EXT EOB 3x10 4# ^11/29   Reverse Flys     Serratus 2x10 needs verbal and tactile cueing  Start10/6   Horizontal Abd      Biceps 3x10 7# ^11/10   Triceps     Retraction EOB 3x10 4# ^11/29        Cable Column/Theraband     External Rotation Modified no money 3x10 red Start11/10   Internal Rotation     Shrugs     Lats     Ext     Flex     Scapular Retraction 3x10 blk ^11/8   BIC     TRIC 3x10 blue ^11/8   PNF          Dynamic Stability     Supine  abc's Start11/15   Plyoback          Manual interventions     PROM Fl scap ER/IR; 15 min  ^11/1              Therapeutic Exercise and NMR EXR  [x] (85900) Provided verbal/tactile cueing for activities related to strengthening, flexibility, endurance, ROM  for improvements in scapular, scapulothoracic and UE control with self care, reaching, carrying, lifting, house/yardwork, driving/computer work.    [] (46846) Provided verbal/tactile cueing for activities related to improving balance, coordination, kinesthetic sense, posture, motor skill, proprioception  to assist with  scapular, scapulothoracic and UE control with self care, reaching, carrying, lifting, house/yardwork, driving/computer work. Therapeutic Activities:    [] (92241 or 20903) Provided verbal/tactile cueing for activities related to improving balance, coordination, kinesthetic sense, posture, motor skill, proprioception and motor activation to allow for proper function of scapular, scapulothoracic and UE control with self care, carrying, lifting, driving/computer work.      Home Exercise Program:    [x] (77760) Reviewed/Progressed HEP activities related to strengthening, flexibility, endurance, ROM of scapular, scapulothoracic and UE control with self care, reaching, carrying, lifting, house/yardwork, driving/computer work  [] (16856) Reviewed/Progressed HEP activities related to improving balance, coordination, kinesthetic sense, posture, motor skill, proprioception of scapular, scapulothoracic and UE control with self care, reaching, carrying, lifting, house/yardwork, driving/computer work      Manual Treatments:  PROM / STM / Oscillations-Mobs:  G-I, II, III, IV (PA's, Inf., Post.)  [x] (77313) Provided manual therapy to mobilize soft tissue/joints of cervical/CT, scapular GHJ and UE for the purpose of modulating pain, promoting relaxation,  increasing ROM, reducing/eliminating soft tissue swelling/inflammation/restriction, improving soft tissue extensibility and allowing for proper ROM for normal function with self care, reaching, carrying, lifting, house/yardwork, driving/computer work    Modalities:  declined ice but recommended ice 15-20 min on several times per day to help with pain and inflammation     Charges:  Timed Code Treatment Minutes: 45   Total Treatment Minutes: 9822-1480       [] EVAL (LOW) 74871 (typically 20 minutes face-to-face)  [] EVAL (MOD) 23554 (typically 30 minutes face-to-face)  [] EVAL (HIGH) 82676 (typically 45 minutes face-to-face)  [] RE-EVAL     [x] RQ(83278) x  2  [] IONTO  [] NMR (16238) x     [] VASO  [x] Manual (23931) x  1    [] Other:  [] TA x      [] Mech Traction (41835)  [] ES(attended) (63052)      [] ES (un) (49022):     GOALS:  Patient stated goal: increase ROM decrease pain, use arm for adls    [] Progressing: [] Met: [] Not Met: [] Adjusted     Therapist goals for Patient:   Short Term Goals: To be achieved in: 2 -4weeks  1. Independent in HEP and progression per patient tolerance, in order to prevent re-injury. [] Progressing: [x] Met: [] Not Met: [] Adjusted   2. Patient will have a decrease in pain to facilitate improvement in movement, function, and ADLs as indicated by Functional Deficits. [x] Progressing: [] Met: [] Not Met: [] Adjusted     Long Term Goals: To be achieved in: 12 weeks  1. Foto 67/100  to assist with reaching prior level of function. [x] Progressing: [] Met: [] Not Met: [] Adjusted  2. Patient will demonstrate increased AROM to = R to allow for proper joint functioning as indicated by patients Functional Deficits. [x] Progressing: [] Met: [] Not Met: [] Adjusted  3. Patient will demonstrate an increase in strength to good scapular and core control  for UE to allow for proper functional mobility as indicated by patients Functional Deficits. [x] Progressing: [] Met: [] Not Met: [] Adjusted  4. Patient will return to  functional activities reach over head, reach behind back, don doff jacket  without increased symptoms or restriction. [x] Progressing: [] Met: [] Not Met: [] Adjusted  Overall Progression Towards Functional goals/ Treatment Progress Update:  [] Patient is progressing as expected towards functional goals listed.     [x] Progression is slowed due to complexities/Impairments listed. Trauma , complexity of injury   [] Progression has been slowed due to co-morbidities. [] Plan just implemented, too soon to assess goals progression <30days   [] Goals require adjustment due to lack of progress  [] Patient is not progressing as expected and requires additional follow up with physician  [] Other    Prognosis for POC: [x] Good [] Fair  [] Poor      Patient requires continued skilled intervention: [x] Yes  [] No    Treatment/Activity Tolerance:  [x] Patient able to complete treatment  [] Patient limited by fatigue  [x] Patient limited by pain     [] Patient limited by other medical complications  [x] Other:   Will continue to progress PROM, AAROM light PREs as able to progress to PLOF L UE. Patient Education:    Reviewed diagnosis, POC, HEP and its importance. Access Code: [email or web address]  URL: [email or web address]  Date: 9/6/22  Prepared by: Carroll Rivera     Exercises  Standing Shoulder Shrugs - 2 x daily - 7 x weekly - 3 sets - 10 reps  Seated Scapular Retraction - 2 x daily - 7 x weekly - 3 sets - 10 reps  Standing Bicep Curls Supinated with Dumbbells - 2 x daily - 7 x weekly - 3 sets - 10 reps     Seated Shoulder Pendulum Exercise - 2 x daily - 7 x weekly - 3 sets - 10 reps  Table slides flexion - 2 x daily - 7 x weekly - 10 reps - 10 hold  Seated Shoulder External Rotation AAROM with Dowel - 2 x daily - 7 x weekly - 10 reps - 10 hold   PLAN: See eval  [x] Continue per plan of care [] Alter current plan (see comments above)  [] Plan of care initiated [] Hold pending MD visit [] Discharge      Electronically signed by:  Carroll Rivera, PT    Note: If patient does not return for scheduled/ recommended follow up visits, this note will serve as a discharge from care along with most recent update on progress.

## 2022-12-19 ENCOUNTER — APPOINTMENT (OUTPATIENT)
Dept: PHYSICAL THERAPY | Age: 72
End: 2022-12-19
Payer: MEDICARE

## 2022-12-20 ENCOUNTER — HOSPITAL ENCOUNTER (OUTPATIENT)
Dept: PHYSICAL THERAPY | Age: 72
Setting detail: THERAPIES SERIES
Discharge: HOME | End: 2022-12-20
Payer: MEDICARE

## 2022-12-20 PROCEDURE — 97110 THERAPEUTIC EXERCISES: CPT | Performed by: PHYSICAL THERAPIST

## 2022-12-20 PROCEDURE — 97140 MANUAL THERAPY 1/> REGIONS: CPT | Performed by: PHYSICAL THERAPIST

## 2022-12-20 NOTE — FLOWSHEET NOTE
[address], [address] [place] [address], [address] [place] [address]  Phone: [phone]   Fax:     [phone]        Physical Therapy Daily Treatment Note  Date:  2022    Patient Name:  Leopoldo Martini    :  1950  MRN: [id number]  Restrictions/Precautions:    Medical/Treatment Diagnosis Information:  Diagnosis: L truamatic rotator cuff tear M75.122  Treatment Diagnosis: L shoulder painM25. 512  DOS 2022  L shoulder RC augmentation labral debridement SAD DCE capsular release LUIS      Insurance/Certification information:  PT Insurance Information: [facility] BMN  Physician Information:   Dr Tamiko Garces  Has the plan of care been signed (Y/N):        [x]  Yes  []  No     Date of Patient follow up with Physician: per md      Is this a Progress Report:     []  Yes   []  No        If Yes:  Date Range for reporting period:  Xehivuxsf67/8  Pvcsfd02/6    Progress report will be due (10 Rx or 30 days whichever is less):       Recertification will be due (POC Duration  / 90 days whichever is less): 3/6        Visit # Insurance Allowable Auth Required   24 post surgery   BMN []  Yes []  No        Functional Scale:  Foto38/100 38/100 54/100,55/100    Date assessed:  ,10/6 11/8,     Latex Allergy:  [x]NO      []YES  Preferred Language for Healthcare:   [x]English       []other:    Pain level:  1-3/10 at rest     SUBJECTIVE:   see md this week     OBJECTIVE: See eval   ROM PROM  AROM  Comment     L R L R     Flexion ~160   ~115 stand     150 supine        scap ~160    ~90 stand*       ER [email or web address]    75@80       IR             Other  (cervical)             Other                  Strength   L R Comment   Flexion  3+ limited range       Abduction  3+ limited range       ER  4+ at side        IR  4 atside       Supraspinatus         Upper Trap         Lower Trap         Mid Trap         Rhomboids Biceps         Triceps         Horizontal Abduction         Horizontal Adduction         Lats          RESTRICTIONS/PRECAUTIONS: RCR,  increase ROM as able per MD due to capsular release and LUIS    Exercises/Interventions:   Exercises:  Exercise/Equipment Resistance/Repetitions Other comments   Stretching/PROM     Wand ER at neutral [id number]    ER Supine @ 60abd 50t82keh      Supine cane flex 3x10       Start9/22        Start10/6   Table Slides fl 53w67eqf     Abd 36g37cfh    ER bend forward 23r28pox       Start9/8      Start9/8   Wall slide 29u66tty  Start9/22   Wall step away for flexion 63d99tbl Start9/8   Pulley flex   X10    Scap x10 Start9/16   Supine grab L wrist with R hand move into flexion 01t54xju    blue ball roll into flexion on table  38s98opo    Pendulum AROM CW CCW fl/ext side to side [id number]  4#    Elbow AROM      IR behind back  41a57lto Start9/12   Supine butterfly 2 min Start10/4   Isometrics     Retraction x30    shrug x30    Weight shift     Flexion     Abduction     External Rotation     Internal Rotation     Biceps     Triceps          PRE's     Flexion     Abduction     External Rotation SL3x10  2# no towel Start10/4   Internal Rotation     Shrugs     EXT EOB 3x10 4# ^11/29   Reverse Flys     Serratus 2x10 needs verbal and tactile cueing  Start10/6   Horizontal Abd      Biceps 3x10 7# ^11/10   Triceps     Retraction EOB 3x10 4# ^11/29        Cable Column/Theraband     External Rotation Modified no money 3x10 red Start11/10   Internal Rotation     Shrugs     Lats     Ext     Flex     Scapular Retraction 3x10 blk ^11/8   BIC     TRIC 3x10 blue ^11/8   PNF          Dynamic Stability     Supine  abc's Start11/15   Plyoback          Manual interventions     PROM Fl scap ER/IR; 15 min  ^11/1              Therapeutic Exercise and NMR EXR  [x] (44739) Provided verbal/tactile cueing for activities related to strengthening, flexibility, endurance, ROM  for improvements in scapular, scapulothoracic and UE control with self care, reaching, carrying, lifting, house/yardwork, driving/computer work.    [] (16586) Provided verbal/tactile cueing for activities related to improving balance, coordination, kinesthetic sense, posture, motor skill, proprioception  to assist with  scapular, scapulothoracic and UE control with self care, reaching, carrying, lifting, house/yardwork, driving/computer work. Therapeutic Activities:    [] (00402 or 30269) Provided verbal/tactile cueing for activities related to improving balance, coordination, kinesthetic sense, posture, motor skill, proprioception and motor activation to allow for proper function of scapular, scapulothoracic and UE control with self care, carrying, lifting, driving/computer work.      Home Exercise Program:    [x] (75323) Reviewed/Progressed HEP activities related to strengthening, flexibility, endurance, ROM of scapular, scapulothoracic and UE control with self care, reaching, carrying, lifting, house/yardwork, driving/computer work  [] (12808) Reviewed/Progressed HEP activities related to improving balance, coordination, kinesthetic sense, posture, motor skill, proprioception of scapular, scapulothoracic and UE control with self care, reaching, carrying, lifting, house/yardwork, driving/computer work      Manual Treatments:  PROM / STM / Oscillations-Mobs:  G-I, II, III, IV (PA's, Inf., Post.)  [x] (21618) Provided manual therapy to mobilize soft tissue/joints of cervical/CT, scapular GHJ and UE for the purpose of modulating pain, promoting relaxation,  increasing ROM, reducing/eliminating soft tissue swelling/inflammation/restriction, improving soft tissue extensibility and allowing for proper ROM for normal function with self care, reaching, carrying, lifting, house/yardwork, driving/computer work    Modalities:  declined ice but recommended ice 15-20 min on several times per day to help with pain and inflammation     Charges:  Timed Code Treatment Minutes: 45   Total Treatment Minutes: 235-340       [] EVAL (LOW) 46525 (typically 20 minutes face-to-face)  [] EVAL (MOD) 10277 (typically 30 minutes face-to-face)  [] EVAL (HIGH) 70286 (typically 45 minutes face-to-face)  [] RE-EVAL     [x] WQ(00545) x  2  [] IONTO  [] NMR (35611) x     [] VASO  [x] Manual (01052) x  1    [] Other:  [] TA x      [] Mech Traction (87480)  [] ES(attended) (80231)      [] ES (un) (68277):     GOALS:  Patient stated goal: increase ROM decrease pain, use arm for adls    [] Progressing: [] Met: [] Not Met: [] Adjusted     Therapist goals for Patient:   Short Term Goals: To be achieved in: 2 -4weeks  1. Independent in HEP and progression per patient tolerance, in order to prevent re-injury. [] Progressing: [x] Met: [] Not Met: [] Adjusted   2. Patient will have a decrease in pain to facilitate improvement in movement, function, and ADLs as indicated by Functional Deficits. [x] Progressing: [] Met: [] Not Met: [] Adjusted     Long Term Goals: To be achieved in: 12 weeks  1. Foto 67/100  to assist with reaching prior level of function. [x] Progressing: [] Met: [] Not Met: [] Adjusted  2. Patient will demonstrate increased AROM to = R to allow for proper joint functioning as indicated by patients Functional Deficits. [x] Progressing: [] Met: [] Not Met: [] Adjusted  3. Patient will demonstrate an increase in strength to good scapular and core control  for UE to allow for proper functional mobility as indicated by patients Functional Deficits. [x] Progressing: [] Met: [] Not Met: [] Adjusted  4. Patient will return to  functional activities reach over head, reach behind back, don doff jacket  without increased symptoms or restriction. [x] Progressing: [] Met: [] Not Met: [] Adjusted  Overall Progression Towards Functional goals/ Treatment Progress Update:  [] Patient is progressing as expected towards functional goals listed.     [x] Progression is slowed due to complexities/Impairments listed. Trauma , complexity of injury   [] Progression has been slowed due to co-morbidities. [] Plan just implemented, too soon to assess goals progression <30days   [] Goals require adjustment due to lack of progress  [] Patient is not progressing as expected and requires additional follow up with physician  [] Other    Prognosis for POC: [x] Good [] Fair  [] Poor      Patient requires continued skilled intervention: [x] Yes  [] No    Treatment/Activity Tolerance:  [x] Patient able to complete treatment  [] Patient limited by fatigue  [x] Patient limited by pain     [] Patient limited by other medical complications  [x] Other:   Will continue to progress PROM, AAROM light PREs as able to progress to PLOF L UE. Patient Education:    Reviewed diagnosis, POC, HEP and its importance. Access Code: [email or web address]  URL: [email or web address]  Date: 9/6/22  Prepared by: Alvino Koroma     Exercises  Standing Shoulder Shrugs - 2 x daily - 7 x weekly - 3 sets - 10 reps  Seated Scapular Retraction - 2 x daily - 7 x weekly - 3 sets - 10 reps  Standing Bicep Curls Supinated with Dumbbells - 2 x daily - 7 x weekly - 3 sets - 10 reps     Seated Shoulder Pendulum Exercise - 2 x daily - 7 x weekly - 3 sets - 10 reps  Table slides flexion - 2 x daily - 7 x weekly - 10 reps - 10 hold  Seated Shoulder External Rotation AAROM with Dowel - 2 x daily - 7 x weekly - 10 reps - 10 hold   PLAN: See eval  [x] Continue per plan of care [] Alter current plan (see comments above)  [] Plan of care initiated [] Hold pending MD visit [] Discharge      Electronically signed by:  Alvino Koroma PT    Note: If patient does not return for scheduled/ recommended follow up visits, this note will serve as a discharge from care along with most recent update on progress.

## 2022-12-21 RX ORDER — TAMSULOSIN HYDROCHLORIDE 0.4 MG/1
CAPSULE ORAL
Qty: 180 CAPSULE | Refills: 2 | Status: SHIPPED | OUTPATIENT
Start: 2022-12-21

## 2022-12-21 NOTE — TELEPHONE ENCOUNTER
Medication:   Requested Prescriptions     Pending Prescriptions Disp Refills    tamsulosin (FLOMAX) 0.4 MG capsule [Pharmacy Med Name: TAMSULOSIN 0.4MG CAPSULES] 180 capsule 2     Sig: TAKE 2 CAPSULES BY MOUTH EVERY DAY     Last Filled:  03/28/2022    Last appt: 10/7/2022   Next appt: Visit date not found    Last OARRS: No flowsheet data found.

## 2022-12-22 ENCOUNTER — TELEPHONE (OUTPATIENT)
Dept: ENDOCRINOLOGY | Age: 72
End: 2022-12-22

## 2022-12-22 ENCOUNTER — HOSPITAL ENCOUNTER (OUTPATIENT)
Dept: PHYSICAL THERAPY | Age: 72
Setting detail: THERAPIES SERIES
Discharge: HOME | End: 2022-12-22
Payer: MEDICARE

## 2022-12-22 PROCEDURE — 97110 THERAPEUTIC EXERCISES: CPT | Performed by: PHYSICAL THERAPIST

## 2022-12-22 PROCEDURE — 97140 MANUAL THERAPY 1/> REGIONS: CPT | Performed by: PHYSICAL THERAPIST

## 2022-12-22 NOTE — FLOWSHEET NOTE
The [address] [place] [address] [address], [address] [place] [address], [address] [place] [address]  Phone: [phone]   Fax:     [phone]        Physical Therapy Daily Treatment Note  Date:  2022    Patient Name:  Mariza Ibarra    :  1950  MRN: [id number]  Restrictions/Precautions:    Medical/Treatment Diagnosis Information:  Diagnosis: L truamatic rotator cuff tear M75.122  Treatment Diagnosis: L shoulder painM25. 512  DOS 2022  L shoulder RC augmentation labral debridement SAD DCE capsular release LUIS      Insurance/Certification information:  PT Insurance Information: [facility] BMN  Physician Information:   Dr Jacoby Jane  Has the plan of care been signed (Y/N):        [x]  Yes  []  No     Date of Patient follow up with Physician: per md      Is this a Progress Report:     []  Yes   []  No        If Yes:  Date Range for reporting period:  Umfkdbmrx65/8  Pcntyb53/6    Progress report will be due (10 Rx or 30 days whichever is less):       Recertification will be due (POC Duration  / 90 days whichever is less): 3/6        Visit # Insurance Allowable Auth Required   25 post surgery   BMN []  Yes []  No        Functional Scale: Foto38/100 38/100 54/100,55/100    Date assessed:  ,10/6 11/8,     Latex Allergy:  [x]NO      []YES  Preferred Language for Healthcare:   [x]English       []other:    Pain level:  1-3/10 at rest     SUBJECTIVE:   md pleased with progress.   See him again in march    OBJECTIVE: See eval   ROM PROM  AROM  Comment     L R L R     Flexion ~160   ~115 stand     150 supine        scap ~160    ~90 stand*       ER [email or web address]    75@80       IR             Other  (cervical)             Other                  Strength   L R Comment   Flexion  3+ limited range       Abduction  3+ limited range       ER  4+ at side        IR  4 atside       Supraspinatus         Upper Trap         Lower Trap         Mid Trap         Rhomboids         Biceps         Triceps         Horizontal Abduction         Horizontal Adduction         Lats          RESTRICTIONS/PRECAUTIONS: RCR,  increase ROM as able per MD due to capsular release and LUIS    Exercises/Interventions:   Exercises:  Exercise/Equipment Resistance/Repetitions Other comments   Stretching/PROM     Wand ER at neutral 51o92gtn    ER Supine @ 60abd 78x35azn      Supine cane flex 3x10       Start9/22        Start10/6   Table Slides fl 10p40rbz     Abd 29o97oow    ER bend forward 45x81ene       Start9/8      Start9/8   Wall slide 17g43edk  Start9/22   Wall step away for flexion 77b04uuy Start9/8   Pulley flex   X10    Scap x10 Start9/16   Supine grab L wrist with R hand move into flexion 31q72evi    blue ball roll into flexion on table  83d14qmg    Pendulum AROM CW CCW fl/ext side to side [id number]  4#    Elbow AROM      IR behind back  80d14psu Start9/12   Supine butterfly 2 min Start10/4   Isometrics     Retraction x30    shrug x30    Weight shift     Flexion     Abduction     External Rotation     Internal Rotation     Biceps     Triceps          PRE's     Flexion     Abduction     External Rotation SL3x10  2# no towel Start10/4   Internal Rotation     Shrugs     EXT EOB 3x10 4# ^11/29   Reverse Flys     Serratus 2x10  Start10/6   Horizontal Abd      Biceps 3x10 7# ^11/10   Triceps     Retraction EOB 3x10 4# ^11/29        Cable Column/Theraband     External Rotation Modified no money 3x10 red Start11/10   Internal Rotation     Shrugs     Lats     Ext     Flex     Scapular Retraction 3x10 blk ^11/8   BIC     TRIC 3x10 blue ^11/8   PNF          Dynamic Stability     Supine  abc's Start11/15   Plyoback          Manual interventions     PROM Fl scap ER/IR; 15 min  ^11/1              Therapeutic Exercise and NMR EXR  [x] (62935) Provided verbal/tactile cueing for activities related to strengthening, flexibility, endurance, ROM  for improvements in scapular, scapulothoracic and UE control with self care, reaching, carrying, lifting, house/yardwork, driving/computer work.    [] (19017) Provided verbal/tactile cueing for activities related to improving balance, coordination, kinesthetic sense, posture, motor skill, proprioception  to assist with  scapular, scapulothoracic and UE control with self care, reaching, carrying, lifting, house/yardwork, driving/computer work. Therapeutic Activities:    [] (49542 or 04729) Provided verbal/tactile cueing for activities related to improving balance, coordination, kinesthetic sense, posture, motor skill, proprioception and motor activation to allow for proper function of scapular, scapulothoracic and UE control with self care, carrying, lifting, driving/computer work.      Home Exercise Program:    [x] (09586) Reviewed/Progressed HEP activities related to strengthening, flexibility, endurance, ROM of scapular, scapulothoracic and UE control with self care, reaching, carrying, lifting, house/yardwork, driving/computer work  [] (64866) Reviewed/Progressed HEP activities related to improving balance, coordination, kinesthetic sense, posture, motor skill, proprioception of scapular, scapulothoracic and UE control with self care, reaching, carrying, lifting, house/yardwork, driving/computer work      Manual Treatments:  PROM / STM / Oscillations-Mobs:  G-I, II, III, IV (PA's, Inf., Post.)  [x] (93799) Provided manual therapy to mobilize soft tissue/joints of cervical/CT, scapular GHJ and UE for the purpose of modulating pain, promoting relaxation,  increasing ROM, reducing/eliminating soft tissue swelling/inflammation/restriction, improving soft tissue extensibility and allowing for proper ROM for normal function with self care, reaching, carrying, lifting, house/yardwork, driving/computer work    Modalities:  declined ice but recommended ice 15-20 min on several times per day to help with pain and inflammation     Charges:  Timed Code Treatment Minutes: 45   Total Treatment Minutes: 501-731       [] EVAL (LOW) 29059 (typically 20 minutes face-to-face)  [] EVAL (MOD) 35194 (typically 30 minutes face-to-face)  [] EVAL (HIGH) 00127 (typically 45 minutes face-to-face)  [] RE-EVAL     [x] WE(27898) x  2  [] IONTO  [] NMR (15334) x     [] VASO  [x] Manual (01609) x  1    [] Other:  [] TA x      [] Mech Traction (23887)  [] ES(attended) (48988)      [] ES (un) (24358):     GOALS:  Patient stated goal: increase ROM decrease pain, use arm for adls    [] Progressing: [] Met: [] Not Met: [] Adjusted     Therapist goals for Patient:   Short Term Goals: To be achieved in: 2 -4weeks  1. Independent in HEP and progression per patient tolerance, in order to prevent re-injury. [] Progressing: [x] Met: [] Not Met: [] Adjusted   2. Patient will have a decrease in pain to facilitate improvement in movement, function, and ADLs as indicated by Functional Deficits. [x] Progressing: [] Met: [] Not Met: [] Adjusted     Long Term Goals: To be achieved in: 12 weeks  1. Foto 67/100  to assist with reaching prior level of function. [x] Progressing: [] Met: [] Not Met: [] Adjusted  2. Patient will demonstrate increased AROM to = R to allow for proper joint functioning as indicated by patients Functional Deficits. [x] Progressing: [] Met: [] Not Met: [] Adjusted  3. Patient will demonstrate an increase in strength to good scapular and core control  for UE to allow for proper functional mobility as indicated by patients Functional Deficits. [x] Progressing: [] Met: [] Not Met: [] Adjusted  4. Patient will return to  functional activities reach over head, reach behind back, don doff jacket  without increased symptoms or restriction. [x] Progressing: [] Met: [] Not Met: [] Adjusted  Overall Progression Towards Functional goals/ Treatment Progress Update:  [] Patient is progressing as expected towards functional goals listed.     [x] Progression is slowed due to complexities/Impairments listed. Trauma , complexity of injury   [] Progression has been slowed due to co-morbidities. [] Plan just implemented, too soon to assess goals progression <30days   [] Goals require adjustment due to lack of progress  [] Patient is not progressing as expected and requires additional follow up with physician  [] Other    Prognosis for POC: [x] Good [] Fair  [] Poor      Patient requires continued skilled intervention: [x] Yes  [] No    Treatment/Activity Tolerance:  [x] Patient able to complete treatment  [] Patient limited by fatigue  [x] Patient limited by pain     [] Patient limited by other medical complications  [x] Other:   Will continue to progress PROM, AAROM light PREs as able to progress to PLOF L UE. Patient Education:    Reviewed diagnosis, POC, HEP and its importance. Access Code: [email or web address]  URL: [email or web address]  Date: 9/6/22  Prepared by: Mouna Dowling     Exercises  Standing Shoulder Shrugs - 2 x daily - 7 x weekly - 3 sets - 10 reps  Seated Scapular Retraction - 2 x daily - 7 x weekly - 3 sets - 10 reps  Standing Bicep Curls Supinated with Dumbbells - 2 x daily - 7 x weekly - 3 sets - 10 reps     Seated Shoulder Pendulum Exercise - 2 x daily - 7 x weekly - 3 sets - 10 reps  Table slides flexion - 2 x daily - 7 x weekly - 10 reps - 10 hold  Seated Shoulder External Rotation AAROM with Dowel - 2 x daily - 7 x weekly - 10 reps - 10 hold   PLAN: See eval  [x] Continue per plan of care [] Alter current plan (see comments above)  [] Plan of care initiated [] Hold pending MD visit [] Discharge      Electronically signed by:  Mouna Dowling, PT    Note: If patient does not return for scheduled/ recommended follow up visits, this note will serve as a discharge from care along with most recent update on progress.

## 2022-12-29 ENCOUNTER — HOSPITAL ENCOUNTER (OUTPATIENT)
Dept: PHYSICAL THERAPY | Age: 72
Setting detail: THERAPIES SERIES
Discharge: HOME | End: 2022-12-29
Payer: MEDICARE

## 2022-12-29 PROCEDURE — 97110 THERAPEUTIC EXERCISES: CPT | Performed by: PHYSICAL THERAPIST

## 2022-12-29 PROCEDURE — 97140 MANUAL THERAPY 1/> REGIONS: CPT | Performed by: PHYSICAL THERAPIST

## 2022-12-29 NOTE — FLOWSHEET NOTE
[facility], [address] [place] [address], [address] [place] [address]  Phone: [phone]   Fax:     [phone]        Physical Therapy Daily Treatment Note  Date:  2022    Patient Name:  Sammy Blackmon    :  1950  MRN: [id number]  Restrictions/Precautions:    Medical/Treatment Diagnosis Information:  Diagnosis: L truamatic rotator cuff tear M75.122  Treatment Diagnosis: L shoulder painM25. 512  DOS 2022  L shoulder RC augmentation labral debridement SAD DCE capsular release LUIS      Insurance/Certification information:  PT Insurance Information: [facility] BMN  Physician Information:   Dr Marylen Jacobsen  Has the plan of care been signed (Y/N):        [x]  Yes  []  No     Date of Patient follow up with Physician: per md      Is this a Progress Report:     []  Yes   []  No        If Yes:  Date Range for reporting period:  Bvunmhhwu52/8  Lujcfe01/6    Progress report will be due (10 Rx or 30 days whichever is less):       Recertification will be due (POC Duration  / 90 days whichever is less): 3/6        Visit # Insurance Allowable Auth Required   26 post surgery   BMN []  Yes []  No        Functional Scale:  Foto38/100 38/100 54/100,55/100    Date assessed:  ,10/6 11/8,     Latex Allergy:  [x]NO      []YES  Preferred Language for Healthcare:   [x]English       []other:    Pain level:  1-3/10 at rest     SUBJECTIVE:  no new c/os given     OBJECTIVE: See eval   ROM PROM  AROM  Comment     L R L R     Flexion ~160   ~115 stand     150 supine        scap ~160    ~90 stand*       ER [email or web address]    75@80       IR             Other  (cervical)             Other                  Strength   L R Comment   Flexion  3+ limited range       Abduction  3+ limited range       ER  4+ at side        IR  4 atside       Supraspinatus         Upper Trap         Lower Trap         Mid Trap         Rhomboids Biceps         Triceps         Horizontal Abduction         Horizontal Adduction         Lats          RESTRICTIONS/PRECAUTIONS: RCR,  increase ROM as able per MD due to capsular release and LUIS    Exercises/Interventions:   Exercises:  Exercise/Equipment Resistance/Repetitions Other comments   Stretching/PROM     Wand ER at neutral 87w33acp    ER Supine @ 60abd 38y77cxt      Supine cane flex 3x10       Start9/22        Start10/6   Table Slides fl 66x49apk     Abd 03j69trh    ER bend forward 22u33izy       Start9/8      Start9/8   Wall slide 88t35zsi  Start9/22   Wall step away for flexion 75u00qho Start9/8   Pulley flex   X10    Scap x10 Start9/16   Supine grab L wrist with R hand move into flexion 86g58cfm    blue ball roll into flexion on table  72g05lam    Pendulum AROM CW CCW fl/ext side to side [id number]  4#    Elbow AROM      IR behind back  54c29zxn Start9/12   Supine butterfly 2 min Start10/4   Isometrics     Retraction x30    shrug x30    Weight shift     Flexion     Abduction     External Rotation     Internal Rotation     Biceps     Triceps          PRE's     Flexion     Abduction     External Rotation SL3x10  2# no towel Start10/4   Internal Rotation     Shrugs     EXT EOB 3x10 4# ^11/29   Reverse Flys     Serratus 2x10  Start10/6   Horizontal Abd      Biceps 3x10 7# ^11/10   Triceps     Retraction EOB 3x10 4# ^11/29        Cable Column/Theraband     External Rotation Modified no money 3x10 red Start11/10   Internal Rotation     Shrugs     Lats     Ext     Flex     Scapular Retraction 3x10 blk ^11/8   BIC     TRIC 3x10 blue ^11/8   PNF          Dynamic Stability     Supine  abc's Start11/15   Plyoback          Manual interventions     PROM Fl scap ER/IR; 15 min  ^11/1              Therapeutic Exercise and NMR EXR  [x] (22135) Provided verbal/tactile cueing for activities related to strengthening, flexibility, endurance, ROM  for improvements in scapular, scapulothoracic and UE control with self care, reaching, carrying, lifting, house/yardwork, driving/computer work.    [] (97804) Provided verbal/tactile cueing for activities related to improving balance, coordination, kinesthetic sense, posture, motor skill, proprioception  to assist with  scapular, scapulothoracic and UE control with self care, reaching, carrying, lifting, house/yardwork, driving/computer work. Therapeutic Activities:    [] (91194 or 80085) Provided verbal/tactile cueing for activities related to improving balance, coordination, kinesthetic sense, posture, motor skill, proprioception and motor activation to allow for proper function of scapular, scapulothoracic and UE control with self care, carrying, lifting, driving/computer work.      Home Exercise Program:    [x] (42599) Reviewed/Progressed HEP activities related to strengthening, flexibility, endurance, ROM of scapular, scapulothoracic and UE control with self care, reaching, carrying, lifting, house/yardwork, driving/computer work  [] (96216) Reviewed/Progressed HEP activities related to improving balance, coordination, kinesthetic sense, posture, motor skill, proprioception of scapular, scapulothoracic and UE control with self care, reaching, carrying, lifting, house/yardwork, driving/computer work      Manual Treatments:  PROM / STM / Oscillations-Mobs:  G-I, II, III, IV (PA's, Inf., Post.)  [x] (57081) Provided manual therapy to mobilize soft tissue/joints of cervical/CT, scapular GHJ and UE for the purpose of modulating pain, promoting relaxation,  increasing ROM, reducing/eliminating soft tissue swelling/inflammation/restriction, improving soft tissue extensibility and allowing for proper ROM for normal function with self care, reaching, carrying, lifting, house/yardwork, driving/computer work    Modalities:  declined ice but recommended ice 15-20 min on several times per day to help with pain and inflammation     Charges:  Timed Code Treatment Minutes: 45   Total Treatment Minutes: 235-340       [] EVAL (LOW) 26474 (typically 20 minutes face-to-face)  [] EVAL (MOD) 41854 (typically 30 minutes face-to-face)  [] EVAL (HIGH) 42897 (typically 45 minutes face-to-face)  [] RE-EVAL     [x] SF(59330) x  2  [] IONTO  [] NMR (56738) x     [] VASO  [x] Manual (82312) x  1    [] Other:  [] TA x      [] Mech Traction (69735)  [] ES(attended) (31954)      [] ES (un) (98771):     GOALS:  Patient stated goal: increase ROM decrease pain, use arm for adls    [] Progressing: [] Met: [] Not Met: [] Adjusted     Therapist goals for Patient:   Short Term Goals: To be achieved in: 2 -4weeks  1. Independent in HEP and progression per patient tolerance, in order to prevent re-injury. [] Progressing: [x] Met: [] Not Met: [] Adjusted   2. Patient will have a decrease in pain to facilitate improvement in movement, function, and ADLs as indicated by Functional Deficits. [x] Progressing: [] Met: [] Not Met: [] Adjusted     Long Term Goals: To be achieved in: 12 weeks  1. Foto 67/100  to assist with reaching prior level of function. [x] Progressing: [] Met: [] Not Met: [] Adjusted  2. Patient will demonstrate increased AROM to = R to allow for proper joint functioning as indicated by patients Functional Deficits. [x] Progressing: [] Met: [] Not Met: [] Adjusted  3. Patient will demonstrate an increase in strength to good scapular and core control  for UE to allow for proper functional mobility as indicated by patients Functional Deficits. [x] Progressing: [] Met: [] Not Met: [] Adjusted  4. Patient will return to  functional activities reach over head, reach behind back, don doff jacket  without increased symptoms or restriction. [x] Progressing: [] Met: [] Not Met: [] Adjusted  Overall Progression Towards Functional goals/ Treatment Progress Update:  [] Patient is progressing as expected towards functional goals listed. [x] Progression is slowed due to complexities/Impairments listed.  Trauma , complexity of injury   [] Progression has been slowed due to co-morbidities. [] Plan just implemented, too soon to assess goals progression <30days   [] Goals require adjustment due to lack of progress  [] Patient is not progressing as expected and requires additional follow up with physician  [] Other    Prognosis for POC: [x] Good [] Fair  [] Poor      Patient requires continued skilled intervention: [x] Yes  [] No    Treatment/Activity Tolerance:  [x] Patient able to complete treatment  [] Patient limited by fatigue  [x] Patient limited by pain     [] Patient limited by other medical complications  [x] Other:   Will continue to progress PROM, AAROM light PREs as able to progress to PLOF L UE. Patient Education:    Reviewed diagnosis, POC, HEP and its importance. Access Code: [email or web address]  URL: [email or web address]  Date: 9/6/22  Prepared by: Tommy Kemp     Exercises  Standing Shoulder Shrugs - 2 x daily - 7 x weekly - 3 sets - 10 reps  Seated Scapular Retraction - 2 x daily - 7 x weekly - 3 sets - 10 reps  Standing Bicep Curls Supinated with Dumbbells - 2 x daily - 7 x weekly - 3 sets - 10 reps     Seated Shoulder Pendulum Exercise - 2 x daily - 7 x weekly - 3 sets - 10 reps  Table slides flexion - 2 x daily - 7 x weekly - 10 reps - 10 hold  Seated Shoulder External Rotation AAROM with Dowel - 2 x daily - 7 x weekly - 10 reps - 10 hold   PLAN: See eval  [x] Continue per plan of care [] Alter current plan (see comments above)  [] Plan of care initiated [] Hold pending MD visit [] Discharge      Electronically signed by:  Tommy Kemp PT    Note: If patient does not return for scheduled/ recommended follow up visits, this note will serve as a discharge from care along with most recent update on progress.

## 2023-01-03 ENCOUNTER — HOSPITAL ENCOUNTER (OUTPATIENT)
Dept: PHYSICAL THERAPY | Age: 73
Setting detail: THERAPIES SERIES
Discharge: HOME | End: 2023-01-03
Payer: MEDICARE

## 2023-01-03 PROCEDURE — 97140 MANUAL THERAPY 1/> REGIONS: CPT | Performed by: PHYSICAL THERAPIST

## 2023-01-03 PROCEDURE — 97110 THERAPEUTIC EXERCISES: CPT | Performed by: PHYSICAL THERAPIST

## 2023-01-03 NOTE — FLOWSHEET NOTE
[address],  [address]  Phone: [phone]   Fax:     [phone]        Physical Therapy Daily Treatment Note  Date:  1/3/2023    Patient Name:  Nikita Villarreal    :  1950  MRN: [id number]  Restrictions/Precautions:    Medical/Treatment Diagnosis Information:  Diagnosis: L truamatic rotator cuff tear M75.122  Treatment Diagnosis: L shoulder painM25. 512  DOS 2022  L shoulder RC augmentation labral debridement SAD DCE capsular release LUIS      Insurance/Certification information:  PT Insurance Information: [facility] BMN  Physician Information:   Dr Laura Hanson  Has the plan of care been signed (Y/N):        [x]  Yes  []  No     Date of Patient follow up with Physician: per md      Is this a Progress Report:     []  Yes   [x]  No        If Yes:  Date Range for reporting period:  Keladuckp59/8  Bgvdkm34/6    Progress report will be due (10 Rx or 30 days whichever is less):       Recertification will be due (POC Duration  / 90 days whichever is less): 3/6        Visit # Insurance Allowable Auth Required   1 in      27 total post surgery   BMN []  Yes []  No        Functional Scale:  Foto38/100 38/100 54/100,55/100    Date assessed:  ,10/6 11/8,     Latex Allergy:  [x]NO      []YES  Preferred Language for Healthcare:   [x]English       []other:    Pain level:  1-3/10 at rest     SUBJECTIVE:  1/3  no new c/os given     OBJECTIVE: See eval   ROM PROM  AROM  Comment     L R L R     Flexion ~160   ~115 stand     150 supine        scap ~160    ~90 stand*       ER [email or web address]    75@80       IR             Other  (cervical)             Other                  Strength   L R Comment   Flexion  3+ limited range       Abduction  3+ limited range       ER  4+ at side        IR  4 atside       Supraspinatus         Upper Trap         Lower Trap         Mid Trap Rhomboids         Biceps         Triceps         Horizontal Abduction         Horizontal Adduction         Lats          RESTRICTIONS/PRECAUTIONS: RCR,  increase ROM as able per MD due to capsular release and LUIS    Exercises/Interventions:   Exercises:  Exercise/Equipment Resistance/Repetitions Other comments   Stretching/PROM     Wand ER at neutral 04j01bpx    ER Supine @ 60abd 30z61puq      Supine cane flex 3x10       Start9/22        Start10/6   Table Slides fl 56r25tgo     Abd 03n16fwu    ER bend forward 60w86jqh       Start9/8      Start9/8   Wall slide 38b45fwu  Start9/22   Wall step away for flexion 47j72yak Start9/8   Pulley flex   X10    Scap x10 Start9/16   Supine grab L wrist with R hand move into flexion 48x88ssn    blue ball roll into flexion on table  13f74kdf    Pendulum AROM CW CCW fl/ext side to side [id number]  4#    Elbow AROM      IR behind back  24c37qkr Start9/12   Supine butterfly 2 min Start10/4   Isometrics     Retraction x30    shrug x30    Weight shift     Flexion     Abduction     External Rotation     Internal Rotation     Biceps     Triceps          PRE's     Flexion     Abduction     External Rotation SL3x10  2# no towel Start10/4   Internal Rotation     Shrugs     EXT EOB 3x10 5# ^1/3   Reverse Flys     Serratus 2x10  Start10/6   Horizontal Abd      Biceps 3x10 7# ^11/10   Triceps     Retraction EOB 3x10 5# ^1/3        Cable Column/Theraband     External Rotation Modified no money 3x10 red Start11/10   Internal Rotation     Shrugs     Lats     Ext     Flex     Scapular Retraction 3x10 blk ^11/8   BIC     TRIC 3x10 blue ^11/8   PNF          Dynamic Stability     Supine  abc's Start11/15   Plyoback          Manual interventions     PROM Fl scap ER/IR; 15 min  ^11/1              Therapeutic Exercise and NMR EXR  [x] (35977) Provided verbal/tactile cueing for activities related to strengthening, flexibility, endurance, ROM  for improvements in scapular, scapulothoracic and UE control with self care, reaching, carrying, lifting, house/yardwork, driving/computer work.    [] (71485) Provided verbal/tactile cueing for activities related to improving balance, coordination, kinesthetic sense, posture, motor skill, proprioception  to assist with  scapular, scapulothoracic and UE control with self care, reaching, carrying, lifting, house/yardwork, driving/computer work. Therapeutic Activities:    [] (68848 or 11490) Provided verbal/tactile cueing for activities related to improving balance, coordination, kinesthetic sense, posture, motor skill, proprioception and motor activation to allow for proper function of scapular, scapulothoracic and UE control with self care, carrying, lifting, driving/computer work.      Home Exercise Program:    [x] (58581) Reviewed/Progressed HEP activities related to strengthening, flexibility, endurance, ROM of scapular, scapulothoracic and UE control with self care, reaching, carrying, lifting, house/yardwork, driving/computer work  [] (42556) Reviewed/Progressed HEP activities related to improving balance, coordination, kinesthetic sense, posture, motor skill, proprioception of scapular, scapulothoracic and UE control with self care, reaching, carrying, lifting, house/yardwork, driving/computer work      Manual Treatments:  PROM / STM / Oscillations-Mobs:  G-I, II, III, IV (PA's, Inf., Post.)  [x] (11985) Provided manual therapy to mobilize soft tissue/joints of cervical/CT, scapular GHJ and UE for the purpose of modulating pain, promoting relaxation,  increasing ROM, reducing/eliminating soft tissue swelling/inflammation/restriction, improving soft tissue extensibility and allowing for proper ROM for normal function with self care, reaching, carrying, lifting, house/yardwork, driving/computer work    Modalities:  declined ice but recommended ice 15-20 min on several times per day to help with pain and inflammation     Charges:  Timed Code Treatment Minutes: 45   Total Treatment Minutes: 710-215       [] EVAL (LOW) 96516 (typically 20 minutes face-to-face)  [] EVAL (MOD) 85027 (typically 30 minutes face-to-face)  [] EVAL (HIGH) 42014 (typically 45 minutes face-to-face)  [] RE-EVAL     [x] XM(51957) x  2  [] IONTO  [] NMR (07587) x     [] VASO  [x] Manual (92348) x  1    [] Other:  [] TA x      [] Mech Traction (88955)  [] ES(attended) (25250)      [] ES (un) (14805):     GOALS:  Patient stated goal: increase ROM decrease pain, use arm for adls    [] Progressing: [] Met: [] Not Met: [] Adjusted     Therapist goals for Patient:   Short Term Goals: To be achieved in: 2 -4weeks  1. Independent in HEP and progression per patient tolerance, in order to prevent re-injury. [] Progressing: [x] Met: [] Not Met: [] Adjusted   2. Patient will have a decrease in pain to facilitate improvement in movement, function, and ADLs as indicated by Functional Deficits. [x] Progressing: [] Met: [] Not Met: [] Adjusted     Long Term Goals: To be achieved in: 12 weeks  1. Foto 67/100  to assist with reaching prior level of function. [x] Progressing: [] Met: [] Not Met: [] Adjusted  2. Patient will demonstrate increased AROM to = R to allow for proper joint functioning as indicated by patients Functional Deficits. [x] Progressing: [] Met: [] Not Met: [] Adjusted  3. Patient will demonstrate an increase in strength to good scapular and core control  for UE to allow for proper functional mobility as indicated by patients Functional Deficits. [x] Progressing: [] Met: [] Not Met: [] Adjusted  4. Patient will return to  functional activities reach over head, reach behind back, don doff jacket  without increased symptoms or restriction. [x] Progressing: [] Met: [] Not Met: [] Adjusted  Overall Progression Towards Functional goals/ Treatment Progress Update:  [] Patient is progressing as expected towards functional goals listed. [x] Progression is slowed due to complexities/Impairments listed. Trauma , complexity of injury   [] Progression has been slowed due to co-morbidities. [] Plan just implemented, too soon to assess goals progression <30days   [] Goals require adjustment due to lack of progress  [] Patient is not progressing as expected and requires additional follow up with physician  [] Other    Prognosis for POC: [x] Good [] Fair  [] Poor      Patient requires continued skilled intervention: [x] Yes  [] No    Treatment/Activity Tolerance:  [x] Patient able to complete treatment  [] Patient limited by fatigue  [x] Patient limited by pain     [] Patient limited by other medical complications  [x] Other:   Will continue to progress PROM, AAROM light PREs as able to progress to PLOF L UE. Patient Education:    Reviewed diagnosis, POC, HEP and its importance. Access Code: [email or web address]  URL: [email or web address]  Date: 9/6/22  Prepared by: Leroy Medrano     Exercises  Standing Shoulder Shrugs - 2 x daily - 7 x weekly - 3 sets - 10 reps  Seated Scapular Retraction - 2 x daily - 7 x weekly - 3 sets - 10 reps  Standing Bicep Curls Supinated with Dumbbells - 2 x daily - 7 x weekly - 3 sets - 10 reps     Seated Shoulder Pendulum Exercise - 2 x daily - 7 x weekly - 3 sets - 10 reps  Table slides flexion - 2 x daily - 7 x weekly - 10 reps - 10 hold  Seated Shoulder External Rotation AAROM with Dowel - 2 x daily - 7 x weekly - 10 reps - 10 hold   PLAN: See eval  [x] Continue per plan of care [] Alter current plan (see comments above)  [] Plan of care initiated [] Hold pending MD visit [] Discharge      Electronically signed by:  Leroy Medrano, PT    Note: If patient does not return for scheduled/ recommended follow up visits, this note will serve as a discharge from care along with most recent update on progress.

## 2023-01-05 ENCOUNTER — HOSPITAL ENCOUNTER (OUTPATIENT)
Dept: PHYSICAL THERAPY | Age: 73
Setting detail: THERAPIES SERIES
Discharge: HOME | End: 2023-01-05
Payer: MEDICARE

## 2023-01-05 PROCEDURE — 97140 MANUAL THERAPY 1/> REGIONS: CPT | Performed by: PHYSICAL THERAPIST

## 2023-01-05 PROCEDURE — 97110 THERAPEUTIC EXERCISES: CPT | Performed by: PHYSICAL THERAPIST

## 2023-01-05 NOTE — FLOWSHEET NOTE
The [address] [place] [address] 200, [address] [place] [address], [address] [place] [address]  Phone: [phone]   Fax:     [phone]        Physical Therapy Daily Treatment Note  Date:  2023    Patient Name:  Damien Ballesteros    :  1950  MRN: [id number]  Restrictions/Precautions:    Medical/Treatment Diagnosis Information:  Diagnosis: L truamatic rotator cuff tear M75.122  Treatment Diagnosis: L shoulder painM25. 512  DOS 2022  L shoulder RC augmentation labral debridement SAD DCE capsular release LUIS      Insurance/Certification information:  PT Insurance Information: [facility] BMN  Physician Information:   Dr Kiara Kennedy  Has the plan of care been signed (Y/N):        [x]  Yes  []  No     Date of Patient follow up with Physician: per md      Is this a Progress Report:     []  Yes   [x]  No        If Yes:  Date Range for reporting period:  Wkrfjpezl05/6  Ending    Progress report will be due (10 Rx or 30 days whichever is less):       Recertification will be due (POC Duration  / 90 days whichever is less): 3/6        Visit # Insurance Allowable Auth Required   2 in      28 total post surgery   BMN []  Yes []  No        Functional Scale:  Foto38/100 38/100 54/100,55/100    Date assessed:  ,10/6 11/8,     Latex Allergy:  [x]NO      []YES  Preferred Language for Healthcare:   [x]English       []other:    Pain level:  1-3/10 at rest     SUBJECTIVE:  1/3  same but pain is much better over the last several weeks    OBJECTIVE: See eval   ROM PROM  AROM   Comment     L R L R     Flexion ~160   ~120 stand     140 supine        scap ~160    ~100 stand*       ER Gonzalo@[employer]    75@80       IR             Other  (cervical)             Other                  Strength   L R Comment   Flexion  3+ limited range       Abduction  3+ limited range       ER  4+ at side        IR  4 at side       Supraspinatus         Upper Trap Lower Trap         Mid Trap         Rhomboids         Biceps         Triceps         Horizontal Abduction         Horizontal Adduction         Lats          RESTRICTIONS/PRECAUTIONS: RCR,  increase ROM as able per MD due to capsular release and LUIS    Exercises/Interventions:   Exercises:  Exercise/Equipment Resistance/Repetitions Other comments   Stretching/PROM     Wand ER at neutral 72k48hsu    ER Supine @ 60abd 90k69hyp      Supine cane flex 3x10       Start9/22        Start10/6   Table Slides fl 56a40fkw     Abd 72z16hck    ER bend forward 74d11tep       Start9/8      Start9/8   Wall slide 54h68hbh  Start9/22   Wall step away for flexion 58m12buj Start9/8   Pulley flex   X10    Scap x10 Start9/16   Supine grab L wrist with R hand move into flexion 16r28zbk    blue ball roll into flexion on table  18i93mki    Pendulum AROM CW CCW fl/ext side to side [id number]  4#    Elbow AROM      IR behind back  68r13zag Start9/12   Supine butterfly 2 min Start10/4   Isometrics     Retraction x30    shrug x30    Weight shift     Flexion     Abduction     External Rotation     Internal Rotation     Biceps     Triceps          PRE's     Flexion     Abduction     External Rotation SL3x10  2# no towel Start10/4   Internal Rotation     Shrugs     EXT EOB 3x10 5# ^1/3   Reverse Flys     Serratus 2x10  Start10/6   Horizontal Abd      Biceps 3x10 7# ^11/10   Triceps     Retraction EOB 3x10 5# ^1/3        Cable Column/Theraband     External Rotation Modified no money 3x10 red Start11/10   Internal Rotation     Shrugs     Lats     Ext     Flex     Scapular Retraction 3x10 blk ^11/8   BIC     TRIC 3x10 blue ^11/8   PNF          Dynamic Stability     Supine      Plyoback          Manual interventions     PROM Fl scap ER/IR; 15 min  ^11/1              Therapeutic Exercise and NMR EXR  [x] (77366) Provided verbal/tactile cueing for activities related to strengthening, flexibility, endurance, ROM  for improvements in scapular, scapulothoracic and UE control with self care, reaching, carrying, lifting, house/yardwork, driving/computer work.    [] (15980) Provided verbal/tactile cueing for activities related to improving balance, coordination, kinesthetic sense, posture, motor skill, proprioception  to assist with  scapular, scapulothoracic and UE control with self care, reaching, carrying, lifting, house/yardwork, driving/computer work. Therapeutic Activities:    [] (08667 or 87066) Provided verbal/tactile cueing for activities related to improving balance, coordination, kinesthetic sense, posture, motor skill, proprioception and motor activation to allow for proper function of scapular, scapulothoracic and UE control with self care, carrying, lifting, driving/computer work.      Home Exercise Program:    [x] (49641) Reviewed/Progressed HEP activities related to strengthening, flexibility, endurance, ROM of scapular, scapulothoracic and UE control with self care, reaching, carrying, lifting, house/yardwork, driving/computer work  [] (30856) Reviewed/Progressed HEP activities related to improving balance, coordination, kinesthetic sense, posture, motor skill, proprioception of scapular, scapulothoracic and UE control with self care, reaching, carrying, lifting, house/yardwork, driving/computer work      Manual Treatments:  PROM / STM / Oscillations-Mobs:  G-I, II, III, IV (PA's, Inf., Post.)  [x] (01912) Provided manual therapy to mobilize soft tissue/joints of cervical/CT, scapular GHJ and UE for the purpose of modulating pain, promoting relaxation,  increasing ROM, reducing/eliminating soft tissue swelling/inflammation/restriction, improving soft tissue extensibility and allowing for proper ROM for normal function with self care, reaching, carrying, lifting, house/yardwork, driving/computer work    Modalities:  declined ice but recommended ice 15-20 min on several times per day to help with pain and inflammation     Charges:  Timed Code Treatment Minutes: 45   Total Treatment Minutes: 881-497       [] EVAL (LOW) 62283 (typically 20 minutes face-to-face)  [] EVAL (MOD) 83570 (typically 30 minutes face-to-face)  [] EVAL (HIGH) 14061 (typically 45 minutes face-to-face)  [] RE-EVAL     [x] JUANJO(64003) x  2  [] IONTO  [] NMR (34295) x     [] VASO  [x] Manual (33664) x  1    [] Other:  [] TA x      [] Mech Traction (15858)  [] ES(attended) (38937)      [] ES (un) (47050):     GOALS:  Patient stated goal: increase ROM decrease pain, use arm for adls    [] Progressing: [] Met: [] Not Met: [] Adjusted     Therapist goals for Patient:   Short Term Goals: To be achieved in: 2 -4weeks  1. Independent in HEP and progression per patient tolerance, in order to prevent re-injury. [] Progressing: [x] Met: [] Not Met: [] Adjusted   2. Patient will have a decrease in pain to facilitate improvement in movement, function, and ADLs as indicated by Functional Deficits. [] Progressing: [x] Met: [] Not Met: [] Adjusted     Long Term Goals: To be achieved in: 12 weeks  1. Foto 67/100  to assist with reaching prior level of function. [x] Progressing: [] Met: [] Not Met: [] Adjusted  2. Patient will demonstrate increased AROM to = R to allow for proper joint functioning as indicated by patients Functional Deficits. [x] Progressing: [] Met: [] Not Met: [] Adjusted  3. Patient will demonstrate an increase in strength to good scapular and core control  for UE to allow for proper functional mobility as indicated by patients Functional Deficits. [x] Progressing: [] Met: [] Not Met: [] Adjusted  4. Patient will return to  functional activities reach over head, reach behind back, don doff jacket  without increased symptoms or restriction. [x] Progressing: [] Met: [] Not Met: [] Adjusted  Overall Progression Towards Functional goals/ Treatment Progress Update:  [] Patient is progressing as expected towards functional goals listed.     [x] Progression is slowed due to complexities/Impairments listed. Trauma , complexity of injury   [] Progression has been slowed due to co-morbidities. [] Plan just implemented, too soon to assess goals progression <30days   [] Goals require adjustment due to lack of progress  [] Patient is not progressing as expected and requires additional follow up with physician  [] Other    Prognosis for POC: [x] Good [] Fair  [] Poor      Patient requires continued skilled intervention: [x] Yes  [] No    Treatment/Activity Tolerance:  [x] Patient able to complete treatment  [] Patient limited by fatigue  [x] Patient limited by some pain     [] Patient limited by other medical complications  [x] Other:   Will continue to progress PROM, AAROM light PREs as able to progress to PLOF L UE. Patient Education:    Reviewed diagnosis, POC, HEP and its importance. Access Code: [email or web address]  URL: [email or web address]  Date: 9/6/22  Prepared by: Grace Gu     Exercises  Standing Shoulder Shrugs - 2 x daily - 7 x weekly - 3 sets - 10 reps  Seated Scapular Retraction - 2 x daily - 7 x weekly - 3 sets - 10 reps  Standing Bicep Curls Supinated with Dumbbells - 2 x daily - 7 x weekly - 3 sets - 10 reps     Seated Shoulder Pendulum Exercise - 2 x daily - 7 x weekly - 3 sets - 10 reps  Table slides flexion - 2 x daily - 7 x weekly - 10 reps - 10 hold  Seated Shoulder External Rotation AAROM with Dowel - 2 x daily - 7 x weekly - 10 reps - 10 hold   PLAN: See eval  [x] Continue per plan of care [] Alter current plan (see comments above)  [] Plan of care initiated [] Hold pending MD visit [] Discharge      Electronically signed by:  Grace Gu, PT    Note: If patient does not return for scheduled/ recommended follow up visits, this note will serve as a discharge from care along with most recent update on progress.

## 2023-01-10 ENCOUNTER — HOSPITAL ENCOUNTER (OUTPATIENT)
Dept: PHYSICAL THERAPY | Age: 73
Setting detail: THERAPIES SERIES
Discharge: HOME | End: 2023-01-10
Payer: MEDICARE

## 2023-01-10 NOTE — FLOWSHEET NOTE
Physical Therapy  Cancellation/No-show Note  Patient Name:  Leopoldo Martini  :  1950   Date:  1/10/2023  Cancelled visits to date: 0  No-shows to date: 0    For today's appointment patient:  [x]  Cancelled  []  Rescheduled appointment  []  No-show     Reason given by patient:  []  Patient ill  []  Conflicting appointment  []  No transportation    []  Conflict with work  []  No reason given  []  Other:     Comments:      Electronically signed by:  Umair Galvan PT, PT

## 2023-01-12 ENCOUNTER — APPOINTMENT (OUTPATIENT)
Dept: PHYSICAL THERAPY | Age: 73
End: 2023-01-12
Payer: MEDICARE

## 2023-01-12 DIAGNOSIS — I10 ESSENTIAL HYPERTENSION: ICD-10-CM

## 2023-01-12 DIAGNOSIS — E78.2 MIXED HYPERLIPIDEMIA: ICD-10-CM

## 2023-01-12 DIAGNOSIS — E11.9 TYPE 2 DIABETES MELLITUS WITHOUT COMPLICATION, WITHOUT LONG-TERM CURRENT USE OF INSULIN (HCC): ICD-10-CM

## 2023-01-12 LAB
A/G RATIO: 1.7 (ref 1.1–2.2)
ALBUMIN SERPL-MCNC: 4.6 G/DL (ref 3.4–5)
ALP BLD-CCNC: 77 U/L (ref 40–129)
ALT SERPL-CCNC: 21 U/L (ref 10–40)
ANION GAP SERPL CALCULATED.3IONS-SCNC: 13 MMOL/L (ref 3–16)
AST SERPL-CCNC: 19 U/L (ref 15–37)
BILIRUB SERPL-MCNC: 0.5 MG/DL (ref 0–1)
BUN BLDV-MCNC: 22 MG/DL (ref 7–20)
CALCIUM SERPL-MCNC: 9.8 MG/DL (ref 8.3–10.6)
CHLORIDE BLD-SCNC: 101 MMOL/L (ref 99–110)
CHOLESTEROL, TOTAL: 132 MG/DL (ref 0–199)
CO2: 25 MMOL/L (ref 21–32)
CREAT SERPL-MCNC: 0.9 MG/DL (ref 0.8–1.3)
GFR SERPL CREATININE-BSD FRML MDRD: >60 ML/MIN/{1.73_M2}
GLUCOSE BLD-MCNC: 99 MG/DL (ref 70–99)
HDLC SERPL-MCNC: 50 MG/DL (ref 40–60)
LDL CHOLESTEROL CALCULATED: 66 MG/DL
POTASSIUM SERPL-SCNC: 4.5 MMOL/L (ref 3.5–5.1)
SODIUM BLD-SCNC: 139 MMOL/L (ref 136–145)
TOTAL PROTEIN: 7.3 G/DL (ref 6.4–8.2)
TRIGL SERPL-MCNC: 82 MG/DL (ref 0–150)
TSH SERPL DL<=0.05 MIU/L-ACNC: 4.04 UIU/ML (ref 0.27–4.2)
VLDLC SERPL CALC-MCNC: 16 MG/DL

## 2023-01-13 LAB
ESTIMATED AVERAGE GLUCOSE: 154.2 MG/DL
HBA1C MFR BLD: 7 %

## 2023-01-16 ENCOUNTER — OFFICE VISIT (OUTPATIENT)
Dept: ENDOCRINOLOGY | Age: 73
End: 2023-01-16
Payer: MEDICARE

## 2023-01-16 VITALS
SYSTOLIC BLOOD PRESSURE: 227 MMHG | DIASTOLIC BLOOD PRESSURE: 85 MMHG | BODY MASS INDEX: 28.47 KG/M2 | HEIGHT: 75 IN | WEIGHT: 229 LBS | RESPIRATION RATE: 16 BRPM | HEART RATE: 78 BPM

## 2023-01-16 DIAGNOSIS — E11.9 TYPE 2 DIABETES MELLITUS WITHOUT COMPLICATION, WITHOUT LONG-TERM CURRENT USE OF INSULIN (HCC): Primary | ICD-10-CM

## 2023-01-16 DIAGNOSIS — I25.10 ATHEROSCLEROSIS OF NATIVE CORONARY ARTERY OF NATIVE HEART WITHOUT ANGINA PECTORIS: ICD-10-CM

## 2023-01-16 DIAGNOSIS — I10 ESSENTIAL HYPERTENSION: ICD-10-CM

## 2023-01-16 PROCEDURE — 99214 OFFICE O/P EST MOD 30 MIN: CPT | Performed by: INTERNAL MEDICINE

## 2023-01-16 PROCEDURE — 1123F ACP DISCUSS/DSCN MKR DOCD: CPT | Performed by: INTERNAL MEDICINE

## 2023-01-16 PROCEDURE — 3051F HG A1C>EQUAL 7.0%<8.0%: CPT | Performed by: INTERNAL MEDICINE

## 2023-01-16 PROCEDURE — 3078F DIAST BP <80 MM HG: CPT | Performed by: INTERNAL MEDICINE

## 2023-01-16 PROCEDURE — 3077F SYST BP >= 140 MM HG: CPT | Performed by: INTERNAL MEDICINE

## 2023-01-16 PROCEDURE — 95251 CONT GLUC MNTR ANALYSIS I&R: CPT | Performed by: INTERNAL MEDICINE

## 2023-01-17 ENCOUNTER — HOSPITAL ENCOUNTER (OUTPATIENT)
Dept: PHYSICAL THERAPY | Age: 73
Setting detail: THERAPIES SERIES
Discharge: HOME | End: 2023-01-17
Payer: MEDICARE

## 2023-01-17 PROCEDURE — 97140 MANUAL THERAPY 1/> REGIONS: CPT | Performed by: PHYSICAL THERAPIST

## 2023-01-17 PROCEDURE — 97110 THERAPEUTIC EXERCISES: CPT | Performed by: PHYSICAL THERAPIST

## 2023-01-17 NOTE — FLOWSHEET NOTE
The [address] [place] [address] 200, [address] [place] [address], [address] [place] [address]  Phone: [phone]   Fax:     [phone]        Physical Therapy Daily Treatment Note  Date:  2023    Patient Name:  Kristy Almonte    :  1950  MRN: [id number]  Restrictions/Precautions:    Medical/Treatment Diagnosis Information:  Diagnosis: L truamatic rotator cuff tear M75.122  Treatment Diagnosis: L shoulder painM25. 512  DOS 2022  L shoulder RC augmentation labral debridement SAD DCE capsular release LUIS      Insurance/Certification information:  PT Insurance Information: [facility] BMN  Physician Information:   Dr Laney Bates  Has the plan of care been signed (Y/N):        [x]  Yes  []  No     Date of Patient follow up with Physician: per md      Is this a Progress Report:     []  Yes   [x]  No        If Yes:  Date Range for reporting period:  Rgvgfstfn86/6  Ending    Progress report will be due (10 Rx or 30 days whichever is less):       Recertification will be due (POC Duration  / 90 days whichever is less): 3/6        Visit # Insurance Allowable Auth Required   3 in      29 total post surgery   BMN []  Yes []  No        Functional Scale:  Foto38/100 38/100 54/100,55/100    Date assessed:  ,10/6 11/8,     Latex Allergy:  [x]NO      []YES  Preferred Language for Healthcare:   [x]English       []other:    Pain level:  1-3/10 at rest     SUBJECTIVE:   overall improving    OBJECTIVE: See eval   ROM PROM  AROM   Comment     L R L R     Flexion ~160   ~120 stand     140 supine        scap ~160    ~100 stand*       ER [email or web address]    80@80        IR             Other  (cervical)             Other                  Strength  / L R Comment   Flexion  3+ limited range       Abduction  3+ limited range       ER  4+ at side        IR  4 at side       Supraspinatus         Upper Trap         Lower Trap         Mid Trap   Rhomboids         Biceps         Triceps         Horizontal Abduction         Horizontal Adduction         Lats          RESTRICTIONS/PRECAUTIONS: RCR,  increase ROM as able per MD due to capsular release and LUIS    Exercises/Interventions:   Exercises:  Exercise/Equipment Resistance/Repetitions Other comments   Stretching/PROM     Wand ER at neutral 49w16myc    ER Supine @ 60abd 04g24byn      Supine cane flex 3x10       Start9/22        Start10/6   Table Slides fl 21h25acv     Abd 97k22hjg    ER bend forward 88h76kpn       Start9/8      Start9/8   Wall slide 93o01bko  Start9/22   Wall step away for flexion 98s02gvt Start9/8   Pulley flex   X10    Scap x10 Start9/16   Supine grab L wrist with R hand move into flexion 91i94wtg    blue ball roll into flexion on table  09a19qym    Pendulum AROM CW CCW fl/ext side to side [id number]  4#    Elbow AROM      IR behind back  90c01mny Start9/12   Supine butterfly 2 min Start10/4   Isometrics     Retraction x30    shrug x30    Weight shift     Flexion     Abduction     External Rotation     Internal Rotation     Biceps     Triceps          PRE's     Flexion     Abduction     External Rotation SL3x10  2# no towel ^ 3 NPV Start10/4   Internal Rotation     Shrugs     EXT EOB 3x10 5# ^1/3   Reverse Flys     Serratus 2x10  Start10/6   Horizontal Abd      Biceps 3x10 7# ^11/10   Triceps     Retraction EOB 3x10 5# ^1/3        Cable Column/Theraband     External Rotation Modified no money 3x10 red Start11/10   Internal Rotation     Supine hor abd               Cheer leader  X10 each Start1/17   Lats     Ext     Flex     Scapular Retraction 3x10 blk ^11/8   BIC     TRIC 3x10 blue ^11/8   PNF          Dynamic Stability     Supine      Plyoback          Manual interventions     PROM Fl scap ER/IR; 15 min  ^11/1              Therapeutic Exercise and NMR EXR  [x] (14975) Provided verbal/tactile cueing for activities related to strengthening, flexibility, endurance, ROM  for improvements in  scapular, scapulothoracic and UE control with self care, reaching, carrying, lifting, house/yardwork, driving/computer work.    [] (37946) Provided verbal/tactile cueing for activities related to improving balance, coordination, kinesthetic sense, posture, motor skill, proprioception  to assist with  scapular, scapulothoracic and UE control with self care, reaching, carrying, lifting, house/yardwork, driving/computer work. Therapeutic Activities:    [] (49235 or 92679) Provided verbal/tactile cueing for activities related to improving balance, coordination, kinesthetic sense, posture, motor skill, proprioception and motor activation to allow for proper function of scapular, scapulothoracic and UE control with self care, carrying, lifting, driving/computer work.      Home Exercise Program:    [x] (77925) Reviewed/Progressed HEP activities related to strengthening, flexibility, endurance, ROM of scapular, scapulothoracic and UE control with self care, reaching, carrying, lifting, house/yardwork, driving/computer work  [] (84879) Reviewed/Progressed HEP activities related to improving balance, coordination, kinesthetic sense, posture, motor skill, proprioception of scapular, scapulothoracic and UE control with self care, reaching, carrying, lifting, house/yardwork, driving/computer work      Manual Treatments:  PROM / STM / Oscillations-Mobs:  G-I, II, III, IV (PA's, Inf., Post.)  [x] (02690) Provided manual therapy to mobilize soft tissue/joints of cervical/CT, scapular GHJ and UE for the purpose of modulating pain, promoting relaxation,  increasing ROM, reducing/eliminating soft tissue swelling/inflammation/restriction, improving soft tissue extensibility and allowing for proper ROM for normal function with self care, reaching, carrying, lifting, house/yardwork, driving/computer work    Modalities:  declined ice but recommended ice 15-20 min on several times per day to help with pain and inflammation     Charges:  Timed Code Treatment Minutes: 45   Total Treatment Minutes: 235-345       [] EVAL (LOW) 15140 (typically 20 minutes face-to-face)  [] EVAL (MOD) 83462 (typically 30 minutes face-to-face)  [] EVAL (HIGH) 40128 (typically 45 minutes face-to-face)  [] RE-EVAL     [x] TE(56574) x  2  [] IONTO  [] NMR (34343) x     [] VASO  [x] Manual (40782) x  1    [] Other:  [] TA x      [] Mech Traction (05446)  [] ES(attended) (45039)      [] ES (un) (93276):     GOALS:  Patient stated goal: increase ROM decrease pain, use arm for adls    [] Progressing: [] Met: [] Not Met: [] Adjusted     Therapist goals for Patient:   Short Term Goals: To be achieved in: 2 -4weeks  1. Independent in HEP and progression per patient tolerance, in order to prevent re-injury.     [] Progressing: [x] Met: [] Not Met: [] Adjusted   2. Patient will have a decrease in pain to facilitate improvement in movement, function, and ADLs as indicated by Functional Deficits.    [] Progressing: [x] Met: [] Not Met: [] Adjusted     Long Term Goals: To be achieved in: 12 weeks  1. Foto 67/100  to assist with reaching prior level of function.   [x] Progressing: [] Met: [] Not Met: [] Adjusted  2. Patient will demonstrate increased AROM to = R to allow for proper joint functioning as indicated by patients Functional Deficits.    [x] Progressing: [] Met: [] Not Met: [] Adjusted  3. Patient will demonstrate an increase in strength to good scapular and core control  for UE to allow for proper functional mobility as indicated by patients Functional Deficits.   [x] Progressing: [] Met: [] Not Met: [] Adjusted  4. Patient will return to  functional activities reach over head, reach behind back, don doff jacket  without increased symptoms or restriction.   [x] Progressing: [] Met: [] Not Met: [] Adjusted  Overall Progression Towards Functional goals/ Treatment Progress Update:  [] Patient is progressing as expected towards functional goals listed.    [x]  Progression is slowed due to complexities/Impairments listed. Trauma , complexity of injury   [] Progression has been slowed due to co-morbidities. [] Plan just implemented, too soon to assess goals progression <30days   [] Goals require adjustment due to lack of progress  [] Patient is not progressing as expected and requires additional follow up with physician  [] Other    Prognosis for POC: [x] Good [] Fair  [] Poor      Patient requires continued skilled intervention: [x] Yes  [] No    Treatment/Activity Tolerance:  [x] Patient able to complete treatment  [] Patient limited by fatigue  [x] Patient limited by some pain     [] Patient limited by other medical complications  [x] Other:   Will continue to progress PROM, AAROM light PREs as able to progress to PLOF L UE. Patient Education:    Reviewed diagnosis, POC, HEP and its importance. Access Code: [email or web address]  URL: [employer]/  Date: 9/6/22  Prepared by: Oralia Heller     Exercises  Standing Shoulder Shrugs - 2 x daily - 7 x weekly - 3 sets - 10 reps  Seated Scapular Retraction - 2 x daily - 7 x weekly - 3 sets - 10 reps  Standing Bicep Curls Supinated with Dumbbells - 2 x daily - 7 x weekly - 3 sets - 10 reps     Seated Shoulder Pendulum Exercise - 2 x daily - 7 x weekly - 3 sets - 10 reps  Table slides flexion - 2 x daily - 7 x weekly - 10 reps - 10 hold  Seated Shoulder External Rotation AAROM with Dowel - 2 x daily - 7 x weekly - 10 reps - 10 hold   PLAN: See eval  [x] Continue per plan of care [] Alter current plan (see comments above)  [] Plan of care initiated [] Hold pending MD visit [] Discharge      Electronically signed by:  Oralia Heller PT    Note: If patient does not return for scheduled/ recommended follow up visits, this note will serve as a discharge from care along with most recent update on progress.

## 2023-01-19 ENCOUNTER — HOSPITAL ENCOUNTER (OUTPATIENT)
Dept: PHYSICAL THERAPY | Age: 73
Setting detail: THERAPIES SERIES
Discharge: HOME | End: 2023-01-19
Payer: MEDICARE

## 2023-01-19 PROCEDURE — 97110 THERAPEUTIC EXERCISES: CPT | Performed by: PHYSICAL THERAPIST

## 2023-01-19 PROCEDURE — 97140 MANUAL THERAPY 1/> REGIONS: CPT | Performed by: PHYSICAL THERAPIST

## 2023-01-19 NOTE — FLOWSHEET NOTE
The [address] [place] [address] 200, [address] [place] [address], [address] [place] [address]  Phone: [phone]   Fax:     [phone]        Physical Therapy Daily Treatment Note  Date:  2023    Patient Name:  Penny Gutierrez    :  1950  MRN: [id number]  Restrictions/Precautions:    Medical/Treatment Diagnosis Information:  Diagnosis: L truamatic rotator cuff tear M75.122  Treatment Diagnosis: L shoulder painM25. 512  DOS 2022  L shoulder RC augmentation labral debridement SAD DCE capsular release LUIS      Insurance/Certification information:  PT Insurance Information: [facility] BMN  Physician Information:   Dr Nathaniel Echevarria  Has the plan of care been signed (Y/N):        [x]  Yes  []  No     Date of Patient follow up with Physician: per md      Is this a Progress Report:     []  Yes   [x]  No        If Yes:  Date Range for reporting period:  Dwupncvxe68/6  Ending    Progress report will be due (10 Rx or 30 days whichever is less):       Recertification will be due (POC Duration  / 90 days whichever is less): 3/6        Visit # Insurance Allowable Auth Required   4 in      30 total post surgery   BMN []  Yes []  No        Functional Scale:  Foto38/100 38/100 54/100,55/100    Date assessed:  ,10/6 11/8,     Latex Allergy:  [x]NO      []YES  Preferred Language for Healthcare:   [x]English       []other:    Pain level:  1-3/10 at rest     SUBJECTIVE:   overall improving    OBJECTIVE: See eval   ROM PROM  AROM   Comment     L R L R     Flexion ~160   ~120 stand     140 supine        scap ~160    ~100 stand*       ER Bk@[place]    80@80        IR             Other  (cervical)             Other                  Strength  / L R Comment   Flexion  3+ limited range       Abduction  3+ limited range       ER  4+ at side        IR  4 at side       Supraspinatus         Upper Trap         Lower Trap         Mid Trap Rhomboids         Biceps         Triceps         Horizontal Abduction         Horizontal Adduction         Lats          RESTRICTIONS/PRECAUTIONS: RCR,  increase ROM as able per MD due to capsular release and LUIS    Exercises/Interventions:   Exercises:  Exercise/Equipment Resistance/Repetitions Other comments   Stretching/PROM     Wand ER at neutral 04d48bdp    ER Supine @ 60abd 49m07rye      Supine cane flex 3x10       Start9/22        Start10/6   Table Slides fl 15a70ons     Abd 70z84hai    ER bend forward 23z34qge       Start9/8      Start9/8   Wall slide 30w16gve  Start9/22   Wall step away for flexion 40b61vxc Start9/8   Pulley flex   X10    Scap x10 Start9/16   Supine grab L wrist with R hand move into flexion 62d96tyl    blue ball roll into flexion on table  37k62zdb    Pendulum AROM CW CCW fl/ext side to side [id number]  4#    Elbow AROM      IR behind back  00m08nip Start9/12   Supine butterfly 2 min Start10/4   Isometrics     Retraction x30    shrug x30    Weight shift     Flexion     Abduction     External Rotation     Internal Rotation     Biceps     Triceps          PRE's     Flexion     Abduction     External Rotation SL3x10  3# no towel  ^1/19   Internal Rotation     Shrugs     EXT EOB 3x10 5# ^1/3   Reverse Flys     Serratus 2x10  Start10/6   Horizontal Abd      Biceps 3x10 7# ^11/10   Triceps     Retraction EOB 3x10 5# ^1/3        Cable Column/Theraband     External Rotation Modified no money 3x10 red Start11/10   Internal Rotation     Supine hor abd               Cheer leader  X10 each Start1/17   Lats     Ext     Flex     Scapular Retraction 3x10 blk ^11/8   BIC     TRIC 3x10 blue ^11/8   PNF          Dynamic Stability     Supine      Plyoback          Manual interventions     PROM Fl scap ER/IR; 15 min  ^11/1              Therapeutic Exercise and NMR EXR  [x] (23600) Provided verbal/tactile cueing for activities related to strengthening, flexibility, endurance, ROM  for improvements in scapular, scapulothoracic and UE control with self care, reaching, carrying, lifting, house/yardwork, driving/computer work.    [] (94198) Provided verbal/tactile cueing for activities related to improving balance, coordination, kinesthetic sense, posture, motor skill, proprioception  to assist with  scapular, scapulothoracic and UE control with self care, reaching, carrying, lifting, house/yardwork, driving/computer work. Therapeutic Activities:    [] (43946 or 40105) Provided verbal/tactile cueing for activities related to improving balance, coordination, kinesthetic sense, posture, motor skill, proprioception and motor activation to allow for proper function of scapular, scapulothoracic and UE control with self care, carrying, lifting, driving/computer work.      Home Exercise Program:    [x] (97097) Reviewed/Progressed HEP activities related to strengthening, flexibility, endurance, ROM of scapular, scapulothoracic and UE control with self care, reaching, carrying, lifting, house/yardwork, driving/computer work  [] (88018) Reviewed/Progressed HEP activities related to improving balance, coordination, kinesthetic sense, posture, motor skill, proprioception of scapular, scapulothoracic and UE control with self care, reaching, carrying, lifting, house/yardwork, driving/computer work      Manual Treatments:  PROM / STM / Oscillations-Mobs:  G-I, II, III, IV (PA's, Inf., Post.)  [x] (88402) Provided manual therapy to mobilize soft tissue/joints of cervical/CT, scapular GHJ and UE for the purpose of modulating pain, promoting relaxation,  increasing ROM, reducing/eliminating soft tissue swelling/inflammation/restriction, improving soft tissue extensibility and allowing for proper ROM for normal function with self care, reaching, carrying, lifting, house/yardwork, driving/computer work    Modalities:  declined ice but recommended ice 15-20 min on several times per day to help with pain and inflammation     Charges:  Timed Code Treatment Minutes: 45   Total Treatment Minutes: 371-776       [] EVAL (LOW) 52339 (typically 20 minutes face-to-face)  [] EVAL (MOD) 90250 (typically 30 minutes face-to-face)  [] EVAL (HIGH) 04719 (typically 45 minutes face-to-face)  [] RE-EVAL     [x] TL(26567) x  2  [] IONTO  [] NMR (59237) x     [] VASO  [x] Manual (11357) x  1    [] Other:  [] TA x      [] Mech Traction (64003)  [] ES(attended) (08260)      [] ES (un) (85612):     GOALS:  Patient stated goal: increase ROM decrease pain, use arm for adls    [] Progressing: [] Met: [] Not Met: [] Adjusted     Therapist goals for Patient:   Short Term Goals: To be achieved in: 2 -4weeks  1. Independent in HEP and progression per patient tolerance, in order to prevent re-injury. [] Progressing: [x] Met: [] Not Met: [] Adjusted   2. Patient will have a decrease in pain to facilitate improvement in movement, function, and ADLs as indicated by Functional Deficits. [] Progressing: [x] Met: [] Not Met: [] Adjusted     Long Term Goals: To be achieved in: 12 weeks  1. Foto 67/100  to assist with reaching prior level of function. [x] Progressing: [] Met: [] Not Met: [] Adjusted  2. Patient will demonstrate increased AROM to = R to allow for proper joint functioning as indicated by patients Functional Deficits. [x] Progressing: [] Met: [] Not Met: [] Adjusted  3. Patient will demonstrate an increase in strength to good scapular and core control  for UE to allow for proper functional mobility as indicated by patients Functional Deficits. [x] Progressing: [] Met: [] Not Met: [] Adjusted  4. Patient will return to  functional activities reach over head, reach behind back, don doff jacket  without increased symptoms or restriction. [x] Progressing: [] Met: [] Not Met: [] Adjusted  Overall Progression Towards Functional goals/ Treatment Progress Update:  [] Patient is progressing as expected towards functional goals listed.     [x] Progression is slowed due to complexities/Impairments listed. Trauma , complexity of injury   [] Progression has been slowed due to co-morbidities. [] Plan just implemented, too soon to assess goals progression <30days   [] Goals require adjustment due to lack of progress  [] Patient is not progressing as expected and requires additional follow up with physician  [] Other    Prognosis for POC: [x] Good [] Fair  [] Poor      Patient requires continued skilled intervention: [x] Yes  [] No    Treatment/Activity Tolerance:  [x] Patient able to complete treatment  [] Patient limited by fatigue  [x] Patient limited by some pain     [] Patient limited by other medical complications  [x] Other:   Will continue to progress PROM, AAROM light PREs as able to progress to PLOF L UE. Patient Education:    Reviewed diagnosis, POC, HEP and its importance. Access Code: [email or web address]  URL: [email or web address]  Date: 9/6/22  Prepared by: Corrie Apley     Exercises  Standing Shoulder Shrugs - 2 x daily - 7 x weekly - 3 sets - 10 reps  Seated Scapular Retraction - 2 x daily - 7 x weekly - 3 sets - 10 reps  Standing Bicep Curls Supinated with Dumbbells - 2 x daily - 7 x weekly - 3 sets - 10 reps     Seated Shoulder Pendulum Exercise - 2 x daily - 7 x weekly - 3 sets - 10 reps  Table slides flexion - 2 x daily - 7 x weekly - 10 reps - 10 hold  Seated Shoulder External Rotation AAROM with Dowel - 2 x daily - 7 x weekly - 10 reps - 10 hold   PLAN: See eval  [x] Continue per plan of care [] Alter current plan (see comments above)  [] Plan of care initiated [] Hold pending MD visit [] Discharge      Electronically signed by:  Corrie Apley, PT    Note: If patient does not return for scheduled/ recommended follow up visits, this note will serve as a discharge from care along with most recent update on progress.

## 2023-01-23 RX ORDER — INSULIN GLARGINE 100 [IU]/ML
INJECTION, SOLUTION SUBCUTANEOUS
Qty: 18 ML | Refills: 0 | Status: SHIPPED | OUTPATIENT
Start: 2023-01-23 | End: 2023-02-28

## 2023-01-24 ENCOUNTER — HOSPITAL ENCOUNTER (OUTPATIENT)
Dept: PHYSICAL THERAPY | Age: 73
Setting detail: THERAPIES SERIES
Discharge: HOME | End: 2023-01-24
Payer: MEDICARE

## 2023-01-24 PROCEDURE — 97140 MANUAL THERAPY 1/> REGIONS: CPT | Performed by: PHYSICAL THERAPIST

## 2023-01-24 PROCEDURE — 97110 THERAPEUTIC EXERCISES: CPT | Performed by: PHYSICAL THERAPIST

## 2023-01-24 NOTE — FLOWSHEET NOTE
[address],  [address]  Phone: [phone]   Fax:     [phone]        Physical Therapy Daily Treatment Note  Date:  2023    Patient Name:  Sandra Crocker    :  1950  MRN: [id number]  Restrictions/Precautions:    Medical/Treatment Diagnosis Information:  Diagnosis: L truamatic rotator cuff tear M75.122  Treatment Diagnosis: L shoulder painM25. 512  DOS 2022  L shoulder RC augmentation labral debridement SAD DCE capsular release LUIS      Insurance/Certification information:  PT Insurance Information: [facility] BMN  Physician Information:   Dr Ofe Newberry  Has the plan of care been signed (Y/N):        [x]  Yes  []  No     Date of Patient follow up with Physician: per md      Is this a Progress Report:     []  Yes   [x]  No        If Yes:  Date Range for reporting period:  Ectwrhznh49/6  Ending    Progress report will be due (10 Rx or 30 days whichever is less):       Recertification will be due (POC Duration  / 90 days whichever is less): 3/6        Visit # Insurance Allowable Auth Required   5 in      31 total post surgery   BMN []  Yes []  No        Functional Scale:  Foto38/100 38/100 54/100,55/100    Date assessed:  ,10/6 11/8,     Latex Allergy:  [x]NO      []YES  Preferred Language for Healthcare:   [x]English       []other:    Pain level:  1-3/10 at rest     SUBJECTIVE:   no new c/o    OBJECTIVE: See eval   ROM PROM  AROM   Comment     L R L R     Flexion ~160   ~120 stand     140 supine        scap ~160    ~100 stand*       ER [email or web address] Me    80@80        IR             Other  (cervical)             Other                  Strength  / L R Comment   Flexion  3+ limited range       Abduction  3+ limited range       ER  4+ at side        IR  4 at side       Supraspinatus         Upper Trap         Lower Trap         Mid Trap Rhomboids         Biceps         Triceps         Horizontal Abduction         Horizontal Adduction         Lats          RESTRICTIONS/PRECAUTIONS: RCR,  increase ROM as able per MD due to capsular release and LUIS    Exercises/Interventions:   Exercises:  Exercise/Equipment Resistance/Repetitions Other comments   Stretching/PROM     Wand ER at neutral 50b92xor    ER Supine @ 60abd 36f12doy      Supine cane flex 3x10       Start9/22        Start10/6   Table Slides fl 33u78ykr     Abd 39b13hdl    ER bend forward 80t09mzh       Start9/8      Start9/8   Wall slide 22k76nzd  Start9/22   Wall step away for flexion 76y19nvx Start9/8   Pulley flex   X10    Scap x10 Start9/16   Supine grab L wrist with R hand move into flexion 56m17gbt    blue ball roll into flexion on table  07q42cba    Pendulum AROM CW CCW fl/ext side to side x59lyvm  4#    Elbow AROM      IR behind back  03t33ajc Start9/12   Supine butterfly 2 min Start10/4   Isometrics     Retraction x30    shrug x30    Weight shift     Flexion     Abduction     External Rotation     Internal Rotation     Biceps     Triceps          PRE's     Flexion     Abduction     External Rotation SL3x10  3# no towel  ^1/19   Internal Rotation     Shrugs     EXT EOB 3x10 5# ^1/3   Reverse Flys     Serratus 2x10  Start10/6   Horizontal Abd      Biceps 3x10 7# ^11/10   Triceps     Retraction EOB 3x10 5# ^1/3        Cable Column/Theraband     External Rotation Modified no money 3x10 red Start11/10   Internal Rotation     Supine hor abd               Cheer leader  X10 each yellow Start1/17   Lats     Ext     Flex     Scapular Retraction 3x10 blk ^11/8   BIC     TRIC 3x10 blue ^11/8   PNF          Dynamic Stability     Supine      Plyoback          Manual interventions     PROM Fl scap ER/IR; 15 min  ^11/1              Therapeutic Exercise and NMR EXR  [x] (49598) Provided verbal/tactile cueing for activities related to strengthening, flexibility, endurance, ROM  for improvements in scapular, scapulothoracic and UE control with self care, reaching, carrying, lifting, house/yardwork, driving/computer work.    [] (50401) Provided verbal/tactile cueing for activities related to improving balance, coordination, kinesthetic sense, posture, motor skill, proprioception  to assist with  scapular, scapulothoracic and UE control with self care, reaching, carrying, lifting, house/yardwork, driving/computer work. Therapeutic Activities:    [] (32047 or 27242) Provided verbal/tactile cueing for activities related to improving balance, coordination, kinesthetic sense, posture, motor skill, proprioception and motor activation to allow for proper function of scapular, scapulothoracic and UE control with self care, carrying, lifting, driving/computer work.      Home Exercise Program:    [x] (34160) Reviewed/Progressed HEP activities related to strengthening, flexibility, endurance, ROM of scapular, scapulothoracic and UE control with self care, reaching, carrying, lifting, house/yardwork, driving/computer work  [] (64604) Reviewed/Progressed HEP activities related to improving balance, coordination, kinesthetic sense, posture, motor skill, proprioception of scapular, scapulothoracic and UE control with self care, reaching, carrying, lifting, house/yardwork, driving/computer work      Manual Treatments:  PROM / STM / Oscillations-Mobs:  G-I, II, III, IV (PA's, Inf., Post.)  [x] (26741) Provided manual therapy to mobilize soft tissue/joints of cervical/CT, scapular GHJ and UE for the purpose of modulating pain, promoting relaxation,  increasing ROM, reducing/eliminating soft tissue swelling/inflammation/restriction, improving soft tissue extensibility and allowing for proper ROM for normal function with self care, reaching, carrying, lifting, house/yardwork, driving/computer work    Modalities:  declined ice but recommended ice 15-20 min on several times per day to help with pain and inflammation Charges:  Timed Code Treatment Minutes: 45   Total Treatment Minutes: 235-350       [] EVAL (LOW) 72508 (typically 20 minutes face-to-face)  [] EVAL (MOD) 97577 (typically 30 minutes face-to-face)  [] EVAL (HIGH) 79403 (typically 45 minutes face-to-face)  [] RE-EVAL     [x] ES(17576) x  2  [] IONTO  [] NMR (36071) x     [] VASO  [x] Manual (42748) x  1    [] Other:  [] TA x      [] Mech Traction (66313)  [] ES(attended) (62791)      [] ES (un) (99608):     GOALS:  Patient stated goal: increase ROM decrease pain, use arm for adls    [] Progressing: [] Met: [] Not Met: [] Adjusted     Therapist goals for Patient:   Short Term Goals: To be achieved in: 2 -4weeks  1. Independent in HEP and progression per patient tolerance, in order to prevent re-injury. [] Progressing: [x] Met: [] Not Met: [] Adjusted   2. Patient will have a decrease in pain to facilitate improvement in movement, function, and ADLs as indicated by Functional Deficits. [] Progressing: [x] Met: [] Not Met: [] Adjusted     Long Term Goals: To be achieved in: 12 weeks  1. Foto 67/100  to assist with reaching prior level of function. [x] Progressing: [] Met: [] Not Met: [] Adjusted  2. Patient will demonstrate increased AROM to = R to allow for proper joint functioning as indicated by patients Functional Deficits. [x] Progressing: [] Met: [] Not Met: [] Adjusted  3. Patient will demonstrate an increase in strength to good scapular and core control  for UE to allow for proper functional mobility as indicated by patients Functional Deficits. [x] Progressing: [] Met: [] Not Met: [] Adjusted  4. Patient will return to  functional activities reach over head, reach behind back, don doff jacket  without increased symptoms or restriction. [x] Progressing: [] Met: [] Not Met: [] Adjusted  Overall Progression Towards Functional goals/ Treatment Progress Update:  [] Patient is progressing as expected towards functional goals listed.     [x] Progression is slowed due to complexities/Impairments listed. Trauma , complexity of injury   [] Progression has been slowed due to co-morbidities. [] Plan just implemented, too soon to assess goals progression <30days   [] Goals require adjustment due to lack of progress  [] Patient is not progressing as expected and requires additional follow up with physician  [] Other    Prognosis for POC: [x] Good [] Fair  [] Poor      Patient requires continued skilled intervention: [x] Yes  [] No    Treatment/Activity Tolerance:  [x] Patient able to complete treatment  [] Patient limited by fatigue  [x] Patient limited by some pain     [] Patient limited by other medical complications  [x] Other:   Will continue to progress PROM, AAROM light PREs as able to progress to PLOF L UE. 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 Patient Education:    Reviewed diagnosis, POC, HEP and its importance. Access Code: [email or web address]  URL: [email or web address]  Date: 9/6/22  Prepared by: Yanet Ferguson     Exercises  Standing Shoulder Shrugs - 2 x daily - 7 x weekly - 3 sets - 10 reps  Seated Scapular Retraction - 2 x daily - 7 x weekly - 3 sets - 10 reps  Standing Bicep Curls Supinated with Dumbbells - 2 x daily - 7 x weekly - 3 sets - 10 reps     Seated Shoulder Pendulum Exercise - 2 x daily - 7 x weekly - 3 sets - 10 reps  Table slides flexion - 2 x daily - 7 x weekly - 10 reps - 10 hold  Seated Shoulder External Rotation AAROM with Dowel - 2 x daily - 7 x weekly - 10 reps - 10 hold   PLAN: See eval  [x] Continue per plan of care [] Alter current plan (see comments above)  [] Plan of care initiated [] Hold pending MD visit [] Discharge      Electronically signed by:  Yanet Ferguson PT    Note: If patient does not return for scheduled/ recommended follow up visits, this note will serve as a discharge from care along with most recent update on progress.

## 2023-01-26 ENCOUNTER — HOSPITAL ENCOUNTER (OUTPATIENT)
Dept: PHYSICAL THERAPY | Age: 73
Setting detail: THERAPIES SERIES
Discharge: HOME | End: 2023-01-26
Payer: MEDICARE

## 2023-01-26 PROCEDURE — 97110 THERAPEUTIC EXERCISES: CPT | Performed by: PHYSICAL THERAPIST

## 2023-01-26 PROCEDURE — 97140 MANUAL THERAPY 1/> REGIONS: CPT | Performed by: PHYSICAL THERAPIST

## 2023-01-26 NOTE — FLOWSHEET NOTE
The [address] [place] [address] 200, [address], [address] [place] [address]  Phone: [phone]   Fax:     [phone]        Physical Therapy Daily Treatment Note  Date:  2023    Patient Name:  Alejandro Kelly    :  1950  MRN: [id number]  Restrictions/Precautions:    Medical/Treatment Diagnosis Information:  Diagnosis: L truamatic rotator cuff tear M75.122  Treatment Diagnosis: L shoulder painM25. 512  DOS 2022  L shoulder RC augmentation labral debridement SAD DCE capsular release LUIS      Insurance/Certification information:  PT Insurance Information: [facility] BMN  Physician Information:   Dr Tanika Sanchez  Has the plan of care been signed (Y/N):        [x]  Yes  []  No     Date of Patient follow up with Physician: per md      Is this a Progress Report:     []  Yes   [x]  No        If Yes:  Date Range for reporting period:  Wgdatfwoe66/6  Ending    Progress report will be due (10 Rx or 30 days whichever is less):       Recertification will be due (POC Duration  / 90 days whichever is less): 3/6        Visit # Insurance Allowable Auth Required   6 in      32 total post surgery   BMN []  Yes []  No        Functional Scale:  Foto38/100 38/100 54/100,55/100    Date assessed:  ,10/6 11/8,     Latex Allergy:  [x]NO      []YES  Preferred Language for Healthcare:   [x]English       []other:    Pain level:  1-3/10 at rest     SUBJECTIVE:   no new c/o    OBJECTIVE: See eval   ROM PROM  AROM   Comment     L R L R     Flexion ~160   ~120 stand     140 supine        scap ~160    ~100 stand*       ER [email or web address]    80@80        IR             Other  (cervical)             Other                  Strength  / L R Comment   Flexion  3+ limited range       Abduction  3+ limited range       ER  4+ at side        IR  4 at side       Supraspinatus         Upper Trap         Lower Trap         Mid Trap Rhomboids         Biceps         Triceps         Horizontal Abduction         Horizontal Adduction         Lats          RESTRICTIONS/PRECAUTIONS: RCR,  increase ROM as able per MD due to capsular release and LUIS    Exercises/Interventions:   Exercises:  Exercise/Equipment Resistance/Repetitions Other comments   Stretching/PROM     Wand ER at neutral 27e87nwe    ER Supine @ 60abd 01m54jcj      Supine cane flex 3x10       Start9/22        Start10/6   Table Slides fl 64b14pul     Abd 50b18lrz    ER bend forward 05u89igg       Start9/8      Start9/8   Wall slide 79p99gtu  Start9/22   Wall step away for flexion 69f27yit Start9/8   Pulley flex   X10    Scap x10 Start9/16   Supine grab L wrist with R hand move into flexion 02j75bvr    blue ball roll into flexion on table  04s74gvm    Pendulum AROM CW CCW fl/ext side to side [id number]  4#    Elbow AROM      IR behind back  93f91lpt Start9/12   Supine butterfly 2 min Start10/4   Isometrics     Retraction x30    shrug x30    Weight shift     Flexion     Abduction     External Rotation     Internal Rotation     Biceps     Triceps          PRE's     Flexion     Abduction     External Rotation SL3x10  3# no towel  ^1/19   Internal Rotation     Shrugs     EXT EOB 3x10 5# ^1/3   Reverse Flys     Serratus 2x10  Start10/6   Horizontal Abd      Biceps 3x10 7# ^11/10   Triceps     Retraction EOB 3x10 5# ^1/3        Cable Column/Theraband     External Rotation Modified no money 3x10 red Start11/10   Internal Rotation     Supine hor abd               Cheer leader  X10 each yellow Start1/17   Lats     Ext     Flex     Scapular Retraction 3x10 blk ^11/8   BIC     TRIC 3x10 blue ^11/8   PNF          Dynamic Stability     Supine      Plyoback          Manual interventions     PROM Fl scap ER/IR;luis ER/IR  15 min  ^11/1              Therapeutic Exercise and NMR EXR  [x] (84411) Provided verbal/tactile cueing for activities related to strengthening, flexibility, endurance, ROM  for improvements in scapular, scapulothoracic and UE control with self care, reaching, carrying, lifting, house/yardwork, driving/computer work.    [] (54558) Provided verbal/tactile cueing for activities related to improving balance, coordination, kinesthetic sense, posture, motor skill, proprioception  to assist with  scapular, scapulothoracic and UE control with self care, reaching, carrying, lifting, house/yardwork, driving/computer work. Therapeutic Activities:    [] (94545 or 12687) Provided verbal/tactile cueing for activities related to improving balance, coordination, kinesthetic sense, posture, motor skill, proprioception and motor activation to allow for proper function of scapular, scapulothoracic and UE control with self care, carrying, lifting, driving/computer work.      Home Exercise Program:    [x] (59959) Reviewed/Progressed HEP activities related to strengthening, flexibility, endurance, ROM of scapular, scapulothoracic and UE control with self care, reaching, carrying, lifting, house/yardwork, driving/computer work  [] (04155) Reviewed/Progressed HEP activities related to improving balance, coordination, kinesthetic sense, posture, motor skill, proprioception of scapular, scapulothoracic and UE control with self care, reaching, carrying, lifting, house/yardwork, driving/computer work      Manual Treatments:  PROM / STM / Oscillations-Mobs:  G-I, II, III, IV (PA's, Inf., Post.)  [x] (91704) Provided manual therapy to mobilize soft tissue/joints of cervical/CT, scapular GHJ and UE for the purpose of modulating pain, promoting relaxation,  increasing ROM, reducing/eliminating soft tissue swelling/inflammation/restriction, improving soft tissue extensibility and allowing for proper ROM for normal function with self care, reaching, carrying, lifting, house/yardwork, driving/computer work    Modalities:  declined ice but recommended ice 15-20 min on several times per day to help with pain and inflammation     Charges:  Timed Code Treatment Minutes: 45   Total Treatment Minutes: 388-455       [] EVAL (LOW) 74192 (typically 20 minutes face-to-face)  [] EVAL (MOD) 08320 (typically 30 minutes face-to-face)  [] EVAL (HIGH) 59236 (typically 45 minutes face-to-face)  [] RE-EVAL     [x] QM(03708) x  2  [] IONTO  [] NMR (55818) x     [] VASO  [x] Manual (67829) x  1    [] Other:  [] TA x      [] Mech Traction (18994)  [] ES(attended) (77242)      [] ES (un) (49133):     GOALS:  Patient stated goal: increase ROM decrease pain, use arm for adls    [] Progressing: [] Met: [] Not Met: [] Adjusted     Therapist goals for Patient:   Short Term Goals: To be achieved in: 2 -4weeks  1. Independent in HEP and progression per patient tolerance, in order to prevent re-injury. [] Progressing: [x] Met: [] Not Met: [] Adjusted   2. Patient will have a decrease in pain to facilitate improvement in movement, function, and ADLs as indicated by Functional Deficits. [] Progressing: [x] Met: [] Not Met: [] Adjusted     Long Term Goals: To be achieved in: 12 weeks  1. Foto 67/100  to assist with reaching prior level of function. [x] Progressing: [] Met: [] Not Met: [] Adjusted  2. Patient will demonstrate increased AROM to = R to allow for proper joint functioning as indicated by patients Functional Deficits. [x] Progressing: [] Met: [] Not Met: [] Adjusted  3. Patient will demonstrate an increase in strength to good scapular and core control  for UE to allow for proper functional mobility as indicated by patients Functional Deficits. [x] Progressing: [] Met: [] Not Met: [] Adjusted  4. Patient will return to  functional activities reach over head, reach behind back, don doff jacket  without increased symptoms or restriction. [x] Progressing: [] Met: [] Not Met: [] Adjusted  Overall Progression Towards Functional goals/ Treatment Progress Update:  [] Patient is progressing as expected towards functional goals listed. [x] Progression is slowed due to complexities/Impairments listed. Trauma , complexity of injury   [] Progression has been slowed due to co-morbidities. [] Plan just implemented, too soon to assess goals progression <30days   [] Goals require adjustment due to lack of progress  [] Patient is not progressing as expected and requires additional follow up with physician  [] Other    Prognosis for POC: [x] Good [] Fair  [] Poor      Patient requires continued skilled intervention: [x] Yes  [] No    Treatment/Activity Tolerance:  [x] Patient able to complete treatment  [] Patient limited by fatigue  [x] Patient limited by some pain     [] Patient limited by other medical complications  [x] Other:   Will continue to progress PROM, AAROM light PREs as able to progress to PLOF L UE. 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 Patient Education:    Reviewed diagnosis, POC, HEP and its importance. Access Code: [email or web address]  URL: [email or web address]  Date: 9/6/22  Prepared by: Ralph Solano     Exercises  Standing Shoulder Shrugs - 2 x daily - 7 x weekly - 3 sets - 10 reps  Seated Scapular Retraction - 2 x daily - 7 x weekly - 3 sets - 10 reps  Standing Bicep Curls Supinated with Dumbbells - 2 x daily - 7 x weekly - 3 sets - 10 reps     Seated Shoulder Pendulum Exercise - 2 x daily - 7 x weekly - 3 sets - 10 reps  Table slides flexion - 2 x daily - 7 x weekly - 10 reps - 10 hold  Seated Shoulder External Rotation AAROM with Dowel - 2 x daily - 7 x weekly - 10 reps - 10 hold   PLAN: See eval  [x] Continue per plan of care [] Alter current plan (see comments above)  [] Plan of care initiated [] Hold pending MD visit [] Discharge      Electronically signed by:  Ralph Solano PT    Note: If patient does not return for scheduled/ recommended follow up visits, this note will serve as a discharge from care along with most recent update on progress.

## 2023-01-31 ENCOUNTER — HOSPITAL ENCOUNTER (OUTPATIENT)
Dept: PHYSICAL THERAPY | Age: 73
Setting detail: THERAPIES SERIES
Discharge: HOME | End: 2023-01-31
Payer: MEDICARE

## 2023-01-31 PROCEDURE — 97140 MANUAL THERAPY 1/> REGIONS: CPT | Performed by: PHYSICAL THERAPIST

## 2023-01-31 PROCEDURE — 97110 THERAPEUTIC EXERCISES: CPT | Performed by: PHYSICAL THERAPIST

## 2023-01-31 NOTE — FLOWSHEET NOTE
[address], [address] [place] [address], [address] [place] [address]  Phone: [phone]   Fax:     [phone]        Physical Therapy Daily Treatment Note  Date:  2023    Patient Name:  Andreina Preston    :  1950  MRN: [id number]  Restrictions/Precautions:    Medical/Treatment Diagnosis Information:  Diagnosis: L truamatic rotator cuff tear M75.122  Treatment Diagnosis: L shoulder painM25. 512  DOS 2022  L shoulder RC augmentation labral debridement SAD DCE capsular release LUIS      Insurance/Certification information:  PT Insurance Information: [facility] BMN  Physician Information:   Dr Delma Hook  Has the plan of care been signed (Y/N):        [x]  Yes  []  No     Date of Patient follow up with Physician: per md      Is this a Progress Report:     []  Yes   [x]  No        If Yes:  Date Range for reporting period:  Bheboxxpv99/6  Ending    Progress report will be due (10 Rx or 30 days whichever is less):       Recertification will be due (POC Duration  / 90 days whichever is less): 3/6        Visit # Insurance Allowable Auth Required   7 in      32 total post surgery   BMN []  Yes []  No        Functional Scale:  Foto38/100 38/100 54/100,55/100    Date assessed:  ,10/6 11/8,     Latex Allergy:  [x]NO      []YES  Preferred Language for Healthcare:   [x]English       []other:    Pain level:  1-3/10 at rest     SUBJECTIVE:  no new c/o given    OBJECTIVE: See eval   ROM PROM  AROM   Comment     L R L R     Flexion ~160   ~120 stand     140 supine        scap ~160    ~100 stand*       ER [email or web address]    80@80        IR             Other  (cervical)             Other                  Strength  / L R Comment   Flexion  3+ limited range       Abduction  3+ limited range       ER  4+ at side        IR  4 at side       Supraspinatus         Upper Trap         Lower Trap         Mid Trap   Rhomboids         Biceps         Triceps         Horizontal Abduction         Horizontal Adduction         Lats          RESTRICTIONS/PRECAUTIONS: RCR,  increase ROM as able per MD due to capsular release and LUIS    Exercises/Interventions:   Exercises:  Exercise/Equipment Resistance/Repetitions Other comments   Stretching/PROM     Wand ER at neutral 10w94evo    ER Supine @ 60abd 47e85zmg      Supine cane flex 3x10       Start9/22        Start10/6   Table Slides fl 22g68gzt     Abd 26x32wvl    ER bend forward 23v22aoz       Start9/8      Start9/8   Wall slide 30x48fhe  Start9/22   Wall step away for flexion 13r20ghh Start9/8   Pulley flex   X10    Scap x10 Start9/16   Supine grab L wrist with R hand move into flexion 38h27byk    blue ball roll into flexion on table  80b13nsp    Pendulum AROM CW CCW fl/ext side to side z82iksb  #    Elbow AROM      IR behind back  16r17fov Start9/12   Supine butterfly 2 min Start10/4   Isometrics     Retraction x30    shrug x30    Weight shift     Flexion     Abduction     External Rotation     Internal Rotation     Biceps     Triceps          PRE's     Flexion     Abduction     External Rotation SL3x10  3# no towel  ^1/19   Internal Rotation     Shrugs     EXT EOB 3x10 5# ^1/3   Reverse Flys     Serratus 2x10  Start10/6   Horizontal Abd      Biceps 3x10 7# ^11/10   Triceps     Retraction EOB 3x10 5# ^1/3        Cable Column/Theraband     External Rotation Modified no money 3x10 red Start11/10   Internal Rotation     Supine hor abd               Cheer leader  X10 each yellow Start1/17   Lats     Ext     Flex     Scapular Retraction 3x10 blk ^11/8   BIC     TRIC 3x10 blue ^11/8   PNF          Dynamic Stability     Supine      Plyoback          Manual interventions     PROM Fl scap ER/IR;luis ER/IR  15 min  ^11/1              Therapeutic Exercise and NMR EXR  [x] (70690) Provided verbal/tactile cueing for activities related to strengthening, flexibility, endurance, ROM  for  improvements in scapular, scapulothoracic and UE control with self care, reaching, carrying, lifting, house/yardwork, driving/computer work.    [] (23667) Provided verbal/tactile cueing for activities related to improving balance, coordination, kinesthetic sense, posture, motor skill, proprioception  to assist with  scapular, scapulothoracic and UE control with self care, reaching, carrying, lifting, house/yardwork, driving/computer work. Therapeutic Activities:    [] (67441 or 33680) Provided verbal/tactile cueing for activities related to improving balance, coordination, kinesthetic sense, posture, motor skill, proprioception and motor activation to allow for proper function of scapular, scapulothoracic and UE control with self care, carrying, lifting, driving/computer work.      Home Exercise Program:    [x] (48337) Reviewed/Progressed HEP activities related to strengthening, flexibility, endurance, ROM of scapular, scapulothoracic and UE control with self care, reaching, carrying, lifting, house/yardwork, driving/computer work  [] (35586) Reviewed/Progressed HEP activities related to improving balance, coordination, kinesthetic sense, posture, motor skill, proprioception of scapular, scapulothoracic and UE control with self care, reaching, carrying, lifting, house/yardwork, driving/computer work      Manual Treatments:  PROM / STM / Oscillations-Mobs:  G-I, II, III, IV (PA's, Inf., Post.)  [x] (70353) Provided manual therapy to mobilize soft tissue/joints of cervical/CT, scapular GHJ and UE for the purpose of modulating pain, promoting relaxation,  increasing ROM, reducing/eliminating soft tissue swelling/inflammation/restriction, improving soft tissue extensibility and allowing for proper ROM for normal function with self care, reaching, carrying, lifting, house/yardwork, driving/computer work    Modalities:  declined ice but recommended ice 15-20 min on several times per day to help with pain and inflammation     Charges:  Timed Code Treatment Minutes: 45   Total Treatment Minutes: 127-124       [] EVAL (LOW) 67924 (typically 20 minutes face-to-face)  [] EVAL (MOD) 28540 (typically 30 minutes face-to-face)  [] EVAL (HIGH) 94763 (typically 45 minutes face-to-face)  [] RE-EVAL     [x] CU(49751) x  2  [] IONTO  [] NMR (86597) x     [] VASO  [x] Manual (75383) x  1    [] Other:  [] TA x      [] Mech Traction (08108)  [] ES(attended) (97540)      [] ES (un) (30307):     GOALS:  Patient stated goal: increase ROM decrease pain, use arm for adls    [] Progressing: [] Met: [] Not Met: [] Adjusted     Therapist goals for Patient:   Short Term Goals: To be achieved in: 2 -4weeks  1. Independent in HEP and progression per patient tolerance, in order to prevent re-injury. [] Progressing: [x] Met: [] Not Met: [] Adjusted   2. Patient will have a decrease in pain to facilitate improvement in movement, function, and ADLs as indicated by Functional Deficits. [] Progressing: [x] Met: [] Not Met: [] Adjusted     Long Term Goals: To be achieved in: 12 weeks  1. Foto 67/100  to assist with reaching prior level of function. [x] Progressing: [] Met: [] Not Met: [] Adjusted  2. Patient will demonstrate increased AROM to = R to allow for proper joint functioning as indicated by patients Functional Deficits. [x] Progressing: [] Met: [] Not Met: [] Adjusted  3. Patient will demonstrate an increase in strength to good scapular and core control  for UE to allow for proper functional mobility as indicated by patients Functional Deficits. [x] Progressing: [] Met: [] Not Met: [] Adjusted  4. Patient will return to  functional activities reach over head, reach behind back, don doff jacket  without increased symptoms or restriction. [x] Progressing: [] Met: [] Not Met: [] Adjusted  Overall Progression Towards Functional goals/ Treatment Progress Update:  [] Patient is progressing as expected towards functional goals listed. [x] Progression is slowed due to complexities/Impairments listed. Trauma , complexity of injury   [] Progression has been slowed due to co-morbidities. [] Plan just implemented, too soon to assess goals progression <30days   [] Goals require adjustment due to lack of progress  [] Patient is not progressing as expected and requires additional follow up with physician  [] Other    Prognosis for POC: [x] Good [] Fair  [] Poor      Patient requires continued skilled intervention: [x] Yes  [] No    Treatment/Activity Tolerance:  [x] Patient able to complete treatment  [] Patient limited by fatigue  [x] Patient limited by some pain     [] Patient limited by other medical complications  [x] Other:   Will continue to progress PROM, AAROM light PREs as able to progress to PLOF L UE. 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 Patient Education:    Reviewed diagnosis, POC, HEP and its importance. Access Code: [email or web address]  URL: [employer])/  Date: 9/6/22  Prepared by: Tommy Kemp     Exercises  Standing Shoulder Shrugs - 2 x daily - 7 x weekly - 3 sets - 10 reps  Seated Scapular Retraction - 2 x daily - 7 x weekly - 3 sets - 10 reps  Standing Bicep Curls Supinated with Dumbbells - 2 x daily - 7 x weekly - 3 sets - 10 reps     Seated Shoulder Pendulum Exercise - 2 x daily - 7 x weekly - 3 sets - 10 reps  Table slides flexion - 2 x daily - 7 x weekly - 10 reps - 10 hold  Seated Shoulder External Rotation AAROM with Dowel - 2 x daily - 7 x weekly - 10 reps - 10 hold   PLAN: See eval  [x] Continue per plan of care [] Alter current plan (see comments above)  [] Plan of care initiated [] Hold pending MD visit [] Discharge      Electronically signed by:  Tommy Kemp PT    Note: If patient does not return for scheduled/ recommended follow up visits, this note will serve as a discharge from care along with most recent update on progress.

## 2023-02-02 ENCOUNTER — HOSPITAL ENCOUNTER (OUTPATIENT)
Dept: PHYSICAL THERAPY | Age: 73
Setting detail: THERAPIES SERIES
Discharge: HOME | End: 2023-02-02
Payer: MEDICARE

## 2023-02-02 PROCEDURE — 97110 THERAPEUTIC EXERCISES: CPT | Performed by: PHYSICAL THERAPIST

## 2023-02-02 PROCEDURE — 97140 MANUAL THERAPY 1/> REGIONS: CPT | Performed by: PHYSICAL THERAPIST

## 2023-02-02 NOTE — FLOWSHEET NOTE
[address],  [address]  Phone: [phone]   Fax:     [phone]        Physical Therapy Daily Treatment Note  Date:  2023    Patient Name:  Andreina Preston    :  1950  MRN: [id number]  Restrictions/Precautions:    Medical/Treatment Diagnosis Information:  Diagnosis: L truamatic rotator cuff tear M75.122  Treatment Diagnosis: L shoulder painM25. 512  DOS 2022  L shoulder RC augmentation labral debridement SAD DCE capsular release LUIS      Insurance/Certification information:  PT Insurance Information: [facility] BMN  Physician Information:   Dr Delma Hook  Has the plan of care been signed (Y/N):        [x]  Yes  []  No     Date of Patient follow up with Physician: per md      Is this a Progress Report:     []  Yes   [x]  No        If Yes:  Date Range for reporting period:  Jbmiazfmn62/6  Ending    Progress report will be due (10 Rx or 30 days whichever is less):       Recertification will be due (POC Duration  / 90 days whichever is less): 3/6        Visit # Insurance Allowable Auth Required   8 in      33 total post surgery   BMN []  Yes []  No        Functional Scale:  Foto38/100 38/100 54/100,55/100    Date assessed:  ,10/6 11/8,     Latex Allergy:  [x]NO      []YES  Preferred Language for Healthcare:   [x]English       []other:    Pain level:  1-3/10 at rest     SUBJECTIVE:  2/2 no new c/o    OBJECTIVE: See eval   ROM PROM  AROM   Comment     L R L R     Flexion ~160   ~120 stand     140 supine        scap ~160    ~100 stand*       ER [email or web address]    80@80        IR             Other  (cervical)             Other                  Strength  /5 L R Comment   Flexion  3+ limited range       Abduction  3+ limited range       ER  4+ at side        IR  4 at side       Supraspinatus         Upper Trap         Lower Trap         Mid Trap Rhomboids         Biceps         Triceps         Horizontal Abduction         Horizontal Adduction         Lats          RESTRICTIONS/PRECAUTIONS: RCR,  increase ROM as able per MD due to capsular release and LUIS    Exercises/Interventions:   Exercises:  Exercise/Equipment Resistance/Repetitions Other comments   Stretching/PROM     Wand ER at neutral 27f77tyw    ER Supine @ 60abd 30z62yoe      Supine cane flex 3x10       Start9/22        Start10/6   Table Slides fl 83p74zzd     Abd 85k09nyg    ER bend forward 62o12pxl       Start9/8      Start9/8   Wall slide 27g41ava  Start9/22   Wall step away for flexion 90c64fll Start9/8   Pulley flex   X10    Scap x10 Start9/16   Supine grab L wrist with R hand move into flexion 81k99akk    blue ball roll into flexion on table  87g73ena    Pendulum AROM CW CCW fl/ext side to side [id number]  #    Elbow AROM      IR behind back  76f91frc Start9/12   Supine butterfly 2 min Start10/4   Isometrics     Retraction x30    shrug x30    Weight shift     Flexion     Abduction     External Rotation     Internal Rotation     Biceps     Triceps          PRE's     Flexion     Abduction     External Rotation SL3x10  3# no towel  ^1/19   Internal Rotation     Shrugs     EXT EOB 3x10 5# ^1/3   Reverse Flys     Serratus 2x10  Start10/6   Horizontal Abd      Biceps 3x10 7# ^11/10   Triceps     Retraction EOB 3x10 5# ^1/3        Cable Column/Theraband     External Rotation Modified no money 3x10 red Start11/10   Internal Rotation     Supine hor abd               Cheer leader  X10 each yellow Start1/17   Lats     Ext     Flex     Scapular Retraction 3x10 blk ^11/8   BIC     TRIC 3x10 blue ^11/8   PNF          Dynamic Stability     Supine      Plyoback          Manual interventions     PROM Fl scap ER/IR;luis ER/IR  15 min  ^11/1              Therapeutic Exercise and NMR EXR  [x] (77670) Provided verbal/tactile cueing for activities related to strengthening, flexibility, endurance, ROM  for improvements in scapular, scapulothoracic and UE control with self care, reaching, carrying, lifting, house/yardwork, driving/computer work.    [] (98441) Provided verbal/tactile cueing for activities related to improving balance, coordination, kinesthetic sense, posture, motor skill, proprioception  to assist with  scapular, scapulothoracic and UE control with self care, reaching, carrying, lifting, house/yardwork, driving/computer work. Therapeutic Activities:    [] (09200 or 20232) Provided verbal/tactile cueing for activities related to improving balance, coordination, kinesthetic sense, posture, motor skill, proprioception and motor activation to allow for proper function of scapular, scapulothoracic and UE control with self care, carrying, lifting, driving/computer work.      Home Exercise Program:    [x] (67560) Reviewed/Progressed HEP activities related to strengthening, flexibility, endurance, ROM of scapular, scapulothoracic and UE control with self care, reaching, carrying, lifting, house/yardwork, driving/computer work  [] (33020) Reviewed/Progressed HEP activities related to improving balance, coordination, kinesthetic sense, posture, motor skill, proprioception of scapular, scapulothoracic and UE control with self care, reaching, carrying, lifting, house/yardwork, driving/computer work      Manual Treatments:  PROM / STM / Oscillations-Mobs:  G-I, II, III, IV (PA's, Inf., Post.)  [x] (39592) Provided manual therapy to mobilize soft tissue/joints of cervical/CT, scapular GHJ and UE for the purpose of modulating pain, promoting relaxation,  increasing ROM, reducing/eliminating soft tissue swelling/inflammation/restriction, improving soft tissue extensibility and allowing for proper ROM for normal function with self care, reaching, carrying, lifting, house/yardwork, driving/computer work    Modalities:  declined ice but recommended ice 15-20 min on several times per day to help with pain and inflammation     Charges:  Timed Code Treatment Minutes: 45   Total Treatment Minutes: 845-147       [] EVAL (LOW) 78893 (typically 20 minutes face-to-face)  [] EVAL (MOD) 05490 (typically 30 minutes face-to-face)  [] EVAL (HIGH) 07939 (typically 45 minutes face-to-face)  [] RE-EVAL     [x] QG(61578) x  2  [] IONTO  [] NMR (27612) x     [] VASO  [x] Manual (56968) x  1    [] Other:  [] TA x      [] Mech Traction (73392)  [] ES(attended) (35695)      [] ES (un) (14652):     GOALS:  Patient stated goal: increase ROM decrease pain, use arm for adls    [] Progressing: [] Met: [] Not Met: [] Adjusted     Therapist goals for Patient:   Short Term Goals: To be achieved in: 2 -4weeks  1. Independent in HEP and progression per patient tolerance, in order to prevent re-injury. [] Progressing: [x] Met: [] Not Met: [] Adjusted   2. Patient will have a decrease in pain to facilitate improvement in movement, function, and ADLs as indicated by Functional Deficits. [] Progressing: [x] Met: [] Not Met: [] Adjusted     Long Term Goals: To be achieved in: 12 weeks  1. Foto 67/100  to assist with reaching prior level of function. [x] Progressing: [] Met: [] Not Met: [] Adjusted  2. Patient will demonstrate increased AROM to = R to allow for proper joint functioning as indicated by patients Functional Deficits. [x] Progressing: [] Met: [] Not Met: [] Adjusted  3. Patient will demonstrate an increase in strength to good scapular and core control  for UE to allow for proper functional mobility as indicated by patients Functional Deficits. [x] Progressing: [] Met: [] Not Met: [] Adjusted  4. Patient will return to  functional activities reach over head, reach behind back, don doff jacket  without increased symptoms or restriction. [x] Progressing: [] Met: [] Not Met: [] Adjusted  Overall Progression Towards Functional goals/ Treatment Progress Update:  [] Patient is progressing as expected towards functional goals listed. [x] Progression is slowed due to complexities/Impairments listed. Trauma , complexity of injury   [] Progression has been slowed due to co-morbidities. [] Plan just implemented, too soon to assess goals progression <30days   [] Goals require adjustment due to lack of progress  [] Patient is not progressing as expected and requires additional follow up with physician  [] Other    Prognosis for POC: [x] Good [] Fair  [] Poor      Patient requires continued skilled intervention: [x] Yes  [] No    Treatment/Activity Tolerance:  [x] Patient able to complete treatment  [] Patient limited by fatigue  [x] Patient limited by some pain     [] Patient limited by other medical complications  [x] Other:   Will continue to progress PROM, AAROM light PREs as able to progress to PLOF L UE. Improved technique to cheerleader ex. Patient Education:    Reviewed diagnosis, POC, HEP and its importance. Access Code: [email or web address]  URL: [employer]/  Date: 9/6/22  Prepared by: Jono Storm     Exercises  Standing Shoulder Shrugs - 2 x daily - 7 x weekly - 3 sets - 10 reps  Seated Scapular Retraction - 2 x daily - 7 x weekly - 3 sets - 10 reps  Standing Bicep Curls Supinated with Dumbbells - 2 x daily - 7 x weekly - 3 sets - 10 reps     Seated Shoulder Pendulum Exercise - 2 x daily - 7 x weekly - 3 sets - 10 reps  Table slides flexion - 2 x daily - 7 x weekly - 10 reps - 10 hold  Seated Shoulder External Rotation AAROM with Dowel - 2 x daily - 7 x weekly - 10 reps - 10 hold   PLAN: See eval  [x] Continue per plan of care [] Alter current plan (see comments above)  [] Plan of care initiated [] Hold pending MD visit [] Discharge      Electronically signed by:  Jono Storm PT    Note: If patient does not return for scheduled/ recommended follow up visits, this note will serve as a discharge from care along with most recent update on progress.

## 2023-02-07 ENCOUNTER — HOSPITAL ENCOUNTER (OUTPATIENT)
Dept: PHYSICAL THERAPY | Age: 73
Setting detail: THERAPIES SERIES
Discharge: HOME | End: 2023-02-07
Payer: MEDICARE

## 2023-02-07 NOTE — FLOWSHEET NOTE
Physical Therapy  Cancellation/No-show Note  Patient Name:  Suzy Villar  :  1950   Date:  2023  Cancelled visits to date: 0  No-shows to date: 0    For today's appointment patient:  [x]  Cancelled  []  Rescheduled appointment  []  No-show     Reason given by patient:  []  Patient ill  []  Conflicting appointment  []  No transportation    [x]  Conflict with work  []  No reason given  []  Other:     Comments:      Electronically signed by:  Quincy Riley PT, PT

## 2023-02-09 ENCOUNTER — HOSPITAL ENCOUNTER (OUTPATIENT)
Dept: PHYSICAL THERAPY | Age: 73
Setting detail: THERAPIES SERIES
Discharge: HOME | End: 2023-02-09
Payer: MEDICARE

## 2023-02-09 PROCEDURE — 97110 THERAPEUTIC EXERCISES: CPT | Performed by: PHYSICAL THERAPIST

## 2023-02-09 PROCEDURE — 97140 MANUAL THERAPY 1/> REGIONS: CPT | Performed by: PHYSICAL THERAPIST

## 2023-02-09 NOTE — FLOWSHEET NOTE
[id number] [address]  Phone: [phone]   Fax:     [phone]        Physical Therapy Daily Treatment Note  Date:  2023    Patient Name:  Alycia Moore    :  1950  MRN: [id number]  Restrictions/Precautions:    Medical/Treatment Diagnosis Information:  Diagnosis: L truamatic rotator cuff tear M75.122  Treatment Diagnosis: L shoulder painM25. 512  DOS 2022  L shoulder RC augmentation labral debridement SAD DCE capsular release LUIS      Insurance/Certification information:  PT Insurance Information: [facility] BMN  Physician Information:   Dr Mya Navarro  Has the plan of care been signed (Y/N):        [x]  Yes  []  No     Date of Patient follow up with Physician: per md      Is this a Progress Report:     [x]  Yes   []  No        If Yes:  Date Range for reporting period:  Beginning  Ending    Progress report will be due (10 Rx or 30 days whichever is less): 3/5 with POC      Recertification will be due (POC Duration  / 90 days whichever is less): 3/6        Visit # Insurance Allowable Auth Required   9 in      34 total post surgery   BMN []  Yes []  No        Functional Scale: Foto38/100 38/100 54/100,55/23770/100    Date assessed:  ,10/6 11/8,     Latex Allergy:  [x]NO      []YES  Preferred Language for Healthcare:   [x]English       []other:    Pain level:  0-3/10 at rest     SUBJECTIVE:   shoulder is fine .  Still have some pain in upper arm muscle    OBJECTIVE: See eval   ROM PROM  AROM   Comment     L R L R     Flexion ~160   ~120 stand     150 supine        scap ~160    ~115 stand*       ER Vandana@[employer]    85@80        IR             Other  (cervical)             Other                  Strength   L R Comment   Flexion  3+ limited range       Abduction  3+ limited range       ER  5- at side        IR  5- at side       Supraspinatus Upper Trap         Lower Trap         Mid Trap         Rhomboids         Biceps         Triceps         Horizontal Abduction         Horizontal Adduction         Lats          RESTRICTIONS/PRECAUTIONS: RCR,  increase ROM as able per MD due to capsular release and LUIS    Exercises/Interventions:   Exercises:  Exercise/Equipment Resistance/Repetitions Other comments   Stretching/PROM     Wand ER at neutral 38c44gjc    ER Supine @ 60abd 22q19fnp      Supine cane flex 3x10       Start9/22        Start10/6   Table Slides fl 46x79nzd     Abd 57y57fxw    ER bend forward 73h96dlo       Start9/8      Start9/8   Wall slide 18h93sqe  Start9/22   Wall step away for flexion 60g04nww Start9/8   Pulley flex   X10    Scap x10 Start9/16   Supine grab L wrist with R hand move into flexion 77g05xzl    blue ball roll into flexion on table  69d31ave    Pendulum AROM CW CCW fl/ext side to side [id number]  #    Elbow AROM      IR behind back  11m16lqh Start9/12   Supine butterfly 2 min Start10/4   Isometrics     Retraction x30    shrug x30    Weight shift     Flexion     Abduction     External Rotation     Internal Rotation     Biceps     Triceps          PRE's     Flexion     Abduction     External Rotation SL3x10  3# no towel  ^1/19   Internal Rotation     Shrugs     EXT EOB 3x10 5# ^1/3   Reverse Flys     Serratus 2x10  Start10/6   Horizontal Abd      Biceps 3x10 7# ^11/10   Triceps     Retraction EOB 3x10 5# ^1/3        Cable Column/Theraband     External Rotation Modified no money 3x10 red Start11/10   Internal Rotation     Supine hor abd               Cheer leader  X10 each yellow Start1/17   Lats     Ext     Flex     Scapular Retraction 3x10 blk ^11/8   BIC     TRIC 3x10 blk ^11/8   PNF          Dynamic Stability     Supine      Plyoback          Manual interventions     PROM Fl scap ER/IR;luis ER/IR  15 min  ^11/1              Therapeutic Exercise and NMR EXR  [x] ([id number]) Provided verbal/tactile cueing for activities related to strengthening, flexibility, endurance, ROM  for improvements in scapular, scapulothoracic and UE control with self care, reaching, carrying, lifting, house/yardwork, driving/computer work.    [] (06871) Provided verbal/tactile cueing for activities related to improving balance, coordination, kinesthetic sense, posture, motor skill, proprioception  to assist with  scapular, scapulothoracic and UE control with self care, reaching, carrying, lifting, house/yardwork, driving/computer work. Therapeutic Activities:    [] (73643 or 57030) Provided verbal/tactile cueing for activities related to improving balance, coordination, kinesthetic sense, posture, motor skill, proprioception and motor activation to allow for proper function of scapular, scapulothoracic and UE control with self care, carrying, lifting, driving/computer work.      Home Exercise Program:    [x] (48014) Reviewed/Progressed HEP activities related to strengthening, flexibility, endurance, ROM of scapular, scapulothoracic and UE control with self care, reaching, carrying, lifting, house/yardwork, driving/computer work  [] (22981) Reviewed/Progressed HEP activities related to improving balance, coordination, kinesthetic sense, posture, motor skill, proprioception of scapular, scapulothoracic and UE control with self care, reaching, carrying, lifting, house/yardwork, driving/computer work      Manual Treatments:  PROM / STM / Oscillations-Mobs:  G-I, II, III, IV (PA's, Inf., Post.)  [x] (47218) Provided manual therapy to mobilize soft tissue/joints of cervical/CT, scapular GHJ and UE for the purpose of modulating pain, promoting relaxation,  increasing ROM, reducing/eliminating soft tissue swelling/inflammation/restriction, improving soft tissue extensibility and allowing for proper ROM for normal function with self care, reaching, carrying, lifting, house/yardwork, driving/computer work    Modalities:  declined ice but recommended ice 15-20 min on several times per day to help with pain and inflammation     Charges:  Timed Code Treatment Minutes: 45   Total Treatment Minutes: 695-823       [] EVAL (LOW) 26865 (typically 20 minutes face-to-face)  [] EVAL (MOD) 72359 (typically 30 minutes face-to-face)  [] EVAL (HIGH) 53674 (typically 45 minutes face-to-face)  [] RE-EVAL     [x] GZ(47874) x  2  [] IONTO  [] NMR (58539) x     [] VASO  [x] Manual (82241) x  1    [] Other:  [] TA x      [] Mech Traction (33102)  [] ES(attended) (28185)      [] ES (un) (05136):     GOALS:  Patient stated goal: increase ROM decrease pain, use arm for adls    [] Progressing: [] Met: [] Not Met: [] Adjusted     Therapist goals for Patient:   Short Term Goals: To be achieved in: 2 -4weeks  1. Independent in HEP and progression per patient tolerance, in order to prevent re-injury. [] Progressing: [x] Met: [] Not Met: [] Adjusted   2. Patient will have a decrease in pain to facilitate improvement in movement, function, and ADLs as indicated by Functional Deficits. [] Progressing: [x] Met: [] Not Met: [] Adjusted     Long Term Goals: To be achieved in: 12 weeks  1. Foto 67/100  to assist with reaching prior level of function. [x] Progressing: [] Met: [] Not Met: [] Adjusted  2. Patient will demonstrate increased AROM to = R to allow for proper joint functioning as indicated by patients Functional Deficits. [x] Progressing: [] Met: [] Not Met: [] Adjusted  3. Patient will demonstrate an increase in strength to good scapular and core control  for UE to allow for proper functional mobility as indicated by patients Functional Deficits. [x] Progressing: [] Met: [] Not Met: [] Adjusted  4. Patient will return to  functional activities reach over head, reach behind back, don doff jacket  without increased symptoms or restriction.    [x] Progressing: [] Met: [] Not Met: [] Adjusted  Overall Progression Towards Functional goals/ Treatment Progress Update:  [] Patient is progressing as expected towards functional goals listed. [x] Progression is slowed due to complexities/Impairments listed. Trauma , complexity of injury   [] Progression has been slowed due to co-morbidities. [] Plan just implemented, too soon to assess goals progression <30days   [] Goals require adjustment due to lack of progress  [] Patient is not progressing as expected and requires additional follow up with physician  [] Other    Prognosis for POC: [x] Good [] Fair  [] Poor      Patient requires continued skilled intervention: [x] Yes  [] No    Treatment/Activity Tolerance:  [x] Patient able to complete treatment  [] Patient limited by fatigue  [x] Patient limited by some pain     [] Patient limited by other medical complications  [x] Other:   gradual increases in ROM and strength progression noted. Will continue to progress PROM, AAROM light PREs as able to progress to PLOF L UE. Patient Education:    Reviewed diagnosis, POC, HEP and its importance. Access Code: [email or web address]  URL: [employer]/  Date: 9/6/22  Prepared by: Ronaldo Petersen     Exercises  Standing Shoulder Shrugs - 2 x daily - 7 x weekly - 3 sets - 10 reps  Seated Scapular Retraction - 2 x daily - 7 x weekly - 3 sets - 10 reps  Standing Bicep Curls Supinated with Dumbbells - 2 x daily - 7 x weekly - 3 sets - 10 reps     Seated Shoulder Pendulum Exercise - 2 x daily - 7 x weekly - 3 sets - 10 reps  Table slides flexion - 2 x daily - 7 x weekly - 10 reps - 10 hold  Seated Shoulder External Rotation AAROM with Dowel - 2 x daily - 7 x weekly - 10 reps - 10 hold   PLAN: See eval  [x] Continue per plan of care [] Alter current plan (see comments above)  [] Plan of care initiated [] Hold pending MD visit [] Discharge      Electronically signed by:  Ronaldo Petersen PT    Note: If patient does not return for scheduled/ recommended follow up visits, this note will serve as a discharge from care along with most recent update on progress.

## 2023-02-14 ENCOUNTER — HOSPITAL ENCOUNTER (OUTPATIENT)
Dept: PHYSICAL THERAPY | Age: 73
Setting detail: THERAPIES SERIES
Discharge: HOME | End: 2023-02-14
Payer: MEDICARE

## 2023-02-14 PROCEDURE — 97110 THERAPEUTIC EXERCISES: CPT | Performed by: PHYSICAL THERAPIST

## 2023-02-14 PROCEDURE — 97140 MANUAL THERAPY 1/> REGIONS: CPT | Performed by: PHYSICAL THERAPIST

## 2023-02-14 NOTE — FLOWSHEET NOTE
[id number] [address]  Phone: [phone]   Fax:     [phone]        Physical Therapy Daily Treatment Note  Date:  2023    Patient Name:  Lencho Landrum    :  1950  MRN: [id number]  Restrictions/Precautions:    Medical/Treatment Diagnosis Information:  Diagnosis: L truamatic rotator cuff tear M75.122  Treatment Diagnosis: L shoulder painM25. 512  DOS 2022  L shoulder RC augmentation labral debridement SAD DCE capsular release LUIS      Insurance/Certification information:  PT Insurance Information: [facility] BMN  Physician Information:   Dr Evelyn Phillips  Has the plan of care been signed (Y/N):        [x]  Yes  []  No     Date of Patient follow up with Physician: per md      Is this a Progress Report:     []  Yes  [x]  No        If Yes:  Date Range for reporting period:  Beginning  Ending    Progress report will be due (10 Rx or 30 days whichever is less): 3/5 with POC      Recertification will be due (POC Duration  / 90 days whichever is less): 3/6        Visit # Insurance Allowable Auth Required   10 in      34 total post surgery   BMN []  Yes []  No        Functional Scale:  Foto38/100 38/100 54/100,55/96392/100    Date assessed:  ,10/6 11/8,     Latex Allergy:  [x]NO      []YES  Preferred Language for Healthcare:   [x]English       []other:    Pain level:  0-3/10 at rest     SUBJECTIVE:   no new c/o    OBJECTIVE: See eval   ROM PROM  AROM   Comment     L R L R     Flexion ~160   ~120 stand     150 supine        scap ~160    ~115 stand*       ER Brett@[facility]    85@80        IR             Other  (cervical)             Other                  Strength   L R Comment   Flexion  3+ limited range       Abduction  3+ limited range       ER  5- at side        IR  5- at side       Supraspinatus         Upper Trap         Lower Trap         Mid Trap Rhomboids         Biceps         Triceps         Horizontal Abduction         Horizontal Adduction         Lats          RESTRICTIONS/PRECAUTIONS: RCR,  increase ROM as able per MD due to capsular release and LUIS    Exercises/Interventions:   Exercises:  Exercise/Equipment Resistance/Repetitions Other comments   Stretching/PROM     Wand ER at neutral [id number]    ER Supine @ 60abd [id number]      Supine cane flex 3x10       Start9/22        Start10/6   Table Slides fl 67m02xhh     Abd [id number]    ER bend forward 77y73pej       Start9/8      Start9/8   Wall slide [id number]  Start9/22   Wall step away for flexion 33o57soa Start9/8   Pulley flex   X10    Scap x10 Start9/16   Supine grab L wrist with R hand move into flexion 23m56pap    blue ball roll into flexion on table  [id number]    Pendulum AROM CW CCW fl/ext side to side [id number]  #    Elbow AROM      IR behind back  [id number] Start9/12   Supine butterfly 2 min Start10/4   Isometrics     Retraction x30    shrug x30    Weight shift     Flexion     Abduction     External Rotation     Internal Rotation     Biceps     Triceps          PRE's     Flexion     Abduction     External Rotation SL3x10  3# no towel  ^1/19   Internal Rotation     Shrugs     EXT EOB 3x10 5# ^1/3   Reverse Flys     Serratus 2x10  Start10/6   Horizontal Abd      Biceps 3x10 7# ^11/10   Triceps     Retraction EOB 3x10 5# ^1/3        Cable Column/Theraband     External Rotation Modified no money 3x10 red Start11/10   Internal Rotation     Supine hor abd               Cheer leader  X10 each yellow Start1/17   Lats     Ext     Flex     Scapular Retraction 3x10 blk ^11/8   BIC     TRIC 3x10 blk ^11/8   PNF          Dynamic Stability     Supine      Plyoback          Manual interventions     PROM Fl scap ER/IR;luis ER/IR  15 min  ^11/1              Therapeutic Exercise and NMR EXR  [x] (19464) Provided verbal/tactile cueing for activities related to strengthening, flexibility, endurance, ROM  for improvements in scapular, scapulothoracic and UE control with self care, reaching, carrying, lifting, house/yardwork, driving/computer work.    [] (05123) Provided verbal/tactile cueing for activities related to improving balance, coordination, kinesthetic sense, posture, motor skill, proprioception  to assist with  scapular, scapulothoracic and UE control with self care, reaching, carrying, lifting, house/yardwork, driving/computer work. Therapeutic Activities:    [] (73701 or 59317) Provided verbal/tactile cueing for activities related to improving balance, coordination, kinesthetic sense, posture, motor skill, proprioception and motor activation to allow for proper function of scapular, scapulothoracic and UE control with self care, carrying, lifting, driving/computer work.      Home Exercise Program:    [x] (61881) Reviewed/Progressed HEP activities related to strengthening, flexibility, endurance, ROM of scapular, scapulothoracic and UE control with self care, reaching, carrying, lifting, house/yardwork, driving/computer work  [] (57371) Reviewed/Progressed HEP activities related to improving balance, coordination, kinesthetic sense, posture, motor skill, proprioception of scapular, scapulothoracic and UE control with self care, reaching, carrying, lifting, house/yardwork, driving/computer work      Manual Treatments:  PROM / STM / Oscillations-Mobs:  G-I, II, III, IV (PA's, Inf., Post.)  [x] (84065) Provided manual therapy to mobilize soft tissue/joints of cervical/CT, scapular GHJ and UE for the purpose of modulating pain, promoting relaxation,  increasing ROM, reducing/eliminating soft tissue swelling/inflammation/restriction, improving soft tissue extensibility and allowing for proper ROM for normal function with self care, reaching, carrying, lifting, house/yardwork, driving/computer work    Modalities:  declined ice but recommended ice 15-20 min on several times per day to help with pain and inflammation     Charges:  Timed Code Treatment Minutes: 45   Total Treatment Minutes: 090-155       [] EVAL (LOW) 19358 (typically 20 minutes face-to-face)  [] EVAL (MOD) 13795 (typically 30 minutes face-to-face)  [] EVAL (HIGH) 92587 (typically 45 minutes face-to-face)  [] RE-EVAL     [x] WH(66407) x  2  [] IONTO  [] NMR (13199) x     [] VASO  [x] Manual (00819) x  1    [] Other:  [] TA x      [] Mech Traction (73041)  [] ES(attended) (43629)      [] ES (un) (21303):     GOALS:  Patient stated goal: increase ROM decrease pain, use arm for adls    [] Progressing: [] Met: [] Not Met: [] Adjusted     Therapist goals for Patient:   Short Term Goals: To be achieved in: 2 -4weeks  1. Independent in HEP and progression per patient tolerance, in order to prevent re-injury. [] Progressing: [x] Met: [] Not Met: [] Adjusted   2. Patient will have a decrease in pain to facilitate improvement in movement, function, and ADLs as indicated by Functional Deficits. [] Progressing: [x] Met: [] Not Met: [] Adjusted     Long Term Goals: To be achieved in: 12 weeks  1. Foto 67/100  to assist with reaching prior level of function. [x] Progressing: [] Met: [] Not Met: [] Adjusted  2. Patient will demonstrate increased AROM to = R to allow for proper joint functioning as indicated by patients Functional Deficits. [x] Progressing: [] Met: [] Not Met: [] Adjusted  3. Patient will demonstrate an increase in strength to good scapular and core control  for UE to allow for proper functional mobility as indicated by patients Functional Deficits. [x] Progressing: [] Met: [] Not Met: [] Adjusted  4. Patient will return to  functional activities reach over head, reach behind back, don doff jacket  without increased symptoms or restriction. [x] Progressing: [] Met: [] Not Met: [] Adjusted  Overall Progression Towards Functional goals/ Treatment Progress Update:  [] Patient is progressing as expected towards functional goals listed. [x] Progression is slowed due to complexities/Impairments listed. Trauma , complexity of injury   [] Progression has been slowed due to co-morbidities. [] Plan just implemented, too soon to assess goals progression <30days   [] Goals require adjustment due to lack of progress  [] Patient is not progressing as expected and requires additional follow up with physician  [] Other    Prognosis for POC: [x] Good [] Fair  [] Poor      Patient requires continued skilled intervention: [x] Yes  [] No    Treatment/Activity Tolerance:  [x] Patient able to complete treatment  [] Patient limited by fatigue  [x] Patient limited by some pain     [] Patient limited by other medical complications  [x] Other:   gradual increases in ROM and strength progression noted. Will continue to progress PROM, AAROM light PREs as able to progress to PLOF L UE. Patient Education:    Reviewed diagnosis, POC, HEP and its importance. Access Code: [email or web address]  URL: [email or web address]  Date: 9/6/22  Prepared by: Marleny Taveras     Exercises  Standing Shoulder Shrugs - 2 x daily - 7 x weekly - 3 sets - 10 reps  Seated Scapular Retraction - 2 x daily - 7 x weekly - 3 sets - 10 reps  Standing Bicep Curls Supinated with Dumbbells - 2 x daily - 7 x weekly - 3 sets - 10 reps     Seated Shoulder Pendulum Exercise - 2 x daily - 7 x weekly - 3 sets - 10 reps  Table slides flexion - 2 x daily - 7 x weekly - 10 reps - 10 hold  Seated Shoulder External Rotation AAROM with Dowel - 2 x daily - 7 x weekly - 10 reps - 10 hold   PLAN: See eval  [x] Continue per plan of care [] Alter current plan (see comments above)  [] Plan of care initiated [] Hold pending MD visit [] Discharge      Electronically signed by:  Marleny Taveras PT    Note: If patient does not return for scheduled/ recommended follow up visits, this note will serve as a discharge from care along with most recent update on progress.

## 2023-02-16 ENCOUNTER — HOSPITAL ENCOUNTER (OUTPATIENT)
Dept: PHYSICAL THERAPY | Age: 73
Setting detail: THERAPIES SERIES
Discharge: HOME | End: 2023-02-16
Payer: MEDICARE

## 2023-02-16 PROCEDURE — 97140 MANUAL THERAPY 1/> REGIONS: CPT | Performed by: PHYSICAL THERAPIST

## 2023-02-16 PROCEDURE — 97110 THERAPEUTIC EXERCISES: CPT | Performed by: PHYSICAL THERAPIST

## 2023-02-16 NOTE — FLOWSHEET NOTE
[facility], [address], [facility] [place] [address]  Phone: [phone]   Fax:     [phone]        Physical Therapy Daily Treatment Note  Date:  2023    Patient Name:  Hernandez Cam    :  1950  MRN: [id number]  Restrictions/Precautions:    Medical/Treatment Diagnosis Information:  Diagnosis: L truamatic rotator cuff tear M75.122  Treatment Diagnosis: L shoulder painM25.512  DOS 2022  L shoulder RC augmentation labral debridement SAD DCE capsular release LUIS      Insurance/Certification information:  PT Insurance Information: [facility] BMN  Physician Information:   Dr Idris Keen  Has the plan of care been signed (Y/N):        [x]  Yes  []  No     Date of Patient follow up with Physician: per md      Is this a Progress Report:     []  Yes  [x]  No        If Yes:  Date Range for reporting period:  Beginning  Ending    Progress report will be due (10 Rx or 30 days whichever is less): 3/5 with POC      Recertification will be due (POC Duration  / 90 days whichever is less): 3/6        Visit # Insurance Allowable Auth Required   11 in      35 total post surgery   BMN []  Yes []  No        Functional Scale: Foto38/100 38/100 54/100,55/21728/100    Date assessed:  ,10/6 11/8,     Latex Allergy:  [x]NO      []YES  Preferred Language for Healthcare:   [x]English       []other:    Pain level:  0-3/10 at rest     SUBJECTIVE:   no new c/o    OBJECTIVE: See eval   ROM PROM  AROM   Comment     L R L R     Flexion ~160   ~120 stand     150 supine        scap ~160    ~115 stand*       ER 90@90abd    85@80        IR             Other  (cervical)             Other                  Strength   L R Comment   Flexion  3+ limited range       Abduction  3+ limited range       ER  5- at side        IR  5- at side       Supraspinatus         Upper Trap         Lower Trap         Mid Trap    Rhomboids         Biceps         Triceps         Horizontal Abduction         Horizontal Adduction         Lats          RESTRICTIONS/PRECAUTIONS: RCR,  increase ROM as able per MD due to capsular release and LUIS    Exercises/Interventions:   Exercises:  Exercise/Equipment Resistance/Repetitions Other comments   Stretching/PROM     Wand ER at neutral [id number]    ER Supine @ 60abd [id number]      Supine cane flex 3x10       Start9/22        Start10/6   Table Slides fl 82j09nnk     Abd 84d27zii    ER bend forward 37c52msz       Start9/8      Start9/8   Wall slide 79x51wjt  Start9/22   Wall step away for flexion 22u27hrb Start9/8   Pulley flex   X10    Scap x10 Start9/16   Supine grab L wrist with R hand move into flexion 59l95lxv    blue ball roll into flexion on table  34e98haz    Pendulum AROM CW CCW fl/ext side to side [id number]  #    Elbow AROM      IR behind back  66s04qqj Start9/12   Supine butterfly 2 min Start10/4   Isometrics     Retraction x30    shrug x30    Weight shift     Flexion     Abduction     External Rotation     Internal Rotation     Biceps     Triceps          PRE's     Flexion Supine 2x10 Start2/16   Abduction Sl 2x10 Start2/16   External Rotation SL3x10  3# no towel  ^1/19   Internal Rotation     Shrugs     EXT EOB 3x10 5# ^1/3   Reverse Flys     Serratus 2x10  Start10/6   Horizontal Abd      Biceps 3x10 7# ^11/10   Triceps     Retraction EOB 3x10 5# ^1/3        Cable Column/Theraband     External Rotation Modified no money 3x10 red Start11/10   Internal Rotation     Supine hor abd               Cheer leader  X10 each yellow Start1/17   Lats     Ext     Flex     Scapular Retraction 3x10 blk ^11/8   BIC     TRIC 3x10 blk ^11/8   PNF          Dynamic Stability     Supine      Plyoback          Manual interventions     PROM Fl scap ER/IR;luis ER/IR  15 min  ^11/1              Therapeutic Exercise and NMR EXR  [x] [phone]) Provided verbal/tactile cueing for activities related to strengthening, flexibility, endurance, ROM  for improvements in scapular, scapulothoracic and UE control with self care, reaching, carrying, lifting, house/yardwork, driving/computer work.    [] (00406) Provided verbal/tactile cueing for activities related to improving balance, coordination, kinesthetic sense, posture, motor skill, proprioception  to assist with  scapular, scapulothoracic and UE control with self care, reaching, carrying, lifting, house/yardwork, driving/computer work. Therapeutic Activities:    [] (75603 or 13434) Provided verbal/tactile cueing for activities related to improving balance, coordination, kinesthetic sense, posture, motor skill, proprioception and motor activation to allow for proper function of scapular, scapulothoracic and UE control with self care, carrying, lifting, driving/computer work.      Home Exercise Program:    [x] (75558) Reviewed/Progressed HEP activities related to strengthening, flexibility, endurance, ROM of scapular, scapulothoracic and UE control with self care, reaching, carrying, lifting, house/yardwork, driving/computer work  [] (71907) Reviewed/Progressed HEP activities related to improving balance, coordination, kinesthetic sense, posture, motor skill, proprioception of scapular, scapulothoracic and UE control with self care, reaching, carrying, lifting, house/yardwork, driving/computer work      Manual Treatments:  PROM / STM / Oscillations-Mobs:  G-I, II, III, IV (PA's, Inf., Post.)  [x] (24859) Provided manual therapy to mobilize soft tissue/joints of cervical/CT, scapular GHJ and UE for the purpose of modulating pain, promoting relaxation,  increasing ROM, reducing/eliminating soft tissue swelling/inflammation/restriction, improving soft tissue extensibility and allowing for proper ROM for normal function with self care, reaching, carrying, lifting, house/yardwork, driving/computer work    Modalities:  declined ice but recommended ice 15-20 min on several times per day to help with pain and inflammation     Charges:  Timed Code Treatment Minutes: 45   Total Treatment Minutes: 173-225       [] EVAL (LOW) 72225 (typically 20 minutes face-to-face)  [] EVAL (MOD) 29918 (typically 30 minutes face-to-face)  [] EVAL (HIGH) 44802 (typically 45 minutes face-to-face)  [] RE-EVAL     [x] MG(13573) x  2  [] IONTO  [] NMR (18443) x     [] VASO  [x] Manual (96547) x  1    [] Other:  [] TA x      [] Mech Traction (98375)  [] ES(attended) (91225)      [] ES (un) (09336):     GOALS:  Patient stated goal: increase ROM decrease pain, use arm for adls    [] Progressing: [] Met: [] Not Met: [] Adjusted     Therapist goals for Patient:   Short Term Goals: To be achieved in: 2 -4weeks  1. Independent in HEP and progression per patient tolerance, in order to prevent re-injury. [] Progressing: [x] Met: [] Not Met: [] Adjusted   2. Patient will have a decrease in pain to facilitate improvement in movement, function, and ADLs as indicated by Functional Deficits. [] Progressing: [x] Met: [] Not Met: [] Adjusted     Long Term Goals: To be achieved in: 12 weeks  1. Foto 67/100  to assist with reaching prior level of function. [x] Progressing: [] Met: [] Not Met: [] Adjusted  2. Patient will demonstrate increased AROM to = R to allow for proper joint functioning as indicated by patients Functional Deficits. [x] Progressing: [] Met: [] Not Met: [] Adjusted  3. Patient will demonstrate an increase in strength to good scapular and core control  for UE to allow for proper functional mobility as indicated by patients Functional Deficits. [x] Progressing: [] Met: [] Not Met: [] Adjusted  4. Patient will return to  functional activities reach over head, reach behind back, don doff jacket  without increased symptoms or restriction.    [x] Progressing: [] Met: [] Not Met: [] Adjusted  Overall Progression Towards Functional goals/ Treatment Progress Update:  [] Patient is progressing as expected towards functional goals listed. [x] Progression is slowed due to complexities/Impairments listed. Trauma , complexity of injury   [] Progression has been slowed due to co-morbidities. [] Plan just implemented, too soon to assess goals progression <30days   [] Goals require adjustment due to lack of progress  [] Patient is not progressing as expected and requires additional follow up with physician  [] Other    Prognosis for POC: [x] Good [] Fair  [] Poor      Patient requires continued skilled intervention: [x] Yes  [] No    Treatment/Activity Tolerance:  [x] Patient able to complete treatment  [] Patient limited by fatigue  [x] Patient limited by some pain     [] Patient limited by other medical complications  [x] Other:   gradual increases in ROM and strength progression noted. Will continue to progress PROM, AAROM light PREs as able to progress to PLOF L UE. Challenge with shoulder  flexion supine and abduction SL. Patient Education:    Reviewed diagnosis, POC, HEP and its importance. Access Code: [email or web address]  URL: [email or web address]  Date: 9/6/22  Prepared by: John Carrillo     Exercises  Standing Shoulder Shrugs - 2 x daily - 7 x weekly - 3 sets - 10 reps  Seated Scapular Retraction - 2 x daily - 7 x weekly - 3 sets - 10 reps  Standing Bicep Curls Supinated with Dumbbells - 2 x daily - 7 x weekly - 3 sets - 10 reps     Seated Shoulder Pendulum Exercise - 2 x daily - 7 x weekly - 3 sets - 10 reps  Table slides flexion - 2 x daily - 7 x weekly - 10 reps - 10 hold  Seated Shoulder External Rotation AAROM with Dowel - 2 x daily - 7 x weekly - 10 reps - 10 hold   PLAN: See eval  [x] Continue per plan of care [] Alter current plan (see comments above)  [] Plan of care initiated [] Hold pending MD visit [] Discharge      Electronically signed by:  John Carrillo PT    Note: If patient does not return for scheduled/ recommended follow up visits, this note will serve as a discharge from care along with most recent update on progress.

## 2023-02-21 ENCOUNTER — HOSPITAL ENCOUNTER (OUTPATIENT)
Dept: PHYSICAL THERAPY | Age: 73
Setting detail: THERAPIES SERIES
Discharge: HOME | End: 2023-02-21
Payer: MEDICARE

## 2023-02-21 PROCEDURE — 97140 MANUAL THERAPY 1/> REGIONS: CPT | Performed by: PHYSICAL THERAPIST

## 2023-02-21 PROCEDURE — 97110 THERAPEUTIC EXERCISES: CPT | Performed by: PHYSICAL THERAPIST

## 2023-02-21 NOTE — FLOWSHEET NOTE
[address],  [address]  Phone: [phone]   Fax:     [phone]        Physical Therapy Daily Treatment Note  Date:  2023    Patient Name:  Roberto Herzog    :  1950  MRN: [id number]  Restrictions/Precautions:    Medical/Treatment Diagnosis Information:  Diagnosis: L truamatic rotator cuff tear M75.122  Treatment Diagnosis: L shoulder painM25. 512  DOS 2022  L shoulder RC augmentation labral debridement SAD DCE capsular release LUIS      Insurance/Certification information:  PT Insurance Information: [facility] BMN  Physician Information:   Dr Carly Connell  Has the plan of care been signed (Y/N):        [x]  Yes  []  No     Date of Patient follow up with Physician: per md      Is this a Progress Report:     []  Yes  [x]  No        If Yes:  Date Range for reporting period:  Beginning  Ending    Progress report will be due (10 Rx or 30 days whichever is less): 3/5 with POC      Recertification will be due (POC Duration  / 90 days whichever is less): 3/6        Visit # Insurance Allowable Auth Required   12 in      35 total post surgery   BMN []  Yes []  No        Functional Scale:  Foto38/100 38/100 54/100,55/39258/100    Date assessed:  ,10/6 11/8,     Latex Allergy:  [x]NO      []YES  Preferred Language for Healthcare:   [x]English       []other:    Pain level:  0-3/10 at rest     SUBJECTIVE:   no new c/o    OBJECTIVE: See eval   ROM PROM  AROM   Comment     L R L R     Flexion ~160   ~120 stand     150 supine        scap ~160    ~115 stand*       ER Silvestre@[employer]    85@80        IR             Other  (cervical)             Other                  Strength   L R Comment   Flexion  3+ limited range       Abduction  3+ limited range       ER  5- at side        IR  5- at side       Supraspinatus         Upper Trap         Lower Trap         Mid Trap Rhomboids         Biceps         Triceps         Horizontal Abduction         Horizontal Adduction         Lats          RESTRICTIONS/PRECAUTIONS: RCR,  increase ROM as able per MD due to capsular release and LUIS    Exercises/Interventions:   Exercises:  Exercise/Equipment Resistance/Repetitions Other comments   Stretching/PROM     Wand ER at neutral 32g92tee    ER Supine @ 60abd 84b82ewg      Supine cane flex 3x10       Start9/22        Start10/6   Table Slides fl 01e48tmj     Abd 88y36yaw    ER bend forward 50w33zli       Start9/8      Start9/8   Wall slide 14g82pwl  Start9/22   Wall step away for flexion 41c69snp Start9/8   Pulley flex   X10    Scap x10 Start9/16   Supine grab L wrist with R hand move into flexion 12h78gli    blue ball roll into flexion on table  58q90eyp    Pendulum AROM CW CCW fl/ext side to side [id number]  #    Elbow AROM      IR behind back  99u46dqq Start9/12   Supine butterfly 2 min Start10/4   Isometrics     Retraction x30    shrug x30    Weight shift     Flexion     Abduction     External Rotation     Internal Rotation     Biceps     Triceps          PRE's     Flexion Supine 3x10 Start2/16   Abduction Sl 3x10 Start2/16   External Rotation SL3x10  3# no towel  ^1/19   Internal Rotation     Shrugs     EXT EOB 3x10 5# ^1/3   Reverse Flys     Serratus 2x10  Start10/6   Horizontal Abd      Biceps 3x10 7# ^11/10   Triceps     Retraction EOB 3x10 5# ^1/3        Cable Column/Theraband     External Rotation Modified no money 3x10 red Start11/10   Internal Rotation     Supine hor abd               Cheer leader  X10 each yellow Start1/17   Lats     Ext     Flex     Scapular Retraction 3x10 blk ^11/8   BIC     TRIC 3x10 blk ^11/8   PNF          Dynamic Stability     Supine      Plyoback          Manual interventions     PROM Fl scap ER/IR;luis ER/IR  15 min  ^11/1              Therapeutic Exercise and NMR EXR  [x] [phone]) Provided verbal/tactile cueing for activities related to strengthening, flexibility, endurance, ROM  for improvements in scapular, scapulothoracic and UE control with self care, reaching, carrying, lifting, house/yardwork, driving/computer work.    [] (26981) Provided verbal/tactile cueing for activities related to improving balance, coordination, kinesthetic sense, posture, motor skill, proprioception  to assist with  scapular, scapulothoracic and UE control with self care, reaching, carrying, lifting, house/yardwork, driving/computer work. Therapeutic Activities:    [] (67120 or 17950) Provided verbal/tactile cueing for activities related to improving balance, coordination, kinesthetic sense, posture, motor skill, proprioception and motor activation to allow for proper function of scapular, scapulothoracic and UE control with self care, carrying, lifting, driving/computer work.      Home Exercise Program:    [x] (86250) Reviewed/Progressed HEP activities related to strengthening, flexibility, endurance, ROM of scapular, scapulothoracic and UE control with self care, reaching, carrying, lifting, house/yardwork, driving/computer work  [] (99299) Reviewed/Progressed HEP activities related to improving balance, coordination, kinesthetic sense, posture, motor skill, proprioception of scapular, scapulothoracic and UE control with self care, reaching, carrying, lifting, house/yardwork, driving/computer work      Manual Treatments:  PROM / STM / Oscillations-Mobs:  G-I, II, III, IV (PA's, Inf., Post.)  [x] (40374) Provided manual therapy to mobilize soft tissue/joints of cervical/CT, scapular GHJ and UE for the purpose of modulating pain, promoting relaxation,  increasing ROM, reducing/eliminating soft tissue swelling/inflammation/restriction, improving soft tissue extensibility and allowing for proper ROM for normal function with self care, reaching, carrying, lifting, house/yardwork, driving/computer work    Modalities:  declined ice but recommended ice 15-20 min on several times per day to help with pain and inflammation     Charges:  Timed Code Treatment Minutes: 45   Total Treatment Minutes: 230-335       [] EVAL (LOW) 95159 (typically 20 minutes face-to-face)  [] EVAL (MOD) 54655 (typically 30 minutes face-to-face)  [] EVAL (HIGH) 04899 (typically 45 minutes face-to-face)  [] RE-EVAL     [x] TE(77154) x  2  [] IONTO  [] NMR (26885) x     [] VASO  [x] Manual (13722) x  1    [] Other:  [] TA x      [] Mech Traction (39339)  [] ES(attended) (09801)      [] ES (un) (84784):     GOALS:  Patient stated goal: increase ROM decrease pain, use arm for adls    [] Progressing: [] Met: [] Not Met: [] Adjusted     Therapist goals for Patient:   Short Term Goals: To be achieved in: 2 -4weeks  1. Independent in HEP and progression per patient tolerance, in order to prevent re-injury.     [] Progressing: [x] Met: [] Not Met: [] Adjusted   2. Patient will have a decrease in pain to facilitate improvement in movement, function, and ADLs as indicated by Functional Deficits.    [] Progressing: [x] Met: [] Not Met: [] Adjusted     Long Term Goals: To be achieved in: 12 weeks  1. Foto 67/100  to assist with reaching prior level of function.   [x] Progressing: [] Met: [] Not Met: [] Adjusted  2. Patient will demonstrate increased AROM to = R to allow for proper joint functioning as indicated by patients Functional Deficits.    [x] Progressing: [] Met: [] Not Met: [] Adjusted  3. Patient will demonstrate an increase in strength to good scapular and core control  for UE to allow for proper functional mobility as indicated by patients Functional Deficits.   [x] Progressing: [] Met: [] Not Met: [] Adjusted  4. Patient will return to  functional activities reach over head, reach behind back, don doff jacket  without increased symptoms or restriction.   [x] Progressing: [] Met: [] Not Met: [] Adjusted  Overall Progression Towards Functional goals/ Treatment Progress Update:  [] Patient is progressing as expected towards  functional goals listed. [x] Progression is slowed due to complexities/Impairments listed. Trauma , complexity of injury   [] Progression has been slowed due to co-morbidities. [] Plan just implemented, too soon to assess goals progression <30days   [] Goals require adjustment due to lack of progress  [] Patient is not progressing as expected and requires additional follow up with physician  [] Other    Prognosis for POC: [x] Good [] Fair  [] Poor      Patient requires continued skilled intervention: [x] Yes  [] No    Treatment/Activity Tolerance:  [x] Patient able to complete treatment  [] Patient limited by fatigue  [x] Patient limited by some pain     [] Patient limited by other medical complications  [x] Other:   gradual increases in ROM and strength progression noted. Will continue to progress PROM, AAROM light PREs as able to progress to PLOF L UE. Challenge with shoulder  flexion supine and abduction SL. Patient Education:    Reviewed diagnosis, POC, HEP and its importance. Access Code: [email or web address]  URL: [email or web address]  Date: 9/6/22  Prepared by: Tressa Reynolds     Exercises  Standing Shoulder Shrugs - 2 x daily - 7 x weekly - 3 sets - 10 reps  Seated Scapular Retraction - 2 x daily - 7 x weekly - 3 sets - 10 reps  Standing Bicep Curls Supinated with Dumbbells - 2 x daily - 7 x weekly - 3 sets - 10 reps     Seated Shoulder Pendulum Exercise - 2 x daily - 7 x weekly - 3 sets - 10 reps  Table slides flexion - 2 x daily - 7 x weekly - 10 reps - 10 hold  Seated Shoulder External Rotation AAROM with Dowel - 2 x daily - 7 x weekly - 10 reps - 10 hold   PLAN: See eval  [x] Continue per plan of care [] Alter current plan (see comments above)  [] Plan of care initiated [] Hold pending MD visit [] Discharge      Electronically signed by:  Tressa Reynolds PT    Note: If patient does not return for scheduled/ recommended follow up visits, this note will serve as a discharge from care along with most recent update on progress.

## 2023-02-23 ENCOUNTER — HOSPITAL ENCOUNTER (OUTPATIENT)
Dept: PHYSICAL THERAPY | Age: 73
Setting detail: THERAPIES SERIES
Discharge: HOME | End: 2023-02-23
Payer: MEDICARE

## 2023-02-23 DIAGNOSIS — E11.9 TYPE 2 DIABETES MELLITUS WITHOUT COMPLICATION, WITHOUT LONG-TERM CURRENT USE OF INSULIN (HCC): Primary | ICD-10-CM

## 2023-02-23 PROCEDURE — 97140 MANUAL THERAPY 1/> REGIONS: CPT | Performed by: PHYSICAL THERAPIST

## 2023-02-23 PROCEDURE — 97110 THERAPEUTIC EXERCISES: CPT | Performed by: PHYSICAL THERAPIST

## 2023-02-23 RX ORDER — BLOOD SUGAR DIAGNOSTIC
STRIP MISCELLANEOUS
Qty: 100 STRIP | Refills: 5 | Status: SHIPPED | OUTPATIENT
Start: 2023-02-23

## 2023-02-23 NOTE — FLOWSHEET NOTE
[address],  [address]  Phone: [phone]   Fax:     [phone]        Physical Therapy Daily Treatment Note  Date:  2023    Patient Name:  Leopoldo Martini    :  1950  MRN: [id number]  Restrictions/Precautions:    Medical/Treatment Diagnosis Information:  Diagnosis: L truamatic rotator cuff tear M75.122  Treatment Diagnosis: L shoulder painM25. 512  DOS 2022  L shoulder RC augmentation labral debridement SAD DCE capsular release LUIS      Insurance/Certification information:  PT Insurance Information: [facility] BMN  Physician Information:   Dr Tamiko Garces  Has the plan of care been signed (Y/N):        [x]  Yes  []  No     Date of Patient follow up with Physician: per md      Is this a Progress Report:     []  Yes  [x]  No        If Yes:  Date Range for reporting period:  Beginning  Ending    Progress report will be due (10 Rx or 30 days whichever is less): 3/5 with POC      Recertification will be due (POC Duration  / 90 days whichever is less): 3/6        Visit # Insurance Allowable Auth Required   12 in      35 total post surgery   BMN []  Yes []  No        Functional Scale:  Foto38/100 38/100 54/100,55/66903/100    Date assessed:  ,10/6 11/8,     Latex Allergy:  [x]NO      []YES  Preferred Language for Healthcare:   [x]English       []other:    Pain level:  0-3/10 at rest     SUBJECTIVE:   no new c/o    OBJECTIVE: See eval   ROM PROM  AROM   Comment     L R L R     Flexion ~160   ~120 stand     150 supine        scap ~160    ~115 stand*       ER [email or web address]    85@80        IR             Other  (cervical)             Other                  Strength   L R Comment   Flexion  3+ limited range       Abduction  3+ limited range       ER  5- at side        IR  5- at side       Supraspinatus         Upper Trap         Lower Trap         Mid Trap Rhomboids         Biceps         Triceps         Horizontal Abduction         Horizontal Adduction         Lats          RESTRICTIONS/PRECAUTIONS: RCR,  increase ROM as able per MD due to capsular release and LUIS    Exercises/Interventions:   Exercises:  Exercise/Equipment Resistance/Repetitions Other comments   Stretching/PROM     Wand ER at neutral 30d32tub    ER Supine @ 60abd 93s45pcs      Supine cane flex 3x10       Start9/22        Start10/6   Table Slides fl 91x10hjs     Abd 44m91dum    ER bend forward 11d25lru       Start9/8      Start9/8   Wall slide 29l62wey  Start9/22   Wall step away for flexion 70z59baq Start9/8   Pulley flex   X10    Scap x10 Start9/16   Supine grab L wrist with R hand move into flexion 96a40tpj    blue ball roll into flexion on table  65h02sxl    Pendulum AROM CW CCW fl/ext side to side q09yhdc  #    Elbow AROM      IR behind back  40i04wab Start9/12   Supine butterfly 2 min Start10/4   Isometrics     Retraction x30    shrug x30    Weight shift     Flexion     Abduction     External Rotation     Internal Rotation     Biceps     Triceps          PRE's     Flexion Supine 3x10 Start2/16   Abduction Sl 3x10 Start2/16   External Rotation SL3x10  3# no towel  ^1/19   Internal Rotation     Shrugs     EXT EOB 3x10 5# ^1/3   Reverse Flys     Serratus 2x10  Start10/6   Horizontal Abd      Biceps 3x10 7# ^11/10   Triceps     Retraction EOB 3x10 5# ^1/3        Cable Column/Theraband     External Rotation Modified no money 3x10 red Start11/10   Internal Rotation     Supine hor abd               Cheer leader  X10 each yellow Start1/17   Lats     Ext     Flex     Scapular Retraction 3x10 blk ^11/8   BIC     TRIC 3x10 blk ^11/8   PNF          Dynamic Stability     Supine      Plyoback          Manual interventions     PROM Fl scap ER/IR;luis ER/IR  15 min  ^11/1              Therapeutic Exercise and NMR EXR  [x] [phone]) Provided verbal/tactile cueing for activities related to strengthening, flexibility, endurance, ROM  for improvements in scapular, scapulothoracic and UE control with self care, reaching, carrying, lifting, house/yardwork, driving/computer work.    [] (82571) Provided verbal/tactile cueing for activities related to improving balance, coordination, kinesthetic sense, posture, motor skill, proprioception  to assist with  scapular, scapulothoracic and UE control with self care, reaching, carrying, lifting, house/yardwork, driving/computer work. Therapeutic Activities:    [] (07639 or 59162) Provided verbal/tactile cueing for activities related to improving balance, coordination, kinesthetic sense, posture, motor skill, proprioception and motor activation to allow for proper function of scapular, scapulothoracic and UE control with self care, carrying, lifting, driving/computer work.      Home Exercise Program:    [x] (18376) Reviewed/Progressed HEP activities related to strengthening, flexibility, endurance, ROM of scapular, scapulothoracic and UE control with self care, reaching, carrying, lifting, house/yardwork, driving/computer work  [] (76985) Reviewed/Progressed HEP activities related to improving balance, coordination, kinesthetic sense, posture, motor skill, proprioception of scapular, scapulothoracic and UE control with self care, reaching, carrying, lifting, house/yardwork, driving/computer work      Manual Treatments:  PROM / STM / Oscillations-Mobs:  G-I, II, III, IV (PA's, Inf., Post.)  [x] (11090) Provided manual therapy to mobilize soft tissue/joints of cervical/CT, scapular GHJ and UE for the purpose of modulating pain, promoting relaxation,  increasing ROM, reducing/eliminating soft tissue swelling/inflammation/restriction, improving soft tissue extensibility and allowing for proper ROM for normal function with self care, reaching, carrying, lifting, house/yardwork, driving/computer work    Modalities:  declined ice but recommended ice 15-20 min on several times per day to help with pain and inflammation     Charges:  Timed Code Treatment Minutes: 45   Total Treatment Minutes: 996-890       [] EVAL (LOW) 42475 (typically 20 minutes face-to-face)  [] EVAL (MOD) 61316 (typically 30 minutes face-to-face)  [] EVAL (HIGH) 51398 (typically 45 minutes face-to-face)  [] RE-EVAL     [x] RF(88864) x  2  [] IONTO  [] NMR (05677) x     [] VASO  [x] Manual (37182) x  1    [] Other:  [] TA x      [] Mech Traction (61135)  [] ES(attended) (11706)      [] ES (un) (50695):     GOALS:  Patient stated goal: increase ROM decrease pain, use arm for adls    [] Progressing: [] Met: [] Not Met: [] Adjusted     Therapist goals for Patient:   Short Term Goals: To be achieved in: 2 -4weeks  1. Independent in HEP and progression per patient tolerance, in order to prevent re-injury. [] Progressing: [x] Met: [] Not Met: [] Adjusted   2. Patient will have a decrease in pain to facilitate improvement in movement, function, and ADLs as indicated by Functional Deficits. [] Progressing: [x] Met: [] Not Met: [] Adjusted     Long Term Goals: To be achieved in: 12 weeks  1. Foto 67/100  to assist with reaching prior level of function. [x] Progressing: [] Met: [] Not Met: [] Adjusted  2. Patient will demonstrate increased AROM to = R to allow for proper joint functioning as indicated by patients Functional Deficits. [x] Progressing: [] Met: [] Not Met: [] Adjusted  3. Patient will demonstrate an increase in strength to good scapular and core control  for UE to allow for proper functional mobility as indicated by patients Functional Deficits. [x] Progressing: [] Met: [] Not Met: [] Adjusted  4. Patient will return to  functional activities reach over head, reach behind back, don doff jacket  without increased symptoms or restriction.    [x] Progressing: [] Met: [] Not Met: [] Adjusted  Overall Progression Towards Functional goals/ Treatment Progress Update:  [] Patient is progressing as expected towards functional goals listed. [x] Progression is slowed due to complexities/Impairments listed. Trauma , complexity of injury   [] Progression has been slowed due to co-morbidities. [] Plan just implemented, too soon to assess goals progression <30days   [] Goals require adjustment due to lack of progress  [] Patient is not progressing as expected and requires additional follow up with physician  [] Other    Prognosis for POC: [x] Good [] Fair  [] Poor      Patient requires continued skilled intervention: [x] Yes  [] No    Treatment/Activity Tolerance:  [x] Patient able to complete treatment  [] Patient limited by fatigue  [x] Patient limited by some pain     [] Patient limited by other medical complications  [x] Other:   . Will continue to progress PROM, AAROM light PREs as able to progress to PLOF L UE. Patient Education:    Reviewed diagnosis, POC, HEP and its importance. Access Code: [email or web address]  URL: [email or web address]  Date: 9/6/22  Prepared by: Denver Kate     Exercises  Standing Shoulder Shrugs - 2 x daily - 7 x weekly - 3 sets - 10 reps  Seated Scapular Retraction - 2 x daily - 7 x weekly - 3 sets - 10 reps  Standing Bicep Curls Supinated with Dumbbells - 2 x daily - 7 x weekly - 3 sets - 10 reps     Seated Shoulder Pendulum Exercise - 2 x daily - 7 x weekly - 3 sets - 10 reps  Table slides flexion - 2 x daily - 7 x weekly - 10 reps - 10 hold  Seated Shoulder External Rotation AAROM with Dowel - 2 x daily - 7 x weekly - 10 reps - 10 hold   PLAN: See eval  [x] Continue per plan of care [] Alter current plan (see comments above)  [] Plan of care initiated [] Hold pending MD visit [] Discharge      Electronically signed by:  Denver Kate, PT    Note: If patient does not return for scheduled/ recommended follow up visits, this note will serve as a discharge from care along with most recent update on progress.

## 2023-02-28 ENCOUNTER — APPOINTMENT (OUTPATIENT)
Dept: PHYSICAL THERAPY | Age: 73
End: 2023-02-28
Payer: MEDICARE

## 2023-02-28 PROCEDURE — 97140 MANUAL THERAPY 1/> REGIONS: CPT | Performed by: PHYSICAL THERAPIST

## 2023-02-28 PROCEDURE — 97110 THERAPEUTIC EXERCISES: CPT | Performed by: PHYSICAL THERAPIST

## 2023-02-28 RX ORDER — INSULIN GLARGINE 100 [IU]/ML
INJECTION, SOLUTION SUBCUTANEOUS
Qty: 18 ML | Refills: 0 | Status: SHIPPED | OUTPATIENT
Start: 2023-02-28

## 2023-02-28 NOTE — FLOWSHEET NOTE
[address],  [address]  Phone: [phone]   Fax:     [phone]        Physical Therapy Daily Treatment Note  Date:  2023    Patient Name:  Tempie Rinne    :  1950  MRN: [id number]  Restrictions/Precautions:    Medical/Treatment Diagnosis Information:  Diagnosis: L truamatic rotator cuff tear M75.122  Treatment Diagnosis: L shoulder painM25. 512  DOS 2022  L shoulder RC augmentation labral debridement SAD DCE capsular release LUIS      Insurance/Certification information:  PT Insurance Information: [facility] BMN  Physician Information:   Dr Rosalyn Meckel  Has the plan of care been signed (Y/N):        [x]  Yes  []  No     Date of Patient follow up with Physician: per md      Is this a Progress Report:     []  Yes  [x]  No        If Yes:  Date Range for reporting period:  Beginning  Ending    Progress report will be due (10 Rx or 30 days whichever is less): 3/5 with POC      Recertification will be due (POC Duration  / 90 days whichever is less): 3/6        Visit # Insurance Allowable Auth Required   13 in      35 total post surgery   BMN []  Yes []  No        Functional Scale: Foto38/100 38/100 54/100,55/97270/100    Date assessed:  ,10/6 11/8,     Latex Allergy:  [x]NO      []YES  Preferred Language for Healthcare:   [x]English       []other:    Pain level:  0-3/10 at rest     SUBJECTIVE:   shoulder feels fine.   Pain persisits lateral humerus area with use    OBJECTIVE: See eval   ROM PROM  AROM   Comment     L R L R     Flexion ~160   ~120 stand     150 supine        scap ~160    ~115 stand*       ER Becca@[employer]    85@80        IR             Other  (cervical)             Other                  Strength   L R Comment   Flexion  3+ limited range       Abduction  3+ limited range       ER  5- at side        IR  5- at side       Supraspinatus Upper Trap         Lower Trap         Mid Trap         Rhomboids         Biceps         Triceps         Horizontal Abduction         Horizontal Adduction         Lats          RESTRICTIONS/PRECAUTIONS: RCR,  increase ROM as able per MD due to capsular release and LUIS    Exercises/Interventions:   Exercises:  Exercise/Equipment Resistance/Repetitions Other comments   Stretching/PROM     Wand ER at neutral 22s82huy    ER Supine @ 60abd 90d77wgl      Supine cane flex 3x10       Start9/22        Start10/6   Table Slides fl 68c15nev     Abd 79v55dip    ER bend forward 60y37ufv       Start9/8      Start9/8   Wall slide 06b39apb  Start9/22   Wall step away for flexion 98m91szz Start9/8   Pulley flex   X10    Scap x10 Start9/16   Supine grab L wrist with R hand move into flexion 50l27szc    blue ball roll into flexion on table  67q99rjr    Pendulum AROM CW CCW fl/ext side to side [id number]  #    Elbow AROM      IR behind back  15c57mho Start9/12   Supine butterfly 2 min Start10/4   Isometrics     Retraction x30    shrug x30    Weight shift     Flexion     Abduction     External Rotation     Internal Rotation     Biceps     Triceps          PRE's     Flexion Supine 3x10 Start2/16   Abduction Sl 3x10 Start2/16   External Rotation SL3x10  3# no towel  ^1/19   Internal Rotation     Shrugs     EXT EOB 3x10 5# ^1/3   Reverse Flys     Serratus 2x10  Start10/6   Horizontal Abd      Biceps 3x10 7# ^11/10   Triceps     Retraction EOB 3x10 5# ^1/3        Cable Column/Theraband     External Rotation Modified no money 3x10 red Start11/10   Internal Rotation     Supine hor abd               Cheer leader  X10 each yellow Start1/17   Lats     Ext     Flex     Scapular Retraction 3x10 blk ^11/8   BIC     TRIC 3x10 blk ^11/8   PNF          Dynamic Stability     Supine      Plyoback          Manual interventions     PROM Fl scap ER/IR;luis ER/IR  15 min  ^11/1              Therapeutic Exercise and NMR EXR  [x] (58350) Provided verbal/tactile cueing for activities related to strengthening, flexibility, endurance, ROM  for improvements in scapular, scapulothoracic and UE control with self care, reaching, carrying, lifting, house/yardwork, driving/computer work.    [] (19808) Provided verbal/tactile cueing for activities related to improving balance, coordination, kinesthetic sense, posture, motor skill, proprioception  to assist with  scapular, scapulothoracic and UE control with self care, reaching, carrying, lifting, house/yardwork, driving/computer work. Therapeutic Activities:    [] (45003 or 81027) Provided verbal/tactile cueing for activities related to improving balance, coordination, kinesthetic sense, posture, motor skill, proprioception and motor activation to allow for proper function of scapular, scapulothoracic and UE control with self care, carrying, lifting, driving/computer work.      Home Exercise Program:    [x] (24454) Reviewed/Progressed HEP activities related to strengthening, flexibility, endurance, ROM of scapular, scapulothoracic and UE control with self care, reaching, carrying, lifting, house/yardwork, driving/computer work  [] (85635) Reviewed/Progressed HEP activities related to improving balance, coordination, kinesthetic sense, posture, motor skill, proprioception of scapular, scapulothoracic and UE control with self care, reaching, carrying, lifting, house/yardwork, driving/computer work      Manual Treatments:  PROM / STM / Oscillations-Mobs:  G-I, II, III, IV (PA's, Inf., Post.)  [x] (37239) Provided manual therapy to mobilize soft tissue/joints of cervical/CT, scapular GHJ and UE for the purpose of modulating pain, promoting relaxation,  increasing ROM, reducing/eliminating soft tissue swelling/inflammation/restriction, improving soft tissue extensibility and allowing for proper ROM for normal function with self care, reaching, carrying, lifting, house/yardwork, driving/computer work    Modalities: declined ice but recommended ice 15-20 min on several times per day to help with pain and inflammation     Charges:  Timed Code Treatment Minutes: 45   Total Treatment Minutes: 230-335       [] EVAL (LOW) 70761 (typically 20 minutes face-to-face)  [] EVAL (MOD) 96518 (typically 30 minutes face-to-face)  [] EVAL (HIGH) 38826 (typically 45 minutes face-to-face)  [] RE-EVAL     [x] DI(28347) x  2  [] IONTO  [] NMR (47980) x     [] VASO  [x] Manual (61526) x  1    [] Other:  [] TA x      [] Mech Traction (33130)  [] ES(attended) (84812)      [] ES (un) (35750):     GOALS:  Patient stated goal: increase ROM decrease pain, use arm for adls    [] Progressing: [] Met: [] Not Met: [] Adjusted     Therapist goals for Patient:   Short Term Goals: To be achieved in: 2 -4weeks  1. Independent in HEP and progression per patient tolerance, in order to prevent re-injury. [] Progressing: [x] Met: [] Not Met: [] Adjusted   2. Patient will have a decrease in pain to facilitate improvement in movement, function, and ADLs as indicated by Functional Deficits. [] Progressing: [x] Met: [] Not Met: [] Adjusted     Long Term Goals: To be achieved in: 12 weeks  1. Foto 67/100  to assist with reaching prior level of function. [x] Progressing: [] Met: [] Not Met: [] Adjusted  2. Patient will demonstrate increased AROM to = R to allow for proper joint functioning as indicated by patients Functional Deficits. [x] Progressing: [] Met: [] Not Met: [] Adjusted  3. Patient will demonstrate an increase in strength to good scapular and core control  for UE to allow for proper functional mobility as indicated by patients Functional Deficits. [x] Progressing: [] Met: [] Not Met: [] Adjusted  4. Patient will return to  functional activities reach over head, reach behind back, don doff jacket  without increased symptoms or restriction.    [x] Progressing: [] Met: [] Not Met: [] Adjusted  Overall Progression Towards Functional goals/ Treatment Progress Update:  [] Patient is progressing as expected towards functional goals listed. [x] Progression is slowed due to complexities/Impairments listed. Trauma , complexity of injury   [] Progression has been slowed due to co-morbidities. [] Plan just implemented, too soon to assess goals progression <30days   [] Goals require adjustment due to lack of progress  [] Patient is not progressing as expected and requires additional follow up with physician  [] Other    Prognosis for POC: [x] Good [] Fair  [] Poor      Patient requires continued skilled intervention: [x] Yes  [] No    Treatment/Activity Tolerance:  [x] Patient able to complete treatment  [] Patient limited by fatigue  [x] Patient limited by some pain     [] Patient limited by other medical complications  [x] Other:   . Will continue to progress PROM, AAROM AROM  light PREs as able to progress to PLOF L UE. Patient Education:    Reviewed diagnosis, POC, HEP and its importance. Access Code: [email or web address]  URL: [employer]/  Date: 9/6/22  Prepared by: Piper Guajardo     Exercises  Standing Shoulder Shrugs - 2 x daily - 7 x weekly - 3 sets - 10 reps  Seated Scapular Retraction - 2 x daily - 7 x weekly - 3 sets - 10 reps  Standing Bicep Curls Supinated with Dumbbells - 2 x daily - 7 x weekly - 3 sets - 10 reps     Seated Shoulder Pendulum Exercise - 2 x daily - 7 x weekly - 3 sets - 10 reps  Table slides flexion - 2 x daily - 7 x weekly - 10 reps - 10 hold  Seated Shoulder External Rotation AAROM with Dowel - 2 x daily - 7 x weekly - 10 reps - 10 hold   PLAN: See eval  [x] Continue per plan of care [] Alter current plan (see comments above)  [] Plan of care initiated [] Hold pending MD visit [] Discharge      Electronically signed by:  Piper Guajardo PT    Note: If patient does not return for scheduled/ recommended follow up visits, this note will serve as a discharge from care along with most recent update on progress.

## 2023-03-02 ENCOUNTER — HOSPITAL ENCOUNTER (OUTPATIENT)
Dept: PHYSICAL THERAPY | Age: 73
Setting detail: THERAPIES SERIES
Discharge: HOME | End: 2023-03-02
Payer: MEDICARE

## 2023-03-02 PROCEDURE — 97140 MANUAL THERAPY 1/> REGIONS: CPT | Performed by: PHYSICAL THERAPIST

## 2023-03-02 PROCEDURE — 97110 THERAPEUTIC EXERCISES: CPT | Performed by: PHYSICAL THERAPIST

## 2023-03-02 NOTE — FLOWSHEET NOTE
[address], [address] [place] [address], [address] [place] [address]  Phone: [phone]   Fax:     [phone]        Physical Therapy Daily Treatment Note  Date:  3/2/2023    Patient Name:  Carlo Douglas    :  1950  MRN: [id number]  Restrictions/Precautions:    Medical/Treatment Diagnosis Information:  Diagnosis: L truamatic rotator cuff tear M75.122  Treatment Diagnosis: L shoulder painM25. 512  DOS 2022  L shoulder RC augmentation labral debridement SAD DCE capsular release LUIS      Insurance/Certification information:  PT Insurance Information: [facility] BMN  Physician Information:   Dr Erika Dennis  Has the plan of care been signed (Y/N):        [x]  Yes  []  No     Date of Patient follow up with Physician: per md      Is this a Progress Report:     []  Yes  [x]  No        If Yes:  Date Range for reporting period:  Beginning  Ending    Progress report will be due (10 Rx or 30 days whichever is less): 3/5 with POC      Recertification will be due (POC Duration  / 90 days whichever is less): 3/6        Visit # Insurance Allowable Auth Required   14 in   POC     36 total post surgery   BMN []  Yes []  No        Functional Scale:  Foto38/100 38/100 54/100,55/99314/100    Date assessed:  ,10/6 11/8,     Latex Allergy:  [x]NO      []YES  Preferred Language for Healthcare:   [x]English       []other:    Pain level:  0-3/10 at rest     SUBJECTIVE:  3/2 no new c/o    OBJECTIVE: See eval   ROM PROM  AROM   Comment     L R L R     Flexion ~160   ~120 stand     150 supine        scap ~160    ~115 stand*       ER Evenus@[employer]    85@80        IR             Other  (cervical)             Other                  Strength   L R Comment   Flexion  3+ limited range       Abduction  3+ limited range       ER  5- at side        IR  5- at side       Supraspinatus         Upper Trap         Lower Trap         Mid Trap         Rhomboids         Biceps         Triceps         Horizontal Abduction         Horizontal Adduction         Lats          RESTRICTIONS/PRECAUTIONS: RCR,  increase ROM as able per MD due to capsular release and LUIS    Exercises/Interventions:   Exercises:  Exercise/Equipment Resistance/Repetitions Other comments   Stretching/PROM     Wand ER at neutral 71e69gow    ER Supine @ 60abd 25q42blg      Supine cane flex 3x10       Start9/22        Start10/6   Table Slides fl 27s99okw     Abd 67r34ume    ER bend forward 79h18coy       Start9/8      Start9/8   Wall slide 90d06thv  Start9/22   Wall step away for flexion 44y92tvf Start9/8   Pulley flex   X10    Scap x10 Start9/16   Supine grab L wrist with R hand move into flexion 10l12wse    blue ball roll into flexion on table  61w21aos    Pendulum AROM CW CCW fl/ext side to side [id number]  #    Elbow AROM      IR behind back  [id number] Start9/12   Supine butterfly 2 min Start10/4   Isometrics     Retraction x30    shrug x30    Weight shift     Flexion     Abduction     External Rotation     Internal Rotation     Biceps     Triceps          PRE's     Flexion Supine 3x10 Start2/16   Abduction Sl 3x10 Start2/16   External Rotation SL3x10  3# no towel  ^1/19   Internal Rotation     Shrugs     EXT EOB 3x10 5# ^1/3   Reverse Flys     Serratus 2x10  Start10/6   Horizontal Abd      Biceps 3x10 7# ^11/10   Triceps     Retraction EOB 3x10 5# ^1/3        Cable Column/Theraband     External Rotation Modified no money 3x10 red Start11/10   Internal Rotation     Supine hor abd               Cheer leader  X10 each yellow Start1/17   Lats     Ext     Flex     Scapular Retraction 3x10 blk ^11/8   BIC     TRIC 3x10 blk ^11/8   PNF          Dynamic Stability     Supine      Plyoback          Manual interventions     PROM Fl scap ER/IR;luis ER/IR  15 min  ^11/1              Therapeutic Exercise and NMR EXR  [x] [phone]) Provided verbal/tactile cueing for activities related to strengthening, flexibility, endurance, ROM  for improvements in scapular, scapulothoracic and UE control with self care, reaching, carrying, lifting, house/yardwork, driving/computer work.    [] (40066) Provided verbal/tactile cueing for activities related to improving balance, coordination, kinesthetic sense, posture, motor skill, proprioception  to assist with  scapular, scapulothoracic and UE control with self care, reaching, carrying, lifting, house/yardwork, driving/computer work. Therapeutic Activities:    [] (59441 or 61381) Provided verbal/tactile cueing for activities related to improving balance, coordination, kinesthetic sense, posture, motor skill, proprioception and motor activation to allow for proper function of scapular, scapulothoracic and UE control with self care, carrying, lifting, driving/computer work.      Home Exercise Program:    [x] (31826) Reviewed/Progressed HEP activities related to strengthening, flexibility, endurance, ROM of scapular, scapulothoracic and UE control with self care, reaching, carrying, lifting, house/yardwork, driving/computer work  [] (43189) Reviewed/Progressed HEP activities related to improving balance, coordination, kinesthetic sense, posture, motor skill, proprioception of scapular, scapulothoracic and UE control with self care, reaching, carrying, lifting, house/yardwork, driving/computer work      Manual Treatments:  PROM / STM / Oscillations-Mobs:  G-I, II, III, IV (PA's, Inf., Post.)  [x] (78511) Provided manual therapy to mobilize soft tissue/joints of cervical/CT, scapular GHJ and UE for the purpose of modulating pain, promoting relaxation,  increasing ROM, reducing/eliminating soft tissue swelling/inflammation/restriction, improving soft tissue extensibility and allowing for proper ROM for normal function with self care, reaching, carrying, lifting, house/yardwork, driving/computer work    Modalities:  declined ice but recommended ice 15-20 min on several times per day to help with pain and inflammation     Charges:  Timed Code Treatment Minutes: 45   Total Treatment Minutes: 880-108       [] EVAL (LOW) 39314 (typically 20 minutes face-to-face)  [] EVAL (MOD) 77064 (typically 30 minutes face-to-face)  [] EVAL (HIGH) 35352 (typically 45 minutes face-to-face)  [] RE-EVAL     [x] ZR(30778) x  2  [] IONTO  [] NMR (43102) x     [] VASO  [x] Manual (08836) x  1    [] Other:  [] TA x      [] Mech Traction (96367)  [] ES(attended) (67340)      [] ES (un) (03481):     GOALS:  Patient stated goal: increase ROM decrease pain, use arm for adls    [] Progressing: [] Met: [] Not Met: [] Adjusted     Therapist goals for Patient:   Short Term Goals: To be achieved in: 2 -4weeks  1. Independent in HEP and progression per patient tolerance, in order to prevent re-injury. [] Progressing: [x] Met: [] Not Met: [] Adjusted   2. Patient will have a decrease in pain to facilitate improvement in movement, function, and ADLs as indicated by Functional Deficits. [] Progressing: [x] Met: [] Not Met: [] Adjusted     Long Term Goals: To be achieved in: 12 weeks  1. Foto 67/100  to assist with reaching prior level of function. [x] Progressing: [] Met: [] Not Met: [] Adjusted  2. Patient will demonstrate increased AROM to = R to allow for proper joint functioning as indicated by patients Functional Deficits. [x] Progressing: [] Met: [] Not Met: [] Adjusted  3. Patient will demonstrate an increase in strength to good scapular and core control  for UE to allow for proper functional mobility as indicated by patients Functional Deficits. [x] Progressing: [] Met: [] Not Met: [] Adjusted  4. Patient will return to  functional activities reach over head, reach behind back, don doff jacket  without increased symptoms or restriction.    [x] Progressing: [] Met: [] Not Met: [] Adjusted  Overall Progression Towards Functional goals/ Treatment Progress Update:  [] Patient is progressing as expected towards functional goals listed. [x] Progression is slowed due to complexities/Impairments listed. Trauma , complexity of injury   [] Progression has been slowed due to co-morbidities. [] Plan just implemented, too soon to assess goals progression <30days   [] Goals require adjustment due to lack of progress  [] Patient is not progressing as expected and requires additional follow up with physician  [] Other    Prognosis for POC: [x] Good [] Fair  [] Poor      Patient requires continued skilled intervention: [x] Yes  [] No    Treatment/Activity Tolerance:  [x] Patient able to complete treatment  [] Patient limited by fatigue  [x] Patient limited by some pain     [] Patient limited by other medical complications  [x] Other:   . Will continue to progress PROM, AAROM AROM  light PREs as able to progress to PLOF L UE. Patient Education:    Reviewed diagnosis, POC, HEP and its importance. Access Code: [email or web address]  URL: [employer]/  Date: 9/6/22  Prepared by: Bobby Armstrong     Exercises  Standing Shoulder Shrugs - 2 x daily - 7 x weekly - 3 sets - 10 reps  Seated Scapular Retraction - 2 x daily - 7 x weekly - 3 sets - 10 reps  Standing Bicep Curls Supinated with Dumbbells - 2 x daily - 7 x weekly - 3 sets - 10 reps     Seated Shoulder Pendulum Exercise - 2 x daily - 7 x weekly - 3 sets - 10 reps  Table slides flexion - 2 x daily - 7 x weekly - 10 reps - 10 hold  Seated Shoulder External Rotation AAROM with Dowel - 2 x daily - 7 x weekly - 10 reps - 10 hold   PLAN: See eval  [x] Continue per plan of care [] Alter current plan (see comments above)  [] Plan of care initiated [] Hold pending MD visit [] Discharge      Electronically signed by:  Bobby Armstrong PT    Note: If patient does not return for scheduled/ recommended follow up visits, this note will serve as a discharge from care along with most recent update on progress.

## 2023-03-07 ENCOUNTER — HOSPITAL ENCOUNTER (OUTPATIENT)
Dept: PHYSICAL THERAPY | Age: 73
Setting detail: THERAPIES SERIES
Discharge: HOME | End: 2023-03-07
Payer: MEDICARE

## 2023-03-07 PROCEDURE — 97110 THERAPEUTIC EXERCISES: CPT | Performed by: PHYSICAL THERAPIST

## 2023-03-07 PROCEDURE — 97140 MANUAL THERAPY 1/> REGIONS: CPT | Performed by: PHYSICAL THERAPIST

## 2023-03-07 NOTE — PLAN OF CARE
The [address] [place] [address],[address], [address] [facility] [address], [address] [place] [address]  Phone: [phone]   Fax:     [phone]   Physical Therapy Re-Certification Plan of Care    Dear Dr Hang Gonsalez   ,    We had the pleasure of treating the following patient for physical therapy services at [address] [place] [address]. A summary of our findings can be found in the updated assessment below. This includes our plan of care. If you have any questions or concerns regarding these findings, please do not hesitate to contact me at the office phone number checked above. Thank you for the referral.     Physician Signature:________________________________Date:__________________  By signing above (or electronic signature), therapists plan is approved by physician      Overall Response to Treatment:   [x]Patient is responding gradually to treatment and improvement is noted with regards  to goals   []Patient should continue to improve in reasonable time if they continue HEP   []Patient has plateaued and is no longer responding to skilled PT intervention    []Patient is getting worse and would benefit from return to referring MD   []Patient unable to adhere to initial POC   [x]Other: pt continue s to make gradual increases in AROM and strength s/p L shoulder RC augmentation labral debridement SAD DCE capsular release LUIS    23. Need to continue skilled PT to increase end ranges of motion and increase strength to allow pt to return to PLOF without difficulty. 2x week for 12 weeks               Physical Therapy Daily Treatment Note  Date:  3/7/2023    Patient Name:  Mei Abraham    :  1950  MRN: [id number]  Restrictions/Precautions:    Medical/Treatment Diagnosis Information:  Diagnosis: L truamatic rotator cuff tear M75.122  Treatment Diagnosis: L shoulder painM25. 512  DOS 2022  L shoulder RC augmentation labral debridement SAD DCE capsular release LUIS      Insurance/Certification information:  PT Insurance Information: [facility] BMN  Physician Information:   Dr Pratik Schwarz  Has the plan of care been signed (Y/N):        [x]  Yes  []  No     Date of Patient follow up with Physician: per md      Is this a Progress Report:     [x]  Yes with POC []  No        If Yes:  Date Range for reporting period:  Beginning2/5  Ending3/7    Progress report will be due (10 Rx or 30 days whichever is less): 4/7       Recertification will be due (POC Duration  / 90 days whichever is less): 6/7        Visit # Insurance Allowable Auth Required   15 in 2023      36 total post surgery   BMN []  Yes []  No        Functional Scale:  Foto38/100 38/100 54/100,55/01198/100    Date assessed:  9/6,10/6 11/8,12/16 2/9     Latex Allergy:  [x]NO      []YES  Preferred Language for Healthcare:   [x]English       []other:    Pain level:  0-3/10 at rest     SUBJECTIVE:  3/7 see md this week    OBJECTIVE: See eval 9/6  ROM PROM 3/7 AROM 3/7  Comment     L R L R     Flexion ~160   ~135 stand     155 supine        scap ~160    ~125 stand       ER [email or web address] [email or web address]    85@80 2/9       IR             Other  (cervical)             Other                  Strength  3/7 L R Comment   Flexion  4- limited range       Abduction  4- limited range       ER  5- at side        IR  5- at side       Supraspinatus         Upper Trap         Lower Trap         Mid Trap         Rhomboids         Biceps         Triceps         Horizontal Abduction         Horizontal Adduction         Lats          RESTRICTIONS/PRECAUTIONS: RCR,  increase ROM as able per MD due to capsular release and LUIS    Exercises/Interventions:   Exercises:  Exercise/Equipment Resistance/Repetitions Other comments   Stretching/PROM     Wand ER at neutral 09r62gkc    ER Supine @ 60abd 99x53yeq      Supine cane flex 3x10       Start9/22 Start10/6   Table Slides fl 83l71jrp     Abd 38x48dxe    ER bend forward 15g10vds       Start9/8 Start9/8 Wall slide 32a28qed  Start9/22   Wall step away for flexion 36l37vbz Start9/8   Pulley flex   X10    Scap x10 Start9/16   Supine grab L wrist with R hand move into flexion 62m01sbb    blue ball roll into flexion on table  14b94ncv    Pendulum AROM CW CCW fl/ext side to side [id number]  #    Elbow AROM      IR behind back  92q39jfs Start9/12   Supine butterfly 2 min Start10/4   Isometrics     Retraction x30    shrug x30    Weight shift     Flexion     Abduction     External Rotation     Internal Rotation     Biceps     Triceps          PRE's     Flexion Supine 3x10 Start2/16   Abduction Sl 3x10 1# Start2/16   External Rotation SL3x10  3# no towel  ^1/19   Internal Rotation     Shrugs     EXT EOB 3x10 5# ^1/3   Reverse Flys     Serratus 2x10  Start10/6   Horizontal Abd      Biceps 3x10 7# ^11/10   Triceps     Retraction EOB 3x10 5# ^1/3        Cable Column/Theraband     External Rotation Modified no money 3x10 red Start11/10   Internal Rotation     Supine hor abd               Cheer leader  X10 each yellow Start1/17   Lats     Ext     Flex     Scapular Retraction 3x10 blk ^11/8   BIC     TRIC 3x10 blk ^11/8   PNF          Dynamic Stability     Supine      Plyoback          Manual interventions     PROM Fl scap ER/IR;luis ER/IR  15 min  ^11/1              Therapeutic Exercise and NMR EXR  [x] (94797) Provided verbal/tactile cueing for activities related to strengthening, flexibility, endurance, ROM  for improvements in scapular, scapulothoracic and UE control with self care, reaching, carrying, lifting, house/yardwork, driving/computer work.    [] (82719) Provided verbal/tactile cueing for activities related to improving balance, coordination, kinesthetic sense, posture, motor skill, proprioception  to assist with  scapular, scapulothoracic and UE control with self care, reaching, carrying, lifting, house/yardwork, driving/computer work.     Therapeutic Activities:    [] (75002 or 78902) Provided verbal/tactile cueing for activities related to improving balance, coordination, kinesthetic sense, posture, motor skill, proprioception and motor activation to allow for proper function of scapular, scapulothoracic and UE control with self care, carrying, lifting, driving/computer work.      Home Exercise Program:    [x] (48848) Reviewed/Progressed HEP activities related to strengthening, flexibility, endurance, ROM of scapular, scapulothoracic and UE control with self care, reaching, carrying, lifting, house/yardwork, driving/computer work  [] (19620) Reviewed/Progressed HEP activities related to improving balance, coordination, kinesthetic sense, posture, motor skill, proprioception of scapular, scapulothoracic and UE control with self care, reaching, carrying, lifting, house/yardwork, driving/computer work      Manual Treatments:  PROM / STM / Oscillations-Mobs:  G-I, II, III, IV (PA's, Inf., Post.)  [x] (09106) Provided manual therapy to mobilize soft tissue/joints of cervical/CT, scapular GHJ and UE for the purpose of modulating pain, promoting relaxation,  increasing ROM, reducing/eliminating soft tissue swelling/inflammation/restriction, improving soft tissue extensibility and allowing for proper ROM for normal function with self care, reaching, carrying, lifting, house/yardwork, driving/computer work    Modalities:  declined ice but recommended ice 15-20 min on several times per day to help with pain and inflammation     Charges:  Timed Code Treatment Minutes: 45   Total Treatment Minutes: 230-330       [] EVAL (LOW) 22689 (typically 20 minutes face-to-face)  [] EVAL (MOD) 50417 (typically 30 minutes face-to-face)  [] EVAL (HIGH) 68228 (typically 45 minutes face-to-face)  [] RE-EVAL     [x] CK(44422) x  2  [] IONTO  [] NMR (87105) x     [] VASO  [x] Manual (76467) x  1    [] Other:  [] TA x      [] Mech Traction (19986)  [] ES(attended) (61577)      [] ES (un) (86485):     GOALS:  Patient stated goal: increase ROM decrease pain, use arm for adls    [] Progressing: [] Met: [] Not Met: [] Adjusted     Therapist goals for Patient:   Short Term Goals: To be achieved in: 2 -4weeks  1. Independent in HEP and progression per patient tolerance, in order to prevent re-injury. [] Progressing: [x] Met: [] Not Met: [] Adjusted   2. Patient will have a decrease in pain to facilitate improvement in movement, function, and ADLs as indicated by Functional Deficits. [] Progressing: [x] Met: [] Not Met: [] Adjusted     Long Term Goals: To be achieved in: 12 weeks  1. Foto 67/100  to assist with reaching prior level of function. [x] Progressing: [] Met: [] Not Met: [] Adjusted  2. Patient will demonstrate increased AROM to = R to allow for proper joint functioning as indicated by patients Functional Deficits. [x] Progressing: [] Met: [] Not Met: [] Adjusted  3. Patient will demonstrate an increase in strength to good scapular and core control  for UE to allow for proper functional mobility as indicated by patients Functional Deficits. [x] Progressing: [] Met: [] Not Met: [] Adjusted  4. Patient will return to  functional activities reach over head, reach behind back, don doff jacket  without increased symptoms or restriction. [x] Progressing: [] Met: [] Not Met: [] Adjusted  Overall Progression Towards Functional goals/ Treatment Progress Update:  [] Patient is progressing as expected towards functional goals listed. [x] Progression is slowed due to complexities/Impairments listed. Trauma , complexity of injury   [] Progression has been slowed due to co-morbidities.   [] Plan just implemented, too soon to assess goals progression <30days   [] Goals require adjustment due to lack of progress  [] Patient is not progressing as expected and requires additional follow up with physician  [] Other    Prognosis for POC: [x] Good [] Fair  [] Poor      Patient requires continued skilled intervention: [x] Yes  [] No    Treatment/Activity Tolerance:  [x] Patient able to complete treatment  [] Patient limited by fatigue  [x] Patient limited by some pain     [] Patient limited by other medical complications  [x] Other:   . See POC notes above                    Patient Education:    Reviewed diagnosis, POC, HEP and its importance. Access Code: [email or web address]  URL: [email or web address]  Date: 9/6/22  Prepared by: Janes Richardson     Exercises  Standing Shoulder Shrugs - 2 x daily - 7 x weekly - 3 sets - 10 reps  Seated Scapular Retraction - 2 x daily - 7 x weekly - 3 sets - 10 reps  Standing Bicep Curls Supinated with Dumbbells - 2 x daily - 7 x weekly - 3 sets - 10 reps     Seated Shoulder Pendulum Exercise - 2 x daily - 7 x weekly - 3 sets - 10 reps  Table slides flexion - 2 x daily - 7 x weekly - 10 reps - 10 hold  Seated Shoulder External Rotation AAROM with Dowel - 2 x daily - 7 x weekly - 10 reps - 10 hold   PLAN: See eval  [] Continue per plan of care [] Alter current plan (see comments above)  [x] Plan of care updated [] Hold pending MD visit [] Discharge      Electronically signed by:  Janes Richardson PT    Note: If patient does not return for scheduled/ recommended follow up visits, this note will serve as a discharge from care along with most recent update on progress.

## 2023-03-09 ENCOUNTER — HOSPITAL ENCOUNTER (OUTPATIENT)
Dept: PHYSICAL THERAPY | Age: 73
Setting detail: THERAPIES SERIES
Discharge: HOME | End: 2023-03-09
Payer: MEDICARE

## 2023-03-09 PROCEDURE — 97530 THERAPEUTIC ACTIVITIES: CPT | Performed by: PHYSICAL THERAPIST

## 2023-03-09 PROCEDURE — 97110 THERAPEUTIC EXERCISES: CPT | Performed by: PHYSICAL THERAPIST

## 2023-03-09 PROCEDURE — 97140 MANUAL THERAPY 1/> REGIONS: CPT | Performed by: PHYSICAL THERAPIST

## 2023-03-09 NOTE — FLOWSHEET NOTE
The [address] [place] [address] 200, 800 [address], [address] [place] [address]  Phone: [phone]   Fax:     [phone]       Physical Therapy Daily Treatment Note  Date:  3/9/2023    Patient Name:  Lisa Short    :  1950  MRN: [id number]  Restrictions/Precautions:    Medical/Treatment Diagnosis Information:  Diagnosis: L truamatic rotator cuff tear M75.122  Treatment Diagnosis: L shoulder painM25. 512  DOS 2022  L shoulder RC augmentation labral debridement SAD DCE capsular release LUIS      Insurance/Certification information:  PT Insurance Information: [facility] BMN  Physician Information:   Dr Braden Pierre  Has the plan of care been signed (Y/N):        [x]  Yes  []  No     Date of Patient follow up with Physician: per md      Is this a Progress Report:     []  Yes  [x]  No        If Yes:  Date Range for reporting period:  Beginning  Ending3/7    Progress report will be due (10 Rx or 30 days whichever is less):        Recertification will be due (POC Duration  / 90 days whichever is less):         Visit # Insurance Allowable Auth Required   16 in       36 total post surgery   BMN []  Yes []  No        Functional Scale: Foto38/100 38/100 54/100,55/45743/100    Date assessed:  ,10/6 11/8,     Latex Allergy:  [x]NO      []YES  Preferred Language for Healthcare:   [x]English       []other:    Pain level:  0-3/10 at rest     SUBJECTIVE:  3/9 md notes increased strength.  Advised self massage to knot in upper arm     OBJECTIVE: See eval   ROM PROM 3/7 AROM 3/7  Comment     L R L R     Flexion ~160   ~135 stand     155 supine        scap ~160    ~125 stand       ER [email or web address]    85@80        IR             Other  (cervical)             Other                  Strength  3/7 L R Comment   Flexion  4- limited range       Abduction  4- limited range       ER  5- at side        IR  5- at side       Supraspinatus         Upper Trap         Lower Trap         Mid Trap         Rhomboids         Biceps         Triceps         Horizontal Abduction         Horizontal Adduction         Lats          RESTRICTIONS/PRECAUTIONS: RCR,  increase ROM as able per MD due to capsular release and LUIS    Exercises/Interventions:   Exercises:  Exercise/Equipment Resistance/Repetitions Other comments   Stretching/PROM     Wand ER at neutral 66w44fbf    ER Supine @ 60abd 13g46dcr      Supine cane flex 3x10       Start9/22        Start10/6   Table Slides fl 50t53gup     Abd 23s81vsm    ER bend forward 52v72sjj       Start9/8      Start9/8   Wall slide 24z02tep  Start9/22   Wall step away for flexion 79y68lwx Start9/8   Pulley flex   X10    Scap x10 Start9/16   Supine grab L wrist with R hand move into flexion 73j50ftn    blue ball roll into flexion on table  08x86nse    Pendulum AROM CW CCW fl/ext side to side [id number]  #    Elbow AROM      IR behind back  66w24gen Start9/12   Supine butterfly 2 min Start10/4   Isometrics     Retraction x30    shrug x30    Weight shift     Flexion     Abduction     External Rotation     Internal Rotation     Biceps     Triceps          PRE's     Flexion Supine 3x10 Start2/16   Abduction Sl 3x10 1# Start2/16   External Rotation SL3x10  3# no towel  ^1/19   Internal Rotation     Shrugs     EXT EOB 3x10 5# ^1/3   Reverse Flys     Serratus 2x10  Start10/6   Horizontal Abd      Biceps 3x10 7# ^11/10   Triceps     Retraction EOB 3x10 5# ^1/3        Cable Column/Theraband     External Rotation Modified no money 3x10 red Start11/10   Internal Rotation     Supine hor abd               Cheer leader  X10 each yellow Start1/17   Lats     Ext     Flex     Scapular Retraction 3x10 blk ^11/8   BIC     TRIC 3x10 blk ^11/8   PNF          Dynamic Stability     Supine      Plyoback          Manual interventions     PROM Fl scap ER/IR;luis ER/IR  15 min  ^11/1              Therapeutic Exercise and NMR EXR  [x] (64211) Provided verbal/tactile cueing for activities related to strengthening, flexibility, endurance, ROM  for improvements in scapular, scapulothoracic and UE control with self care, reaching, carrying, lifting, house/yardwork, driving/computer work.    [] (66453) Provided verbal/tactile cueing for activities related to improving balance, coordination, kinesthetic sense, posture, motor skill, proprioception  to assist with  scapular, scapulothoracic and UE control with self care, reaching, carrying, lifting, house/yardwork, driving/computer work. Therapeutic Activities:    [] (51660 or 30182) Provided verbal/tactile cueing for activities related to improving balance, coordination, kinesthetic sense, posture, motor skill, proprioception and motor activation to allow for proper function of scapular, scapulothoracic and UE control with self care, carrying, lifting, driving/computer work.      Home Exercise Program:    [x] (39908) Reviewed/Progressed HEP activities related to strengthening, flexibility, endurance, ROM of scapular, scapulothoracic and UE control with self care, reaching, carrying, lifting, house/yardwork, driving/computer work  [] (74202) Reviewed/Progressed HEP activities related to improving balance, coordination, kinesthetic sense, posture, motor skill, proprioception of scapular, scapulothoracic and UE control with self care, reaching, carrying, lifting, house/yardwork, driving/computer work      Manual Treatments:  PROM / STM / Oscillations-Mobs:  G-I, II, III, IV (PA's, Inf., Post.)  [x] (73682) Provided manual therapy to mobilize soft tissue/joints of cervical/CT, scapular GHJ and UE for the purpose of modulating pain, promoting relaxation,  increasing ROM, reducing/eliminating soft tissue swelling/inflammation/restriction, improving soft tissue extensibility and allowing for proper ROM for normal function with self care, reaching, carrying, lifting, house/yardwork, driving/computer work    Modalities:  declined ice but recommended ice 15-20 min on several times per day to help with pain and inflammation     Charges:  Timed Code Treatment Minutes: 45   Total Treatment Minutes: 230-330       [] EVAL (LOW) 55357 (typically 20 minutes face-to-face)  [] EVAL (MOD) 11388 (typically 30 minutes face-to-face)  [] EVAL (HIGH) 14119 (typically 45 minutes face-to-face)  [] RE-EVAL     [x] RV(87874) x  2  [] IONTO  [] NMR (69648) x     [] VASO  [x] Manual (24411) x  1    [] Other:  [] TA x      [] Mech Traction (24794)  [] ES(attended) (03128)      [] ES (un) (69230):     GOALS:  Patient stated goal: increase ROM decrease pain, use arm for adls    [] Progressing: [] Met: [] Not Met: [] Adjusted     Therapist goals for Patient:   Short Term Goals: To be achieved in: 2 -4weeks  1. Independent in HEP and progression per patient tolerance, in order to prevent re-injury. [] Progressing: [x] Met: [] Not Met: [] Adjusted   2. Patient will have a decrease in pain to facilitate improvement in movement, function, and ADLs as indicated by Functional Deficits. [] Progressing: [x] Met: [] Not Met: [] Adjusted     Long Term Goals: To be achieved in: 12 weeks  1. Foto 67/100  to assist with reaching prior level of function. [x] Progressing: [] Met: [] Not Met: [] Adjusted  2. Patient will demonstrate increased AROM to = R to allow for proper joint functioning as indicated by patients Functional Deficits. [x] Progressing: [] Met: [] Not Met: [] Adjusted  3. Patient will demonstrate an increase in strength to good scapular and core control  for UE to allow for proper functional mobility as indicated by patients Functional Deficits. [x] Progressing: [] Met: [] Not Met: [] Adjusted  4. Patient will return to  functional activities reach over head, reach behind back, don doff jacket  without increased symptoms or restriction.    [x] Progressing: [] Met: [] Not Met: [] Adjusted  Overall Progression Towards Functional goals/ Treatment Progress Update:  [] Patient is progressing as expected towards functional goals listed. [x] Progression is slowed due to complexities/Impairments listed. Trauma , complexity of injury   [] Progression has been slowed due to co-morbidities. [] Plan just implemented, too soon to assess goals progression <30days   [] Goals require adjustment due to lack of progress  [] Patient is not progressing as expected and requires additional follow up with physician  [] Other    Prognosis for POC: [x] Good [] Fair  [] Poor      Patient requires continued skilled intervention: [x] Yes  [] No    Treatment/Activity Tolerance:  [x] Patient able to complete treatment  [] Patient limited by fatigue  [x] Patient limited by some pain     [] Patient limited by other medical complications  [x] Other:   need to conitnue to advance end ranges of ROM and increasin R tota arm stength to allowpt to return to PLOF. Patient Education:    Reviewed diagnosis, POC, HEP and its importance. Access Code: [email or web address]  URL: [email or web address]  Date: 9/6/22  Prepared by: Tessy Mejias     Exercises  Standing Shoulder Shrugs - 2 x daily - 7 x weekly - 3 sets - 10 reps  Seated Scapular Retraction - 2 x daily - 7 x weekly - 3 sets - 10 reps  Standing Bicep Curls Supinated with Dumbbells - 2 x daily - 7 x weekly - 3 sets - 10 reps     Seated Shoulder Pendulum Exercise - 2 x daily - 7 x weekly - 3 sets - 10 reps  Table slides flexion - 2 x daily - 7 x weekly - 10 reps - 10 hold  Seated Shoulder External Rotation AAROM with Dowel - 2 x daily - 7 x weekly - 10 reps - 10 hold   PLAN: See eval  [] Continue per plan of care [] Alter current plan (see comments above)  [x] Plan of care updated [] Hold pending MD visit [] Discharge      Electronically signed by:  Tessy Mejias PT    Note: If patient does not return for scheduled/ recommended follow up visits, this note will serve as a discharge from care along with most recent update on progress. 150

## 2023-03-21 ENCOUNTER — HOSPITAL ENCOUNTER (OUTPATIENT)
Dept: PHYSICAL THERAPY | Age: 73
Setting detail: THERAPIES SERIES
Discharge: HOME | End: 2023-03-21
Payer: MEDICARE

## 2023-03-21 PROCEDURE — 97110 THERAPEUTIC EXERCISES: CPT | Performed by: PHYSICAL THERAPIST

## 2023-03-21 PROCEDURE — 97530 THERAPEUTIC ACTIVITIES: CPT | Performed by: PHYSICAL THERAPIST

## 2023-03-21 NOTE — FLOWSHEET NOTE
work    Modalities:  declined ice but recommended ice 15-20 min on several times per day to help with pain and inflammation     Charges:  Timed Code Treatment Minutes: 45   Total Treatment Minutes: 230-335       [] EVAL (LOW) 32274 (typically 20 minutes face-to-face)  [] EVAL (MOD) 96998 (typically 30 minutes face-to-face)  [] EVAL (HIGH) 90267 (typically 45 minutes face-to-face)  [] RE-EVAL     [x] CF(26515) x  2  [] IONTO  [] NMR (34467) x     [] VASO  [x] Manual (09618) x  1    [] Other:  [] TA x      [] Mech Traction (52011)  [] ES(attended) (25565)      [] ES (un) (39335):     GOALS:  Patient stated goal: increase ROM decrease pain, use arm for adls    [] Progressing: [] Met: [] Not Met: [] Adjusted     Therapist goals for Patient:   Short Term Goals: To be achieved in: 2 -4weeks  1. Independent in HEP and progression per patient tolerance, in order to prevent re-injury. [] Progressing: [x] Met: [] Not Met: [] Adjusted   2. Patient will have a decrease in pain to facilitate improvement in movement, function, and ADLs as indicated by Functional Deficits. [] Progressing: [x] Met: [] Not Met: [] Adjusted     Long Term Goals: To be achieved in: 12 weeks  1. Foto 67/100  to assist with reaching prior level of function. [x] Progressing: [] Met: [] Not Met: [] Adjusted  2. Patient will demonstrate increased AROM to = R to allow for proper joint functioning as indicated by patients Functional Deficits. [x] Progressing: [] Met: [] Not Met: [] Adjusted  3. Patient will demonstrate an increase in strength to good scapular and core control  for UE to allow for proper functional mobility as indicated by patients Functional Deficits. [x] Progressing: [] Met: [] Not Met: [] Adjusted  4. Patient will return to  functional activities reach over head, reach behind back, don doff jacket  without increased symptoms or restriction.    [x] Progressing: [] Met: [] Not Met: [] Adjusted  Overall Progression Towards

## 2023-03-23 ENCOUNTER — HOSPITAL ENCOUNTER (OUTPATIENT)
Dept: PHYSICAL THERAPY | Age: 73
Setting detail: THERAPIES SERIES
Discharge: HOME | End: 2023-03-23
Payer: MEDICARE

## 2023-03-23 PROCEDURE — 97140 MANUAL THERAPY 1/> REGIONS: CPT | Performed by: PHYSICAL THERAPIST

## 2023-03-23 PROCEDURE — 97110 THERAPEUTIC EXERCISES: CPT | Performed by: PHYSICAL THERAPIST

## 2023-03-23 NOTE — FLOWSHEET NOTE
recommended ice 15-20 min on several times per day to help with pain and inflammation     Charges:  Timed Code Treatment Minutes: 45   Total Treatment Minutes: 743-056       [] EVAL (LOW) 13188 (typically 20 minutes face-to-face)  [] EVAL (MOD) 77934 (typically 30 minutes face-to-face)  [] EVAL (HIGH) 99922 (typically 45 minutes face-to-face)  [] RE-EVAL     [x] SS(11078) x  2  [] IONTO  [] NMR (92861) x     [] VASO  [x] Manual (03647) x  1    [] Other:  [] TA x      [] Mech Traction (03089)  [] ES(attended) (41558)      [] ES (un) (17289):     GOALS:  Patient stated goal: increase ROM decrease pain, use arm for adls    [] Progressing: [] Met: [] Not Met: [] Adjusted     Therapist goals for Patient:   Short Term Goals: To be achieved in: 2 -4weeks  1. Independent in HEP and progression per patient tolerance, in order to prevent re-injury. [] Progressing: [x] Met: [] Not Met: [] Adjusted   2. Patient will have a decrease in pain to facilitate improvement in movement, function, and ADLs as indicated by Functional Deficits. [] Progressing: [x] Met: [] Not Met: [] Adjusted     Long Term Goals: To be achieved in: 12 weeks  1. Foto 67/100  to assist with reaching prior level of function. [x] Progressing: [] Met: [] Not Met: [] Adjusted  2. Patient will demonstrate increased AROM to = R to allow for proper joint functioning as indicated by patients Functional Deficits. [x] Progressing: [] Met: [] Not Met: [] Adjusted  3. Patient will demonstrate an increase in strength to good scapular and core control  for UE to allow for proper functional mobility as indicated by patients Functional Deficits. [x] Progressing: [] Met: [] Not Met: [] Adjusted  4. Patient will return to  functional activities reach over head, reach behind back, don doff jacket  without increased symptoms or restriction.    [x] Progressing: [] Met: [] Not Met: [] Adjusted  Overall Progression Towards Functional goals/ Treatment Progress

## 2023-03-28 ENCOUNTER — HOSPITAL ENCOUNTER (OUTPATIENT)
Dept: PHYSICAL THERAPY | Age: 73
Setting detail: THERAPIES SERIES
Discharge: HOME | End: 2023-03-28
Payer: MEDICARE

## 2023-03-28 PROCEDURE — 97140 MANUAL THERAPY 1/> REGIONS: CPT | Performed by: PHYSICAL THERAPIST

## 2023-03-28 PROCEDURE — 97110 THERAPEUTIC EXERCISES: CPT | Performed by: PHYSICAL THERAPIST

## 2023-03-29 DIAGNOSIS — E11.9 TYPE 2 DIABETES MELLITUS WITHOUT COMPLICATION, WITH LONG-TERM CURRENT USE OF INSULIN (HCC): ICD-10-CM

## 2023-03-29 DIAGNOSIS — Z79.4 TYPE 2 DIABETES MELLITUS WITHOUT COMPLICATION, WITH LONG-TERM CURRENT USE OF INSULIN (HCC): ICD-10-CM

## 2023-03-29 RX ORDER — LANCETS 33 GAUGE
EACH MISCELLANEOUS
Qty: 200 EACH | Refills: 5 | Status: SHIPPED | OUTPATIENT
Start: 2023-03-29

## 2023-03-30 ENCOUNTER — HOSPITAL ENCOUNTER (OUTPATIENT)
Dept: PHYSICAL THERAPY | Age: 73
Setting detail: THERAPIES SERIES
Discharge: HOME | End: 2023-03-30
Payer: MEDICARE

## 2023-03-30 PROCEDURE — 97140 MANUAL THERAPY 1/> REGIONS: CPT | Performed by: PHYSICAL THERAPIST

## 2023-03-30 PROCEDURE — 97110 THERAPEUTIC EXERCISES: CPT | Performed by: PHYSICAL THERAPIST

## 2023-03-30 NOTE — FLOWSHEET NOTE
[address],  [address]  Phone: [phone]   Fax:     [phone]       Physical Therapy Daily Treatment Note  Date:  3/30/2023    Patient Name:  Brandon Marks    :  1950  MRN: [id number]  Restrictions/Precautions:    Medical/Treatment Diagnosis Information:  Diagnosis: L truamatic rotator cuff tear M75.122  Treatment Diagnosis: L shoulder painM25. 512  DOS 2022  L shoulder RC augmentation labral debridement SAD DCE capsular release LUIS      Insurance/Certification information:  PT Insurance Information: [facility] BMN  Physician Information:   Dr Brianna Carcamo  Has the plan of care been signed (Y/N):        [x]  Yes  []  No     Date of Patient follow up with Physician: per md      Is this a Progress Report:     []  Yes  [x]  No        If Yes:  Date Range for reporting period:  Beginning  Ending3/7    Progress report will be due (10 Rx or 30 days whichever is less):        Recertification will be due (POC Duration  / 90 days whichever is less):         Visit # Insurance Allowable Auth Required   20 in       36 total post surgery   BMN []  Yes []  No        Functional Scale: Foto38/100 38/100 54/100,55/62228/100    Date assessed:  ,10/6 11/8,     Latex Allergy:  [x]NO      []YES  Preferred Language for Healthcare:   [x]English       []other:    Pain level:  0-3/10 at rest     SUBJECTIVE:  3/28 main c/o remains pain along the muscle know outside humeral area.   No new c/o    OBJECTIVE: See eval   ROM PROM 3/7 AROM 3/7  Comment     L R L R     Flexion ~160   ~135 stand     155 supine        scap ~160    ~125 stand       ER Reve@[employer]    85@80        IR             Other  (cervical)             Other                  Strength  3/7 L R Comment   Flexion  4- limited range       Abduction  4- limited range       ER  5- at side        IR  5- at side       Supraspinatus

## 2023-04-04 ENCOUNTER — HOSPITAL ENCOUNTER (OUTPATIENT)
Dept: PHYSICAL THERAPY | Age: 73
Setting detail: THERAPIES SERIES
Discharge: HOME | End: 2023-04-04
Payer: MEDICARE

## 2023-04-04 DIAGNOSIS — Z79.4 TYPE 2 DIABETES MELLITUS WITHOUT COMPLICATION, WITH LONG-TERM CURRENT USE OF INSULIN (HCC): Primary | ICD-10-CM

## 2023-04-04 DIAGNOSIS — E11.9 TYPE 2 DIABETES MELLITUS WITHOUT COMPLICATION, WITH LONG-TERM CURRENT USE OF INSULIN (HCC): Primary | ICD-10-CM

## 2023-04-04 PROCEDURE — 97110 THERAPEUTIC EXERCISES: CPT | Performed by: PHYSICAL THERAPIST

## 2023-04-04 PROCEDURE — 97140 MANUAL THERAPY 1/> REGIONS: CPT | Performed by: PHYSICAL THERAPIST

## 2023-04-04 RX ORDER — INSULIN GLARGINE 100 [IU]/ML
INJECTION, SOLUTION SUBCUTANEOUS
Qty: 15 ML | Refills: 5 | Status: SHIPPED | OUTPATIENT
Start: 2023-04-04

## 2023-04-04 NOTE — FLOWSHEET NOTE
[address] [address]  Phone: [phone]   Fax:     [phone]       Physical Therapy Daily Treatment Note  Date:  2023    Patient Name:  Aislinn Spencer    :  1950  MRN: [id number]  Restrictions/Precautions:    Medical/Treatment Diagnosis Information:  Diagnosis: L truamatic rotator cuff tear M75.122  Treatment Diagnosis: L shoulder painM25. 512  DOS 2022  L shoulder RC augmentation labral debridement SAD DCE capsular release LUIS      Insurance/Certification information:  PT Insurance Information: [facility] BMN  Physician Information:   Dr Dutch Gaxiola  Has the plan of care been signed (Y/N):        [x]  Yes  []  No     Date of Patient follow up with Physician: per md      Is this a Progress Report:     []  Yes  [x]  No        If Yes:  Date Range for reporting period:  Beginning  Ending3/7    Progress report will be due (10 Rx or 30 days whichever is less):        Recertification will be due (POC Duration  / 90 days whichever is less):         Visit # Insurance Allowable Auth Required   21 in   PN NPV      36 total post surgery   BMN []  Yes []  No        Functional Scale:  Foto38/100 ( [facility] DASH 68.3)38/100 54/100,55/100 55/100 Quick dash 27    Date assessed:  ,10/6 11/8,,     Latex Allergy:  [x]NO      []YES  Preferred Language for Healthcare:   [x]English       []other:    Pain level:  0-3/10 at rest     SUBJECTIVE: 4/4 no new c/os    OBJECTIVE: See eval   ROM PROM 3/7 AROM 3/7  Comment     L R L R     Flexion ~160   ~135 stand     155 supine        scap ~160    ~125 stand       ER Amphion@[employer]    85@80        IR             Other  (cervical)             Other                  Strength  3/ L R Comment   Flexion  4- limited range       Abduction  4- limited range       ER  5- at side        IR  5- at side       Supraspinatus         Upper Trap

## 2023-04-06 ENCOUNTER — HOSPITAL ENCOUNTER (OUTPATIENT)
Dept: PHYSICAL THERAPY | Age: 73
Setting detail: THERAPIES SERIES
Discharge: HOME | End: 2023-04-06
Payer: MEDICARE

## 2023-04-06 PROCEDURE — 97140 MANUAL THERAPY 1/> REGIONS: CPT | Performed by: PHYSICAL THERAPIST

## 2023-04-06 PROCEDURE — 97110 THERAPEUTIC EXERCISES: CPT | Performed by: PHYSICAL THERAPIST

## 2023-04-06 NOTE — FLOWSHEET NOTE
related to strengthening, flexibility, endurance, ROM  for improvements in scapular, scapulothoracic and UE control with self care, reaching, carrying, lifting, house/yardwork, driving/computer work.    [] (10062) Provided verbal/tactile cueing for activities related to improving balance, coordination, kinesthetic sense, posture, motor skill, proprioception  to assist with  scapular, scapulothoracic and UE control with self care, reaching, carrying, lifting, house/yardwork, driving/computer work. Therapeutic Activities:    [] (60876 or 79730) Provided verbal/tactile cueing for activities related to improving balance, coordination, kinesthetic sense, posture, motor skill, proprioception and motor activation to allow for proper function of scapular, scapulothoracic and UE control with self care, carrying, lifting, driving/computer work.      Home Exercise Program:    [x] (07856) Reviewed/Progressed HEP activities related to strengthening, flexibility, endurance, ROM of scapular, scapulothoracic and UE control with self care, reaching, carrying, lifting, house/yardwork, driving/computer work  [] (01791) Reviewed/Progressed HEP activities related to improving balance, coordination, kinesthetic sense, posture, motor skill, proprioception of scapular, scapulothoracic and UE control with self care, reaching, carrying, lifting, house/yardwork, driving/computer work      Manual Treatments:  PROM / STM / Oscillations-Mobs:  G-I, II, III, IV (PA's, Inf., Post.)  [x] (95169) Provided manual therapy to mobilize soft tissue/joints of cervical/CT, scapular GHJ and UE for the purpose of modulating pain, promoting relaxation,  increasing ROM, reducing/eliminating soft tissue swelling/inflammation/restriction, improving soft tissue extensibility and allowing for proper ROM for normal function with self care, reaching, carrying, lifting, house/yardwork, driving/computer work    Modalities:  declined ice but recommended ice 15-20 min

## 2023-04-17 ENCOUNTER — OFFICE VISIT (OUTPATIENT)
Dept: ENDOCRINOLOGY | Age: 73
End: 2023-04-17
Payer: MEDICARE

## 2023-04-17 VITALS
DIASTOLIC BLOOD PRESSURE: 68 MMHG | SYSTOLIC BLOOD PRESSURE: 146 MMHG | HEIGHT: 75 IN | HEART RATE: 70 BPM | WEIGHT: 233 LBS | RESPIRATION RATE: 16 BRPM | BODY MASS INDEX: 28.97 KG/M2

## 2023-04-17 DIAGNOSIS — E11.9 TYPE 2 DIABETES MELLITUS WITHOUT COMPLICATION, WITHOUT LONG-TERM CURRENT USE OF INSULIN (HCC): Primary | ICD-10-CM

## 2023-04-17 DIAGNOSIS — I10 ESSENTIAL HYPERTENSION: ICD-10-CM

## 2023-04-17 PROCEDURE — 3051F HG A1C>EQUAL 7.0%<8.0%: CPT | Performed by: INTERNAL MEDICINE

## 2023-04-17 PROCEDURE — 1123F ACP DISCUSS/DSCN MKR DOCD: CPT | Performed by: INTERNAL MEDICINE

## 2023-04-17 PROCEDURE — 3078F DIAST BP <80 MM HG: CPT | Performed by: INTERNAL MEDICINE

## 2023-04-17 PROCEDURE — 3077F SYST BP >= 140 MM HG: CPT | Performed by: INTERNAL MEDICINE

## 2023-04-17 PROCEDURE — 99214 OFFICE O/P EST MOD 30 MIN: CPT | Performed by: INTERNAL MEDICINE

## 2023-04-17 RX ORDER — BLOOD-GLUCOSE,RECEIVER,CONT
EACH MISCELLANEOUS
Qty: 1 EACH | Refills: 3 | Status: SHIPPED | OUTPATIENT
Start: 2023-04-17

## 2023-04-17 RX ORDER — BLOOD-GLUCOSE SENSOR
EACH MISCELLANEOUS
Qty: 3 EACH | Refills: 3 | Status: SHIPPED | OUTPATIENT
Start: 2023-04-17

## 2023-04-17 NOTE — PROGRESS NOTES
ordered other diagnostic tests yes   Made a decision to obtain old records yes   Reviewed and summarized old records yes     Cordell Mclain was counseled regarding symptoms of current diagnosis, course and complications of disease if inadequately treated, side effects of medications, diagnosis, treatment options, and prognosis, risks, benefits, complications, and alternatives of treatment, labs, imaging and other studies and treatment targets and goals. He understands instructions and counseling. Return in about 3 months (around 7/17/2023). Please note that some or all of this report was generated using voice recognition software. Please notify me in case of any questions about the content of this document, as some errors in transcription may have occurred .

## 2023-04-18 ENCOUNTER — HOSPITAL ENCOUNTER (OUTPATIENT)
Dept: PHYSICAL THERAPY | Age: 73
Setting detail: THERAPIES SERIES
Discharge: HOME | End: 2023-04-18
Payer: MEDICARE

## 2023-04-18 PROCEDURE — 97140 MANUAL THERAPY 1/> REGIONS: CPT | Performed by: PHYSICAL THERAPIST

## 2023-04-18 PROCEDURE — 97110 THERAPEUTIC EXERCISES: CPT | Performed by: PHYSICAL THERAPIST

## 2023-04-18 NOTE — FLOWSHEET NOTE
[address], [address], [address] [place] [address]  Phone: [phone]   Fax:     [phone]       Physical Therapy Daily Treatment Note  Date:  2023    Patient Name:  Beto Waterman    :  1950  MRN: [id number]  Restrictions/Precautions:    Medical/Treatment Diagnosis Information:  Diagnosis: L truamatic rotator cuff tear M75.122  Treatment Diagnosis: L shoulder painM25. 512  DOS 2022  L shoulder RC augmentation labral debridement SAD DCE capsular release LUIS      Insurance/Certification information:  PT Insurance Information: [facility] BMN  Physician Information:   Dr Rey Leyva  Has the plan of care been signed (Y/N):        [x]  Yes  []  No     Date of Patient follow up with Physician: per md      Is this a Progress Report:     []  Yes  [x]  No        If Yes:  Date Range for reporting period:  Beginning3/7  Ending    Progress report will be due (10 Rx or 30 days whichever is less):        Recertification will be due (POC Duration  / 90 days whichever is less):         Visit # Insurance Allowable Auth Required   25 in         36 total post surgery   BMN []  Yes []  No        Functional Scale:  Foto38/100 ( [facility] DASH 68.3)38/100 54/100,55/100 55/100 Quick dash 27    Date assessed:  ,10/6 11/8,,     Latex Allergy:  [x]NO      []YES  Preferred Language for Healthcare:   [x]English       []other:    Pain level:  0-3/10 at rest     SUBJECTIVE:  no new c/os    OBJECTIVE: See eval   ROM PROM  AROM /  Comment     L R L R     Flexion ~160   ~135 stand     155 supine        scap ~160    ~125 stand       ER [email or web address]    85@80        IR             Other  (cervical)             Other                  Strength  /6 L R Comment   Flexion  4+ limited range       Abduction  4 limited range       ER  5- at side        IR  5- at side       Supraspinatus         Upper Trap         Lower

## 2023-04-20 ENCOUNTER — HOSPITAL ENCOUNTER (OUTPATIENT)
Dept: PHYSICAL THERAPY | Age: 73
Setting detail: THERAPIES SERIES
Discharge: HOME | End: 2023-04-20
Payer: MEDICARE

## 2023-04-20 PROCEDURE — 97140 MANUAL THERAPY 1/> REGIONS: CPT | Performed by: PHYSICAL THERAPIST

## 2023-04-20 PROCEDURE — 97110 THERAPEUTIC EXERCISES: CPT | Performed by: PHYSICAL THERAPIST

## 2023-04-20 NOTE — FLOWSHEET NOTE
[address],  [address]  Phone: [phone]   Fax:     [phone]       Physical Therapy Daily Treatment Note  Date:  2023    Patient Name:  Gabby Gotti    :  1950  MRN: [id number]  Restrictions/Precautions:    Medical/Treatment Diagnosis Information:  Diagnosis: L truamatic rotator cuff tear M75.122  Treatment Diagnosis: L shoulder painM25. 512  DOS 2022  L shoulder RC augmentation labral debridement SAD DCE capsular release LUIS      Insurance/Certification information:  PT Insurance Information: [facility] BMN  Physician Information:   Dr Stacy Ojeda  Has the plan of care been signed (Y/N):        [x]  Yes  []  No     Date of Patient follow up with Physician: per md      Is this a Progress Report:     []  Yes  [x]  No        If Yes:  Date Range for reporting period:  Beginning3/7  Ending    Progress report will be due (10 Rx or 30 days whichever is less):        Recertification will be due (POC Duration  / 90 days whichever is less):         Visit # Insurance Allowable Auth Required   26 in         36 total post surgery   BMN []  Yes []  No        Functional Scale:  Foto38/100 ( [facility] DASH 68.3)38/100 54/100,55/100 55/100 Quick dash 27    Date assessed:  ,10/6 11,,     Latex Allergy:  [x]NO      []YES  Preferred Language for Healthcare:   [x]English       []other:    Pain level:  0-3/10 at rest     SUBJECTIVE:  no new c/os    OBJECTIVE: See eval   ROM PROM / AROM /  Comment     L R L R     Flexion ~160   ~135 stand     155 supine        scap ~160    ~125 stand       ER Jamin@[employer]    85@80        IR             Other  (cervical)             Other                  Strength  4/6 L R Comment   Flexion  4+ limited range       Abduction  4 limited range       ER  5- at side        IR  5- at side       Supraspinatus         Upper Trap         Lower

## 2023-04-25 ENCOUNTER — HOSPITAL ENCOUNTER (OUTPATIENT)
Dept: PHYSICAL THERAPY | Age: 73
Setting detail: THERAPIES SERIES
Discharge: HOME | End: 2023-04-25
Payer: MEDICARE

## 2023-04-25 NOTE — FLOWSHEET NOTE
Physical Therapy  Cancellation/No-show Note  Patient Name:  Wilton Mckeon  :  1950   Date:  2023  Cancelled visits to date: 0  No-shows to date: 0    For today's appointment patient:  [x]  Cancelled  []  Rescheduled appointment  []  No-show     Reason given by patient:  []  Patient ill  []  Conflicting appointment  []  No transportation    [x]  Conflict with work  []  No reason given  []  Other:     Comments:      Electronically signed by:  Les Lowe, PT, PT

## 2023-04-27 ENCOUNTER — HOSPITAL ENCOUNTER (OUTPATIENT)
Dept: PHYSICAL THERAPY | Age: 73
Setting detail: THERAPIES SERIES
Discharge: HOME | End: 2023-04-27
Payer: MEDICARE

## 2023-04-27 PROCEDURE — 97140 MANUAL THERAPY 1/> REGIONS: CPT | Performed by: PHYSICAL THERAPIST

## 2023-04-27 PROCEDURE — 20560 NDL INSJ W/O NJX 1 OR 2 MUSC: CPT | Performed by: PHYSICAL THERAPIST

## 2023-04-27 PROCEDURE — 97110 THERAPEUTIC EXERCISES: CPT | Performed by: PHYSICAL THERAPIST

## 2023-04-27 NOTE — FLOWSHEET NOTE
contraindications, and indications with pt in addition to application of dry needling within established plan of care and expected outcomes; pt verbalized understanding with all questions answered. Pt had signed consent form for dry needling and PT obtained written consent with updated plan of care from MD before beginning. Dry needling manual therapy: consisted on the placement of 1 needle in the following muscles: L deltoid. A 50 mm needle was inserted, piston, rotated, and coned to produce intramuscular mobilization. These techniques were used to restore functional range of motion, reduce muscle spasm and induce healing in the corresponding musculature. (60846)  Clean Technique was utilized today while applying Dry needling treatment. The treatment sites where cleaned with 70% solution of  isopropyl alcohol . The PT washed their hands and utilized treatment gloves along with hand  prior to inserting the needles. All needles where removed and discarded in the appropriate sharps container. 10 min on 4/27/23    Performed by Casey See, PT, DPT                   Therapeutic Exercise and NMR EXR  [x] (83827) Provided verbal/tactile cueing for activities related to strengthening, flexibility, endurance, ROM  for improvements in scapular, scapulothoracic and UE control with self care, reaching, carrying, lifting, house/yardwork, driving/computer work.    [] (67811) Provided verbal/tactile cueing for activities related to improving balance, coordination, kinesthetic sense, posture, motor skill, proprioception  to assist with  scapular, scapulothoracic and UE control with self care, reaching, carrying, lifting, house/yardwork, driving/computer work.     Therapeutic Activities:    [] (51249 or 87052) Provided verbal/tactile cueing for activities related to improving balance, coordination, kinesthetic sense, posture, motor skill, proprioception and motor activation to allow for proper function of

## 2023-05-02 ENCOUNTER — HOSPITAL ENCOUNTER (OUTPATIENT)
Dept: PHYSICAL THERAPY | Age: 73
Setting detail: THERAPIES SERIES
Discharge: HOME | End: 2023-05-02
Payer: MEDICARE

## 2023-05-02 PROCEDURE — 97110 THERAPEUTIC EXERCISES: CPT | Performed by: PHYSICAL THERAPIST

## 2023-05-02 PROCEDURE — 97140 MANUAL THERAPY 1/> REGIONS: CPT | Performed by: PHYSICAL THERAPIST

## 2023-05-02 NOTE — FLOWSHEET NOTE
The [address] [place] [address], [address], [address] [place] [address]  Phone: [phone]   Fax:     [phone]       Physical Therapy Daily Treatment Note  Date:  2023    Patient Name:  Carlo Douglas    :  1950  MRN: [id number]  Restrictions/Precautions:    Medical/Treatment Diagnosis Information:  Diagnosis: L truamatic rotator cuff tear M75.122  Treatment Diagnosis: L shoulder painM25. 512  DOS 2022  L shoulder RC augmentation labral debridement SAD DCE capsular release LUIS      Insurance/Certification information:  PT Insurance Information: [facility] BMN  Physician Information:   Dr Erika Dnenis  Has the plan of care been signed (Y/N):        [x]  Yes  []  No     Date of Patient follow up with Physician: per md      Is this a Progress Report:     []  Yes  [x]  No        If Yes:  Date Range for reporting period:  Beginning3/7  Ending    Progress report will be due (10 Rx or 30 days whichever is less):        Recertification will be due (POC Duration  / 90 days whichever is less):         Visit # Insurance Allowable Auth Required   28 in   PN NPV        36 total post surgery   BMN []  Yes []  No        Functional Scale: Foto38/100 ( [facility] DASH 68.3)38/100 54/100,55/100 55/100 Quick dash 27    Date assessed:  ,10/6 11/8,,     Latex Allergy:  [x]NO      []YES  Preferred Language for Healthcare:   [x]English       []other:    Pain level:  0-3/10 at rest     SUBJECTIVE: 5/2 DN may have helped some.      OBJECTIVE: See eval   ROM PROM / AROM /  Comment     L R L R     Flexion ~160   ~135 stand     155 supine        scap ~160    ~125 stand       ER Evenus@[employer]    85@80        IR             Other  (cervical)             Other                  Strength  4/6 L R Comment   Flexion  4+ limited range       Abduction  4 limited range       ER  5- at side        IR  5- at side       Supraspinatus         Upper

## 2023-05-04 ENCOUNTER — HOSPITAL ENCOUNTER (OUTPATIENT)
Dept: PHYSICAL THERAPY | Age: 73
Setting detail: THERAPIES SERIES
Discharge: HOME | End: 2023-05-04
Payer: MEDICARE

## 2023-05-04 PROCEDURE — 97110 THERAPEUTIC EXERCISES: CPT | Performed by: PHYSICAL THERAPIST

## 2023-05-04 PROCEDURE — 97140 MANUAL THERAPY 1/> REGIONS: CPT | Performed by: PHYSICAL THERAPIST

## 2023-05-04 NOTE — FLOWSHEET NOTE
[address], [address] [place] [address], [address] [place] [address]  Phone: [phone]   Fax:     [phone]       Physical Therapy Daily Treatment Note  Date:  2023    Patient Name:  Alf Caputo    :  1950  MRN: [id number]  Restrictions/Precautions:    Medical/Treatment Diagnosis Information:  Diagnosis: L truamatic rotator cuff tear M75.122  Treatment Diagnosis: L shoulder painM25. 512  DOS 2022  L shoulder RC augmentation labral debridement SAD DCE capsular release LUIS      Insurance/Certification information:  PT Insurance Information: [facility] BMN  Physician Information:   Dr Sander Alejandro  Has the plan of care been signed (Y/N):        [x]  Yes  []  No     Date of Patient follow up with Physician: per md      Is this a Progress Report:     []  Yes  [x]  No        If Yes:  Date Range for reporting period:  Beginning3/7  Ending    Progress report will be due (10 Rx or 30 days whichever is less):        Recertification will be due (POC Duration  / 90 days whichever is less):         Visit # Insurance Allowable Auth Required   29 in   PN NPV        36 total post surgery   BMN []  Yes []  No        Functional Scale:  Foto38/100 ( [facility] DASH 68.3)38/100 54/100,55/100 55/100 Quick dash 27    Date assessed:  ,10/6 11/8,,     Latex Allergy:  [x]NO      []YES  Preferred Language for Healthcare:   [x]English       []other:    Pain level:  0-3/10 at rest     SUBJECTIVE: 5/4doing ok     OBJECTIVE: See eval   ROM PROM /6 AROM 4/6  Comment     L R L R     Flexion ~160   ~135 stand     155 supine        scap ~160    ~125 stand       ER Jack@[employer]    85@80        IR             Other  (cervical)             Other                  Strength  4/6 L R Comment   Flexion  4+ limited range       Abduction  4 limited range       ER  5- at side        IR  5- at side       Supraspinatus         Upper Trap

## 2023-05-09 ENCOUNTER — HOSPITAL ENCOUNTER (OUTPATIENT)
Dept: PHYSICAL THERAPY | Age: 73
Setting detail: THERAPIES SERIES
Discharge: HOME | End: 2023-05-09
Payer: MEDICARE

## 2023-05-09 PROCEDURE — 97110 THERAPEUTIC EXERCISES: CPT | Performed by: PHYSICAL THERAPIST

## 2023-05-09 PROCEDURE — 97140 MANUAL THERAPY 1/> REGIONS: CPT | Performed by: PHYSICAL THERAPIST

## 2023-05-09 NOTE — FLOWSHEET NOTE
recommended ice 15-20 min on several times per day to help with pain and inflammation     Charges:  Timed Code Treatment Minutes: 45   Total Treatment Minutes: 230-330       [] EVAL (LOW) 54846 (typically 20 minutes face-to-face)  [] EVAL (MOD) 33971 (typically 30 minutes face-to-face)  [] EVAL (HIGH) 51616 (typically 45 minutes face-to-face)  [] RE-EVAL     [x] WL(55116) x  2  [] IONTO  [] NMR (31230) x     [] VASO  [x] Manual (55452) x  1    [x] Other: Dry needling   [] TA x      [] Mech Traction (95301)  [] ES(attended) (86113)      [] ES (un) (70410):     GOALS:  Patient stated goal: increase ROM decrease pain, use arm for adls    [] Progressing: [] Met: [] Not Met: [] Adjusted     Therapist goals for Patient:   Short Term Goals: To be achieved in: 2 -4weeks  1. Independent in HEP and progression per patient tolerance, in order to prevent re-injury. [] Progressing: [x] Met: [] Not Met: [] Adjusted   2. Patient will have a decrease in pain to facilitate improvement in movement, function, and ADLs as indicated by Functional Deficits. [] Progressing: [x] Met: [] Not Met: [] Adjusted     Long Term Goals: To be achieved in: 12 weeks  1. Foto 67/100/DASH  to 75% improvement  to assist with reaching prior level of function. [x] Progressing: [] Met: [] Not Met: [] Adjusted  2. Patient will demonstrate increased AROM to = R to allow for proper joint functioning as indicated by patients Functional Deficits. [x] Progressing: [] Met: [] Not Met: [] Adjusted  3. Patient will demonstrate an increase in strength to good scapular and core control  for UE to allow for proper functional mobility as indicated by patients Functional Deficits. [x] Progressing: [] Met: [] Not Met: [] Adjusted  4. Patient will return to  functional activities reach over head, reach behind back, don doff jacket  without increased symptoms or restriction.    [x] Progressing: [] Met: [] Not Met: [] Adjusted  Overall Progression Towards

## 2023-05-11 ENCOUNTER — HOSPITAL ENCOUNTER (OUTPATIENT)
Dept: PHYSICAL THERAPY | Age: 73
Setting detail: THERAPIES SERIES
Discharge: HOME | End: 2023-05-11
Payer: MEDICARE

## 2023-05-11 PROCEDURE — 97110 THERAPEUTIC EXERCISES: CPT | Performed by: PHYSICAL THERAPIST

## 2023-05-11 PROCEDURE — 97140 MANUAL THERAPY 1/> REGIONS: CPT | Performed by: PHYSICAL THERAPIST

## 2023-05-11 PROCEDURE — 20560 NDL INSJ W/O NJX 1 OR 2 MUSC: CPT | Performed by: PHYSICAL THERAPIST

## 2023-05-11 NOTE — FLOWSHEET NOTE
[address], [address] [place] [address], [address] [place] [address]  Phone: [phone]   Fax:     [phone]       Physical Therapy Daily Treatment Note  Date:  2023    Patient Name:  Gabby Gotti    :  1950  MRN: [id number]  Restrictions/Precautions:    Medical/Treatment Diagnosis Information:  Diagnosis: L truamatic rotator cuff tear M75.122  Treatment Diagnosis: L shoulder painM25. 512  DOS 2022  L shoulder RC augmentation labral debridement SAD DCE capsular release LUIS      Insurance/Certification information:  PT Insurance Information: [facility] BMN  Physician Information:   Dr Stacy Ojeda  Has the plan of care been signed (Y/N):        [x]  Yes  []  No     Date of Patient follow up with Physician: per md      Is this a Progress Report:     []  Yes  [x]  No        If Yes:  Date Range for reporting period:  Beginning3/7  Ending    Progress report will be due (10 Rx or 30 days whichever is less):        Recertification will be due (POC Duration  / 90 days whichever is less):         Visit # Insurance Allowable Auth Required   31 in   PN NPV        36 total post surgery   BMN []  Yes []  No        Functional Scale:  Foto38/100 ( [facility] DASH 68.3)38/100 54/100,55/100 55/100 Quick dash 27    Date assessed:  ,10/6 11/8,,     Latex Allergy:  [x]NO      []YES  Preferred Language for Healthcare:   [x]English       []other:    Pain level:  0-3/10 at rest     SUBJECTIVE: doing ok     OBJECTIVE: See eval   ROM PROM / AROM /  Comment     L R L R     Flexion ~160   ~135 stand     155 supine        scap ~160    ~125 stand       ER Jamin@[employer]    85@80        IR             Other  (cervical)             Other                  Strength  4/6 L R Comment   Flexion  4+ limited range       Abduction  4 limited range       ER  5- at side        IR  5- at side       Supraspinatus         Upper Trap

## 2023-05-16 ENCOUNTER — HOSPITAL ENCOUNTER (OUTPATIENT)
Dept: PHYSICAL THERAPY | Age: 73
Setting detail: THERAPIES SERIES
Discharge: HOME | End: 2023-05-16
Payer: MEDICARE

## 2023-05-16 NOTE — FLOWSHEET NOTE
Physical Therapy  Cancellation/No-show Note  Patient Name:  Leeanne Gary  :  1950   Date:  2023  Cancelled visits to date: 0  No-shows to date: 0    For today's appointment patient:  [x]  Cancelled  []  Rescheduled appointment  []  No-show     Reason given by patient:  []  Patient ill  []  Conflicting appointment  []  No transportation    [x]  Conflict with work  []  No reason given  []  Other:     Comments:      Electronically signed by:  Ben Marks PT, PT

## 2023-05-18 ENCOUNTER — HOSPITAL ENCOUNTER (OUTPATIENT)
Dept: PHYSICAL THERAPY | Age: 73
Setting detail: THERAPIES SERIES
Discharge: HOME | End: 2023-05-18
Payer: MEDICARE

## 2023-05-18 PROCEDURE — 20560 NDL INSJ W/O NJX 1 OR 2 MUSC: CPT | Performed by: PHYSICAL THERAPIST

## 2023-05-18 PROCEDURE — 97140 MANUAL THERAPY 1/> REGIONS: CPT | Performed by: PHYSICAL THERAPIST

## 2023-05-18 PROCEDURE — 97110 THERAPEUTIC EXERCISES: CPT | Performed by: PHYSICAL THERAPIST

## 2023-05-18 RX ORDER — ROSUVASTATIN CALCIUM 40 MG/1
40 TABLET, COATED ORAL DAILY
Qty: 90 TABLET | Refills: 0 | Status: SHIPPED | OUTPATIENT
Start: 2023-05-18

## 2023-05-18 NOTE — FLOWSHEET NOTE
scapular, scapulothoracic and UE control with self care, carrying, lifting, driving/computer work. Home Exercise Program:    [x] (27035) Reviewed/Progressed HEP activities related to strengthening, flexibility, endurance, ROM of scapular, scapulothoracic and UE control with self care, reaching, carrying, lifting, house/yardwork, driving/computer work  [] (90622) Reviewed/Progressed HEP activities related to improving balance, coordination, kinesthetic sense, posture, motor skill, proprioception of scapular, scapulothoracic and UE control with self care, reaching, carrying, lifting, house/yardwork, driving/computer work      Manual Treatments:  PROM / STM / Oscillations-Mobs:  G-I, II, III, IV (PA's, Inf., Post.)  [x] (06900) Provided manual therapy to mobilize soft tissue/joints of cervical/CT, scapular GHJ and UE for the purpose of modulating pain, promoting relaxation,  increasing ROM, reducing/eliminating soft tissue swelling/inflammation/restriction, improving soft tissue extensibility and allowing for proper ROM for normal function with self care, reaching, carrying, lifting, house/yardwork, driving/computer work    Modalities:  declined ice but recommended ice 15-20 min on several times per day to help with pain and inflammation     Charges:  Timed Code Treatment Minutes: 45   Total Treatment Minutes: 230-335       [] EVAL (LOW) 22324 (typically 20 minutes face-to-face)  [] EVAL (MOD) 35508 (typically 30 minutes face-to-face)  [] EVAL (HIGH) 32478 (typically 45 minutes face-to-face)  [] RE-EVAL     [x] JS(88626) x  2  [] IONTO  [] NMR (86729) x     [] VASO  [x] Manual (81638) x  1    [x] Other: Dry needling   [] TA x      [] Mech Traction (27463)  [] ES(attended) (38374)      [] ES (un) (18443):     GOALS:  Patient stated goal: increase ROM decrease pain, use arm for adls    [] Progressing: [] Met: [] Not Met: [] Adjusted     Therapist goals for Patient:   Short Term Goals: To be achieved in: 2 -4weeks  1.

## 2023-05-18 NOTE — TELEPHONE ENCOUNTER
Medication:   Requested Prescriptions     Pending Prescriptions Disp Refills    rosuvastatin (CRESTOR) 40 MG tablet [Pharmacy Med Name: ROSUVASTATIN 40MG TABLETS] 90 tablet 0     Sig: TAKE 1 TABLET BY MOUTH DAILY     Last Filled:  11.22.22    Last appt: 10/7/2022   Next appt: Visit date not found    Last Lipid:   Lab Results   Component Value Date/Time    CHOL 151 04/13/2023 07:42 AM    TRIG 136 04/13/2023 07:42 AM    HDL 45 04/13/2023 07:42 AM    HDL 38 05/22/2012 08:41 AM    LDLCALC 79 04/13/2023 07:42 AM

## 2023-05-22 DIAGNOSIS — I25.10 CORONARY ARTERY DISEASE INVOLVING NATIVE CORONARY ARTERY OF NATIVE HEART WITHOUT ANGINA PECTORIS: ICD-10-CM

## 2023-05-22 RX ORDER — EMPAGLIFLOZIN, METFORMIN HYDROCHLORIDE 12.5; 1 MG/1; MG/1
TABLET, EXTENDED RELEASE ORAL
Qty: 60 TABLET | Refills: 1 | Status: SHIPPED | OUTPATIENT
Start: 2023-05-22 | End: 2023-07-21

## 2023-05-23 ENCOUNTER — HOSPITAL ENCOUNTER (OUTPATIENT)
Dept: PHYSICAL THERAPY | Age: 73
Setting detail: THERAPIES SERIES
Discharge: HOME | End: 2023-05-23
Payer: MEDICARE

## 2023-05-25 ENCOUNTER — HOSPITAL ENCOUNTER (OUTPATIENT)
Dept: PHYSICAL THERAPY | Age: 73
Setting detail: THERAPIES SERIES
Discharge: HOME | End: 2023-05-25
Payer: MEDICARE

## 2023-05-25 PROCEDURE — 97140 MANUAL THERAPY 1/> REGIONS: CPT | Performed by: PHYSICAL THERAPIST

## 2023-05-25 PROCEDURE — 20560 NDL INSJ W/O NJX 1 OR 2 MUSC: CPT | Performed by: PHYSICAL THERAPIST

## 2023-05-25 PROCEDURE — 97110 THERAPEUTIC EXERCISES: CPT | Performed by: PHYSICAL THERAPIST

## 2023-05-25 RX ORDER — ROSUVASTATIN CALCIUM 40 MG/1
40 TABLET, COATED ORAL DAILY
Qty: 90 TABLET | Refills: 0 | OUTPATIENT
Start: 2023-05-25

## 2023-05-25 NOTE — FLOWSHEET NOTE
self care, carrying, lifting, driving/computer work. Home Exercise Program:    [x] (63416) Reviewed/Progressed HEP activities related to strengthening, flexibility, endurance, ROM of scapular, scapulothoracic and UE control with self care, reaching, carrying, lifting, house/yardwork, driving/computer work  [] (29669) Reviewed/Progressed HEP activities related to improving balance, coordination, kinesthetic sense, posture, motor skill, proprioception of scapular, scapulothoracic and UE control with self care, reaching, carrying, lifting, house/yardwork, driving/computer work      Manual Treatments:  PROM / STM / Oscillations-Mobs:  G-I, II, III, IV (PA's, Inf., Post.)  [x] (30975) Provided manual therapy to mobilize soft tissue/joints of cervical/CT, scapular GHJ and UE for the purpose of modulating pain, promoting relaxation,  increasing ROM, reducing/eliminating soft tissue swelling/inflammation/restriction, improving soft tissue extensibility and allowing for proper ROM for normal function with self care, reaching, carrying, lifting, house/yardwork, driving/computer work    Modalities:  declined ice but recommended ice 15-20 min on several times per day to help with pain and inflammation     Charges:  Timed Code Treatment Minutes: 45   Total Treatment Minutes: 230-335       [] EVAL (LOW) 70470 (typically 20 minutes face-to-face)  [] EVAL (MOD) 94010 (typically 30 minutes face-to-face)  [] EVAL (HIGH) 31750 (typically 45 minutes face-to-face)  [] RE-EVAL     [x] DB(23914) x  2  [] IONTO  [] NMR (15735) x     [] VASO  [x] Manual (01402) x  1    [x] Other: Dry needling   [] TA x      [] Mech Traction (99036)  [] ES(attended) (81608)      [] ES (un) (58019):     GOALS:  Patient stated goal: increase ROM decrease pain, use arm for adls    [] Progressing: [] Met: [] Not Met: [] Adjusted     Therapist goals for Patient:   Short Term Goals: To be achieved in: 2 -4weeks  1.  Independent in HEP and progression per

## 2023-06-01 ENCOUNTER — HOSPITAL ENCOUNTER (OUTPATIENT)
Dept: PHYSICAL THERAPY | Age: 73
Setting detail: THERAPIES SERIES
Discharge: HOME | End: 2023-06-01
Payer: MEDICARE

## 2023-06-01 PROCEDURE — 97140 MANUAL THERAPY 1/> REGIONS: CPT | Performed by: PHYSICAL THERAPIST

## 2023-06-01 PROCEDURE — 97110 THERAPEUTIC EXERCISES: CPT | Performed by: PHYSICAL THERAPIST

## 2023-06-01 PROCEDURE — 97112 NEUROMUSCULAR REEDUCATION: CPT | Performed by: PHYSICAL THERAPIST

## 2023-06-01 NOTE — FLOWSHEET NOTE
EXR  [x] (75258) Provided verbal/tactile cueing for activities related to strengthening, flexibility, endurance, ROM  for improvements in scapular, scapulothoracic and UE control with self care, reaching, carrying, lifting, house/yardwork, driving/computer work.    [] (44556) Provided verbal/tactile cueing for activities related to improving balance, coordination, kinesthetic sense, posture, motor skill, proprioception  to assist with  scapular, scapulothoracic and UE control with self care, reaching, carrying, lifting, house/yardwork, driving/computer work. Therapeutic Activities:    [] (34930 or 09941) Provided verbal/tactile cueing for activities related to improving balance, coordination, kinesthetic sense, posture, motor skill, proprioception and motor activation to allow for proper function of scapular, scapulothoracic and UE control with self care, carrying, lifting, driving/computer work.      Home Exercise Program:    [x] (89975) Reviewed/Progressed HEP activities related to strengthening, flexibility, endurance, ROM of scapular, scapulothoracic and UE control with self care, reaching, carrying, lifting, house/yardwork, driving/computer work  [] (05968) Reviewed/Progressed HEP activities related to improving balance, coordination, kinesthetic sense, posture, motor skill, proprioception of scapular, scapulothoracic and UE control with self care, reaching, carrying, lifting, house/yardwork, driving/computer work      Manual Treatments:  PROM / STM / Oscillations-Mobs:  G-I, II, III, IV (PA's, Inf., Post.)  [x] (66388) Provided manual therapy to mobilize soft tissue/joints of cervical/CT, scapular GHJ and UE for the purpose of modulating pain, promoting relaxation,  increasing ROM, reducing/eliminating soft tissue swelling/inflammation/restriction, improving soft tissue extensibility and allowing for proper ROM for normal function with self care, reaching, carrying, lifting, house/yardwork, driving/computer

## 2023-06-06 ENCOUNTER — APPOINTMENT (OUTPATIENT)
Dept: PHYSICAL THERAPY | Age: 73
End: 2023-06-06
Payer: MEDICARE

## 2023-06-08 ENCOUNTER — HOSPITAL ENCOUNTER (OUTPATIENT)
Dept: PHYSICAL THERAPY | Age: 73
Setting detail: THERAPIES SERIES
Discharge: HOME | End: 2023-06-08
Payer: MEDICARE

## 2023-06-08 NOTE — FLOWSHEET NOTE
Physical Therapy  Cancellation/No-show Note  Patient Name:  Leeanne Gary  :  1950   Date:  2023  Cancelled visits to date: 0  No-shows to date: 0    For today's appointment patient:  [x]  Cancelled  []  Rescheduled appointment  []  No-show     Reason given by patient:  []  Patient ill  []  Conflicting appointment  []  No transportation    []  Conflict with work  []  No reason given  [x]  Other: out of town    Comments:      Electronically signed by:  Ben Marks PT, PT

## 2023-06-15 ENCOUNTER — APPOINTMENT (OUTPATIENT)
Dept: PHYSICAL THERAPY | Age: 73
End: 2023-06-15
Payer: MEDICARE

## 2023-06-20 ENCOUNTER — HOSPITAL ENCOUNTER (OUTPATIENT)
Dept: PHYSICAL THERAPY | Age: 73
Setting detail: THERAPIES SERIES
Discharge: HOME | End: 2023-06-20
Payer: MEDICARE

## 2023-06-20 PROCEDURE — 97110 THERAPEUTIC EXERCISES: CPT | Performed by: PHYSICAL THERAPIST

## 2023-06-20 PROCEDURE — 97140 MANUAL THERAPY 1/> REGIONS: CPT | Performed by: PHYSICAL THERAPIST

## 2023-06-20 NOTE — FLOWSHEET NOTE
Plan of care updated 6/13 [] Hold pending MD visit [] Discharge      Electronically signed by:  Bishnu Hayes, PT    Note: If patient does not return for scheduled/ recommended follow up visits, this note will serve as a discharge from care along with most recent update on progress.

## 2023-06-22 ENCOUNTER — HOSPITAL ENCOUNTER (OUTPATIENT)
Dept: PHYSICAL THERAPY | Age: 73
Setting detail: THERAPIES SERIES
Discharge: HOME | End: 2023-06-22
Payer: MEDICARE

## 2023-06-22 PROCEDURE — 97110 THERAPEUTIC EXERCISES: CPT | Performed by: PHYSICAL THERAPIST

## 2023-06-22 PROCEDURE — 97140 MANUAL THERAPY 1/> REGIONS: CPT | Performed by: PHYSICAL THERAPIST

## 2023-06-22 NOTE — FLOWSHEET NOTE
The [address] [place] [address] 200, [address], [address] [place] [address]  Phone: [phone]   Fax:     [phone]             Physical Therapy Daily Treatment Note  Date:  2023    Patient Name:  Dax Zaragoza    :  1950  MRN: [id number]  Restrictions/Precautions:    Medical/Treatment Diagnosis Information:  Diagnosis: L truamatic rotator cuff tear M75.122  Treatment Diagnosis: L shoulder painM25. 512  DOS 2022  L shoulder RC augmentation labral debridement SAD DCE capsular release LUIS      Insurance/Certification information:  PT Insurance Information: [facility] BMN  Physician Information:   Dr Ayana Merlos  Has the plan of care been signed (Y/N):        [x]  Yes  []  No     Date of Patient follow up with Physician: per md      Is this a Progress Report:     []  Yes  [x]  No        If Yes:  Date Range for reporting period:  Beginning  Ending    Progress report will be due (10 Rx or 30 days whichever is less):       Recertification will be due (POC Duration  / 90 days whichever is less):         Visit # Insurance Allowable Auth Required   37 in           36 total post surgery   BMN []  Yes []  No        Functional Scale: Foto38/100 ( [facility] DASH 68.3)38/100 54/100,55/100 55/100 Quick dash 27    Date assessed:  ,10/6 11, 2/,4/4     Latex Allergy:  [x]NO      []YES  Preferred Language for Healthcare:   [x]English       []other:    Pain level:  0-3/10 at rest     SUBJECTIVE:   saw MD.  He gave me cortisone shot with something else.       OBJECTIVE: See eval   ROM PROM  AROM   Comment     L R L R     Flexion ~165   ~138 stand     ~158 supine        scap ~165    ~130 stand       ER Prestoniter@[employer]    85@80        IR             Other  (cervical)             Other                  Strength   L R Comment   Flexion  4+ limited range       Abduction  4+ limited range       ER  5- at side        IR  5-

## 2023-06-23 RX ORDER — TAMSULOSIN HYDROCHLORIDE 0.4 MG/1
CAPSULE ORAL
Qty: 180 CAPSULE | Refills: 0 | Status: SHIPPED | OUTPATIENT
Start: 2023-06-23

## 2023-06-23 NOTE — TELEPHONE ENCOUNTER
Medication:   Requested Prescriptions     Pending Prescriptions Disp Refills    tamsulosin (FLOMAX) 0.4 MG capsule [Pharmacy Med Name: TAMSULOSIN 0.4MG CAPSULES] 180 capsule 2     Sig: TAKE 2 CAPSULES BY MOUTH EVERY DAY     Last Filled:  12/21/2022    Last appt: 10/7/2022   Next appt: Visit date not found    Last OARRS: No flowsheet data found.

## 2023-06-27 ENCOUNTER — HOSPITAL ENCOUNTER (OUTPATIENT)
Dept: PHYSICAL THERAPY | Age: 73
Setting detail: THERAPIES SERIES
Discharge: HOME | End: 2023-06-27
Payer: MEDICARE

## 2023-06-29 ENCOUNTER — HOSPITAL ENCOUNTER (OUTPATIENT)
Dept: PHYSICAL THERAPY | Age: 73
Setting detail: THERAPIES SERIES
Discharge: HOME | End: 2023-06-29
Payer: MEDICARE

## 2023-06-29 PROCEDURE — 97110 THERAPEUTIC EXERCISES: CPT | Performed by: PHYSICAL THERAPIST

## 2023-06-29 PROCEDURE — 97140 MANUAL THERAPY 1/> REGIONS: CPT | Performed by: PHYSICAL THERAPIST

## 2023-07-03 ENCOUNTER — TELEPHONE (OUTPATIENT)
Dept: ENDOCRINOLOGY | Age: 73
End: 2023-07-03

## 2023-07-08 DIAGNOSIS — I10 ESSENTIAL HYPERTENSION: ICD-10-CM

## 2023-07-08 DIAGNOSIS — E11.9 TYPE 2 DIABETES MELLITUS WITHOUT COMPLICATION, WITHOUT LONG-TERM CURRENT USE OF INSULIN (HCC): ICD-10-CM

## 2023-07-08 LAB
ALBUMIN SERPL-MCNC: 4.4 G/DL (ref 3.4–5)
ALBUMIN/GLOB SERPL: 1.8 {RATIO} (ref 1.1–2.2)
ALP SERPL-CCNC: 65 U/L (ref 40–129)
ALT SERPL-CCNC: 27 U/L (ref 10–40)
ANION GAP SERPL CALCULATED.3IONS-SCNC: 9 MMOL/L (ref 3–16)
AST SERPL-CCNC: 21 U/L (ref 15–37)
BILIRUB SERPL-MCNC: 0.4 MG/DL (ref 0–1)
BUN SERPL-MCNC: 13 MG/DL (ref 7–20)
CALCIUM SERPL-MCNC: 9.5 MG/DL (ref 8.3–10.6)
CHLORIDE SERPL-SCNC: 104 MMOL/L (ref 99–110)
CHOLEST SERPL-MCNC: 136 MG/DL (ref 0–199)
CO2 SERPL-SCNC: 28 MMOL/L (ref 21–32)
CREAT SERPL-MCNC: 0.9 MG/DL (ref 0.8–1.3)
CREAT UR-MCNC: 110.8 MG/DL (ref 39–259)
GFR SERPLBLD CREATININE-BSD FMLA CKD-EPI: >60 ML/MIN/{1.73_M2}
GLUCOSE SERPL-MCNC: 125 MG/DL (ref 70–99)
HDLC SERPL-MCNC: 46 MG/DL (ref 40–60)
LDLC SERPL CALC-MCNC: 74 MG/DL
MICROALBUMIN UR DL<=1MG/L-MCNC: 2.7 MG/DL
MICROALBUMIN/CREAT UR: 24.4 MG/G (ref 0–30)
POTASSIUM SERPL-SCNC: 4.2 MMOL/L (ref 3.5–5.1)
PROT SERPL-MCNC: 6.8 G/DL (ref 6.4–8.2)
SODIUM SERPL-SCNC: 141 MMOL/L (ref 136–145)
TRIGL SERPL-MCNC: 82 MG/DL (ref 0–150)
TSH SERPL DL<=0.005 MIU/L-ACNC: 3.08 UIU/ML (ref 0.27–4.2)
VLDLC SERPL CALC-MCNC: 16 MG/DL

## 2023-07-09 LAB
EST. AVERAGE GLUCOSE BLD GHB EST-MCNC: 168.6 MG/DL
HBA1C MFR BLD: 7.5 %

## 2023-07-11 ENCOUNTER — APPOINTMENT (OUTPATIENT)
Dept: PHYSICAL THERAPY | Age: 73
End: 2023-07-11
Payer: MEDICARE

## 2023-07-12 LAB
CATARACTS: POSITIVE
DIABETIC RETINOPATHY: NEGATIVE
GLAUCOMA: NEGATIVE
INTRAOCULAR PRESSURE EYE: 12
INTRAOCULAR PRESSURE EYE: 12
VISUAL ACUITY DISTANCE LEFT EYE: NORMAL
VISUAL ACUITY DISTANCE RIGHT EYE: NORMAL

## 2023-07-13 ENCOUNTER — HOSPITAL ENCOUNTER (OUTPATIENT)
Dept: PHYSICAL THERAPY | Age: 73
Setting detail: THERAPIES SERIES
Discharge: HOME | End: 2023-07-13

## 2023-07-13 NOTE — FLOWSHEET NOTE
Physical Therapy  Cancellation/No-show Note  Patient Name:  Joey Ball  :  1950   Date:  2023  Cancelled visits to date: 0  No-shows to date: 0    For today's appointment patient:  [x]  Cancelled  []  Rescheduled appointment  []  No-show     Reason given by patient:  []  Patient ill  []  Conflicting appointment  []  No transportation    []  Conflict with work  []  No reason given  []  Other:     Comments:      Electronically signed by:  Ana Maria Wylie PT, PT

## 2023-07-17 ENCOUNTER — OFFICE VISIT (OUTPATIENT)
Dept: ENDOCRINOLOGY | Age: 73
End: 2023-07-17
Payer: MEDICARE

## 2023-07-17 VITALS
HEIGHT: 75 IN | RESPIRATION RATE: 14 BRPM | SYSTOLIC BLOOD PRESSURE: 134 MMHG | WEIGHT: 231 LBS | BODY MASS INDEX: 28.72 KG/M2 | TEMPERATURE: 98 F | HEART RATE: 67 BPM | DIASTOLIC BLOOD PRESSURE: 73 MMHG

## 2023-07-17 DIAGNOSIS — I10 ESSENTIAL HYPERTENSION: ICD-10-CM

## 2023-07-17 DIAGNOSIS — I25.10 CORONARY ARTERY DISEASE INVOLVING NATIVE CORONARY ARTERY OF NATIVE HEART WITHOUT ANGINA PECTORIS: ICD-10-CM

## 2023-07-17 DIAGNOSIS — I25.10 ATHEROSCLEROSIS OF NATIVE CORONARY ARTERY OF NATIVE HEART WITHOUT ANGINA PECTORIS: ICD-10-CM

## 2023-07-17 DIAGNOSIS — E11.9 TYPE 2 DIABETES MELLITUS WITHOUT COMPLICATION, WITHOUT LONG-TERM CURRENT USE OF INSULIN (HCC): Primary | ICD-10-CM

## 2023-07-17 PROCEDURE — 99214 OFFICE O/P EST MOD 30 MIN: CPT | Performed by: INTERNAL MEDICINE

## 2023-07-17 PROCEDURE — 3051F HG A1C>EQUAL 7.0%<8.0%: CPT | Performed by: INTERNAL MEDICINE

## 2023-07-17 PROCEDURE — 1123F ACP DISCUSS/DSCN MKR DOCD: CPT | Performed by: INTERNAL MEDICINE

## 2023-07-17 PROCEDURE — 3078F DIAST BP <80 MM HG: CPT | Performed by: INTERNAL MEDICINE

## 2023-07-17 PROCEDURE — 3075F SYST BP GE 130 - 139MM HG: CPT | Performed by: INTERNAL MEDICINE

## 2023-07-17 RX ORDER — BLOOD-GLUCOSE,RECEIVER,CONT
EACH MISCELLANEOUS
Qty: 1 EACH | Refills: 0 | Status: SHIPPED | OUTPATIENT
Start: 2023-07-17

## 2023-07-17 NOTE — PROGRESS NOTES
glucose range is generally >200 mg/dl. An ACE inhibitor/angiotensin II receptor blocker is being taken. He sees a podiatrist.Eye exam is current.      Denies any unexplained hypoglycemia, but had one fasting glucose which was less than 80 and he does not recall taking the short acting insulin at bedtime usually he takes short acting insulin only prior to meals    Past Medical History:   Diagnosis Date    CAD (coronary artery disease)     Chronic back pain     lumbar disk disease    Colorectal polyps     Diabetic ulcer of toe of left foot associated with type 2 diabetes mellitus, limited to breakdown of skin ([address]) 5/20/2019    Erectile dysfunction     Hyperlipidemia     Hypertension     Hypertrophy of prostate without urinary obstruction and other lower urinary tract symptoms (LUTS)     Penetrating foot wound 2019    L foot     Retrograde ejaculation     Sciatica     Type II or unspecified type diabetes mellitus without mention of complication, not stated as uncontrolled       Patient Active Problem List   Diagnosis    Type 2 diabetes mellitus without complication, without long-term current use of insulin ([address])    Pure hypercholesterolemia    Essential hypertension    Coronary atherosclerosis of native coronary artery    Fungal dermatitis    Hx of smoking    Colon polyps    Left hip pain    Carotid stenosis, symptomatic w/o infarct, right    Falls    Cerebrovascular disease    Ulnar neuropathy of left upper extremity    Anterior dislocation of left shoulder    Numbness and tingling in left arm    Left shoulder pain    Traumatic closed displaced fracture of left shoulder with anterior dislocation    Benign prostatic hyperplasia with urinary hesitancy     Past Surgical History:   Procedure Laterality Date    CAROTID ENDARTERECTOMY Right 12/31/2018    RIGHT CAROTID ENDARTERECTOMY WITH INTRA OP DUPLEX SCAN performed by Luis Leyva MD at [address]      x2    CYST REMOVAL      from back and finger

## 2023-07-18 ENCOUNTER — APPOINTMENT (OUTPATIENT)
Dept: PHYSICAL THERAPY | Age: 73
End: 2023-07-18
Payer: MEDICARE

## 2023-07-20 ENCOUNTER — HOSPITAL ENCOUNTER (OUTPATIENT)
Dept: PHYSICAL THERAPY | Age: 73
Setting detail: THERAPIES SERIES
Discharge: HOME | End: 2023-07-20
Payer: MEDICARE

## 2023-07-20 PROCEDURE — 97140 MANUAL THERAPY 1/> REGIONS: CPT | Performed by: PHYSICAL THERAPIST

## 2023-07-20 PROCEDURE — 97110 THERAPEUTIC EXERCISES: CPT | Performed by: PHYSICAL THERAPIST

## 2023-07-20 NOTE — FLOWSHEET NOTE
care [] Alter current plan (see comments above)  [] Plan of care updated 6/13 [] Hold pending MD visit [] Discharge      Electronically signed by:  Ana Maria Wylie PT    Note: If patient does not return for scheduled/ recommended follow up visits, this note will serve as a discharge from care along with most recent update on progress.

## 2023-07-21 DIAGNOSIS — I25.10 CORONARY ARTERY DISEASE INVOLVING NATIVE CORONARY ARTERY OF NATIVE HEART WITHOUT ANGINA PECTORIS: ICD-10-CM

## 2023-07-21 RX ORDER — EMPAGLIFLOZIN, METFORMIN HYDROCHLORIDE 12.5; 1 MG/1; MG/1
TABLET, EXTENDED RELEASE ORAL
Qty: 60 TABLET | Refills: 5 | Status: SHIPPED | OUTPATIENT
Start: 2023-07-21

## 2023-07-25 ENCOUNTER — APPOINTMENT (OUTPATIENT)
Dept: PHYSICAL THERAPY | Age: 73
End: 2023-07-25
Payer: MEDICARE

## 2023-07-27 ENCOUNTER — HOSPITAL ENCOUNTER (OUTPATIENT)
Dept: PHYSICAL THERAPY | Age: 73
Setting detail: THERAPIES SERIES
Discharge: HOME | End: 2023-07-27
Payer: MEDICARE

## 2023-07-27 PROCEDURE — 97140 MANUAL THERAPY 1/> REGIONS: CPT | Performed by: PHYSICAL THERAPIST

## 2023-07-27 PROCEDURE — 97110 THERAPEUTIC EXERCISES: CPT | Performed by: PHYSICAL THERAPIST

## 2023-07-27 NOTE — FLOWSHEET NOTE
EXR  [x] (71654) Provided verbal/tactile cueing for activities related to strengthening, flexibility, endurance, ROM  for improvements in scapular, scapulothoracic and UE control with self care, reaching, carrying, lifting, house/yardwork, driving/computer work.    [] (52367) Provided verbal/tactile cueing for activities related to improving balance, coordination, kinesthetic sense, posture, motor skill, proprioception  to assist with  scapular, scapulothoracic and UE control with self care, reaching, carrying, lifting, house/yardwork, driving/computer work. Therapeutic Activities:    [] (43873 or 14698) Provided verbal/tactile cueing for activities related to improving balance, coordination, kinesthetic sense, posture, motor skill, proprioception and motor activation to allow for proper function of scapular, scapulothoracic and UE control with self care, carrying, lifting, driving/computer work.      Home Exercise Program:    [x] (05064) Reviewed/Progressed HEP activities related to strengthening, flexibility, endurance, ROM of scapular, scapulothoracic and UE control with self care, reaching, carrying, lifting, house/yardwork, driving/computer work  [] (84179) Reviewed/Progressed HEP activities related to improving balance, coordination, kinesthetic sense, posture, motor skill, proprioception of scapular, scapulothoracic and UE control with self care, reaching, carrying, lifting, house/yardwork, driving/computer work      Manual Treatments:  PROM / STM / Oscillations-Mobs:  G-I, II, III, IV (PA's, Inf., Post.)  [x] (90955) Provided manual therapy to mobilize soft tissue/joints of cervical/CT, scapular GHJ and UE for the purpose of modulating pain, promoting relaxation,  increasing ROM, reducing/eliminating soft tissue swelling/inflammation/restriction, improving soft tissue extensibility and allowing for proper ROM for normal function with self care, reaching, carrying, lifting, house/yardwork, driving/computer

## 2023-08-03 ENCOUNTER — HOSPITAL ENCOUNTER (OUTPATIENT)
Dept: PHYSICAL THERAPY | Age: 73
Setting detail: THERAPIES SERIES
Discharge: HOME | End: 2023-08-03
Payer: MEDICARE

## 2023-08-03 PROCEDURE — 97110 THERAPEUTIC EXERCISES: CPT | Performed by: PHYSICAL THERAPIST

## 2023-08-03 PROCEDURE — 97140 MANUAL THERAPY 1/> REGIONS: CPT | Performed by: PHYSICAL THERAPIST

## 2023-08-03 NOTE — FLOWSHEET NOTE
Adjusted  Overall Progression Towards Functional goals/ Treatment Progress Update:  [] Patient is progressing as expected towards functional goals listed. [x] Progression is slowed due to complexities/Impairments listed. Trauma , complexity of injury   [] Progression has been slowed due to co-morbidities. [] Plan just implemented, too soon to assess goals progression <30days   [] Goals require adjustment due to lack of progress  [] Patient is not progressing as expected and requires additional follow up with physician  [] Other    Prognosis for POC: [x] Good [] Fair  [] Poor      Patient requires continued skilled intervention: [x] Yes  [] No    Treatment/Activity Tolerance:  [x] Patient able to complete treatment  [] Patient limited by fatigue  [x] Patient limited by some pain     [] Patient limited by other medical complications  [x] Other:   need to continue to advance end ranges of ROM and increasing R total arm stength to allow pt to return to PLOF. Pt continues to make slow but gradual improvement with ROM and strength. Slow progression at this stage is on track due to complexity of injury and surgical procedure. Continue 1xweek. Patient Education:    Reviewed diagnosis, POC, HEP and its importance. Access Code: [email or web address]  URL: [email or web address]  Date: 9/6/22  Prepared by: Elijah Youssef     Exercises  Standing Shoulder Shrugs - 2 x daily - 7 x weekly - 3 sets - 10 reps  Seated Scapular Retraction - 2 x daily - 7 x weekly - 3 sets - 10 reps  Standing Bicep Curls Supinated with Dumbbells - 2 x daily - 7 x weekly - 3 sets - 10 reps     Seated Shoulder Pendulum Exercise - 2 x daily - 7 x weekly - 3 sets - 10 reps  Table slides flexion - 2 x daily - 7 x weekly - 10 reps - 10 hold  Seated Shoulder External Rotation AAROM with Dowel - 2 x daily - 7 x weekly - 10 reps - 10 hold   PLAN: See eval  [x] Continue per plan of care [] Alter current plan (see comments above)  [] Plan of

## 2023-08-10 ENCOUNTER — HOSPITAL ENCOUNTER (OUTPATIENT)
Dept: PHYSICAL THERAPY | Age: 73
Setting detail: THERAPIES SERIES
Discharge: HOME | End: 2023-08-10
Payer: MEDICARE

## 2023-08-10 NOTE — FLOWSHEET NOTE
Physical Therapy  Cancellation/No-show Note  Patient Name:  Shirlene Vail  :  1950   Date:  8/10/2023  Cancelled visits to date: 0  No-shows to date: 0    For today's appointment patient:  [x]  Cancelled  []  Rescheduled appointment  []  No-show     Reason given by patient:  []  Patient ill  []  Conflicting appointment  []  No transportation    [x]  Conflict with work  []  No reason given  []  Other:     Comments:      Electronically signed by:  Heather Burnette PT, PT

## 2023-08-17 ENCOUNTER — HOSPITAL ENCOUNTER (OUTPATIENT)
Dept: PHYSICAL THERAPY | Age: 73
Setting detail: THERAPIES SERIES
Discharge: HOME | End: 2023-08-17
Payer: MEDICARE

## 2023-08-17 PROCEDURE — 97110 THERAPEUTIC EXERCISES: CPT | Performed by: PHYSICAL THERAPIST

## 2023-08-17 PROCEDURE — 97140 MANUAL THERAPY 1/> REGIONS: CPT | Performed by: PHYSICAL THERAPIST

## 2023-08-17 NOTE — FLOWSHEET NOTE
[facility], [address] [place] [address] 125 [address], 315 [facility], [address] [facility]  Phone: [phone]   Fax:     [phone]             Physical Therapy Daily Treatment Note  Date:  2023    Patient Name:  Sharath Mattson    :  1950  MRN: [id number]  Restrictions/Precautions:    Medical/Treatment Diagnosis Information:  Diagnosis: L truamatic rotator cuff tear M75.122  Treatment Diagnosis: L shoulder painM25. 512  DOS 2022  L shoulder RC augmentation labral debridement SAD DCE capsular release LUIS      Insurance/Certification information:  PT Insurance Information: [facility] BMN  Physician Information:   Dr Edmundo Caban  Has the plan of care been signed (Y/N):        [x]  Yes  []  No     Date of Patient follow up with Physician: per md      Is this a Progress Report:     []  Yes  [x]  No        If Yes:  Date Range for reporting period:  Beginning  Ending    Progress report will be due (10 Rx or 30 days whichever is less):       Recertification will be due (POC Duration  / 90 days whichever is less):         Visit # Insurance Allowable Auth Required   42 in              BMN []  Yes []  No        Functional Scale: Foto38/100 ( crosswalk DASH 68.3)38/100 54/100,55/100 55/100 Quick dash 27 UEFI 38%    Date assessed:  ,10/6 11, 2,,      Latex Allergy:  [x]NO      []YES  Preferred Language for Healthcare:   [x]English       []other:    Pain level:  0-5/10 at rest     SUBJECTIVE:  same.  Pain more in shoulder since injection     OBJECTIVE: See eval   ROM PROM  AROM   Comment     L R L R     Flexion ~165   ~138 stand     ~158 supine        scap ~165    ~130 stand       ER [email or web address]    85@80        IR             Other  (cervical)             Other                  Strength   L R Comment   Flexion  4+ limited range       Abduction  4+ limited range       ER  5- at side        IR  5- at side Fellow Resident Fellow Resident Resident Resident Resident Resident Resident Resident Resident Resident Resident Resident Resident Resident Resident

## 2023-08-24 ENCOUNTER — HOSPITAL ENCOUNTER (OUTPATIENT)
Dept: PHYSICAL THERAPY | Age: 73
Setting detail: THERAPIES SERIES
Discharge: HOME | End: 2023-08-24
Payer: MEDICARE

## 2023-08-24 PROCEDURE — 97140 MANUAL THERAPY 1/> REGIONS: CPT | Performed by: PHYSICAL THERAPIST

## 2023-08-24 PROCEDURE — 97110 THERAPEUTIC EXERCISES: CPT | Performed by: PHYSICAL THERAPIST

## 2023-08-24 NOTE — FLOWSHEET NOTE
Formerly [facility], [facility], [address], [address] [facility], [address] [place] [address]  Phone: [phone]   Fax:     [phone]             Physical Therapy Daily Treatment Note  Date:  2023    Patient Name:  Danica Moraes    :  1950  MRN: [id number]  Restrictions/Precautions:    Medical/Treatment Diagnosis Information:  Diagnosis: L truamatic rotator cuff tear M75.122  Treatment Diagnosis: L shoulder painM25. 512  DOS 2022  L shoulder RC augmentation labral debridement SAD DCE capsular release LUIS      Insurance/Certification information:  PT Insurance Information: [facility] BMN  Physician Information:   Dr Suma Serrano  Has the plan of care been signed (Y/N):        [x]  Yes  []  No     Date of Patient follow up with Physician: per md      Is this a Progress Report:     [x]  Yes   []  No        If Yes:  Date Range for reporting period:  Beginning  Ending    Progress report will be due (10 Rx or 30 days whichever is less):  with POC      Recertification will be due (POC Duration  / 90 days whichever is less):         Visit # Insurance Allowable Auth Required   43 in              BMN []  Yes []  No        Functional Scale:  Foto38/100 ( crosswalk DASH 68.3)38/100 54/100,55/100 55/100 Quick dash 27 UEFI 38%, 24%    Date assessed:  ,10/6 118, 2/9,, ,     Latex Allergy:  [x]NO      []YES  Preferred Language for Healthcare:   [x]English       []other:    Pain level:  0-5/10 at rest     SUBJECTIVE:  feeling better    OBJECTIVE: See eval   ROM PROM  AROM   Comment     L R L R     Flexion ~165   ~150 stand     ~158 supine        scap ~165    ~145 stand       ER [email or web address]    85@80        IR             Other  (cervical)             Other                  Strength   L R Comment   Flexion  4+ limited range       Abduction  4+ limited range       ER  5- at side        IR  5- at side       Supraspinatus

## 2023-08-28 RX ORDER — ROSUVASTATIN CALCIUM 40 MG/1
40 TABLET, COATED ORAL DAILY
Qty: 90 TABLET | Refills: 0 | OUTPATIENT
Start: 2023-08-28

## 2023-08-28 RX ORDER — FLURBIPROFEN SODIUM 0.3 MG/ML
SOLUTION/ DROPS OPHTHALMIC
Qty: 200 EACH | Refills: 1 | Status: SHIPPED | OUTPATIENT
Start: 2023-08-28

## 2023-08-30 ENCOUNTER — OFFICE VISIT (OUTPATIENT)
Dept: CARDIOLOGY CLINIC | Age: 73
End: 2023-08-30
Payer: MEDICARE

## 2023-08-30 VITALS
HEART RATE: 71 BPM | DIASTOLIC BLOOD PRESSURE: 70 MMHG | WEIGHT: 232 LBS | HEIGHT: 75 IN | SYSTOLIC BLOOD PRESSURE: 140 MMHG | OXYGEN SATURATION: 95 % | BODY MASS INDEX: 28.85 KG/M2

## 2023-08-30 DIAGNOSIS — I10 ESSENTIAL HYPERTENSION: ICD-10-CM

## 2023-08-30 DIAGNOSIS — E78.2 MIXED HYPERLIPIDEMIA: ICD-10-CM

## 2023-08-30 DIAGNOSIS — I25.10 ATHEROSCLEROSIS OF NATIVE CORONARY ARTERY OF NATIVE HEART WITHOUT ANGINA PECTORIS: Primary | ICD-10-CM

## 2023-08-30 PROCEDURE — 1123F ACP DISCUSS/DSCN MKR DOCD: CPT | Performed by: INTERNAL MEDICINE

## 2023-08-30 PROCEDURE — 3077F SYST BP >= 140 MM HG: CPT | Performed by: INTERNAL MEDICINE

## 2023-08-30 PROCEDURE — 3078F DIAST BP <80 MM HG: CPT | Performed by: INTERNAL MEDICINE

## 2023-08-30 PROCEDURE — 99214 OFFICE O/P EST MOD 30 MIN: CPT | Performed by: INTERNAL MEDICINE

## 2023-08-31 ENCOUNTER — HOSPITAL ENCOUNTER (OUTPATIENT)
Dept: PHYSICAL THERAPY | Age: 73
Setting detail: THERAPIES SERIES
Discharge: HOME | End: 2023-08-31
Payer: MEDICARE

## 2023-08-31 PROCEDURE — 97110 THERAPEUTIC EXERCISES: CPT | Performed by: PHYSICAL THERAPIST

## 2023-08-31 PROCEDURE — 97140 MANUAL THERAPY 1/> REGIONS: CPT | Performed by: PHYSICAL THERAPIST

## 2023-08-31 NOTE — FLOWSHEET NOTE
[facility], [address] [place] [address] [address], 315 [facility], 15[address] [facility]  Phone: [phone]   Fax:     [phone]             Physical Therapy Daily Treatment Note  Date:  2023    Patient Name:  Severo Pi    :  1950  MRN: [id number]  Restrictions/Precautions:    Medical/Treatment Diagnosis Information:  Diagnosis: L truamatic rotator cuff tear M75.122  Treatment Diagnosis: L shoulder painM25. 512  DOS 2022  L shoulder RC augmentation labral debridement SAD DCE capsular release LUIS      Insurance/Certification information:  PT Insurance Information: [facility] BMN  Physician Information:   Dr Sarthak Tsai  Has the plan of care been signed (Y/N):        [x]  Yes  []  No     Date of Patient follow up with Physician: per md      Is this a Progress Report:     []  Yes   [x]  No        If Yes:  Date Range for reporting period:  Beginning  Ending    Progress report will be due (10 Rx or 30 days whichever is less):  with POC      Recertification will be due (POC Duration  / 90 days whichever is less):         Visit # Insurance Allowable Auth Required   44 in              BMN []  Yes []  No        Functional Scale:  Foto38/100 ( crosswalk DASH 68.3)38/100 54/100,55/100 55/100 Quick dash 27 UEFI 38%, 24%    Date assessed:  ,10/6 11/8,1216 2/9,, ,     Latex Allergy:  [x]NO      []YES  Preferred Language for Healthcare:   [x]English       []other:    Pain level:  0-5/10 at rest     SUBJECTIVE:  same some pain prox shoulder area    OBJECTIVE: See eval   ROM PROM  AROM   Comment     L R L R     Flexion ~165   ~150 stand     ~158 supine        scap ~165    ~145 stand       ER [email or web address]    85@80        IR             Other  (cervical)             Other                  Strength   L R Comment   Flexion  4+ limited range       Abduction  4+ limited range       ER  5- at side        IR  5- at side

## 2023-09-07 ENCOUNTER — HOSPITAL ENCOUNTER (OUTPATIENT)
Dept: PHYSICAL THERAPY | Age: 73
Setting detail: THERAPIES SERIES
Discharge: HOME | End: 2023-09-07
Payer: MEDICARE

## 2023-09-07 PROCEDURE — 97110 THERAPEUTIC EXERCISES: CPT | Performed by: PHYSICAL THERAPIST

## 2023-09-07 PROCEDURE — 97140 MANUAL THERAPY 1/> REGIONS: CPT | Performed by: PHYSICAL THERAPIST

## 2023-09-07 NOTE — FLOWSHEET NOTE
Adjusted  Overall Progression Towards Functional goals/ Treatment Progress Update:  [] Patient is progressing as expected towards functional goals listed. [x] Progression is slowed due to complexities/Impairments listed. Trauma , complexity of injury   [] Progression has been slowed due to co-morbidities. [] Plan just implemented, too soon to assess goals progression <30days   [] Goals require adjustment due to lack of progress  [] Patient is not progressing as expected and requires additional follow up with physician  [] Other    Prognosis for POC: [x] Good [] Fair  [] Poor      Patient requires continued skilled intervention: [x] Yes  [] No    Treatment/Activity Tolerance:  [x] Patient able to complete treatment  [] Patient limited by fatigue  [x] Patient limited by some pain     [] Patient limited by other medical complications  [x] Other:   need to continue to advance end ranges of ROM and increasing L total arm stength to allow pt to return to PLOF. Pt continues to make slow but gradual improvement with ROM and strength. Slow progression at this stage is on track due to complexity of injury and surgical procedure. Continue 1xweek. Possible dc planning by end of sept                  Patient Education:    Reviewed diagnosis, POC, HEP and its importance. Access Code: [email or web address]  URL: [email or web address]  Date: 9/6/22  Prepared by: Kathie Alexander     Exercises  Standing Shoulder Shrugs - 2 x daily - 7 x weekly - 3 sets - 10 reps  Seated Scapular Retraction - 2 x daily - 7 x weekly - 3 sets - 10 reps  Standing Bicep Curls Supinated with Dumbbells - 2 x daily - 7 x weekly - 3 sets - 10 reps     Seated Shoulder Pendulum Exercise - 2 x daily - 7 x weekly - 3 sets - 10 reps  Table slides flexion - 2 x daily - 7 x weekly - 10 reps - 10 hold  Seated Shoulder External Rotation AAROM with Dowel - 2 x daily - 7 x weekly - 10 reps - 10 hold   PLAN: See eval  [x] Continue per plan of care [] Alter current

## 2023-09-12 RX ORDER — ROSUVASTATIN CALCIUM 40 MG/1
40 TABLET, COATED ORAL DAILY
Qty: 90 TABLET | Refills: 0 | OUTPATIENT
Start: 2023-09-12

## 2023-09-12 NOTE — TELEPHONE ENCOUNTER
Pt call for meds refill, stated that he hasnt been able to get it for two weeks, because pharmacy denied it. Please contact pt for more info. Pt is requesting an emergency refill for this prescription.      rosuvastatin (CRESTOR) 40 MG tablet   [address] Edna Cage [address] [place] [address], [address] [place] [id number]   Phone:  [phone]  Fax:  [phone]

## 2023-09-13 RX ORDER — ROSUVASTATIN CALCIUM 40 MG/1
40 TABLET, COATED ORAL DAILY
Qty: 30 TABLET | Refills: 0 | Status: SHIPPED | OUTPATIENT
Start: 2023-09-13 | End: 2023-09-13

## 2023-09-13 RX ORDER — ROSUVASTATIN CALCIUM 40 MG/1
40 TABLET, COATED ORAL DAILY
Qty: 90 TABLET | Refills: 0 | Status: SHIPPED | OUTPATIENT
Start: 2023-09-13

## 2023-09-13 NOTE — TELEPHONE ENCOUNTER
Medication:   Requested Prescriptions     Pending Prescriptions Disp Refills    rosuvastatin (CRESTOR) 40 MG tablet [Pharmacy Med Name: ROSUVASTATIN 40MG TABLETS] 90 tablet      Sig: TAKE 1 TABLET BY MOUTH DAILY     Last Filled:  9/13/2023    Last appt: 10/7/2022   Next appt: Visit date not found    Last Lipid:   Lab Results   Component Value Date/Time    CHOL 136 07/08/2023 09:36 AM    TRIG 82 07/08/2023 09:36 AM    HDL 46 07/08/2023 09:36 AM    HDL 38 05/22/2012 08:41 AM    LDLCALC 74 07/08/2023 09:36 AM

## 2023-09-14 ENCOUNTER — HOSPITAL ENCOUNTER (OUTPATIENT)
Dept: PHYSICAL THERAPY | Age: 73
Setting detail: THERAPIES SERIES
Discharge: HOME | End: 2023-09-14
Payer: MEDICARE

## 2023-09-14 PROCEDURE — 97110 THERAPEUTIC EXERCISES: CPT | Performed by: PHYSICAL THERAPIST

## 2023-09-14 PROCEDURE — 97140 MANUAL THERAPY 1/> REGIONS: CPT | Performed by: PHYSICAL THERAPIST

## 2023-09-14 NOTE — FLOWSHEET NOTE
Not Met: [] Adjusted  Overall Progression Towards Functional goals/ Treatment Progress Update:  [] Patient is progressing as expected towards functional goals listed. [x] Progression is slowed due to complexities/Impairments listed. Trauma , complexity of injury   [] Progression has been slowed due to co-morbidities. [] Plan just implemented, too soon to assess goals progression <30days   [] Goals require adjustment due to lack of progress  [] Patient is not progressing as expected and requires additional follow up with physician  [] Other    Prognosis for POC: [x] Good [] Fair  [] Poor      Patient requires continued skilled intervention: [x] Yes  [] No    Treatment/Activity Tolerance:  [x] Patient able to complete treatment  [] Patient limited by fatigue  [x] Patient limited by some pain     [] Patient limited by other medical complications  [x] Other:   need to continue to advance end ranges of ROM and increasing L total arm stength to allow pt to return to PLOF. Pt continues to make slow but gradual improvement with ROM and strength. Slow progression at this stage is on track due to complexity of injury and surgical procedure. Continue 1xweek. Possible dc planning by end of sept                  Patient Education:    Reviewed diagnosis, POC, HEP and its importance. Access Code: [email or web address]  URL: [email or web address]  Date: 9/6/22  Prepared by: Debi Herbert     Exercises  Standing Shoulder Shrugs - 2 x daily - 7 x weekly - 3 sets - 10 reps  Seated Scapular Retraction - 2 x daily - 7 x weekly - 3 sets - 10 reps  Standing Bicep Curls Supinated with Dumbbells - 2 x daily - 7 x weekly - 3 sets - 10 reps     Seated Shoulder Pendulum Exercise - 2 x daily - 7 x weekly - 3 sets - 10 reps  Table slides flexion - 2 x daily - 7 x weekly - 10 reps - 10 hold  Seated Shoulder External Rotation AAROM with Dowel - 2 x daily - 7 x weekly - 10 reps - 10 hold   PLAN: See damir  [x] Continue per plan of care []

## 2023-09-20 DIAGNOSIS — Z79.4 TYPE 2 DIABETES MELLITUS WITHOUT COMPLICATION, WITH LONG-TERM CURRENT USE OF INSULIN (HCC): ICD-10-CM

## 2023-09-20 DIAGNOSIS — E11.9 TYPE 2 DIABETES MELLITUS WITHOUT COMPLICATION, WITH LONG-TERM CURRENT USE OF INSULIN (HCC): ICD-10-CM

## 2023-09-20 RX ORDER — INSULIN GLARGINE 100 [IU]/ML
INJECTION, SOLUTION SUBCUTANEOUS
Qty: 15 ML | Refills: 1 | Status: SHIPPED | OUTPATIENT
Start: 2023-09-20 | End: 2023-11-20

## 2023-09-21 ENCOUNTER — HOSPITAL ENCOUNTER (OUTPATIENT)
Dept: PHYSICAL THERAPY | Age: 73
Setting detail: THERAPIES SERIES
End: 2023-09-21
Payer: MEDICARE

## 2023-09-28 ENCOUNTER — APPOINTMENT (OUTPATIENT)
Dept: PHYSICAL THERAPY | Age: 73
End: 2023-09-28
Payer: MEDICARE

## 2023-10-13 DIAGNOSIS — E11.9 TYPE 2 DIABETES MELLITUS WITHOUT COMPLICATION, WITHOUT LONG-TERM CURRENT USE OF INSULIN (HCC): ICD-10-CM

## 2023-10-13 DIAGNOSIS — I25.10 CORONARY ARTERY DISEASE INVOLVING NATIVE CORONARY ARTERY OF NATIVE HEART WITHOUT ANGINA PECTORIS: ICD-10-CM

## 2023-10-13 LAB
ALBUMIN SERPL-MCNC: 4.7 G/DL (ref 3.4–5)
ALBUMIN/GLOB SERPL: 2.2 {RATIO} (ref 1.1–2.2)
ALP SERPL-CCNC: 65 U/L (ref 40–129)
ALT SERPL-CCNC: 26 U/L (ref 10–40)
ANION GAP SERPL CALCULATED.3IONS-SCNC: 10 MMOL/L (ref 3–16)
AST SERPL-CCNC: 18 U/L (ref 15–37)
BILIRUB SERPL-MCNC: 0.4 MG/DL (ref 0–1)
BUN SERPL-MCNC: 14 MG/DL (ref 7–20)
CALCIUM SERPL-MCNC: 9.3 MG/DL (ref 8.3–10.6)
CHLORIDE SERPL-SCNC: 100 MMOL/L (ref 99–110)
CHOLEST SERPL-MCNC: 126 MG/DL (ref 0–199)
CO2 SERPL-SCNC: 28 MMOL/L (ref 21–32)
CREAT SERPL-MCNC: 0.9 MG/DL (ref 0.8–1.3)
CREAT UR-MCNC: 114 MG/DL (ref 39–259)
GFR SERPLBLD CREATININE-BSD FMLA CKD-EPI: >60 ML/MIN/{1.73_M2}
GLUCOSE SERPL-MCNC: 105 MG/DL (ref 70–99)
HDLC SERPL-MCNC: 43 MG/DL (ref 40–60)
LDLC SERPL CALC-MCNC: 62 MG/DL
MICROALBUMIN UR DL<=1MG/L-MCNC: 2.3 MG/DL
MICROALBUMIN/CREAT UR: 20.2 MG/G (ref 0–30)
POTASSIUM SERPL-SCNC: 4.2 MMOL/L (ref 3.5–5.1)
PROT SERPL-MCNC: 6.8 G/DL (ref 6.4–8.2)
SODIUM SERPL-SCNC: 138 MMOL/L (ref 136–145)
TRIGL SERPL-MCNC: 107 MG/DL (ref 0–150)
TSH SERPL DL<=0.005 MIU/L-ACNC: 3.96 UIU/ML (ref 0.27–4.2)
VLDLC SERPL CALC-MCNC: 21 MG/DL

## 2023-10-13 RX ORDER — ROSUVASTATIN CALCIUM 40 MG/1
40 TABLET, COATED ORAL DAILY
Qty: 90 TABLET | Refills: 0 | Status: SHIPPED | OUTPATIENT
Start: 2023-10-13

## 2023-10-13 NOTE — TELEPHONE ENCOUNTER
Medication:   Requested Prescriptions     Pending Prescriptions Disp Refills    rosuvastatin (CRESTOR) 40 MG tablet [Pharmacy Med Name: ROSUVASTATIN 40MG TABLETS] 90 tablet 0     Sig: TAKE 1 TABLET BY MOUTH DAILY     Last Filled:  09/13/2023    Last appt: 10/7/2022   Next appt: Visit date not found    Last Lipid:   Lab Results   Component Value Date/Time    CHOL 136 07/08/2023 09:36 AM    TRIG 82 07/08/2023 09:36 AM    HDL 46 07/08/2023 09:36 AM    HDL 38 05/22/2012 08:41 AM    LDLCALC 74 07/08/2023 09:36 AM

## 2023-10-14 LAB
EST. AVERAGE GLUCOSE BLD GHB EST-MCNC: 157.1 MG/DL
HBA1C MFR BLD: 7.1 %

## 2023-10-16 ENCOUNTER — OFFICE VISIT (OUTPATIENT)
Dept: ENDOCRINOLOGY | Age: 73
End: 2023-10-16
Payer: MEDICARE

## 2023-10-16 VITALS
DIASTOLIC BLOOD PRESSURE: 70 MMHG | SYSTOLIC BLOOD PRESSURE: 128 MMHG | HEART RATE: 60 BPM | RESPIRATION RATE: 16 BRPM | WEIGHT: 233.6 LBS | HEIGHT: 75 IN | BODY MASS INDEX: 29.05 KG/M2

## 2023-10-16 DIAGNOSIS — E03.9 ACQUIRED HYPOTHYROIDISM: ICD-10-CM

## 2023-10-16 DIAGNOSIS — E11.9 TYPE 2 DIABETES MELLITUS WITHOUT COMPLICATION, WITHOUT LONG-TERM CURRENT USE OF INSULIN (HCC): Primary | ICD-10-CM

## 2023-10-16 PROCEDURE — 3051F HG A1C>EQUAL 7.0%<8.0%: CPT | Performed by: INTERNAL MEDICINE

## 2023-10-16 PROCEDURE — 1123F ACP DISCUSS/DSCN MKR DOCD: CPT | Performed by: INTERNAL MEDICINE

## 2023-10-16 PROCEDURE — 3078F DIAST BP <80 MM HG: CPT | Performed by: INTERNAL MEDICINE

## 2023-10-16 PROCEDURE — 99214 OFFICE O/P EST MOD 30 MIN: CPT | Performed by: INTERNAL MEDICINE

## 2023-10-16 PROCEDURE — 3074F SYST BP LT 130 MM HG: CPT | Performed by: INTERNAL MEDICINE

## 2023-10-16 RX ORDER — LEVOTHYROXINE SODIUM 0.05 MG/1
50 TABLET ORAL DAILY
Qty: 90 TABLET | Refills: 1 | Status: SHIPPED | OUTPATIENT
Start: 2023-10-16

## 2023-10-18 ENCOUNTER — OFFICE VISIT (OUTPATIENT)
Dept: PRIMARY CARE CLINIC | Age: 73
End: 2023-10-18

## 2023-10-18 VITALS
WEIGHT: 233 LBS | DIASTOLIC BLOOD PRESSURE: 68 MMHG | HEART RATE: 77 BPM | RESPIRATION RATE: 12 BRPM | OXYGEN SATURATION: 99 % | BODY MASS INDEX: 29.12 KG/M2 | SYSTOLIC BLOOD PRESSURE: 130 MMHG | TEMPERATURE: 97.6 F

## 2023-10-18 DIAGNOSIS — Z12.5 SCREENING FOR PROSTATE CANCER: ICD-10-CM

## 2023-10-18 DIAGNOSIS — N40.1 BENIGN PROSTATIC HYPERPLASIA WITH URINARY HESITANCY: Chronic | ICD-10-CM

## 2023-10-18 DIAGNOSIS — E11.9 TYPE 2 DIABETES MELLITUS WITHOUT COMPLICATION, WITHOUT LONG-TERM CURRENT USE OF INSULIN (HCC): ICD-10-CM

## 2023-10-18 DIAGNOSIS — E78.00 PURE HYPERCHOLESTEROLEMIA: ICD-10-CM

## 2023-10-18 DIAGNOSIS — R39.11 BENIGN PROSTATIC HYPERPLASIA WITH URINARY HESITANCY: Chronic | ICD-10-CM

## 2023-10-18 DIAGNOSIS — I10 ESSENTIAL HYPERTENSION: Primary | ICD-10-CM

## 2023-10-18 DIAGNOSIS — I25.10 ATHEROSCLEROSIS OF NATIVE CORONARY ARTERY OF NATIVE HEART WITHOUT ANGINA PECTORIS: ICD-10-CM

## 2023-10-18 RX ORDER — TAMSULOSIN HYDROCHLORIDE 0.4 MG/1
0.8 CAPSULE ORAL DAILY
Qty: 180 CAPSULE | Refills: 3 | Status: SHIPPED | OUTPATIENT
Start: 2023-10-18

## 2023-10-18 RX ORDER — EZETIMIBE 10 MG/1
10 TABLET ORAL DAILY
Qty: 90 TABLET | Refills: 3 | Status: SHIPPED | OUTPATIENT
Start: 2023-10-18

## 2023-10-18 RX ORDER — HYDROCHLOROTHIAZIDE 12.5 MG/1
12.5 CAPSULE, GELATIN COATED ORAL EVERY MORNING
Qty: 90 CAPSULE | Refills: 3 | Status: SHIPPED | OUTPATIENT
Start: 2023-10-18

## 2023-10-18 RX ORDER — ROSUVASTATIN CALCIUM 40 MG/1
40 TABLET, COATED ORAL DAILY
Qty: 90 TABLET | Refills: 3 | Status: SHIPPED | OUTPATIENT
Start: 2023-10-18

## 2023-10-18 RX ORDER — AMLODIPINE BESYLATE AND BENAZEPRIL HYDROCHLORIDE 10; 20 MG/1; MG/1
1 CAPSULE ORAL DAILY
Qty: 90 CAPSULE | Refills: 3 | Status: SHIPPED | OUTPATIENT
Start: 2023-10-18

## 2023-10-18 SDOH — ECONOMIC STABILITY: INCOME INSECURITY: HOW HARD IS IT FOR YOU TO PAY FOR THE VERY BASICS LIKE FOOD, HOUSING, MEDICAL CARE, AND HEATING?: NOT HARD AT ALL

## 2023-10-18 SDOH — ECONOMIC STABILITY: FOOD INSECURITY: WITHIN THE PAST 12 MONTHS, THE FOOD YOU BOUGHT JUST DIDN'T LAST AND YOU DIDN'T HAVE MONEY TO GET MORE.: NEVER TRUE

## 2023-10-18 SDOH — ECONOMIC STABILITY: FOOD INSECURITY: WITHIN THE PAST 12 MONTHS, YOU WORRIED THAT YOUR FOOD WOULD RUN OUT BEFORE YOU GOT MONEY TO BUY MORE.: NEVER TRUE

## 2023-10-18 ASSESSMENT — PATIENT HEALTH QUESTIONNAIRE - PHQ9
1. LITTLE INTEREST OR PLEASURE IN DOING THINGS: 0
2. FEELING DOWN, DEPRESSED OR HOPELESS: 0
SUM OF ALL RESPONSES TO PHQ9 QUESTIONS 1 & 2: 0
SUM OF ALL RESPONSES TO PHQ QUESTIONS 1-9: 0

## 2023-10-18 NOTE — PROGRESS NOTES
Subjective:      Patient ID: Dianne Chavez is a 68 y.o. male. HPI  Established patient here for a visit to manage acute and chronic medical conditions as detailed below. Essential hypertension  This is a chronic problem. The problem is well controlled. Patient monitors readings regularly. Pertinent negatives include no chest pain, focal sensory loss, focal weakness, leg pain, myalgias or shortness of breath. No headaches or chest pain. Takes medications regularly. Blood pressure has been stable, blood work was reviewed, and advised patient to continue the current instructions or medications. Pure hypercholesterolemia  This has been a long standing problem, takes zetia and lipitor       Monitors diet and tries to follow a low fat diet. Has  been reasonably  compliant w exercise. Lipids have been stable, The problem is controlled. Recent lipid tests were reviewed and are normal. Pertinent negatives include no chest pain, focal sensory loss, focal weakness, leg pain, myalgias or shortness of breath. Advised patient to continue the current instructions or medications. Type 2 diabetes mellitus without complication, without long-term current use of insulin ([facility])  Seeing sally,   Last a1c was 7.1,  This problem was reviewed in detail. It is under control and stable. Will check blood work. Coronary atherosclerosis of native coronary artery  No cp or sob,  Patient is compliant w medications, no side effects, effective, provides adequate symptom relief. No new symptoms or problems as noted by patient. The problem is stable, no changes noted by patient. Will consider monitoring labs and refill medications as appropriate. Patient counseled and will continue current plan. Benign prostatic hyperplasia with urinary hesitancy  On flomax,  Patient is compliant w medications, no side effects, effective, provides adequate symptom relief. No new symptoms or problems as noted by patient.   The problem

## 2023-10-18 NOTE — ASSESSMENT & PLAN NOTE
Seeing endo,   Last a1c was 7.1,  This problem was reviewed in detail. It is under control and stable. Will check blood work.

## 2023-10-18 NOTE — ASSESSMENT & PLAN NOTE
This has been a long standing problem, takes zetia and lipitor       Monitors diet and tries to follow a low fat diet. Has  been reasonably  compliant w exercise. Lipids have been stable, The problem is controlled. Recent lipid tests were reviewed and are normal. Pertinent negatives include no chest pain, focal sensory loss, focal weakness, leg pain, myalgias or shortness of breath. Advised patient to continue the current instructions or medications.

## 2023-10-20 RX ORDER — ROSUVASTATIN CALCIUM 40 MG/1
40 TABLET, COATED ORAL DAILY
Qty: 90 TABLET | Refills: 3 | OUTPATIENT
Start: 2023-10-20

## 2023-11-19 DIAGNOSIS — E11.9 TYPE 2 DIABETES MELLITUS WITHOUT COMPLICATION, WITH LONG-TERM CURRENT USE OF INSULIN (HCC): ICD-10-CM

## 2023-11-19 DIAGNOSIS — Z79.4 TYPE 2 DIABETES MELLITUS WITHOUT COMPLICATION, WITH LONG-TERM CURRENT USE OF INSULIN (HCC): ICD-10-CM

## 2023-11-20 RX ORDER — INSULIN GLARGINE 100 [IU]/ML
INJECTION, SOLUTION SUBCUTANEOUS
Qty: 15 ML | Refills: 1 | Status: SHIPPED | OUTPATIENT
Start: 2023-11-20

## 2023-11-21 RX ORDER — AMLODIPINE BESYLATE AND BENAZEPRIL HYDROCHLORIDE 10; 20 MG/1; MG/1
1 CAPSULE ORAL DAILY
Qty: 90 CAPSULE | Refills: 3 | Status: SHIPPED | OUTPATIENT
Start: 2023-11-21

## 2023-11-21 NOTE — TELEPHONE ENCOUNTER
Medication:   Requested Prescriptions     Pending Prescriptions Disp Refills    amLODIPine-benazepril (LOTREL) 10-20 MG per capsule [Pharmacy Med Name: Layne Pepper 90 capsule 3     Sig: TAKE 1 CAPSULE BY MOUTH DAILY     Last Filled:  10/18/2023    Last appt: 10/18/2023   Next appt: Visit date not found    Last OARRS:        No data to display

## 2023-12-05 ENCOUNTER — TELEPHONE (OUTPATIENT)
Dept: ENDOCRINOLOGY | Age: 73
End: 2023-12-05

## 2023-12-05 DIAGNOSIS — Z79.4 TYPE 2 DIABETES MELLITUS WITHOUT COMPLICATION, WITH LONG-TERM CURRENT USE OF INSULIN (HCC): Primary | ICD-10-CM

## 2023-12-05 DIAGNOSIS — E11.9 TYPE 2 DIABETES MELLITUS WITHOUT COMPLICATION, WITH LONG-TERM CURRENT USE OF INSULIN (HCC): Primary | ICD-10-CM

## 2023-12-05 RX ORDER — ACYCLOVIR 400 MG/1
TABLET ORAL
Qty: 3 EACH | Refills: 3 | Status: SHIPPED | OUTPATIENT
Start: 2023-12-05

## 2023-12-07 RX ORDER — HYDROCHLOROTHIAZIDE 12.5 MG/1
12.5 CAPSULE, GELATIN COATED ORAL EVERY MORNING
Qty: 90 CAPSULE | Refills: 3 | Status: SHIPPED | OUTPATIENT
Start: 2023-12-07

## 2023-12-07 NOTE — TELEPHONE ENCOUNTER
Medication:   Requested Prescriptions     Pending Prescriptions Disp Refills    hydroCHLOROthiazide (MICROZIDE) 12.5 MG capsule [Pharmacy Med Name: HYDROCHLOROTHIAZIDE 12.5MG CAPSULES] 90 capsule 3     Sig: TAKE 1 CAPSULE BY MOUTH EVERY MORNING     Last Filled:  10.18.23    Last appt: 10/18/2023   Next appt: Visit date not found    Last OARRS:        No data to display

## 2023-12-15 ENCOUNTER — TELEPHONE (OUTPATIENT)
Dept: ENDOCRINOLOGY | Age: 73
End: 2023-12-15

## 2023-12-15 DIAGNOSIS — E11.9 TYPE 2 DIABETES MELLITUS WITHOUT COMPLICATION, WITH LONG-TERM CURRENT USE OF INSULIN (HCC): ICD-10-CM

## 2023-12-15 DIAGNOSIS — Z79.4 TYPE 2 DIABETES MELLITUS WITHOUT COMPLICATION, WITH LONG-TERM CURRENT USE OF INSULIN (HCC): ICD-10-CM

## 2023-12-15 RX ORDER — LANCETS 33 GAUGE
EACH MISCELLANEOUS
Qty: 200 EACH | Refills: 5 | Status: SHIPPED | OUTPATIENT
Start: 2023-12-15

## 2024-01-10 ENCOUNTER — TELEPHONE (OUTPATIENT)
Dept: ENDOCRINOLOGY | Age: 74
End: 2024-01-10

## 2024-01-10 DIAGNOSIS — E03.9 ACQUIRED HYPOTHYROIDISM: ICD-10-CM

## 2024-01-10 DIAGNOSIS — E11.9 TYPE 2 DIABETES MELLITUS WITHOUT COMPLICATION, WITH LONG-TERM CURRENT USE OF INSULIN (HCC): ICD-10-CM

## 2024-01-10 DIAGNOSIS — I25.10 CORONARY ARTERY DISEASE INVOLVING NATIVE CORONARY ARTERY OF NATIVE HEART WITHOUT ANGINA PECTORIS: ICD-10-CM

## 2024-01-10 DIAGNOSIS — E78.2 MIXED HYPERLIPIDEMIA: ICD-10-CM

## 2024-01-10 DIAGNOSIS — Z79.4 TYPE 2 DIABETES MELLITUS WITHOUT COMPLICATION, WITH LONG-TERM CURRENT USE OF INSULIN (HCC): ICD-10-CM

## 2024-01-10 DIAGNOSIS — E03.9 ACQUIRED HYPOTHYROIDISM: Primary | ICD-10-CM

## 2024-01-10 LAB
CREAT UR-MCNC: 74.8 MG/DL (ref 39–259)
MICROALBUMIN UR DL<=1MG/L-MCNC: 9.5 MG/DL
MICROALBUMIN/CREAT UR: 127 MG/G (ref 0–30)
TSH SERPL DL<=0.005 MIU/L-ACNC: 1.97 UIU/ML (ref 0.27–4.2)

## 2024-01-10 RX ORDER — EMPAGLIFLOZIN, METFORMIN HYDROCHLORIDE 12.5; 1 MG/1; MG/1
TABLET, EXTENDED RELEASE ORAL
Qty: 60 TABLET | Refills: 5 | Status: SHIPPED | OUTPATIENT
Start: 2024-01-10

## 2024-01-10 NOTE — TELEPHONE ENCOUNTER
Pt has NOV: 1/15/24  Would MD like to have BW done prior to upcoming appt?    Pt would like a call when orders has been placed     Please advise   CB# [phone]

## 2024-01-11 LAB
ALBUMIN SERPL-MCNC: 4.7 G/DL (ref 3.4–5)
ALBUMIN/GLOB SERPL: 1.8 {RATIO} (ref 1.1–2.2)
ALP SERPL-CCNC: 76 U/L (ref 40–129)
ALT SERPL-CCNC: 21 U/L (ref 10–40)
ANION GAP SERPL CALCULATED.3IONS-SCNC: 14 MMOL/L (ref 3–16)
AST SERPL-CCNC: 16 U/L (ref 15–37)
BILIRUB SERPL-MCNC: 0.5 MG/DL (ref 0–1)
BUN SERPL-MCNC: 12 MG/DL (ref 7–20)
CALCIUM SERPL-MCNC: 9.5 MG/DL (ref 8.3–10.6)
CHLORIDE SERPL-SCNC: 101 MMOL/L (ref 99–110)
CHOLEST SERPL-MCNC: 138 MG/DL (ref 0–199)
CO2 SERPL-SCNC: 25 MMOL/L (ref 21–32)
CREAT SERPL-MCNC: 0.8 MG/DL (ref 0.8–1.3)
EST. AVERAGE GLUCOSE BLD GHB EST-MCNC: 162.8 MG/DL
GFR SERPLBLD CREATININE-BSD FMLA CKD-EPI: >60 ML/MIN/{1.73_M2}
GLUCOSE SERPL-MCNC: 122 MG/DL (ref 70–99)
HBA1C MFR BLD: 7.3 %
HDLC SERPL-MCNC: 45 MG/DL (ref 40–60)
LDLC SERPL CALC-MCNC: 63 MG/DL
POTASSIUM SERPL-SCNC: 4.5 MMOL/L (ref 3.5–5.1)
PROT SERPL-MCNC: 7.3 G/DL (ref 6.4–8.2)
SODIUM SERPL-SCNC: 140 MMOL/L (ref 136–145)
TRIGL SERPL-MCNC: 151 MG/DL (ref 0–150)
VLDLC SERPL CALC-MCNC: 30 MG/DL

## 2024-01-13 DIAGNOSIS — Z79.4 TYPE 2 DIABETES MELLITUS WITHOUT COMPLICATION, WITH LONG-TERM CURRENT USE OF INSULIN (HCC): ICD-10-CM

## 2024-01-13 DIAGNOSIS — E11.9 TYPE 2 DIABETES MELLITUS WITHOUT COMPLICATION, WITH LONG-TERM CURRENT USE OF INSULIN (HCC): ICD-10-CM

## 2024-01-15 ENCOUNTER — OFFICE VISIT (OUTPATIENT)
Dept: ENDOCRINOLOGY | Age: 74
End: 2024-01-15
Payer: MEDICARE

## 2024-01-15 VITALS
SYSTOLIC BLOOD PRESSURE: 131 MMHG | RESPIRATION RATE: 16 BRPM | HEART RATE: 75 BPM | DIASTOLIC BLOOD PRESSURE: 76 MMHG | WEIGHT: 237 LBS | HEIGHT: 75 IN | BODY MASS INDEX: 29.47 KG/M2

## 2024-01-15 DIAGNOSIS — E11.40 TYPE 2 DIABETES MELLITUS WITH DIABETIC NEUROPATHY, WITH LONG-TERM CURRENT USE OF INSULIN (HCC): Primary | ICD-10-CM

## 2024-01-15 DIAGNOSIS — Z79.4 TYPE 2 DIABETES MELLITUS WITH DIABETIC NEUROPATHY, WITH LONG-TERM CURRENT USE OF INSULIN (HCC): Primary | ICD-10-CM

## 2024-01-15 DIAGNOSIS — I10 ESSENTIAL HYPERTENSION: ICD-10-CM

## 2024-01-15 DIAGNOSIS — R39.11 BENIGN PROSTATIC HYPERPLASIA WITH URINARY HESITANCY: Chronic | ICD-10-CM

## 2024-01-15 DIAGNOSIS — N40.1 BENIGN PROSTATIC HYPERPLASIA WITH URINARY HESITANCY: Chronic | ICD-10-CM

## 2024-01-15 DIAGNOSIS — I25.10 ATHEROSCLEROSIS OF NATIVE CORONARY ARTERY OF NATIVE HEART WITHOUT ANGINA PECTORIS: ICD-10-CM

## 2024-01-15 DIAGNOSIS — E78.00 PURE HYPERCHOLESTEROLEMIA: ICD-10-CM

## 2024-01-15 PROCEDURE — 1123F ACP DISCUSS/DSCN MKR DOCD: CPT | Performed by: INTERNAL MEDICINE

## 2024-01-15 PROCEDURE — 3078F DIAST BP <80 MM HG: CPT | Performed by: INTERNAL MEDICINE

## 2024-01-15 PROCEDURE — 99214 OFFICE O/P EST MOD 30 MIN: CPT | Performed by: INTERNAL MEDICINE

## 2024-01-15 PROCEDURE — 3051F HG A1C>EQUAL 7.0%<8.0%: CPT | Performed by: INTERNAL MEDICINE

## 2024-01-15 PROCEDURE — 3075F SYST BP GE 130 - 139MM HG: CPT | Performed by: INTERNAL MEDICINE

## 2024-01-15 PROCEDURE — 95251 CONT GLUC MNTR ANALYSIS I&R: CPT | Performed by: INTERNAL MEDICINE

## 2024-01-15 RX ORDER — INSULIN GLARGINE 100 [IU]/ML
INJECTION, SOLUTION SUBCUTANEOUS
Qty: 15 ML | Refills: 1 | Status: SHIPPED | OUTPATIENT
Start: 2024-01-15

## 2024-01-15 NOTE — PROGRESS NOTES
Pt is a 73 y.o. male seen management of uncontrolled type 2 diabetes and acquired hypothyroidism     diabetes was diagnosed at routine screening. Hernandez Cam got diabetic education in the past when he was first diagnosed with diabetes.  Comorbid conditions: Neuropathy and Coronary Artery Disease     previous  diabetic medications include: Actos 30 milligrams, metformin thousand twice a day, Amaryl 4 milligrams daily    He has CAD s/p stents age 50, follows with cardiology  Has hypertension and is on medication well controlled   He has hyperlipidemia and is on medication   Hypothyroidism diagnosed in April 2023 started on levothyroxine 50 mcg daily.    INTERIM:    Diabetes  He presents for his follow-up diabetic visit. He has type 2 diabetes mellitus. No MedicAlert identification noted. The initial diagnosis of diabetes was made 20 years ago. His disease course has been worsening. There are no hypoglycemic associated symptoms. Associated symptoms include polyphagia and polyuria. Pertinent negatives for diabetes include no weight loss. There are no hypoglycemic complications. Pertinent negatives for hypoglycemia complications include no required glucagon injection. Symptoms are worsening. Diabetic complications include heart disease and peripheral neuropathy. Risk factors for coronary artery disease include diabetes mellitus, dyslipidemia, family history, male sex, obesity, hypertension and tobacco exposure. Current diabetic treatment includes oral agent (triple therapy). He is compliant with treatment most of the time. His weight is stable. He is following a generally unhealthy diet. Meal planning includes avoidance of concentrated sweets. He has had a previous visit with a dietitian. He rarely participates in exercise. There is no change in his home blood glucose trend. His breakfast blood glucose is taken between 7-8 am. His breakfast blood glucose range is generally >200 mg/dl. An ACE inhibitor/angiotensin

## 2024-01-19 RX ORDER — LEVOTHYROXINE SODIUM 0.05 MG/1
50 TABLET ORAL DAILY
Qty: 90 TABLET | Refills: 1 | Status: SHIPPED | OUTPATIENT
Start: 2024-01-19

## 2024-02-18 DIAGNOSIS — E11.9 TYPE 2 DIABETES MELLITUS WITHOUT COMPLICATION, WITH LONG-TERM CURRENT USE OF INSULIN (HCC): ICD-10-CM

## 2024-02-18 DIAGNOSIS — Z79.4 TYPE 2 DIABETES MELLITUS WITHOUT COMPLICATION, WITH LONG-TERM CURRENT USE OF INSULIN (HCC): ICD-10-CM

## 2024-02-19 RX ORDER — INSULIN GLARGINE 100 [IU]/ML
INJECTION, SOLUTION SUBCUTANEOUS
Qty: 15 ML | Refills: 1 | Status: SHIPPED | OUTPATIENT
Start: 2024-02-19

## 2024-03-27 ENCOUNTER — TELEPHONE (OUTPATIENT)
Dept: ENDOCRINOLOGY | Age: 74
End: 2024-03-27

## 2024-04-22 DIAGNOSIS — I10 ESSENTIAL HYPERTENSION: ICD-10-CM

## 2024-04-22 DIAGNOSIS — Z79.4 TYPE 2 DIABETES MELLITUS WITH DIABETIC NEUROPATHY, WITH LONG-TERM CURRENT USE OF INSULIN (HCC): ICD-10-CM

## 2024-04-22 DIAGNOSIS — N40.1 BENIGN PROSTATIC HYPERPLASIA WITH URINARY HESITANCY: Chronic | ICD-10-CM

## 2024-04-22 DIAGNOSIS — I25.10 ATHEROSCLEROSIS OF NATIVE CORONARY ARTERY OF NATIVE HEART WITHOUT ANGINA PECTORIS: ICD-10-CM

## 2024-04-22 DIAGNOSIS — E78.00 PURE HYPERCHOLESTEROLEMIA: ICD-10-CM

## 2024-04-22 DIAGNOSIS — E11.40 TYPE 2 DIABETES MELLITUS WITH DIABETIC NEUROPATHY, WITH LONG-TERM CURRENT USE OF INSULIN (HCC): ICD-10-CM

## 2024-04-22 DIAGNOSIS — R39.11 BENIGN PROSTATIC HYPERPLASIA WITH URINARY HESITANCY: Chronic | ICD-10-CM

## 2024-04-22 LAB
ALBUMIN SERPL-MCNC: 4.9 G/DL (ref 3.4–5)
ALBUMIN/GLOB SERPL: 1.9 {RATIO} (ref 1.1–2.2)
ALP SERPL-CCNC: 70 U/L (ref 40–129)
ALT SERPL-CCNC: 22 U/L (ref 10–40)
ANION GAP SERPL CALCULATED.3IONS-SCNC: 11 MMOL/L (ref 3–16)
AST SERPL-CCNC: 22 U/L (ref 15–37)
BILIRUB SERPL-MCNC: 0.3 MG/DL (ref 0–1)
BUN SERPL-MCNC: 15 MG/DL (ref 7–20)
CALCIUM SERPL-MCNC: 9.7 MG/DL (ref 8.3–10.6)
CHLORIDE SERPL-SCNC: 101 MMOL/L (ref 99–110)
CHOLEST SERPL-MCNC: 164 MG/DL (ref 0–199)
CO2 SERPL-SCNC: 28 MMOL/L (ref 21–32)
CREAT SERPL-MCNC: 0.8 MG/DL (ref 0.8–1.3)
CREAT UR-MCNC: 85.5 MG/DL (ref 39–259)
GFR SERPLBLD CREATININE-BSD FMLA CKD-EPI: >90 ML/MIN/{1.73_M2}
GLUCOSE SERPL-MCNC: 96 MG/DL (ref 70–99)
HDLC SERPL-MCNC: 53 MG/DL (ref 40–60)
LDLC SERPL CALC-MCNC: 87 MG/DL
MICROALBUMIN UR DL<=1MG/L-MCNC: 5 MG/DL
MICROALBUMIN/CREAT UR: 58.5 MG/G (ref 0–30)
POTASSIUM SERPL-SCNC: 4.3 MMOL/L (ref 3.5–5.1)
PROT SERPL-MCNC: 7.5 G/DL (ref 6.4–8.2)
SODIUM SERPL-SCNC: 140 MMOL/L (ref 136–145)
TRIGL SERPL-MCNC: 121 MG/DL (ref 0–150)
TSH SERPL DL<=0.005 MIU/L-ACNC: 2.92 UIU/ML (ref 0.27–4.2)
VLDLC SERPL CALC-MCNC: 24 MG/DL

## 2024-04-23 LAB
EST. AVERAGE GLUCOSE BLD GHB EST-MCNC: 171.4 MG/DL
HBA1C MFR BLD: 7.6 %

## 2024-04-29 ENCOUNTER — OFFICE VISIT (OUTPATIENT)
Dept: ENDOCRINOLOGY | Age: 74
End: 2024-04-29
Payer: MEDICARE

## 2024-04-29 VITALS
SYSTOLIC BLOOD PRESSURE: 134 MMHG | HEIGHT: 75 IN | BODY MASS INDEX: 29.34 KG/M2 | HEART RATE: 70 BPM | WEIGHT: 236 LBS | RESPIRATION RATE: 16 BRPM | DIASTOLIC BLOOD PRESSURE: 58 MMHG

## 2024-04-29 DIAGNOSIS — Z79.4 TYPE 2 DIABETES MELLITUS WITHOUT COMPLICATION, WITH LONG-TERM CURRENT USE OF INSULIN (HCC): ICD-10-CM

## 2024-04-29 DIAGNOSIS — E03.9 ACQUIRED HYPOTHYROIDISM: Primary | ICD-10-CM

## 2024-04-29 DIAGNOSIS — E11.9 TYPE 2 DIABETES MELLITUS WITHOUT COMPLICATION, WITH LONG-TERM CURRENT USE OF INSULIN (HCC): ICD-10-CM

## 2024-04-29 PROCEDURE — 3075F SYST BP GE 130 - 139MM HG: CPT | Performed by: INTERNAL MEDICINE

## 2024-04-29 PROCEDURE — 95251 CONT GLUC MNTR ANALYSIS I&R: CPT | Performed by: INTERNAL MEDICINE

## 2024-04-29 PROCEDURE — 3051F HG A1C>EQUAL 7.0%<8.0%: CPT | Performed by: INTERNAL MEDICINE

## 2024-04-29 PROCEDURE — 99214 OFFICE O/P EST MOD 30 MIN: CPT | Performed by: INTERNAL MEDICINE

## 2024-04-29 PROCEDURE — 1123F ACP DISCUSS/DSCN MKR DOCD: CPT | Performed by: INTERNAL MEDICINE

## 2024-04-29 PROCEDURE — 3078F DIAST BP <80 MM HG: CPT | Performed by: INTERNAL MEDICINE

## 2024-04-29 RX ORDER — LEVOTHYROXINE SODIUM 0.05 MG/1
50 TABLET ORAL DAILY
Qty: 90 TABLET | Refills: 1 | Status: SHIPPED | OUTPATIENT
Start: 2024-04-29

## 2024-04-29 RX ORDER — INSULIN LISPRO 100 [IU]/ML
INJECTION, SOLUTION INTRAVENOUS; SUBCUTANEOUS
Qty: 15 ML | Refills: 5 | Status: SHIPPED | OUTPATIENT
Start: 2024-04-29

## 2024-04-29 NOTE — PROGRESS NOTES
involving native coronary artery of native heart without angina pectoris  Patient had coronary stents put in 20 years ago.  He had carotid endarterectomy done in January 2019    --Essential hypertension  Hernandez Cam denies any chest pain, denies any Shortness Of Breath  he is advised to go the Er if  he develops any of these signs or symptoms.  He will recheck BP at home increase HCTZ and call or office if still stays elevated   He believes the blood pressure is high due to excessive caffeine intake and he will work on reducing that.    --- Mixed hyperlipidemia  Patient is on statins --Crestor 40 milligrams and ezetimibe  Advised to work on diet   LDL is at target in May 2021 he will continue with the current regimen      Reviewed and/or ordered clinical lab results yes   Reviewed and/or ordered radiology tests Yes  Reviewed and/or ordered other diagnostic tests yes   Made a decision to obtain old records yes   Reviewed and summarized old records yes     Hernandez Cam was counseled regarding symptoms of current diagnosis, course and complications of disease if inadequately treated, side effects of medications, diagnosis, treatment options, and prognosis, risks, benefits, complications, and alternatives of treatment, labs, imaging and other studies and treatment targets and goals.  He understands instructions and counseling.     Diabetes Continuous Glucose Monitoring Report         Reason for Study:     - improve diabetic control without risk of hypoglycemia       Current Medication regimen:        CGMS Report     CGMS data collection was performed on  April 29, 2024    Patient provided information on his  diet, activities and insulin dosing  during this period.   Data was available for  14 days     Sensor Data Report:   - 12 AM to 6 AM: Overnight blood glucose pattern shows stable glycemia with lower readings  - 6   AM to 10 AM:  Post breakfast minimal hyperglycemia is noted  --10AM to 5 PM : No

## 2024-05-24 RX ORDER — FLURBIPROFEN SODIUM 0.3 MG/ML
SOLUTION/ DROPS OPHTHALMIC
Qty: 200 EACH | Refills: 1 | Status: SHIPPED | OUTPATIENT
Start: 2024-05-24

## 2024-06-10 ENCOUNTER — TELEPHONE (OUTPATIENT)
Dept: ENDOCRINOLOGY | Age: 74
End: 2024-06-10

## 2024-06-10 DIAGNOSIS — Z79.4 TYPE 2 DIABETES MELLITUS WITHOUT COMPLICATION, WITH LONG-TERM CURRENT USE OF INSULIN (HCC): Primary | ICD-10-CM

## 2024-06-10 DIAGNOSIS — E11.9 TYPE 2 DIABETES MELLITUS WITHOUT COMPLICATION, WITH LONG-TERM CURRENT USE OF INSULIN (HCC): Primary | ICD-10-CM

## 2024-06-10 RX ORDER — ACYCLOVIR 400 MG/1
TABLET ORAL
Qty: 1 EACH | Refills: 0 | Status: SHIPPED | OUTPATIENT
Start: 2024-06-10

## 2024-06-10 NOTE — TELEPHONE ENCOUNTER
Pt called in and said he lost his g7  and it was given to him by the office so he would like another one. Please call pt

## 2024-06-10 NOTE — TELEPHONE ENCOUNTER
Patient called requesting a refill     Rx- Dexcom G7      Pharmacy- [facility]     LOV-  NOV- 7/15/24    Please advise

## 2024-06-19 ENCOUNTER — TELEPHONE (OUTPATIENT)
Dept: ENDOCRINOLOGY | Age: 74
End: 2024-06-19

## 2024-07-06 DIAGNOSIS — E03.9 ACQUIRED HYPOTHYROIDISM: ICD-10-CM

## 2024-07-06 DIAGNOSIS — Z79.4 TYPE 2 DIABETES MELLITUS WITHOUT COMPLICATION, WITH LONG-TERM CURRENT USE OF INSULIN (HCC): ICD-10-CM

## 2024-07-06 DIAGNOSIS — E11.9 TYPE 2 DIABETES MELLITUS WITHOUT COMPLICATION, WITH LONG-TERM CURRENT USE OF INSULIN (HCC): ICD-10-CM

## 2024-07-06 LAB
ALBUMIN SERPL-MCNC: 4.4 G/DL (ref 3.4–5)
ALBUMIN/GLOB SERPL: 1.9 {RATIO} (ref 1.1–2.2)
ALP SERPL-CCNC: 64 U/L (ref 40–129)
ALT SERPL-CCNC: 22 U/L (ref 10–40)
ANION GAP SERPL CALCULATED.3IONS-SCNC: 10 MMOL/L (ref 3–16)
AST SERPL-CCNC: 19 U/L (ref 15–37)
BILIRUB SERPL-MCNC: 0.4 MG/DL (ref 0–1)
BUN SERPL-MCNC: 13 MG/DL (ref 7–20)
CALCIUM SERPL-MCNC: 9.1 MG/DL (ref 8.3–10.6)
CHLORIDE SERPL-SCNC: 104 MMOL/L (ref 99–110)
CHOLEST SERPL-MCNC: 125 MG/DL (ref 0–199)
CO2 SERPL-SCNC: 24 MMOL/L (ref 21–32)
CREAT SERPL-MCNC: 0.7 MG/DL (ref 0.8–1.3)
CREAT UR-MCNC: 133.3 MG/DL (ref 39–259)
GFR SERPLBLD CREATININE-BSD FMLA CKD-EPI: >90 ML/MIN/{1.73_M2}
GLUCOSE SERPL-MCNC: 100 MG/DL (ref 70–99)
HDLC SERPL-MCNC: 40 MG/DL (ref 40–60)
LDLC SERPL CALC-MCNC: 58 MG/DL
MICROALBUMIN UR DL<=1MG/L-MCNC: 3.8 MG/DL
MICROALBUMIN/CREAT UR: 28.5 MG/G (ref 0–30)
POTASSIUM SERPL-SCNC: 3.9 MMOL/L (ref 3.5–5.1)
PROT SERPL-MCNC: 6.7 G/DL (ref 6.4–8.2)
SODIUM SERPL-SCNC: 138 MMOL/L (ref 136–145)
TRIGL SERPL-MCNC: 133 MG/DL (ref 0–150)
TSH SERPL DL<=0.005 MIU/L-ACNC: 2.88 UIU/ML (ref 0.27–4.2)
VLDLC SERPL CALC-MCNC: 27 MG/DL

## 2024-07-07 LAB
EST. AVERAGE GLUCOSE BLD GHB EST-MCNC: 165.7 MG/DL
HBA1C MFR BLD: 7.4 %

## 2024-07-10 ENCOUNTER — OFFICE VISIT (OUTPATIENT)
Dept: PRIMARY CARE CLINIC | Age: 74
End: 2024-07-10

## 2024-07-10 VITALS
HEIGHT: 75 IN | WEIGHT: 238 LBS | HEART RATE: 61 BPM | OXYGEN SATURATION: 98 % | DIASTOLIC BLOOD PRESSURE: 80 MMHG | RESPIRATION RATE: 12 BRPM | BODY MASS INDEX: 29.59 KG/M2 | TEMPERATURE: 97.6 F | SYSTOLIC BLOOD PRESSURE: 110 MMHG

## 2024-07-10 DIAGNOSIS — E78.00 PURE HYPERCHOLESTEROLEMIA: ICD-10-CM

## 2024-07-10 DIAGNOSIS — R39.11 BENIGN PROSTATIC HYPERPLASIA WITH URINARY HESITANCY: ICD-10-CM

## 2024-07-10 DIAGNOSIS — E11.9 TYPE 2 DIABETES MELLITUS WITHOUT COMPLICATION, WITHOUT LONG-TERM CURRENT USE OF INSULIN (HCC): ICD-10-CM

## 2024-07-10 DIAGNOSIS — N40.1 BENIGN PROSTATIC HYPERPLASIA WITH URINARY HESITANCY: ICD-10-CM

## 2024-07-10 DIAGNOSIS — Z00.00 MEDICARE ANNUAL WELLNESS VISIT, SUBSEQUENT: Primary | ICD-10-CM

## 2024-07-10 DIAGNOSIS — I10 ESSENTIAL HYPERTENSION: ICD-10-CM

## 2024-07-10 DIAGNOSIS — I25.10 ATHEROSCLEROSIS OF NATIVE CORONARY ARTERY OF NATIVE HEART WITHOUT ANGINA PECTORIS: ICD-10-CM

## 2024-07-10 ASSESSMENT — PATIENT HEALTH QUESTIONNAIRE - PHQ9
SUM OF ALL RESPONSES TO PHQ QUESTIONS 1-9: 0
SUM OF ALL RESPONSES TO PHQ QUESTIONS 1-9: 0
2. FEELING DOWN, DEPRESSED OR HOPELESS: NOT AT ALL
SUM OF ALL RESPONSES TO PHQ9 QUESTIONS 1 & 2: 0
1. LITTLE INTEREST OR PLEASURE IN DOING THINGS: NOT AT ALL
SUM OF ALL RESPONSES TO PHQ QUESTIONS 1-9: 0
SUM OF ALL RESPONSES TO PHQ QUESTIONS 1-9: 0

## 2024-07-10 NOTE — PROGRESS NOTES
Bee stings       Prior to Visit Medications    Medication Sig Taking? Authorizing Provider   Continuous Glucose  (DEXCOM G7 ) MOISE Use to Monitor Blood sugar  Beth Yoder MD   Insulin Pen Needle (B-D UF III MINI PEN NEEDLES) 31G X 5 MM MISC USE 4 TIMES PER DAY TO INJECT INSULIN  Beth Yoder MD   levothyroxine (SYNTHROID) 50 MCG tablet Take 1 tablet by mouth daily  Beth Yoder MD   insulin lispro, 1 Unit Dial, (HUMALOG/ADMELOG) 100 UNIT/ML SOPN INJECT up to 15 UNITS UNDER THE SKIN WITH EACH MEAL  Beth Yoder MD   LANTUS SOLOSTAR 100 UNIT/ML injection pen ADMINISTER 50 UNITS UNDER THE SKIN EVERY NIGHT  Beth Yoder MD   SYNJARDY XR 12.5-1000 MG TB24 TAKE 1 TABLET BY MOUTH TWICE DAILY  Beth Yoder MD   Lancets (ONETOUCH DELICA PLUS [id number]) MISC USE TO TEST 4 TO 6 TIMES DAILY  Beth Yoder MD   hydroCHLOROthiazide (MICROZIDE) 12.5 MG capsule TAKE 1 CAPSULE BY MOUTH EVERY MORNING  Efrain Evans MD   Continuous Blood Gluc Sensor (DEXCOM G7 SENSOR) MISC Change every 10 days  Beth Yoder MD   amLODIPine-benazepril (LOTREL) 10-20 MG per capsule TAKE 1 CAPSULE BY MOUTH DAILY  Efrain Evans MD   rosuvastatin (CRESTOR) 40 MG tablet Take 1 tablet by mouth daily  Efrain Evans MD   ezetimibe (ZETIA) 10 MG tablet Take 1 tablet by mouth daily  Efrain Evans MD   tamsulosin (FLOMAX) 0.4 MG capsule Take 2 capsules by mouth daily  Efrain Evans MD   blood glucose test strips (ONETOUCH VERIO) strip TEST THREE TIMES DAILY AS DIRECTED  Beth Yoder MD   Blood Glucose Monitoring Suppl (ONETOUCH VERIO FLEX SYSTEM) w/Device KIT Check blood sugar  Beth Yoder MD   aspirin 81 MG EC tablet Take 1 tablet by mouth daily  Provider, MD Rigoberto Wagner (Including outside providers/suppliers regularly involved in providing care):   Patient Care Team:  Efrain Evans MD as PCP - General (Internal Medicine)  Efrain Evans MD as PCP - Empaneled Provider

## 2024-07-15 ENCOUNTER — OFFICE VISIT (OUTPATIENT)
Dept: ENDOCRINOLOGY | Age: 74
End: 2024-07-15
Payer: MEDICARE

## 2024-07-15 VITALS
HEIGHT: 75 IN | RESPIRATION RATE: 16 BRPM | BODY MASS INDEX: 29.72 KG/M2 | HEART RATE: 73 BPM | WEIGHT: 239 LBS | DIASTOLIC BLOOD PRESSURE: 86 MMHG | SYSTOLIC BLOOD PRESSURE: 146 MMHG

## 2024-07-15 DIAGNOSIS — R39.11 BENIGN PROSTATIC HYPERPLASIA WITH URINARY HESITANCY: Chronic | ICD-10-CM

## 2024-07-15 DIAGNOSIS — N40.1 BENIGN PROSTATIC HYPERPLASIA WITH URINARY HESITANCY: Chronic | ICD-10-CM

## 2024-07-15 DIAGNOSIS — I67.9 CEREBROVASCULAR DISEASE: Chronic | ICD-10-CM

## 2024-07-15 DIAGNOSIS — E11.40 TYPE 2 DIABETES MELLITUS WITH DIABETIC NEUROPATHY, WITH LONG-TERM CURRENT USE OF INSULIN (HCC): Primary | ICD-10-CM

## 2024-07-15 DIAGNOSIS — Z79.4 TYPE 2 DIABETES MELLITUS WITH DIABETIC NEUROPATHY, WITH LONG-TERM CURRENT USE OF INSULIN (HCC): Primary | ICD-10-CM

## 2024-07-15 PROCEDURE — 1123F ACP DISCUSS/DSCN MKR DOCD: CPT | Performed by: INTERNAL MEDICINE

## 2024-07-15 PROCEDURE — 99214 OFFICE O/P EST MOD 30 MIN: CPT | Performed by: INTERNAL MEDICINE

## 2024-07-15 PROCEDURE — 3079F DIAST BP 80-89 MM HG: CPT | Performed by: INTERNAL MEDICINE

## 2024-07-15 PROCEDURE — 3077F SYST BP >= 140 MM HG: CPT | Performed by: INTERNAL MEDICINE

## 2024-07-15 PROCEDURE — 3051F HG A1C>EQUAL 7.0%<8.0%: CPT | Performed by: INTERNAL MEDICINE

## 2024-07-15 PROCEDURE — 95251 CONT GLUC MNTR ANALYSIS I&R: CPT | Performed by: INTERNAL MEDICINE

## 2024-07-15 NOTE — PROGRESS NOTES
jan 2024       -- Coronary artery disease involving native coronary artery of native heart without angina pectoris  Patient had coronary stents put in 20 years ago.  He had carotid endarterectomy done in January 2019    --Essential hypertension  Hernandez Cam denies any chest pain, denies any Shortness Of Breath  he is advised to go the Er if  he develops any of these signs or symptoms.  He will recheck BP at home increase HCTZ and call or office if still stays elevated   He believes the blood pressure is high due to excessive caffeine intake and he will work on reducing that.    --- Mixed hyperlipidemia  Patient is on statins --Crestor 40 milligrams and ezetimibe  Advised to work on diet   LDL is at target in May 2021 he will continue with the current regimen      Reviewed and/or ordered clinical lab results yes   Reviewed and/or ordered radiology tests Yes  Reviewed and/or ordered other diagnostic tests yes   Made a decision to obtain old records yes   Reviewed and summarized old records yes     Hernandez Cam was counseled regarding symptoms of current diagnosis, course and complications of disease if inadequately treated, side effects of medications, diagnosis, treatment options, and prognosis, risks, benefits, complications, and alternatives of treatment, labs, imaging and other studies and treatment targets and goals.  He understands instructions and counseling.     Diabetes Continuous Glucose Monitoring Report         Reason for Study:     - improve diabetic control without risk of hypoglycemia       Current Medication regimen:        CGMS Report     CGMS data collection was performed on  July 15,  2024    Patient provided information on his  diet, activities and insulin dosing  during this period.   Data was available for  14 days     Sensor Data Report:   - 12 AM to 6 AM: Overnight blood glucose pattern shows stable glycemia with lower readings  - 6   AM to 10 AM:  Post breakfast minimal

## 2024-07-22 RX ORDER — EZETIMIBE 10 MG/1
10 TABLET ORAL DAILY
Qty: 90 TABLET | Refills: 3 | Status: SHIPPED | OUTPATIENT
Start: 2024-07-22

## 2024-07-22 NOTE — TELEPHONE ENCOUNTER
Medication:   Requested Prescriptions     Pending Prescriptions Disp Refills    ezetimibe (ZETIA) 10 MG tablet [Pharmacy Med Name: EZETIMIBE 10MG TABLETS] 90 tablet 3     Sig: TAKE 1 TABLET BY MOUTH DAILY     Last Filled:  10/18/2023    Last appt: 7/10/2024   Next appt: Visit date not found    Last OARRS:        No data to display

## 2024-07-27 DIAGNOSIS — E11.9 TYPE 2 DIABETES MELLITUS WITHOUT COMPLICATION, WITH LONG-TERM CURRENT USE OF INSULIN (HCC): ICD-10-CM

## 2024-07-27 DIAGNOSIS — Z79.4 TYPE 2 DIABETES MELLITUS WITHOUT COMPLICATION, WITH LONG-TERM CURRENT USE OF INSULIN (HCC): ICD-10-CM

## 2024-07-29 RX ORDER — TAMSULOSIN HYDROCHLORIDE 0.4 MG/1
0.8 CAPSULE ORAL DAILY
Qty: 180 CAPSULE | Refills: 3 | Status: SHIPPED | OUTPATIENT
Start: 2024-07-29

## 2024-07-29 RX ORDER — INSULIN GLARGINE 100 [IU]/ML
INJECTION, SOLUTION SUBCUTANEOUS
Qty: 15 ML | Refills: 1 | Status: SHIPPED | OUTPATIENT
Start: 2024-07-29

## 2024-07-29 NOTE — TELEPHONE ENCOUNTER
Medication:   Requested Prescriptions     Pending Prescriptions Disp Refills    tamsulosin (FLOMAX) 0.4 MG capsule [Pharmacy Med Name: TAMSULOSIN 0.4MG CAPSULES] 180 capsule 3     Sig: TAKE 2 CAPSULES BY MOUTH DAILY     Last Filled:  10/18/2023    Last appt: 7/10/2024   Next appt: Visit date not found    Last OARRS:        No data to display

## 2024-08-03 DIAGNOSIS — I25.10 CORONARY ARTERY DISEASE INVOLVING NATIVE CORONARY ARTERY OF NATIVE HEART WITHOUT ANGINA PECTORIS: ICD-10-CM

## 2024-08-05 RX ORDER — EMPAGLIFLOZIN, METFORMIN HYDROCHLORIDE 12.5; 1 MG/1; MG/1
TABLET, EXTENDED RELEASE ORAL
Qty: 60 TABLET | Refills: 5 | Status: SHIPPED | OUTPATIENT
Start: 2024-08-05

## 2024-09-26 DIAGNOSIS — Z79.4 TYPE 2 DIABETES MELLITUS WITHOUT COMPLICATION, WITH LONG-TERM CURRENT USE OF INSULIN (HCC): ICD-10-CM

## 2024-09-26 DIAGNOSIS — E11.9 TYPE 2 DIABETES MELLITUS WITHOUT COMPLICATION, WITH LONG-TERM CURRENT USE OF INSULIN (HCC): ICD-10-CM

## 2024-09-26 RX ORDER — INSULIN GLARGINE 100 [IU]/ML
INJECTION, SOLUTION SUBCUTANEOUS
Qty: 15 ML | Refills: 1 | Status: SHIPPED | OUTPATIENT
Start: 2024-09-26

## 2024-10-05 DIAGNOSIS — E11.40 TYPE 2 DIABETES MELLITUS WITH DIABETIC NEUROPATHY, WITH LONG-TERM CURRENT USE OF INSULIN (HCC): ICD-10-CM

## 2024-10-05 DIAGNOSIS — R39.11 BENIGN PROSTATIC HYPERPLASIA WITH URINARY HESITANCY: Chronic | ICD-10-CM

## 2024-10-05 DIAGNOSIS — Z79.4 TYPE 2 DIABETES MELLITUS WITH DIABETIC NEUROPATHY, WITH LONG-TERM CURRENT USE OF INSULIN (HCC): ICD-10-CM

## 2024-10-05 DIAGNOSIS — I67.9 CEREBROVASCULAR DISEASE: Chronic | ICD-10-CM

## 2024-10-05 DIAGNOSIS — N40.1 BENIGN PROSTATIC HYPERPLASIA WITH URINARY HESITANCY: Chronic | ICD-10-CM

## 2024-10-05 LAB
ALBUMIN SERPL-MCNC: 4.1 G/DL (ref 3.4–5)
ALBUMIN/GLOB SERPL: 1.9 {RATIO} (ref 1.1–2.2)
ALP SERPL-CCNC: 62 U/L (ref 40–129)
ALT SERPL-CCNC: 23 U/L (ref 10–40)
ANION GAP SERPL CALCULATED.3IONS-SCNC: 10 MMOL/L (ref 3–16)
AST SERPL-CCNC: 18 U/L (ref 15–37)
BILIRUB SERPL-MCNC: 0.3 MG/DL (ref 0–1)
BUN SERPL-MCNC: 16 MG/DL (ref 7–20)
CALCIUM SERPL-MCNC: 9.7 MG/DL (ref 8.3–10.6)
CHLORIDE SERPL-SCNC: 102 MMOL/L (ref 99–110)
CHOLEST SERPL-MCNC: 135 MG/DL (ref 0–199)
CO2 SERPL-SCNC: 27 MMOL/L (ref 21–32)
CREAT SERPL-MCNC: 0.8 MG/DL (ref 0.8–1.3)
CREAT UR-MCNC: 75 MG/DL (ref 39–259)
GFR SERPLBLD CREATININE-BSD FMLA CKD-EPI: >90 ML/MIN/{1.73_M2}
GLUCOSE SERPL-MCNC: 139 MG/DL (ref 70–99)
HDLC SERPL-MCNC: 42 MG/DL (ref 40–60)
LDLC SERPL CALC-MCNC: 68 MG/DL
MICROALBUMIN UR DL<=1MG/L-MCNC: 1.43 MG/DL
MICROALBUMIN/CREAT UR: 19.1 MG/G (ref 0–30)
POTASSIUM SERPL-SCNC: 4.6 MMOL/L (ref 3.5–5.1)
PROT SERPL-MCNC: 6.3 G/DL (ref 6.4–8.2)
SODIUM SERPL-SCNC: 139 MMOL/L (ref 136–145)
TRIGL SERPL-MCNC: 126 MG/DL (ref 0–150)
TSH SERPL DL<=0.005 MIU/L-ACNC: 1.99 UIU/ML (ref 0.27–4.2)
VLDLC SERPL CALC-MCNC: 25 MG/DL

## 2024-10-06 LAB
EST. AVERAGE GLUCOSE BLD GHB EST-MCNC: 162.8 MG/DL
HBA1C MFR BLD: 7.3 %

## 2024-10-14 ENCOUNTER — OFFICE VISIT (OUTPATIENT)
Dept: ENDOCRINOLOGY | Age: 74
End: 2024-10-14
Payer: MEDICARE

## 2024-10-14 VITALS
SYSTOLIC BLOOD PRESSURE: 135 MMHG | BODY MASS INDEX: 28.85 KG/M2 | HEIGHT: 75 IN | DIASTOLIC BLOOD PRESSURE: 64 MMHG | RESPIRATION RATE: 14 BRPM | TEMPERATURE: 98 F | WEIGHT: 232 LBS | HEART RATE: 75 BPM

## 2024-10-14 DIAGNOSIS — I25.10 CORONARY ARTERY DISEASE INVOLVING NATIVE CORONARY ARTERY OF NATIVE HEART WITHOUT ANGINA PECTORIS: ICD-10-CM

## 2024-10-14 DIAGNOSIS — E11.9 TYPE 2 DIABETES MELLITUS WITHOUT COMPLICATION, WITH LONG-TERM CURRENT USE OF INSULIN (HCC): ICD-10-CM

## 2024-10-14 DIAGNOSIS — Z79.4 TYPE 2 DIABETES MELLITUS WITHOUT COMPLICATION, WITH LONG-TERM CURRENT USE OF INSULIN (HCC): ICD-10-CM

## 2024-10-14 PROCEDURE — 99214 OFFICE O/P EST MOD 30 MIN: CPT | Performed by: INTERNAL MEDICINE

## 2024-10-14 PROCEDURE — 3078F DIAST BP <80 MM HG: CPT | Performed by: INTERNAL MEDICINE

## 2024-10-14 PROCEDURE — 3051F HG A1C>EQUAL 7.0%<8.0%: CPT | Performed by: INTERNAL MEDICINE

## 2024-10-14 PROCEDURE — 1123F ACP DISCUSS/DSCN MKR DOCD: CPT | Performed by: INTERNAL MEDICINE

## 2024-10-14 PROCEDURE — 95251 CONT GLUC MNTR ANALYSIS I&R: CPT | Performed by: INTERNAL MEDICINE

## 2024-10-14 PROCEDURE — 3075F SYST BP GE 130 - 139MM HG: CPT | Performed by: INTERNAL MEDICINE

## 2024-10-14 RX ORDER — INSULIN GLARGINE 100 [IU]/ML
INJECTION, SOLUTION SUBCUTANEOUS
Qty: 15 ML | Refills: 3 | Status: SHIPPED | OUTPATIENT
Start: 2024-10-14

## 2024-10-14 RX ORDER — EMPAGLIFLOZIN, METFORMIN HYDROCHLORIDE 12.5; 1 MG/1; MG/1
TABLET, EXTENDED RELEASE ORAL
Qty: 60 TABLET | Refills: 5 | Status: SHIPPED | OUTPATIENT
Start: 2024-10-14

## 2024-10-14 NOTE — PROGRESS NOTES
rhythm, normal S1 and S2,   Musculoskeletal: normal gait and station,   Neurological: normal coordination, normal general cortical function      Lab Results   Component Value Date/Time    LABA1C 7.3 10/05/2024 08:38 AM    LABA1C 7.4 07/06/2024 08:14 AM    LABA1C 7.6 04/22/2024 07:37 AM         ASSESSMENT/PLAN:    --- Uncontrolled type 2 diabetes mellitus with complication, with long-term current use of insulin 9.3>>9.9>>8.7>>7.8>>7.8>>7.1>>6.9>>7.3    Control not at target Aic 7.4 >>6.9>7.0 >> 7.4 >>7.1>>7.3>>7.6>>7.3  I reviewed patient's  continuous glucose sensor data in detail and made appropriate changes in patient's diabetic regimen.  Discussed Glp agonist he is concerned about SE (wife takes it who is also my patient and had excellent response to GLP-1 and was able to get off of insulin )      ---he has been taking lantus 50 units---no fasting hypoglycemia noted on his CGM  He takes humalog with meals, 4--8 units with each meal, he was advised to increase meal boluses with lunch and dinner and make sure that he takes it 10 minutes before eating once  ---Advised to avoid snacks at bedtime and also reduce the amount of carbohydrates  On synjardy with no SE, microalbumin to creatinine ratio has improved  Patient is using Humalog  CIR  10:1 patient is familiar with carbohydrate counting.      Health Maintenance       Last Eye Exam: advised to have annual dilated eye exam. his last eye exam was within a year 2023   Last Podiatry Exam:  follows with podiatry closely --- advised t follow up   Lipids: Last LDL level was 77  In  July 2022   Microalbumin/Creatinine Ratio:pt has microalbuminuria elevated in sept 2019 , normal in oct 2024    . Education: Reviewed ‘ABCs’ of diabetes management (respective goals in parentheses): A1C (<7), blood pressure (<130/80), and cholesterol (LDL <100).    Acquired hypothyroidism   Thyroid AB negative ---TSH 4.51 in April 2023   C/o fatiguse start Synthroid 50 mcg daily  TSH normal

## 2024-10-14 NOTE — PATIENT INSTRUCTIONS
Dexcom Customer Service to report faulty sensors:  [phone]  Option #3    [facility] [employer] reorder number:  [phone]

## 2024-10-17 RX ORDER — ROSUVASTATIN CALCIUM 40 MG/1
40 TABLET, COATED ORAL DAILY
Qty: 90 TABLET | Refills: 3 | Status: SHIPPED | OUTPATIENT
Start: 2024-10-17

## 2024-10-17 NOTE — TELEPHONE ENCOUNTER
Medication:   Requested Prescriptions     Pending Prescriptions Disp Refills    rosuvastatin (CRESTOR) 40 MG tablet [Pharmacy Med Name: ROSUVASTATIN 40MG TABLETS] 90 tablet 3     Sig: TAKE 1 TABLET BY MOUTH DAILY     Last Filled:  10/18/2023    Last appt: 7/10/2024   Next appt: Visit date not found    Last OARRS:        No data to display

## 2024-10-28 DIAGNOSIS — E03.9 ACQUIRED HYPOTHYROIDISM: ICD-10-CM

## 2024-10-28 RX ORDER — LEVOTHYROXINE SODIUM 50 UG/1
50 TABLET ORAL DAILY
Qty: 90 TABLET | Refills: 1 | Status: SHIPPED | OUTPATIENT
Start: 2024-10-28

## 2024-11-11 RX ORDER — AMLODIPINE AND BENAZEPRIL HYDROCHLORIDE 10; 20 MG/1; MG/1
1 CAPSULE ORAL DAILY
Qty: 90 CAPSULE | Refills: 3 | Status: SHIPPED | OUTPATIENT
Start: 2024-11-11

## 2024-11-11 NOTE — TELEPHONE ENCOUNTER
Medication:   Requested Prescriptions     Pending Prescriptions Disp Refills    amLODIPine-benazepril (LOTREL) 10-20 MG per capsule [Pharmacy Med Name: AMLODIPINE-BENAZ 10/20MG CAPSULES] 90 capsule 3     Sig: TAKE 1 CAPSULE BY MOUTH DAILY     Last Filled:  11/21/2023    Last appt: 7/10/2024   Next appt: Visit date not found    Last OARRS:        No data to display

## 2024-12-03 RX ORDER — HYDROCHLOROTHIAZIDE 12.5 MG/1
12.5 CAPSULE ORAL EVERY MORNING
Qty: 90 CAPSULE | Refills: 3 | Status: SHIPPED | OUTPATIENT
Start: 2024-12-03

## 2024-12-03 NOTE — TELEPHONE ENCOUNTER
Medication:   Requested Prescriptions     Pending Prescriptions Disp Refills    hydroCHLOROthiazide 12.5 MG capsule [Pharmacy Med Name: HYDROCHLOROTHIAZIDE 12.5MG CAPSULES] 90 capsule 3     Sig: TAKE 1 CAPSULE BY MOUTH EVERY MORNING     Last Filled:  12/07/2023    Last appt: 7/10/2024   Next appt: Visit date not found    Last OARRS:        No data to display

## 2025-01-04 DIAGNOSIS — Z79.4 TYPE 2 DIABETES MELLITUS WITHOUT COMPLICATION, WITH LONG-TERM CURRENT USE OF INSULIN (HCC): ICD-10-CM

## 2025-01-04 DIAGNOSIS — I25.10 CORONARY ARTERY DISEASE INVOLVING NATIVE CORONARY ARTERY OF NATIVE HEART WITHOUT ANGINA PECTORIS: ICD-10-CM

## 2025-01-04 DIAGNOSIS — E11.9 TYPE 2 DIABETES MELLITUS WITHOUT COMPLICATION, WITH LONG-TERM CURRENT USE OF INSULIN (HCC): ICD-10-CM

## 2025-01-04 LAB
ALBUMIN SERPL-MCNC: 4.4 G/DL (ref 3.4–5)
ALBUMIN/GLOB SERPL: 1.9 {RATIO} (ref 1.1–2.2)
ALP SERPL-CCNC: 61 U/L (ref 40–129)
ALT SERPL-CCNC: 31 U/L (ref 10–40)
ANION GAP SERPL CALCULATED.3IONS-SCNC: 11 MMOL/L (ref 3–16)
AST SERPL-CCNC: 23 U/L (ref 15–37)
BILIRUB SERPL-MCNC: 0.3 MG/DL (ref 0–1)
BUN SERPL-MCNC: 14 MG/DL (ref 7–20)
CALCIUM SERPL-MCNC: 9.7 MG/DL (ref 8.3–10.6)
CHLORIDE SERPL-SCNC: 102 MMOL/L (ref 99–110)
CHOLEST SERPL-MCNC: 130 MG/DL (ref 0–199)
CO2 SERPL-SCNC: 27 MMOL/L (ref 21–32)
CREAT SERPL-MCNC: 0.8 MG/DL (ref 0.8–1.3)
CREAT UR-MCNC: 95.8 MG/DL (ref 39–259)
GFR SERPLBLD CREATININE-BSD FMLA CKD-EPI: >90 ML/MIN/{1.73_M2}
GLUCOSE SERPL-MCNC: 123 MG/DL (ref 70–99)
HDLC SERPL-MCNC: 44 MG/DL (ref 40–60)
LDLC SERPL CALC-MCNC: 68 MG/DL
MICROALBUMIN UR DL<=1MG/L-MCNC: 2.62 MG/DL
MICROALBUMIN/CREAT UR: 27.3 MG/G (ref 0–30)
POTASSIUM SERPL-SCNC: 4.5 MMOL/L (ref 3.5–5.1)
PROT SERPL-MCNC: 6.7 G/DL (ref 6.4–8.2)
SODIUM SERPL-SCNC: 140 MMOL/L (ref 136–145)
TRIGL SERPL-MCNC: 88 MG/DL (ref 0–150)
TSH SERPL DL<=0.005 MIU/L-ACNC: 1.66 UIU/ML (ref 0.27–4.2)
VLDLC SERPL CALC-MCNC: 18 MG/DL

## 2025-01-05 LAB
EST. AVERAGE GLUCOSE BLD GHB EST-MCNC: 159.9 MG/DL
HBA1C MFR BLD: 7.2 %

## 2025-01-13 ENCOUNTER — OFFICE VISIT (OUTPATIENT)
Dept: ENDOCRINOLOGY | Age: 75
End: 2025-01-13
Payer: MEDICARE

## 2025-01-13 VITALS
HEART RATE: 65 BPM | DIASTOLIC BLOOD PRESSURE: 88 MMHG | WEIGHT: 236 LBS | SYSTOLIC BLOOD PRESSURE: 130 MMHG | BODY MASS INDEX: 29.34 KG/M2 | HEIGHT: 75 IN

## 2025-01-13 DIAGNOSIS — Z79.4 TYPE 2 DIABETES MELLITUS WITHOUT COMPLICATION, WITH LONG-TERM CURRENT USE OF INSULIN (HCC): ICD-10-CM

## 2025-01-13 DIAGNOSIS — E11.9 TYPE 2 DIABETES MELLITUS WITHOUT COMPLICATION, WITH LONG-TERM CURRENT USE OF INSULIN (HCC): ICD-10-CM

## 2025-01-13 PROCEDURE — 1159F MED LIST DOCD IN RCRD: CPT | Performed by: INTERNAL MEDICINE

## 2025-01-13 PROCEDURE — 99214 OFFICE O/P EST MOD 30 MIN: CPT | Performed by: INTERNAL MEDICINE

## 2025-01-13 PROCEDURE — 2022F DILAT RTA XM EVC RTNOPTHY: CPT | Performed by: INTERNAL MEDICINE

## 2025-01-13 PROCEDURE — G8419 CALC BMI OUT NRM PARAM NOF/U: HCPCS | Performed by: INTERNAL MEDICINE

## 2025-01-13 PROCEDURE — 3075F SYST BP GE 130 - 139MM HG: CPT | Performed by: INTERNAL MEDICINE

## 2025-01-13 PROCEDURE — 3051F HG A1C>EQUAL 7.0%<8.0%: CPT | Performed by: INTERNAL MEDICINE

## 2025-01-13 PROCEDURE — 3017F COLORECTAL CA SCREEN DOC REV: CPT | Performed by: INTERNAL MEDICINE

## 2025-01-13 PROCEDURE — 95251 CONT GLUC MNTR ANALYSIS I&R: CPT | Performed by: INTERNAL MEDICINE

## 2025-01-13 PROCEDURE — G8427 DOCREV CUR MEDS BY ELIG CLIN: HCPCS | Performed by: INTERNAL MEDICINE

## 2025-01-13 PROCEDURE — 1036F TOBACCO NON-USER: CPT | Performed by: INTERNAL MEDICINE

## 2025-01-13 PROCEDURE — 1123F ACP DISCUSS/DSCN MKR DOCD: CPT | Performed by: INTERNAL MEDICINE

## 2025-01-13 PROCEDURE — 1160F RVW MEDS BY RX/DR IN RCRD: CPT | Performed by: INTERNAL MEDICINE

## 2025-01-13 PROCEDURE — 3079F DIAST BP 80-89 MM HG: CPT | Performed by: INTERNAL MEDICINE

## 2025-01-13 RX ORDER — INSULIN LISPRO 100 [IU]/ML
INJECTION, SOLUTION INTRAVENOUS; SUBCUTANEOUS
Qty: 15 ML | Refills: 5 | Status: SHIPPED | OUTPATIENT
Start: 2025-01-13

## 2025-01-13 NOTE — PROGRESS NOTES
-----------------------------  Dexcom Clarity  -----------------------------  Hernandez Dorina  YOB: 1950  Generated at: Mon, Jan 13, 2025 2:37 PM EST  Reporting period: Tue Oct 1, 2024 - Mon Oct 14, 2024  -----------------------------  Glucose Details  Average glucose: 161 mg/dL  Standard deviation: 51 mg/dL  GMI: 7.2%  -----------------------------  Time in Range  Very High: 6%  High: 29%  In Range: 65%  Low: 0%  Very Low: 0%  Target Range   mg/dL    -----------------------------  CGM Details  Sensor usage: 100%  Days with CGM data: 14/14

## 2025-01-13 NOTE — PROGRESS NOTES
Pt is a 74 y.o. male seen management of uncontrolled type 2 diabetes and acquired hypothyroidism     diabetes was diagnosed at routine screening. Hernandez Cam got diabetic education in the past when he was first diagnosed with diabetes.  Comorbid conditions: Neuropathy and Coronary Artery Disease     previous  diabetic medications include: Actos 30 milligrams, metformin thousand twice a day, Amaryl 4 milligrams daily    He has CAD s/p stents age 50, follows with cardiology  Has hypertension and is on medication well controlled   He has hyperlipidemia and is on medication   Hypothyroidism diagnosed in April 2023 started on levothyroxine 50 mcg daily.    INTERIM:      Is having some fasting hypoglycemia  Is not interested in GLP-1 agonist as yet as it cost his wife $300 a month for at least 6 months and to a year and that is expensive for them.  Tells me that he has been working on his diet    Past Medical History:   Diagnosis Date    CAD (coronary artery disease)     Chronic back pain     lumbar disk disease    Colorectal polyps     Diabetic ulcer of toe of left foot associated with type 2 diabetes mellitus, limited to breakdown of skin (HCC) 05/20/2019    Erectile dysfunction     Hyperlipidemia     Hypertension     Hypertrophy of prostate without urinary obstruction and other lower urinary tract symptoms (LUTS)     Penetrating foot wound 2019    L foot     Retrograde ejaculation     Sciatica     Shoulder injury 05/2022    left    Type 2 diabetes mellitus with diabetic neuropathy, with long-term current use of insulin (HCC)     Type II or unspecified type diabetes mellitus without mention of complication, not stated as uncontrolled       Patient Active Problem List   Diagnosis    Type 2 diabetes mellitus with diabetic neuropathy, with long-term current use of insulin (HCC)    Pure hypercholesterolemia    Essential hypertension    Coronary atherosclerosis of native coronary artery    Fungal dermatitis    Hx of

## 2025-01-15 RX ORDER — FLURBIPROFEN SODIUM 0.3 MG/ML
SOLUTION/ DROPS OPHTHALMIC
Qty: 200 EACH | Refills: 1 | Status: SHIPPED | OUTPATIENT
Start: 2025-01-15

## 2025-01-31 DIAGNOSIS — E03.9 ACQUIRED HYPOTHYROIDISM: ICD-10-CM

## 2025-01-31 RX ORDER — LEVOTHYROXINE SODIUM 50 UG/1
50 TABLET ORAL DAILY
Qty: 90 TABLET | Refills: 1 | Status: SHIPPED | OUTPATIENT
Start: 2025-01-31

## 2025-02-01 DIAGNOSIS — E11.9 TYPE 2 DIABETES MELLITUS WITHOUT COMPLICATION, WITH LONG-TERM CURRENT USE OF INSULIN (HCC): ICD-10-CM

## 2025-02-01 DIAGNOSIS — Z79.4 TYPE 2 DIABETES MELLITUS WITHOUT COMPLICATION, WITH LONG-TERM CURRENT USE OF INSULIN (HCC): ICD-10-CM

## 2025-02-03 RX ORDER — INSULIN GLARGINE 100 [IU]/ML
INJECTION, SOLUTION SUBCUTANEOUS
Qty: 15 ML | Refills: 1 | Status: SHIPPED | OUTPATIENT
Start: 2025-02-03

## 2025-02-14 DIAGNOSIS — I25.10 CORONARY ARTERY DISEASE INVOLVING NATIVE CORONARY ARTERY OF NATIVE HEART WITHOUT ANGINA PECTORIS: ICD-10-CM

## 2025-02-14 RX ORDER — EMPAGLIFLOZIN, METFORMIN HYDROCHLORIDE 12.5; 1 MG/1; MG/1
TABLET, EXTENDED RELEASE ORAL
Qty: 60 TABLET | Refills: 5 | Status: SHIPPED | OUTPATIENT
Start: 2025-02-14

## 2025-03-06 ENCOUNTER — OFFICE VISIT (OUTPATIENT)
Dept: PRIMARY CARE CLINIC | Age: 75
End: 2025-03-06
Payer: MEDICARE

## 2025-03-06 VITALS
RESPIRATION RATE: 12 BRPM | OXYGEN SATURATION: 99 % | SYSTOLIC BLOOD PRESSURE: 120 MMHG | HEART RATE: 84 BPM | TEMPERATURE: 98.1 F | DIASTOLIC BLOOD PRESSURE: 72 MMHG | WEIGHT: 230 LBS | BODY MASS INDEX: 28.75 KG/M2

## 2025-03-06 DIAGNOSIS — J06.9 URI WITH COUGH AND CONGESTION: Primary | ICD-10-CM

## 2025-03-06 PROCEDURE — 1159F MED LIST DOCD IN RCRD: CPT | Performed by: INTERNAL MEDICINE

## 2025-03-06 PROCEDURE — 99213 OFFICE O/P EST LOW 20 MIN: CPT | Performed by: INTERNAL MEDICINE

## 2025-03-06 PROCEDURE — G8419 CALC BMI OUT NRM PARAM NOF/U: HCPCS | Performed by: INTERNAL MEDICINE

## 2025-03-06 PROCEDURE — 3074F SYST BP LT 130 MM HG: CPT | Performed by: INTERNAL MEDICINE

## 2025-03-06 PROCEDURE — 1123F ACP DISCUSS/DSCN MKR DOCD: CPT | Performed by: INTERNAL MEDICINE

## 2025-03-06 PROCEDURE — G8427 DOCREV CUR MEDS BY ELIG CLIN: HCPCS | Performed by: INTERNAL MEDICINE

## 2025-03-06 PROCEDURE — 3017F COLORECTAL CA SCREEN DOC REV: CPT | Performed by: INTERNAL MEDICINE

## 2025-03-06 PROCEDURE — 1036F TOBACCO NON-USER: CPT | Performed by: INTERNAL MEDICINE

## 2025-03-06 PROCEDURE — 3078F DIAST BP <80 MM HG: CPT | Performed by: INTERNAL MEDICINE

## 2025-03-06 RX ORDER — GUAIFENESIN AND PSEUDOEPHEDRINE HCL 1200; 120 MG/1; MG/1
TABLET, EXTENDED RELEASE ORAL
Qty: 20 TABLET | Refills: 0 | Status: SHIPPED | OUTPATIENT
Start: 2025-03-06

## 2025-03-06 RX ORDER — AZITHROMYCIN 250 MG/1
TABLET, FILM COATED ORAL
Qty: 1 PACKET | Refills: 0 | Status: SHIPPED | OUTPATIENT
Start: 2025-03-06

## 2025-03-06 SDOH — ECONOMIC STABILITY: FOOD INSECURITY: WITHIN THE PAST 12 MONTHS, YOU WORRIED THAT YOUR FOOD WOULD RUN OUT BEFORE YOU GOT MONEY TO BUY MORE.: NEVER TRUE

## 2025-03-06 SDOH — ECONOMIC STABILITY: FOOD INSECURITY: WITHIN THE PAST 12 MONTHS, THE FOOD YOU BOUGHT JUST DIDN'T LAST AND YOU DIDN'T HAVE MONEY TO GET MORE.: NEVER TRUE

## 2025-03-06 ASSESSMENT — PATIENT HEALTH QUESTIONNAIRE - PHQ9
SUM OF ALL RESPONSES TO PHQ QUESTIONS 1-9: 0
1. LITTLE INTEREST OR PLEASURE IN DOING THINGS: NOT AT ALL
SUM OF ALL RESPONSES TO PHQ QUESTIONS 1-9: 0
2. FEELING DOWN, DEPRESSED OR HOPELESS: NOT AT ALL

## 2025-03-06 NOTE — PROGRESS NOTES
Subjective  Patient complains of  cough for 3-5 days. Also complains of pain and discomfort in both the ears, decreased hearing. Some hoarseness, Cough is productive with phlegm production and is colored. Some fever and chills. Also has body aches and fatigue. Feels very weak. Symptoms are getting worse.Denies shortness of breath or wheezing. Has had sore throat as well. Tried otc decongestants but did not help.   Allergies   Allergen Reactions    Other      Bee stings        Objective  /72 (Site: Left Upper Arm, Position: Sitting, Cuff Size: Large Adult)   Pulse 84   Temp 98.1 °F (36.7 °C)   Resp 12   Wt 104.3 kg (230 lb)   SpO2 99%   BMI 28.75 kg/m²    Patient appears mildly ill, temp as noted above. Eyes appear normal.Ears show some tympanic membrane erythema and bulge. Throat shows posterior pharyngeal erythema and drainage. Nasal passages are congested. Mild to moderate maxillary sinus tenderness is noted. No significant neck adenopathy. Lungs are clear to auscultation. No rashes noted.  Assessment  Acute upper respiratory infection  Plan  Start antibiotics and over-the counter decongestants. Consume a lot of fluids, rest and call if no better in 5 days, or if new symptoms develop.

## 2025-03-28 DIAGNOSIS — E11.9 TYPE 2 DIABETES MELLITUS WITHOUT COMPLICATION, WITH LONG-TERM CURRENT USE OF INSULIN: ICD-10-CM

## 2025-03-28 DIAGNOSIS — Z79.4 TYPE 2 DIABETES MELLITUS WITHOUT COMPLICATION, WITH LONG-TERM CURRENT USE OF INSULIN: ICD-10-CM

## 2025-03-28 RX ORDER — INSULIN GLARGINE 100 [IU]/ML
INJECTION, SOLUTION SUBCUTANEOUS
Qty: 15 ML | Refills: 1 | Status: SHIPPED | OUTPATIENT
Start: 2025-03-28

## 2025-04-05 DIAGNOSIS — Z79.4 TYPE 2 DIABETES MELLITUS WITHOUT COMPLICATION, WITH LONG-TERM CURRENT USE OF INSULIN: ICD-10-CM

## 2025-04-05 DIAGNOSIS — E11.9 TYPE 2 DIABETES MELLITUS WITHOUT COMPLICATION, WITH LONG-TERM CURRENT USE OF INSULIN: ICD-10-CM

## 2025-04-05 LAB
ALBUMIN SERPL-MCNC: 4.4 G/DL (ref 3.4–5)
ALBUMIN/GLOB SERPL: 1.8 {RATIO} (ref 1.1–2.2)
ALP SERPL-CCNC: 59 U/L (ref 40–129)
ALT SERPL-CCNC: 25 U/L (ref 10–40)
ANION GAP SERPL CALCULATED.3IONS-SCNC: 11 MMOL/L (ref 3–16)
AST SERPL-CCNC: 22 U/L (ref 15–37)
BILIRUB SERPL-MCNC: 0.5 MG/DL (ref 0–1)
BUN SERPL-MCNC: 17 MG/DL (ref 7–20)
CALCIUM SERPL-MCNC: 9.8 MG/DL (ref 8.3–10.6)
CHLORIDE SERPL-SCNC: 103 MMOL/L (ref 99–110)
CHOLEST SERPL-MCNC: 150 MG/DL (ref 0–199)
CO2 SERPL-SCNC: 26 MMOL/L (ref 21–32)
CREAT SERPL-MCNC: 0.8 MG/DL (ref 0.8–1.3)
CREAT UR-MCNC: 140 MG/DL (ref 39–259)
GFR SERPLBLD CREATININE-BSD FMLA CKD-EPI: >90 ML/MIN/{1.73_M2}
GLUCOSE SERPL-MCNC: 101 MG/DL (ref 70–99)
HDLC SERPL-MCNC: 48 MG/DL (ref 40–60)
LDLC SERPL CALC-MCNC: 78 MG/DL
MICROALBUMIN UR DL<=1MG/L-MCNC: 4.02 MG/DL
MICROALBUMIN/CREAT UR: 28.7 MG/G (ref 0–30)
POTASSIUM SERPL-SCNC: 4.3 MMOL/L (ref 3.5–5.1)
PROT SERPL-MCNC: 6.8 G/DL (ref 6.4–8.2)
SODIUM SERPL-SCNC: 140 MMOL/L (ref 136–145)
TRIGL SERPL-MCNC: 119 MG/DL (ref 0–150)
TSH SERPL DL<=0.005 MIU/L-ACNC: 2.3 UIU/ML (ref 0.27–4.2)
VLDLC SERPL CALC-MCNC: 24 MG/DL

## 2025-04-06 LAB
EST. AVERAGE GLUCOSE BLD GHB EST-MCNC: 154.2 MG/DL
HBA1C MFR BLD: 7 %

## 2025-04-28 ENCOUNTER — OFFICE VISIT (OUTPATIENT)
Dept: ENDOCRINOLOGY | Age: 75
End: 2025-04-28
Payer: MEDICARE

## 2025-04-28 VITALS
HEART RATE: 62 BPM | BODY MASS INDEX: 28.85 KG/M2 | HEIGHT: 75 IN | SYSTOLIC BLOOD PRESSURE: 145 MMHG | WEIGHT: 232 LBS | DIASTOLIC BLOOD PRESSURE: 80 MMHG

## 2025-04-28 DIAGNOSIS — E78.00 PURE HYPERCHOLESTEROLEMIA: ICD-10-CM

## 2025-04-28 DIAGNOSIS — E11.40 TYPE 2 DIABETES MELLITUS WITH DIABETIC NEUROPATHY, WITH LONG-TERM CURRENT USE OF INSULIN (HCC): Primary | ICD-10-CM

## 2025-04-28 DIAGNOSIS — I25.10 ATHEROSCLEROSIS OF NATIVE CORONARY ARTERY OF NATIVE HEART WITHOUT ANGINA PECTORIS: ICD-10-CM

## 2025-04-28 DIAGNOSIS — I10 ESSENTIAL HYPERTENSION: ICD-10-CM

## 2025-04-28 DIAGNOSIS — Z79.4 TYPE 2 DIABETES MELLITUS WITH DIABETIC NEUROPATHY, WITH LONG-TERM CURRENT USE OF INSULIN (HCC): Primary | ICD-10-CM

## 2025-04-28 PROCEDURE — 1036F TOBACCO NON-USER: CPT | Performed by: INTERNAL MEDICINE

## 2025-04-28 PROCEDURE — 1160F RVW MEDS BY RX/DR IN RCRD: CPT | Performed by: INTERNAL MEDICINE

## 2025-04-28 PROCEDURE — 3017F COLORECTAL CA SCREEN DOC REV: CPT | Performed by: INTERNAL MEDICINE

## 2025-04-28 PROCEDURE — 1159F MED LIST DOCD IN RCRD: CPT | Performed by: INTERNAL MEDICINE

## 2025-04-28 PROCEDURE — 2022F DILAT RTA XM EVC RTNOPTHY: CPT | Performed by: INTERNAL MEDICINE

## 2025-04-28 PROCEDURE — G8427 DOCREV CUR MEDS BY ELIG CLIN: HCPCS | Performed by: INTERNAL MEDICINE

## 2025-04-28 PROCEDURE — G8419 CALC BMI OUT NRM PARAM NOF/U: HCPCS | Performed by: INTERNAL MEDICINE

## 2025-04-28 PROCEDURE — 3077F SYST BP >= 140 MM HG: CPT | Performed by: INTERNAL MEDICINE

## 2025-04-28 PROCEDURE — 3051F HG A1C>EQUAL 7.0%<8.0%: CPT | Performed by: INTERNAL MEDICINE

## 2025-04-28 PROCEDURE — 3079F DIAST BP 80-89 MM HG: CPT | Performed by: INTERNAL MEDICINE

## 2025-04-28 PROCEDURE — 1123F ACP DISCUSS/DSCN MKR DOCD: CPT | Performed by: INTERNAL MEDICINE

## 2025-04-28 PROCEDURE — 99214 OFFICE O/P EST MOD 30 MIN: CPT | Performed by: INTERNAL MEDICINE

## 2025-04-28 NOTE — PROGRESS NOTES
smoking    Colon polyps    Left hip pain    Carotid stenosis, symptomatic w/o infarct, right    Falls    Cerebrovascular disease    Ulnar neuropathy of left upper extremity    Anterior dislocation of left shoulder    Numbness and tingling in left arm    Left shoulder pain    Traumatic closed displaced fracture of left shoulder with anterior dislocation    Benign prostatic hyperplasia with urinary hesitancy    Mixed hyperlipidemia     Past Surgical History:   Procedure Laterality Date    CAROTID ENDARTERECTOMY Right 2018    RIGHT CAROTID ENDARTERECTOMY WITH INTRA OP DUPLEX SCAN performed by Cristian Porter MD at [facility] OR    CORONARY ANGIOPLASTY      x2    CYST REMOVAL      from back and finger x2    TONSILLECTOMY AND ADENOIDECTOMY       Social History     Socioeconomic History    Marital status:      Spouse name: Not on file    Number of children: Not on file    Years of education: Not on file    Highest education level: Not on file   Occupational History    Not on file   Tobacco Use    Smoking status: Former     Current packs/day: 0.00     Average packs/day: 1 pack/day for 48.2 years (48.2 ttl pk-yrs)     Types: Cigarettes     Start date: 1959     Quit date: 2007     Years since quittin.4    Smokeless tobacco: Never   Vaping Use    Vaping status: Never Used   Substance and Sexual Activity    Alcohol use: Yes     Comment: social    Drug use: No    Sexual activity: Yes     Partners: Female   Other Topics Concern    Not on file   Social History Narrative    Not on file     Social Drivers of Health     Financial Resource Strain: Low Risk  (10/18/2023)    Overall Financial Resource Strain (CARDIA)     Difficulty of Paying Living Expenses: Not hard at all   Food Insecurity: No Food Insecurity (3/6/2025)    Hunger Vital Sign     Worried About Running Out of Food in the Last Year: Never true     Ran Out of Food in the Last Year: Never true   Transportation Needs: No Transportation Needs

## 2025-05-25 DIAGNOSIS — Z79.4 TYPE 2 DIABETES MELLITUS WITHOUT COMPLICATION, WITH LONG-TERM CURRENT USE OF INSULIN (HCC): ICD-10-CM

## 2025-05-25 DIAGNOSIS — E11.9 TYPE 2 DIABETES MELLITUS WITHOUT COMPLICATION, WITH LONG-TERM CURRENT USE OF INSULIN (HCC): ICD-10-CM

## 2025-05-27 RX ORDER — INSULIN GLARGINE 100 [IU]/ML
INJECTION, SOLUTION SUBCUTANEOUS
Qty: 15 ML | Refills: 1 | Status: SHIPPED | OUTPATIENT
Start: 2025-05-27

## 2025-06-10 ENCOUNTER — TELEPHONE (OUTPATIENT)
Dept: PRIMARY CARE CLINIC | Age: 75
End: 2025-06-10

## 2025-06-10 NOTE — TELEPHONE ENCOUNTER
Bernadine- [facility] call NP- [phone]      Bernadine called on behalf of pt and stated that she did a leg test with pt and it showed mild PAD in left leg. Number was 0.70.

## 2025-06-12 ENCOUNTER — TELEPHONE (OUTPATIENT)
Dept: ENDOCRINOLOGY | Age: 75
End: 2025-06-12

## 2025-06-28 DIAGNOSIS — Z79.4 TYPE 2 DIABETES MELLITUS WITHOUT COMPLICATION, WITH LONG-TERM CURRENT USE OF INSULIN (HCC): ICD-10-CM

## 2025-06-28 DIAGNOSIS — E11.9 TYPE 2 DIABETES MELLITUS WITHOUT COMPLICATION, WITH LONG-TERM CURRENT USE OF INSULIN (HCC): ICD-10-CM

## 2025-06-28 LAB
ALBUMIN SERPL-MCNC: 4.4 G/DL (ref 3.4–5)
ALBUMIN/GLOB SERPL: 1.8 {RATIO} (ref 1.1–2.2)
ALP SERPL-CCNC: 62 U/L (ref 40–129)
ALT SERPL-CCNC: 27 U/L (ref 10–40)
ANION GAP SERPL CALCULATED.3IONS-SCNC: 11 MMOL/L (ref 3–16)
AST SERPL-CCNC: 20 U/L (ref 15–37)
BILIRUB SERPL-MCNC: 0.5 MG/DL (ref 0–1)
BUN SERPL-MCNC: 14 MG/DL (ref 7–20)
CALCIUM SERPL-MCNC: 9.7 MG/DL (ref 8.3–10.6)
CHLORIDE SERPL-SCNC: 101 MMOL/L (ref 99–110)
CHOLEST SERPL-MCNC: 130 MG/DL (ref 0–199)
CO2 SERPL-SCNC: 26 MMOL/L (ref 21–32)
CREAT SERPL-MCNC: 0.8 MG/DL (ref 0.8–1.3)
CREAT UR-MCNC: 103 MG/DL (ref 39–259)
GFR SERPLBLD CREATININE-BSD FMLA CKD-EPI: >90 ML/MIN/{1.73_M2}
GLUCOSE SERPL-MCNC: 137 MG/DL (ref 70–99)
HDLC SERPL-MCNC: 42 MG/DL (ref 40–60)
LDLC SERPL CALC-MCNC: 63 MG/DL
MICROALBUMIN UR DL<=1MG/L-MCNC: 1.66 MG/DL
MICROALBUMIN/CREAT UR: 16.1 MG/G (ref 0–30)
POTASSIUM SERPL-SCNC: 4.2 MMOL/L (ref 3.5–5.1)
PROT SERPL-MCNC: 6.8 G/DL (ref 6.4–8.2)
SODIUM SERPL-SCNC: 138 MMOL/L (ref 136–145)
TRIGL SERPL-MCNC: 127 MG/DL (ref 0–150)
TSH SERPL DL<=0.005 MIU/L-ACNC: 2.73 UIU/ML (ref 0.27–4.2)
VLDLC SERPL CALC-MCNC: 25 MG/DL

## 2025-06-29 LAB
EST. AVERAGE GLUCOSE BLD GHB EST-MCNC: 157.1 MG/DL
HBA1C MFR BLD: 7.1 %

## 2025-07-07 ENCOUNTER — OFFICE VISIT (OUTPATIENT)
Dept: ENDOCRINOLOGY | Age: 75
End: 2025-07-07
Payer: MEDICARE

## 2025-07-07 VITALS
DIASTOLIC BLOOD PRESSURE: 73 MMHG | SYSTOLIC BLOOD PRESSURE: 118 MMHG | BODY MASS INDEX: 28.72 KG/M2 | WEIGHT: 231 LBS | HEART RATE: 63 BPM | HEIGHT: 75 IN

## 2025-07-07 DIAGNOSIS — Z79.4 TYPE 2 DIABETES MELLITUS WITH DIABETIC NEUROPATHY, WITH LONG-TERM CURRENT USE OF INSULIN (HCC): Primary | ICD-10-CM

## 2025-07-07 DIAGNOSIS — E78.00 PURE HYPERCHOLESTEROLEMIA: ICD-10-CM

## 2025-07-07 DIAGNOSIS — I10 ESSENTIAL HYPERTENSION: ICD-10-CM

## 2025-07-07 DIAGNOSIS — E11.40 TYPE 2 DIABETES MELLITUS WITH DIABETIC NEUROPATHY, WITH LONG-TERM CURRENT USE OF INSULIN (HCC): Primary | ICD-10-CM

## 2025-07-07 DIAGNOSIS — I25.10 ATHEROSCLEROSIS OF NATIVE CORONARY ARTERY OF NATIVE HEART WITHOUT ANGINA PECTORIS: ICD-10-CM

## 2025-07-07 PROCEDURE — 2022F DILAT RTA XM EVC RTNOPTHY: CPT | Performed by: INTERNAL MEDICINE

## 2025-07-07 PROCEDURE — 1123F ACP DISCUSS/DSCN MKR DOCD: CPT | Performed by: INTERNAL MEDICINE

## 2025-07-07 PROCEDURE — 3017F COLORECTAL CA SCREEN DOC REV: CPT | Performed by: INTERNAL MEDICINE

## 2025-07-07 PROCEDURE — G8427 DOCREV CUR MEDS BY ELIG CLIN: HCPCS | Performed by: INTERNAL MEDICINE

## 2025-07-07 PROCEDURE — 3051F HG A1C>EQUAL 7.0%<8.0%: CPT | Performed by: INTERNAL MEDICINE

## 2025-07-07 PROCEDURE — 1159F MED LIST DOCD IN RCRD: CPT | Performed by: INTERNAL MEDICINE

## 2025-07-07 PROCEDURE — 1036F TOBACCO NON-USER: CPT | Performed by: INTERNAL MEDICINE

## 2025-07-07 PROCEDURE — G8419 CALC BMI OUT NRM PARAM NOF/U: HCPCS | Performed by: INTERNAL MEDICINE

## 2025-07-07 PROCEDURE — 3078F DIAST BP <80 MM HG: CPT | Performed by: INTERNAL MEDICINE

## 2025-07-07 PROCEDURE — 95251 CONT GLUC MNTR ANALYSIS I&R: CPT | Performed by: INTERNAL MEDICINE

## 2025-07-07 PROCEDURE — 3074F SYST BP LT 130 MM HG: CPT | Performed by: INTERNAL MEDICINE

## 2025-07-07 PROCEDURE — 99214 OFFICE O/P EST MOD 30 MIN: CPT | Performed by: INTERNAL MEDICINE

## 2025-07-07 NOTE — PROGRESS NOTES
-----------------------------  Dexcom Clarity  -----------------------------  Hernandez Dorina    YOB: 1950    Generated at: Mon, Jul 7, 2025 3:28 PM EDT    Reporting period: Tue Jun 24, 2025 - Mon Jul 7, 2025  -----------------------------  Glucose Details    Average glucose: 155 mg/dL    GMI: 7.0%    Standard deviation: 46 mg/dL    Coefficient of Variation: 29.3%  -----------------------------  Time in Range    Very High: 4%    High: 23%    In Range: 72%    Low: 1%    Very Low: <1%    Target Range   mg/dL    -----------------------------  Sensor usage    Days with data: 14/14    Time active: 97%    Avg. calibrations per day: 0.2    
FOUR TIMES DAILY TO INJECT INSULIN 200 each 1    insulin lispro, 1 Unit Dial, (HUMALOG/ADMELOG) 100 UNIT/ML SOPN INJECT up to 15 UNITS UNDER THE SKIN WITH EACH MEAL 15 mL 5    hydroCHLOROthiazide 12.5 MG capsule TAKE 1 CAPSULE BY MOUTH EVERY MORNING 90 capsule 3    amLODIPine-benazepril (LOTREL) 10-20 MG per capsule TAKE 1 CAPSULE BY MOUTH DAILY 90 capsule 3    rosuvastatin (CRESTOR) 40 MG tablet TAKE 1 TABLET BY MOUTH DAILY 90 tablet 3    tamsulosin (FLOMAX) 0.4 MG capsule TAKE 2 CAPSULES BY MOUTH DAILY 180 capsule 3    ezetimibe (ZETIA) 10 MG tablet TAKE 1 TABLET BY MOUTH DAILY 90 tablet 3    Continuous Glucose  (DEXCOM G7 ) MOISE Use to Monitor Blood sugar 1 each 0    Lancets (ONETOUCH DELICA PLUS [id number]) MISC USE TO TEST 4 TO 6 TIMES DAILY 200 each 5    Continuous Blood Gluc Sensor (DEXCOM G7 SENSOR) MISC Change every 10 days 3 each 3    blood glucose test strips (ONETOUCH VERIO) strip TEST THREE TIMES DAILY AS DIRECTED 100 strip 5    Blood Glucose Monitoring Suppl (ONETOUCH VERIO FLEX SYSTEM) w/Device KIT Check blood sugar 1 kit 0    aspirin 81 MG EC tablet Take 1 tablet by mouth daily       No current facility-administered medications for this visit.     Allergies   Allergen Reactions    Other      Bee stings       Family Status   Relation Name Status    Mother      Father      Brother   at age 68    MGM  (Not Specified)    Brother   at age 68    Brother  Alive   No partnership data on file         OBJECTIVE:  /73   Pulse 63   Ht 1.905 m (6' 3\")   Wt 104.8 kg (231 lb)   BMI 28.87 kg/m²    Wt Readings from Last 3 Encounters:   25 104.8 kg (231 lb)   25 105.2 kg (232 lb)   25 104.3 kg (230 lb)       EXAM   Constitutional: no acute distress, well appearing, well nourished  Psychiatric: oriented to person, place and time, judgement, insight and normal, recent and remote memory and intact and mood, affect are normal  Skin: skin and

## 2025-07-07 NOTE — PATIENT INSTRUCTIONS
Patient Education        Hypoglycemia: Care Instructions  Overview  Hypoglycemia means that your blood sugar is low and your body isn’t getting enough fuel. Low blood sugar can be caused by too much insulin or certain medicines. Some people get low blood sugar from missing a meal or exercising too hard without eating enough food.    Know your signs of low blood sugar. They're different for everyone. Common early signs include nausea; hunger; and feeling nervous, irritable, or shaky.   It can help to check your blood sugar levels often. Take your insulin or other medicine as prescribed.   How can you care for yourself?   Use the \"rule of 15\" to treat low blood sugar.  Eat 15 grams of carbohydrate from a quick-sugar food (such as 3 or 4 glucose tablets or 1/2 cup of juice). Wait 15 minutes and check your blood sugar. Repeat if your blood sugar is still below 70 mg/dL.    Eat after your blood sugar is in a safe range.  A snack or meal can reduce symptoms and prevent low blood sugar from coming back.    Tell friends, family, and coworkers how they can help you.  Make sure that they know the symptoms of low blood sugar and how to help you get your sugar levels up.    If you have glucagon, keep it with you.  Make sure that your friends, family, and coworkers know how to use it.   When should you call for help?    Call 911 anytime you think you may need emergency care. For example, call if:  You passed out (lost consciousness).  You are confused or cannot think clearly.  Your blood sugar is very high or very low.  Watch closely for changes in your health, and be sure to contact your doctor if:  Your blood sugar stays outside the level your doctor set for you.  You have any problems.  Where can you learn more?  Go to https://www.[employer].net/patientEd and enter R955 to learn more about \"Hypoglycemia: Care Instructions.\"  Current as of: April 30, 2024  Content Version: 14.5  © 1170-3509 [facility] [employer].   Care

## 2025-07-20 DIAGNOSIS — Z79.4 TYPE 2 DIABETES MELLITUS WITHOUT COMPLICATION, WITH LONG-TERM CURRENT USE OF INSULIN (HCC): ICD-10-CM

## 2025-07-20 DIAGNOSIS — E11.9 TYPE 2 DIABETES MELLITUS WITHOUT COMPLICATION, WITH LONG-TERM CURRENT USE OF INSULIN (HCC): ICD-10-CM

## 2025-07-21 RX ORDER — INSULIN GLARGINE 100 [IU]/ML
INJECTION, SOLUTION SUBCUTANEOUS
Qty: 15 ML | Refills: 1 | Status: SHIPPED | OUTPATIENT
Start: 2025-07-21

## 2025-08-06 RX ORDER — AMLODIPINE AND BENAZEPRIL HYDROCHLORIDE 10; 20 MG/1; MG/1
1 CAPSULE ORAL DAILY
Qty: 90 CAPSULE | Refills: 0 | Status: SHIPPED | OUTPATIENT
Start: 2025-08-06

## 2025-08-11 RX ORDER — TAMSULOSIN HYDROCHLORIDE 0.4 MG/1
0.8 CAPSULE ORAL DAILY
Qty: 180 CAPSULE | Refills: 3 | Status: SHIPPED | OUTPATIENT
Start: 2025-08-11

## 2025-08-15 DIAGNOSIS — I25.10 CORONARY ARTERY DISEASE INVOLVING NATIVE CORONARY ARTERY OF NATIVE HEART WITHOUT ANGINA PECTORIS: ICD-10-CM

## 2025-08-15 RX ORDER — EMPAGLIFLOZIN, METFORMIN HYDROCHLORIDE 12.5; 1 MG/1; MG/1
1 TABLET, EXTENDED RELEASE ORAL 2 TIMES DAILY
Qty: 60 TABLET | Refills: 2 | Status: SHIPPED | OUTPATIENT
Start: 2025-08-15

## 2025-08-17 DIAGNOSIS — E03.9 ACQUIRED HYPOTHYROIDISM: ICD-10-CM

## 2025-08-18 RX ORDER — LEVOTHYROXINE SODIUM 50 UG/1
50 TABLET ORAL DAILY
Qty: 90 TABLET | Refills: 1 | Status: SHIPPED | OUTPATIENT
Start: 2025-08-18

## (undated) DEVICE — FOGARTY - HYDRAGRIP SURGICAL - CLAMP INSERTS: Brand: FOGARTY SOFTJAW

## (undated) DEVICE — TOWEL,OR,DSP,ST,BLUE,STD,6/PK,12PK/CS: Brand: MEDLINE

## (undated) DEVICE — SHEET,DRAPE,53X77,STERILE: Brand: MEDLINE

## (undated) DEVICE — SYRINGE TB 1ML NDL 27GA L0.5IN PLAS SLIP TIP CONVENTIONAL

## (undated) DEVICE — SUTURE PROL SZ 7-0 L24IN NONABSORBABLE BLU L8MM BV175-6 3/8 8735H

## (undated) DEVICE — SUTURE PERMAHAND SZ 2-0 L12X18IN NONABSORBABLE BLK SILK A185H

## (undated) DEVICE — HYPODERMIC SAFETY NEEDLE: Brand: MAGELLAN

## (undated) DEVICE — STERILE POLYISOPRENE POWDER-FREE SURGICAL GLOVES: Brand: PROTEXIS

## (undated) DEVICE — COTTON BALLS: Brand: DEROYAL

## (undated) DEVICE — INTENDED TO BE USED TO OCCLUDE, RETRACT AND IDENTIFY ARTERIES, VEINS, TENDONS AND NERVES IN SURGICAL PROCEDURES: Brand: STERION®  VESSEL LOOP

## (undated) DEVICE — SKIN AFFIX SURG ADHESIVE 72/CS 0.55ML: Brand: MEDLINE

## (undated) DEVICE — SOLUTION IV IRRIG POUR BRL 0.9% SODIUM CHL 2F7124

## (undated) DEVICE — SPONGE,PEANUT,XRAY,ST,SM,3/8",5/CARD: Brand: MEDLINE INDUSTRIES, INC.

## (undated) DEVICE — APPLIER CLP L9.38IN M LIG TI DISP STR RNG HNDL LIGACLP

## (undated) DEVICE — GAUZE,SPONGE,4"X4",8PLY,STRL,LF,10/TRAY: Brand: MEDLINE

## (undated) DEVICE — PROVE COVER: Brand: UNBRANDED

## (undated) DEVICE — PROTECTOR EYE PT SELF ADH NS OPT GRD LF

## (undated) DEVICE — 35 ML SYRINGE LUER-LOCK TIP: Brand: MONOJECT

## (undated) DEVICE — SUTURE VCRL SZ 3-0 L18IN ABSRB UD L26MM SH 1/2 CIR J864D

## (undated) DEVICE — SUTURE MCRYL SZ 4-0 L27IN ABSRB UD L19MM PS-2 1/2 CIR PRIM Y426H

## (undated) DEVICE — Device

## (undated) DEVICE — SUTURE PROL SZ 6-0 L24IN NONABSORBABLE BLU L13MM C-1 3/8 8726H

## (undated) DEVICE — SUTURE PERMAHAND SZ 3-0 L18IN NONABSORBABLE BLK L26MM SH C013D

## (undated) DEVICE — INTENDED FOR TISSUE SEPARATION, AND OTHER PROCEDURES THAT REQUIRE A SHARP SURGICAL BLADE TO PUNCTURE OR CUT.: Brand: BARD-PARKER ® STAINLESS STEEL BLADES

## (undated) DEVICE — SUTURE PERMAHAND SZ 4-0 L18IN NONABSORBABLE BLK SILK BRAID A183H

## (undated) DEVICE — SUTURE BOOT: Brand: DEROYAL

## (undated) DEVICE — Device: Brand: MEDEX

## (undated) DEVICE — MAJOR SET UP PK

## (undated) DEVICE — DRAPE ADOLESCENT  LAPAROTOMY

## (undated) DEVICE — SUTURE NONABSORBABLE MONOFILAMENT 7-0 BV-1 1X24 IN PROLENE 8702H

## (undated) DEVICE — GOWN SIRUS NONREIN XL W/TWL: Brand: MEDLINE INDUSTRIES, INC.

## (undated) DEVICE — DECANTER: Brand: UNBRANDED

## (undated) DEVICE — SUTURE PROL SZ 7-0 L18IN NONABSORBABLE BLU L9.3MM BV-1 3/8 8701H

## (undated) DEVICE — MONITOR LN W LUER LOK 72

## (undated) DEVICE — SPONGES GAUZE X-RAY 4X4 16PLY

## (undated) DEVICE — CHLORAPREP 26ML ORANGE

## (undated) DEVICE — KIT OR ROOM TURNOVER W/STRAP

## (undated) DEVICE — SYRINGE, LUER LOCK, 10ML: Brand: MEDLINE

## (undated) DEVICE — APPLIER CLP L9.375IN APER 2.1MM CLS L3.8MM 20 SM TI CLP